# Patient Record
Sex: FEMALE | Race: ASIAN | NOT HISPANIC OR LATINO | URBAN - METROPOLITAN AREA
[De-identification: names, ages, dates, MRNs, and addresses within clinical notes are randomized per-mention and may not be internally consistent; named-entity substitution may affect disease eponyms.]

---

## 2018-06-25 ENCOUNTER — EMERGENCY (EMERGENCY)
Facility: HOSPITAL | Age: 28
LOS: 1 days | Discharge: HOME | End: 2018-06-25
Attending: EMERGENCY MEDICINE | Admitting: EMERGENCY MEDICINE
Payer: COMMERCIAL

## 2018-06-25 VITALS
SYSTOLIC BLOOD PRESSURE: 102 MMHG | HEART RATE: 61 BPM | OXYGEN SATURATION: 99 % | DIASTOLIC BLOOD PRESSURE: 55 MMHG | RESPIRATION RATE: 16 BRPM | TEMPERATURE: 99 F

## 2018-06-25 VITALS
SYSTOLIC BLOOD PRESSURE: 104 MMHG | OXYGEN SATURATION: 98 % | TEMPERATURE: 100 F | RESPIRATION RATE: 18 BRPM | DIASTOLIC BLOOD PRESSURE: 71 MMHG | HEART RATE: 77 BPM

## 2018-06-25 VITALS
HEART RATE: 76 BPM | OXYGEN SATURATION: 98 % | TEMPERATURE: 98 F | SYSTOLIC BLOOD PRESSURE: 84 MMHG | DIASTOLIC BLOOD PRESSURE: 54 MMHG | RESPIRATION RATE: 18 BRPM

## 2018-06-25 DIAGNOSIS — J18.9 PNEUMONIA, UNSPECIFIED ORGANISM: ICD-10-CM

## 2018-06-25 DIAGNOSIS — M32.9 SYSTEMIC LUPUS ERYTHEMATOSUS, UNSPECIFIED: ICD-10-CM

## 2018-06-25 DIAGNOSIS — R51 HEADACHE: ICD-10-CM

## 2018-06-25 DIAGNOSIS — Z88.2 ALLERGY STATUS TO SULFONAMIDES: ICD-10-CM

## 2018-06-25 DIAGNOSIS — R63.0 ANOREXIA: ICD-10-CM

## 2018-06-25 DIAGNOSIS — R53.1 WEAKNESS: ICD-10-CM

## 2018-06-25 DIAGNOSIS — R42 DIZZINESS AND GIDDINESS: ICD-10-CM

## 2018-06-25 LAB
ALBUMIN SERPL ELPH-MCNC: 3.5 G/DL — SIGNIFICANT CHANGE UP (ref 3.4–5)
ALP SERPL-CCNC: 43 U/L — SIGNIFICANT CHANGE UP (ref 40–120)
ALT FLD-CCNC: 29 U/L — SIGNIFICANT CHANGE UP (ref 12–42)
AMYLASE P1 CFR SERPL: 97 U/L — SIGNIFICANT CHANGE UP (ref 25–115)
ANION GAP SERPL CALC-SCNC: 5 MMOL/L — LOW (ref 9–16)
APPEARANCE UR: CLEAR — SIGNIFICANT CHANGE UP
APTT BLD: 30.5 SEC — SIGNIFICANT CHANGE UP (ref 27.5–36.5)
AST SERPL-CCNC: 27 U/L — SIGNIFICANT CHANGE UP (ref 15–37)
BILIRUB SERPL-MCNC: 0.4 MG/DL — SIGNIFICANT CHANGE UP (ref 0.2–1.2)
BILIRUB UR-MCNC: NEGATIVE — SIGNIFICANT CHANGE UP
BUN SERPL-MCNC: 10 MG/DL — SIGNIFICANT CHANGE UP (ref 7–23)
CALCIUM SERPL-MCNC: 9.1 MG/DL — SIGNIFICANT CHANGE UP (ref 8.5–10.5)
CHLORIDE SERPL-SCNC: 103 MMOL/L — SIGNIFICANT CHANGE UP (ref 96–108)
CO2 SERPL-SCNC: 29 MMOL/L — SIGNIFICANT CHANGE UP (ref 22–31)
COLOR SPEC: YELLOW — SIGNIFICANT CHANGE UP
CREAT SERPL-MCNC: 0.94 MG/DL — SIGNIFICANT CHANGE UP (ref 0.5–1.3)
D DIMER BLD IA.RAPID-MCNC: 257 NG/ML DDU — HIGH
DIFF PNL FLD: NEGATIVE — SIGNIFICANT CHANGE UP
GLUCOSE SERPL-MCNC: 103 MG/DL — HIGH (ref 70–99)
GLUCOSE UR QL: NEGATIVE — SIGNIFICANT CHANGE UP
HCG UR QL: NEGATIVE — SIGNIFICANT CHANGE UP
HCT VFR BLD CALC: 29.2 % — LOW (ref 34.5–45)
HCT VFR BLD CALC: 31.7 % — LOW (ref 34.5–45)
HGB BLD-MCNC: 10 G/DL — LOW (ref 11.5–15.5)
HGB BLD-MCNC: 10.7 G/DL — LOW (ref 11.5–15.5)
INR BLD: 1.05 — SIGNIFICANT CHANGE UP (ref 0.88–1.16)
KETONES UR-MCNC: NEGATIVE — SIGNIFICANT CHANGE UP
LACTATE SERPL-SCNC: 1.2 MMOL/L — SIGNIFICANT CHANGE UP (ref 0.4–2)
LEUKOCYTE ESTERASE UR-ACNC: NEGATIVE — SIGNIFICANT CHANGE UP
LIDOCAIN IGE QN: 201 U/L — SIGNIFICANT CHANGE UP (ref 73–393)
LIDOCAIN IGE QN: 256 U/L — SIGNIFICANT CHANGE UP (ref 73–393)
LYMPHOCYTES # BLD AUTO: 25 % — SIGNIFICANT CHANGE UP (ref 13–44)
MCHC RBC-ENTMCNC: 30.6 PG — SIGNIFICANT CHANGE UP (ref 27–34)
MCHC RBC-ENTMCNC: 31.1 PG — SIGNIFICANT CHANGE UP (ref 27–34)
MCHC RBC-ENTMCNC: 33.8 G/DL — SIGNIFICANT CHANGE UP (ref 32–36)
MCHC RBC-ENTMCNC: 34.2 G/DL — SIGNIFICANT CHANGE UP (ref 32–36)
MCV RBC AUTO: 90.6 FL — SIGNIFICANT CHANGE UP (ref 80–100)
MCV RBC AUTO: 90.7 FL — SIGNIFICANT CHANGE UP (ref 80–100)
MONOCYTES NFR BLD AUTO: 3 % — SIGNIFICANT CHANGE UP (ref 2–14)
NEUTROPHILS NFR BLD AUTO: 65 % — SIGNIFICANT CHANGE UP (ref 43–77)
NITRITE UR-MCNC: NEGATIVE — SIGNIFICANT CHANGE UP
PCP SPEC-MCNC: SIGNIFICANT CHANGE UP
PH UR: 6.5 — SIGNIFICANT CHANGE UP (ref 5–8)
PLATELET # BLD AUTO: 109 K/UL — LOW (ref 150–400)
PLATELET # BLD AUTO: 90 K/UL — LOW (ref 150–400)
POTASSIUM SERPL-MCNC: 3.9 MMOL/L — SIGNIFICANT CHANGE UP (ref 3.5–5.3)
POTASSIUM SERPL-SCNC: 3.9 MMOL/L — SIGNIFICANT CHANGE UP (ref 3.5–5.3)
PROT SERPL-MCNC: 8.7 G/DL — HIGH (ref 6.4–8.2)
PROT UR-MCNC: NEGATIVE MG/DL — SIGNIFICANT CHANGE UP
PROTHROM AB SERPL-ACNC: 11.6 SEC — SIGNIFICANT CHANGE UP (ref 9.8–12.7)
RBC # BLD: 3.22 M/UL — LOW (ref 3.8–5.2)
RBC # BLD: 3.5 M/UL — LOW (ref 3.8–5.2)
RBC # FLD: 11.2 % — SIGNIFICANT CHANGE UP (ref 10.3–16.9)
RBC # FLD: 11.2 % — SIGNIFICANT CHANGE UP (ref 10.3–16.9)
SODIUM SERPL-SCNC: 137 MMOL/L — SIGNIFICANT CHANGE UP (ref 132–145)
SP GR SPEC: 1.02 — SIGNIFICANT CHANGE UP (ref 1–1.03)
UROBILINOGEN FLD QL: 0.2 E.U./DL — SIGNIFICANT CHANGE UP
WBC # BLD: 3.1 K/UL — LOW (ref 3.8–10.5)
WBC # BLD: 3.7 K/UL — LOW (ref 3.8–10.5)
WBC # FLD AUTO: 3.1 K/UL — LOW (ref 3.8–10.5)
WBC # FLD AUTO: 3.7 K/UL — LOW (ref 3.8–10.5)

## 2018-06-25 PROCEDURE — 74177 CT ABD & PELVIS W/CONTRAST: CPT | Mod: 26

## 2018-06-25 PROCEDURE — 93010 ELECTROCARDIOGRAM REPORT: CPT | Mod: 59

## 2018-06-25 PROCEDURE — 62270 DX LMBR SPI PNXR: CPT

## 2018-06-25 PROCEDURE — 71260 CT THORAX DX C+: CPT | Mod: 26

## 2018-06-25 PROCEDURE — 70496 CT ANGIOGRAPHY HEAD: CPT | Mod: 26

## 2018-06-25 PROCEDURE — 99284 EMERGENCY DEPT VISIT MOD MDM: CPT

## 2018-06-25 PROCEDURE — 70498 CT ANGIOGRAPHY NECK: CPT | Mod: 26

## 2018-06-25 PROCEDURE — 70450 CT HEAD/BRAIN W/O DYE: CPT | Mod: 26,59

## 2018-06-25 PROCEDURE — 99291 CRITICAL CARE FIRST HOUR: CPT | Mod: 25

## 2018-06-25 PROCEDURE — 71046 X-RAY EXAM CHEST 2 VIEWS: CPT | Mod: 26

## 2018-06-25 RX ORDER — SODIUM CHLORIDE 9 MG/ML
2000 INJECTION INTRAMUSCULAR; INTRAVENOUS; SUBCUTANEOUS ONCE
Qty: 0 | Refills: 0 | Status: COMPLETED | OUTPATIENT
Start: 2018-06-25 | End: 2018-06-25

## 2018-06-25 RX ORDER — ACETAMINOPHEN 500 MG
650 TABLET ORAL ONCE
Qty: 0 | Refills: 0 | Status: COMPLETED | OUTPATIENT
Start: 2018-06-25 | End: 2018-06-25

## 2018-06-25 RX ORDER — CEFTRIAXONE 500 MG/1
2 INJECTION, POWDER, FOR SOLUTION INTRAMUSCULAR; INTRAVENOUS ONCE
Qty: 0 | Refills: 0 | Status: COMPLETED | OUTPATIENT
Start: 2018-06-25 | End: 2018-06-25

## 2018-06-25 RX ORDER — AZITHROMYCIN 500 MG/1
1 TABLET, FILM COATED ORAL
Qty: 4 | Refills: 0 | OUTPATIENT
Start: 2018-06-25 | End: 2018-06-28

## 2018-06-25 RX ORDER — KETOROLAC TROMETHAMINE 30 MG/ML
15 SYRINGE (ML) INJECTION ONCE
Qty: 0 | Refills: 0 | Status: DISCONTINUED | OUTPATIENT
Start: 2018-06-25 | End: 2018-06-25

## 2018-06-25 RX ORDER — AZITHROMYCIN 500 MG/1
500 TABLET, FILM COATED ORAL ONCE
Qty: 0 | Refills: 0 | Status: COMPLETED | OUTPATIENT
Start: 2018-06-25 | End: 2018-06-25

## 2018-06-25 RX ORDER — DIPHENHYDRAMINE HCL 50 MG
25 CAPSULE ORAL ONCE
Qty: 0 | Refills: 0 | Status: COMPLETED | OUTPATIENT
Start: 2018-06-25 | End: 2018-06-25

## 2018-06-25 RX ORDER — METOCLOPRAMIDE HCL 10 MG
5 TABLET ORAL ONCE
Qty: 0 | Refills: 0 | Status: COMPLETED | OUTPATIENT
Start: 2018-06-25 | End: 2018-06-25

## 2018-06-25 RX ADMIN — CEFTRIAXONE 100 GRAM(S): 500 INJECTION, POWDER, FOR SOLUTION INTRAMUSCULAR; INTRAVENOUS at 20:31

## 2018-06-25 RX ADMIN — SODIUM CHLORIDE 1000 MILLILITER(S): 9 INJECTION INTRAMUSCULAR; INTRAVENOUS; SUBCUTANEOUS at 15:24

## 2018-06-25 RX ADMIN — SODIUM CHLORIDE 2000 MILLILITER(S): 9 INJECTION INTRAMUSCULAR; INTRAVENOUS; SUBCUTANEOUS at 20:31

## 2018-06-25 RX ADMIN — Medication 15 MILLIGRAM(S): at 15:25

## 2018-06-25 RX ADMIN — AZITHROMYCIN 255 MILLIGRAM(S): 500 TABLET, FILM COATED ORAL at 18:20

## 2018-06-25 RX ADMIN — SODIUM CHLORIDE 2000 MILLILITER(S): 9 INJECTION INTRAMUSCULAR; INTRAVENOUS; SUBCUTANEOUS at 21:31

## 2018-06-25 RX ADMIN — Medication 650 MILLIGRAM(S): at 20:31

## 2018-06-25 RX ADMIN — Medication 5 MILLIGRAM(S): at 15:23

## 2018-06-25 RX ADMIN — CEFTRIAXONE 2 GRAM(S): 500 INJECTION, POWDER, FOR SOLUTION INTRAMUSCULAR; INTRAVENOUS at 21:00

## 2018-06-25 RX ADMIN — Medication 25 MILLIGRAM(S): at 15:23

## 2018-06-25 NOTE — ED ADULT NURSE NOTE - OBJECTIVE STATEMENT
c/o very bad headache with nausea, chills, and weakness x 1 week. denies CP, SOB, fever/ urinary/bowel s/s. pt in NAD, friends at bedside, and will continue to monitor.

## 2018-06-25 NOTE — ED PROVIDER NOTE - CRITICAL CARE PROVIDED
additional history taking/interpretation of diagnostic studies/documentation/consultation with other physicians/direct patient care (not related to procedure)

## 2018-06-25 NOTE — ED PROVIDER NOTE - PHYSICAL EXAMINATION
Comfortable, no acute distress  NCAT  PERRL, EOMI  RRR  CTAB  soft, NTND  skin normal, no rashes  AAOx3, motor/sensory grossly intact   no meningeal signs

## 2018-06-25 NOTE — ED PROVIDER NOTE - OBJECTIVE STATEMENT
29 y/o F w/ PMHx of Lupus presents to ED w/ c/o ha, di 29 y/o F w/ PMHx of Lupus on prednisone and hydroxychloroquine presents to ED w/ c/o ha and dizziness since this morning. Pt states that ha is really painful, specially when staying still. Pt has been having ha but today ha will not go away. She also reports stomach pain w/ loss of appetite. States she felt febrile but did not take temperature.  Pt also reports that she has not taken medications for a while. She recently had to change PCP due to change of insurance, has appointment next week w/ Dr Hanna Billy from [place].

## 2018-06-25 NOTE — ED PROVIDER NOTE - ATTESTATION, MLM
Pt got her test results (labs) and would like to further discuss   Pt is asking if the Nurse can call back and explain them TODAY   Please advise I have reviewed and confirmed nurses' notes for patient's medications, allergies, medical history, and surgical history.

## 2018-06-25 NOTE — ED PROVIDER NOTE - DIAGNOSTIC INTERPRETATION
Chest x-ray interpreted by ER Physician Dr. Pittman  Findings: heart size normal, no infiltrates, lungs fully expanded, soft tissues appear normal.

## 2018-06-25 NOTE — ED ADULT NURSE NOTE - CHPI ED SYMPTOMS NEG
no fever/no confusion/no loss of consciousness/no weakness/no vomiting/no change in level of consciousness/no numbness

## 2018-06-25 NOTE — ED PROVIDER NOTE - OBJECTIVE STATEMENT
pt with h/o SLE, seen today for headache, xray with possible infiltrate and treated with azithro, upon discharge pt had syncopal episode in front of hospital, SBP 80s upon triage, pt complains of 10/10 headache, frontal, thorbbing, some photophobia and neck stiffness, subjective fever, no CP/SOB, she does report a few weeks of abdominal pain, diffuse, crampy, no n/v/d, no rash, no sick contacts, no leg pain or swelling

## 2018-06-25 NOTE — ED PROVIDER NOTE - MEDICAL DECISION MAKING DETAILS
pt w/ ha and dizziness, will do blood work, CT head, and give IV fluids. pt w/ hx of lupus presenting w/ c/o ha and dizziness, will do blood work, CT head, and give IV fluids. pt w/ hx of lupus presenting w/ c/o ha and dizziness, will do blood work, CT head, and give IV fluids.  Pt with infiltrate on CXR as per radiologist, she will receive azithromycin.  She feels better after fluids and ketorolac and reglan.  IV azithro given 500mg in ED

## 2018-06-26 ENCOUNTER — INPATIENT (INPATIENT)
Facility: HOSPITAL | Age: 28
LOS: 7 days | Discharge: HOME | DRG: 546 | End: 2018-07-04
Attending: STUDENT IN AN ORGANIZED HEALTH CARE EDUCATION/TRAINING PROGRAM | Admitting: STUDENT IN AN ORGANIZED HEALTH CARE EDUCATION/TRAINING PROGRAM
Payer: COMMERCIAL

## 2018-06-26 DIAGNOSIS — D63.8 ANEMIA IN OTHER CHRONIC DISEASES CLASSIFIED ELSEWHERE: ICD-10-CM

## 2018-06-26 DIAGNOSIS — A41.9 SEPSIS, UNSPECIFIED ORGANISM: ICD-10-CM

## 2018-06-26 DIAGNOSIS — J18.9 PNEUMONIA, UNSPECIFIED ORGANISM: ICD-10-CM

## 2018-06-26 DIAGNOSIS — E03.8 OTHER SPECIFIED HYPOTHYROIDISM: ICD-10-CM

## 2018-06-26 DIAGNOSIS — R55 SYNCOPE AND COLLAPSE: ICD-10-CM

## 2018-06-26 DIAGNOSIS — Z29.9 ENCOUNTER FOR PROPHYLACTIC MEASURES, UNSPECIFIED: ICD-10-CM

## 2018-06-26 DIAGNOSIS — R63.8 OTHER SYMPTOMS AND SIGNS CONCERNING FOOD AND FLUID INTAKE: ICD-10-CM

## 2018-06-26 DIAGNOSIS — M32.9 SYSTEMIC LUPUS ERYTHEMATOSUS, UNSPECIFIED: ICD-10-CM

## 2018-06-26 DIAGNOSIS — D72.819 DECREASED WHITE BLOOD CELL COUNT, UNSPECIFIED: ICD-10-CM

## 2018-06-26 LAB
ALBUMIN SERPL ELPH-MCNC: 2.7 G/DL — LOW (ref 3.3–5)
ALP SERPL-CCNC: 28 U/L — LOW (ref 40–120)
ALT FLD-CCNC: 12 U/L — SIGNIFICANT CHANGE UP (ref 10–45)
ANION GAP SERPL CALC-SCNC: 9 MMOL/L — SIGNIFICANT CHANGE UP (ref 5–17)
APPEARANCE CSF: CLEAR — SIGNIFICANT CHANGE UP
APPEARANCE SPUN FLD: COLORLESS — SIGNIFICANT CHANGE UP
APPEARANCE UR: CLEAR — SIGNIFICANT CHANGE UP
AST SERPL-CCNC: 16 U/L — SIGNIFICANT CHANGE UP (ref 10–40)
BACTERIAL AG PNL SER: 0 % — SIGNIFICANT CHANGE UP
BILIRUB SERPL-MCNC: 0.2 MG/DL — SIGNIFICANT CHANGE UP (ref 0.2–1.2)
BILIRUB UR-MCNC: NEGATIVE — SIGNIFICANT CHANGE UP
BUN SERPL-MCNC: 12 MG/DL — SIGNIFICANT CHANGE UP (ref 7–23)
CALCIUM SERPL-MCNC: 7.7 MG/DL — LOW (ref 8.4–10.5)
CHLORIDE SERPL-SCNC: 111 MMOL/L — HIGH (ref 96–108)
CO2 SERPL-SCNC: 21 MMOL/L — LOW (ref 22–31)
COLOR CSF: CLEAR — SIGNIFICANT CHANGE UP
COLOR SPEC: YELLOW — SIGNIFICANT CHANGE UP
CREAT SERPL-MCNC: 0.77 MG/DL — SIGNIFICANT CHANGE UP (ref 0.5–1.3)
CULTURE RESULTS: SIGNIFICANT CHANGE UP
DIFF PNL FLD: NEGATIVE — SIGNIFICANT CHANGE UP
EOSINOPHIL # CSF: 0 % — SIGNIFICANT CHANGE UP
GLUCOSE CSF-MCNC: 45 MG/DL — SIGNIFICANT CHANGE UP (ref 40–70)
GLUCOSE SERPL-MCNC: 105 MG/DL — HIGH (ref 70–99)
GLUCOSE UR QL: NEGATIVE — SIGNIFICANT CHANGE UP
GRAM STN FLD: SIGNIFICANT CHANGE UP
HBA1C BLD-MCNC: 5.4 % — SIGNIFICANT CHANGE UP (ref 4–5.6)
HCT VFR BLD CALC: 29 % — LOW (ref 34.5–45)
HGB BLD-MCNC: 9.9 G/DL — LOW (ref 11.5–15.5)
HIV 1+2 AB+HIV1 P24 AG SERPL QL IA: SIGNIFICANT CHANGE UP
KETONES UR-MCNC: 15 MG/DL
LEUKOCYTE ESTERASE UR-ACNC: NEGATIVE — SIGNIFICANT CHANGE UP
LYMPHOCYTES # CSF: 7 % — LOW (ref 40–80)
MAGNESIUM SERPL-MCNC: 1.7 MG/DL — SIGNIFICANT CHANGE UP (ref 1.6–2.6)
MCHC RBC-ENTMCNC: 30.9 PG — SIGNIFICANT CHANGE UP (ref 27–34)
MCHC RBC-ENTMCNC: 34.1 G/DL — SIGNIFICANT CHANGE UP (ref 32–36)
MCV RBC AUTO: 90.6 FL — SIGNIFICANT CHANGE UP (ref 80–100)
MONOS+MACROS NFR CSF: 1 % — LOW (ref 15–45)
NEUTROPHILS # CSF: 92 % — HIGH (ref 0–6)
NITRITE UR-MCNC: NEGATIVE — SIGNIFICANT CHANGE UP
NRBC NFR CSF: 0 % — SIGNIFICANT CHANGE UP (ref 0–0)
NRBC NFR CSF: 57 /UL — HIGH
OTHER CELLS CSF MANUAL: 0 % — SIGNIFICANT CHANGE UP
PH UR: 5.5 — SIGNIFICANT CHANGE UP (ref 5–8)
PHOSPHATE SERPL-MCNC: 3.2 MG/DL — SIGNIFICANT CHANGE UP (ref 2.5–4.5)
PLATELET # BLD AUTO: 96 K/UL — LOW (ref 150–400)
POTASSIUM SERPL-MCNC: 4 MMOL/L — SIGNIFICANT CHANGE UP (ref 3.5–5.3)
POTASSIUM SERPL-SCNC: 4 MMOL/L — SIGNIFICANT CHANGE UP (ref 3.5–5.3)
PROT CSF-MCNC: 55 MG/DL — HIGH (ref 15–45)
PROT SERPL-MCNC: 5.9 G/DL — LOW (ref 6–8.3)
PROT UR-MCNC: NEGATIVE MG/DL — SIGNIFICANT CHANGE UP
RAPID RVP RESULT: SIGNIFICANT CHANGE UP
RBC # BLD: 3.2 M/UL — LOW (ref 3.8–5.2)
RBC # BLD: 3.2 M/UL — LOW (ref 3.8–5.2)
RBC # CSF: 12 /UL — HIGH (ref 0–0)
RBC # FLD: 11.3 % — SIGNIFICANT CHANGE UP (ref 10.3–16.9)
RETICS/RBC NFR: 0.8 % — SIGNIFICANT CHANGE UP (ref 0.5–2.5)
SODIUM SERPL-SCNC: 141 MMOL/L — SIGNIFICANT CHANGE UP (ref 135–145)
SP GR SPEC: <=1.005 — SIGNIFICANT CHANGE UP (ref 1–1.03)
SPECIMEN SOURCE: SIGNIFICANT CHANGE UP
SPECIMEN SOURCE: SIGNIFICANT CHANGE UP
T4 FREE SERPL-MCNC: 0.76 NG/DL — SIGNIFICANT CHANGE UP (ref 0.7–1.48)
TSH SERPL-MCNC: 0.26 UIU/ML — LOW (ref 0.35–4.94)
TUBE TYPE: SIGNIFICANT CHANGE UP
UROBILINOGEN FLD QL: 0.2 E.U./DL — SIGNIFICANT CHANGE UP
WBC # BLD: 2.1 K/UL — LOW (ref 3.8–10.5)
WBC # FLD AUTO: 2.1 K/UL — LOW (ref 3.8–10.5)

## 2018-06-26 PROCEDURE — 99223 1ST HOSP IP/OBS HIGH 75: CPT | Mod: GC

## 2018-06-26 PROCEDURE — 99233 SBSQ HOSP IP/OBS HIGH 50: CPT

## 2018-06-26 RX ORDER — HEPARIN SODIUM 5000 [USP'U]/ML
5000 INJECTION INTRAVENOUS; SUBCUTANEOUS EVERY 8 HOURS
Qty: 0 | Refills: 0 | Status: DISCONTINUED | OUTPATIENT
Start: 2018-06-26 | End: 2018-06-26

## 2018-06-26 RX ORDER — CEFTRIAXONE 500 MG/1
2 INJECTION, POWDER, FOR SOLUTION INTRAMUSCULAR; INTRAVENOUS EVERY 12 HOURS
Qty: 0 | Refills: 0 | Status: DISCONTINUED | OUTPATIENT
Start: 2018-06-26 | End: 2018-06-29

## 2018-06-26 RX ORDER — VANCOMYCIN HCL 1 G
1000 VIAL (EA) INTRAVENOUS ONCE
Qty: 0 | Refills: 0 | Status: COMPLETED | OUTPATIENT
Start: 2018-06-26 | End: 2018-06-26

## 2018-06-26 RX ORDER — VANCOMYCIN HCL 1 G
1000 VIAL (EA) INTRAVENOUS EVERY 12 HOURS
Qty: 0 | Refills: 0 | Status: DISCONTINUED | OUTPATIENT
Start: 2018-06-26 | End: 2018-06-27

## 2018-06-26 RX ORDER — ACYCLOVIR SODIUM 500 MG
500 VIAL (EA) INTRAVENOUS ONCE
Qty: 0 | Refills: 0 | Status: COMPLETED | OUTPATIENT
Start: 2018-06-26 | End: 2018-06-26

## 2018-06-26 RX ORDER — LIDOCAINE 4 G/100G
1 CREAM TOPICAL DAILY
Qty: 0 | Refills: 0 | Status: DISCONTINUED | OUTPATIENT
Start: 2018-06-26 | End: 2018-07-04

## 2018-06-26 RX ORDER — HYDROCORTISONE 20 MG
50 TABLET ORAL EVERY 8 HOURS
Qty: 0 | Refills: 0 | Status: COMPLETED | OUTPATIENT
Start: 2018-06-26 | End: 2018-06-27

## 2018-06-26 RX ORDER — KETOROLAC TROMETHAMINE 30 MG/ML
30 SYRINGE (ML) INJECTION ONCE
Qty: 0 | Refills: 0 | Status: DISCONTINUED | OUTPATIENT
Start: 2018-06-26 | End: 2018-06-26

## 2018-06-26 RX ORDER — HYDROCORTISONE 20 MG
50 TABLET ORAL ONCE
Qty: 0 | Refills: 0 | Status: COMPLETED | OUTPATIENT
Start: 2018-06-26 | End: 2018-06-26

## 2018-06-26 RX ORDER — SODIUM CHLORIDE 9 MG/ML
1000 INJECTION INTRAMUSCULAR; INTRAVENOUS; SUBCUTANEOUS
Qty: 0 | Refills: 0 | Status: DISCONTINUED | OUTPATIENT
Start: 2018-06-26 | End: 2018-06-27

## 2018-06-26 RX ORDER — POLYETHYLENE GLYCOL 3350 17 G/17G
17 POWDER, FOR SOLUTION ORAL DAILY
Qty: 0 | Refills: 0 | Status: DISCONTINUED | OUTPATIENT
Start: 2018-06-26 | End: 2018-06-30

## 2018-06-26 RX ORDER — ACETAMINOPHEN 500 MG
650 TABLET ORAL ONCE
Qty: 0 | Refills: 0 | Status: COMPLETED | OUTPATIENT
Start: 2018-06-26 | End: 2018-06-26

## 2018-06-26 RX ORDER — SIMETHICONE 80 MG/1
80 TABLET, CHEWABLE ORAL ONCE
Qty: 0 | Refills: 0 | Status: COMPLETED | OUTPATIENT
Start: 2018-06-26 | End: 2018-06-26

## 2018-06-26 RX ORDER — HYDROCORTISONE 20 MG
50 TABLET ORAL EVERY 8 HOURS
Qty: 0 | Refills: 0 | Status: DISCONTINUED | OUTPATIENT
Start: 2018-06-26 | End: 2018-06-26

## 2018-06-26 RX ORDER — CEFTRIAXONE 500 MG/1
2 INJECTION, POWDER, FOR SOLUTION INTRAMUSCULAR; INTRAVENOUS EVERY 24 HOURS
Qty: 0 | Refills: 0 | Status: DISCONTINUED | OUTPATIENT
Start: 2018-06-26 | End: 2018-06-26

## 2018-06-26 RX ORDER — SODIUM CHLORIDE 9 MG/ML
5 INJECTION INTRAMUSCULAR; INTRAVENOUS; SUBCUTANEOUS
Qty: 0 | Refills: 0 | Status: DISCONTINUED | OUTPATIENT
Start: 2018-06-26 | End: 2018-07-04

## 2018-06-26 RX ADMIN — Medication 50 MILLIGRAM(S): at 19:49

## 2018-06-26 RX ADMIN — HEPARIN SODIUM 5000 UNIT(S): 5000 INJECTION INTRAVENOUS; SUBCUTANEOUS at 14:39

## 2018-06-26 RX ADMIN — Medication 650 MILLIGRAM(S): at 21:59

## 2018-06-26 RX ADMIN — Medication 50 MILLIGRAM(S): at 04:16

## 2018-06-26 RX ADMIN — Medication 30 MILLIGRAM(S): at 04:30

## 2018-06-26 RX ADMIN — POLYETHYLENE GLYCOL 3350 17 GRAM(S): 17 POWDER, FOR SOLUTION ORAL at 21:59

## 2018-06-26 RX ADMIN — CEFTRIAXONE 100 GRAM(S): 500 INJECTION, POWDER, FOR SOLUTION INTRAMUSCULAR; INTRAVENOUS at 18:01

## 2018-06-26 RX ADMIN — Medication 650 MILLIGRAM(S): at 23:00

## 2018-06-26 RX ADMIN — CEFTRIAXONE 100 GRAM(S): 500 INJECTION, POWDER, FOR SOLUTION INTRAMUSCULAR; INTRAVENOUS at 05:51

## 2018-06-26 RX ADMIN — Medication 250 MILLIGRAM(S): at 02:42

## 2018-06-26 RX ADMIN — SODIUM CHLORIDE 100 MILLILITER(S): 9 INJECTION INTRAMUSCULAR; INTRAVENOUS; SUBCUTANEOUS at 06:57

## 2018-06-26 RX ADMIN — Medication 250 MILLIGRAM(S): at 14:39

## 2018-06-26 RX ADMIN — Medication 1000 MILLIGRAM(S): at 03:42

## 2018-06-26 RX ADMIN — Medication 110 MILLIGRAM(S): at 04:16

## 2018-06-26 RX ADMIN — SODIUM CHLORIDE 5 MILLILITER(S): 9 INJECTION INTRAMUSCULAR; INTRAVENOUS; SUBCUTANEOUS at 18:00

## 2018-06-26 RX ADMIN — Medication 50 MILLIGRAM(S): at 11:43

## 2018-06-26 RX ADMIN — Medication 500 MILLIGRAM(S): at 05:16

## 2018-06-26 RX ADMIN — SIMETHICONE 80 MILLIGRAM(S): 80 TABLET, CHEWABLE ORAL at 21:59

## 2018-06-26 RX ADMIN — LIDOCAINE 1 PATCH: 4 CREAM TOPICAL at 21:59

## 2018-06-26 NOTE — H&P ADULT - PROBLEM SELECTOR PLAN 3
- Patient with lupus, takes hydroxychloroquine and prednisone as home medications  - Consider immunosuppression in workup of infectious etiology  - Hold prednisone and hydroxychloroquine at present given sepsis  - SoluCortef 50mg IV q8h - Patient with episode of syncope, shortly afterward febrile to 103F  - Likely due to sepsis as patient hypotensive as well  - low threshold for cardiac workup   - Continue telemetry - Patient with episode of syncope, shortly afterward febrile to 103F  - Likely due to sepsis as patient hypotensive as well  - low threshold for cardiac workup   - Continue telemetry  - CT head negative and CTA negative

## 2018-06-26 NOTE — PROGRESS NOTE ADULT - PROBLEM SELECTOR PLAN 6
Noted leukopenia Noted leukopenia 3.1. VTE: HSQ    FULL CODE    Dispo: Admitted to [facility] for sepsis of unclear etiology though concern for opportunistic infection vs viral infection and up for step down to [facility].

## 2018-06-26 NOTE — PROGRESS NOTE ADULT - SUBJECTIVE AND OBJECTIVE BOX
Transfer Note: [facility] to [facility]    Hospital Course:   28F with PMH SLE who presented with headache and syncopal event. Reports that headache lasted 1 day 8.5/10, band-like distribution without radiation. Denied photophobia.  Patient reported that she has had cough- recent visit to ED day prior to admission for which patient given abx with azithromycin but never initiated as had syncopal event en route to picking up prescription Of note, patient reports over the last month she notes worsening generalized weakness and fatigue. Also reported 1 month of unintentional weight loss of 5lbs with early satiety, and night sweats. In the ED Noted febrile to 103F (rectal), HR P76, BP notable for 84/54, RR18 sat 98% on room air. S/p 2L NS, ceftriaxone 2g empiric coverage. Labs notable for WBC 3.7, Hb 10.7, platelets 109, UA and UTox negative, CT/CTA head negative. LP performed in ED, CSF with nucleated cells, with RBC and neutrophils, glucose WNL, mild elevation of protein- given no photophobia, no kernig/brudzinski with low suspicion for meningitis. CT chest with RUL with atypical nodule with tree in bud pattern. Patient admitted to [facility] for sepsis with concern for opportunistic infection vs TB given immunosuppression. Started on Vanc and ceftriaxone for broad abx coverage as well as solucortef. Sent off viral panel. BP improved with IVF. Patient with clinical improvement determined stable for step down to [facility].     Subjective/ROS: Patient reports improvement in her headache. With movement she experiences worsening of her headache and occasional dizziness. She endorses intermittent cough with occasional yellow phlegm, constipation, abdominal pain, and chills. Denies photophobia, chest pain, shortness of breath, palpitations, nausea, vomiting, diarrhea, fevers, and chills.     VITAL SIGNS:  Vital Signs Last 24 Hrs  T(C): 37.2 (26 Jun 2018 11:35), Max: 39.4 (25 Jun 2018 20:35)  T(F): 99 (26 Jun 2018 11:35), Max: 103 (25 Jun 2018 20:35)  HR: 52 (26 Jun 2018 14:56) (52 - 76)  BP: 112/71 (26 Jun 2018 14:56) (84/54 - 140/70)  BP(mean): 85 (26 Jun 2018 14:56) (71 - 85)  RR: 14 (26 Jun 2018 14:56) (14 - 19)  SpO2: 99% (26 Jun 2018 14:56) (98% - 100%)    PHYSICAL EXAM:    General: Well dressed well nourished young woman sitting comfortably in bed. Non-toxic appearing   HEENT: NC/AT; EOMI, anicteric sclera; MMM  Neck: supple, no JVD, no submandibular or cervical lymphadenopathy, no nuchal rigidity; kernig/brudzinski negative   Cardiovascular: +S1/S2; bradycardic, no r/m/g  Respiratory: CTA B/L; no W/R/R  Back: LP site with bandage; non-tender to palpation   Gastrointestinal: soft, NT/ND; +BS  Extremities: WWP; no edema, clubbing or cyanosis  Vascular: 2+ radial, DP pulses B/L  Neurological: AAOx3; no focal deficits    MEDICATIONS:  MEDICATIONS  (STANDING):  cefTRIAXone   IVPB 2 Gram(s) IV Intermittent every 12 hours  heparin  Injectable 5000 Unit(s) SubCutaneous every 8 hours  hydrocortisone sodium succinate Injectable 50 milliGRAM(s) IV Push every 8 hours  sodium chloride 0.9%. 1000 milliLiter(s) (100 mL/Hr) IV Continuous <Continuous>  sodium chloride 3%  Inhalation 5 milliLiter(s) Inhalation two times a day  vancomycin  IVPB 1000 milliGRAM(s) IV Intermittent every 12 hours    MEDICATIONS  (PRN):      ALLERGIES:  Allergies    sulfa drugs (Rash)    Intolerances        LABS:                        9.9    2.1   )-----------( 96       ( 26 Jun 2018 10:41 )             29.0     06-26    141  |  111<H>  |  12  ----------------------------<  105<H>  4.0   |  21<L>  |  0.77    Ca    7.7<L>      26 Jun 2018 10:41  Phos  3.2     06-26  Mg     1.7     06-26    TPro  5.9<L>  /  Alb  2.7<L>  /  TBili  0.2  /  DBili  x   /  AST  16  /  ALT  12  /  AlkPhos  28<L>  06-26    PT/INR - ( 25 Jun 2018 15:20 )   PT: 11.6 sec;   INR: 1.05          PTT - ( 25 Jun 2018 15:20 )  PTT:30.5 sec  Urinalysis Basic - ( 26 Jun 2018 06:43 )    Color: Yellow / Appearance: Clear / SG: <=1.005 / pH: x  Gluc: x / Ketone: 15 mg/dL  / Bili: Negative / Urobili: 0.2 E.U./dL   Blood: x / Protein: NEGATIVE mg/dL / Nitrite: NEGATIVE   Leuk Esterase: NEGATIVE / RBC: x / WBC x   Sq Epi: x / Non Sq Epi: x / Bacteria: x      CAPILLARY BLOOD GLUCOSE      POCT Blood Glucose.: 75 mg/dL (25 Jun 2018 20:46)      RADIOLOGY & ADDITIONAL TESTS: Reviewed. Transfer Note: [facility] to [facility]    Hospital Course:   28F with PMH SLE who presented with headache and syncopal event. Reports that headache lasted 1 day 8.5/10, band-like distribution without radiation. Denied photophobia.  Patient reported that she has had cough- recent visit to ED day prior to admission for which patient given abx with azithromycin but never initiated as had syncopal event en route to picking up prescription Of note, patient reports over the last month she notes worsening generalized weakness and fatigue. Also reported 1 month of unintentional weight loss of 5lbs with early satiety, and night sweats. In the ED Noted febrile to 103F (rectal), HR P76, BP notable for 84/54, RR18 sat 98% on room air. S/p 2L NS, ceftriaxone 2g empiric coverage. Labs notable for WBC 3.7, Hb 10.7, platelets 109, UA and UTox negative, CT/CTA head negative. LP performed in ED, CSF with nucleated cells, with RBC and neutrophils, glucose WNL, mild elevation of protein- given no photophobia, no kernig/brudzinski with low suspicion for meningitis. CT chest with RUL with atypical nodule with tree in bud pattern. Patient admitted to [facility] for sepsis with concern for opportunistic infection vs TB given immunosuppression. Started on Vanc and ceftriaxone for broad abx coverage as well as solucortef. Sent off viral panel. BP improved with IVF. Patient with clinical improvement determined stable for step down to [facility].     Subjective/ROS: Patient reports improvement in her headache. With movement she experiences worsening of her headache and occasional dizziness. She endorses intermittent cough with occasional yellow phlegm, constipation, abdominal pain, and chills. Denies photophobia, chest pain, shortness of breath, palpitations, nausea, vomiting, diarrhea, fevers.     VITAL SIGNS:  Vital Signs Last 24 Hrs  T(C): 37.2 (26 Jun 2018 11:35), Max: 39.4 (25 Jun 2018 20:35)  T(F): 99 (26 Jun 2018 11:35), Max: 103 (25 Jun 2018 20:35)  HR: 52 (26 Jun 2018 14:56) (52 - 76)  BP: 112/71 (26 Jun 2018 14:56) (84/54 - 140/70)  BP(mean): 85 (26 Jun 2018 14:56) (71 - 85)  RR: 14 (26 Jun 2018 14:56) (14 - 19)  SpO2: 99% (26 Jun 2018 14:56) (98% - 100%)    PHYSICAL EXAM:    General: Well dressed well nourished young woman sitting comfortably in bed. Non-toxic appearing   HEENT: NC/AT; EOMI, anicteric sclera; MMM  Neck: supple, no JVD, no submandibular or cervical lymphadenopathy, no nuchal rigidity; kernig/brudzinski negative   Cardiovascular: +S1/S2; bradycardic, no r/m/g  Respiratory: CTA B/L; no W/R/R  Back: LP site with bandage; non-tender to palpation   Gastrointestinal: soft, ND, tenderness to palpation in lower quadrants; +BS  Extremities: WWP; no edema, clubbing or cyanosis  Vascular: 2+ radial, DP pulses B/L  Neurological: AAOx3; no focal deficits    MEDICATIONS:  MEDICATIONS  (STANDING):  cefTRIAXone   IVPB 2 Gram(s) IV Intermittent every 12 hours  heparin  Injectable 5000 Unit(s) SubCutaneous every 8 hours  hydrocortisone sodium succinate Injectable 50 milliGRAM(s) IV Push every 8 hours  sodium chloride 0.9%. 1000 milliLiter(s) (100 mL/Hr) IV Continuous <Continuous>  sodium chloride 3%  Inhalation 5 milliLiter(s) Inhalation two times a day  vancomycin  IVPB 1000 milliGRAM(s) IV Intermittent every 12 hours    MEDICATIONS  (PRN):      ALLERGIES:  Allergies    sulfa drugs (Rash)    Intolerances        LABS:                        9.9    2.1   )-----------( 96       ( 26 Jun 2018 10:41 )             29.0     06-26    141  |  111<H>  |  12  ----------------------------<  105<H>  4.0   |  21<L>  |  0.77    Ca    7.7<L>      26 Jun 2018 10:41  Phos  3.2     06-26  Mg     1.7     06-26    TPro  5.9<L>  /  Alb  2.7<L>  /  TBili  0.2  /  DBili  x   /  AST  16  /  ALT  12  /  AlkPhos  28<L>  06-26    PT/INR - ( 25 Jun 2018 15:20 )   PT: 11.6 sec;   INR: 1.05          PTT - ( 25 Jun 2018 15:20 )  PTT:30.5 sec  Urinalysis Basic - ( 26 Jun 2018 06:43 )    Color: Yellow / Appearance: Clear / SG: <=1.005 / pH: x  Gluc: x / Ketone: 15 mg/dL  / Bili: Negative / Urobili: 0.2 E.U./dL   Blood: x / Protein: NEGATIVE mg/dL / Nitrite: NEGATIVE   Leuk Esterase: NEGATIVE / RBC: x / WBC x   Sq Epi: x / Non Sq Epi: x / Bacteria: x      CAPILLARY BLOOD GLUCOSE      POCT Blood Glucose.: 75 mg/dL (25 Jun 2018 20:46)      RADIOLOGY & ADDITIONAL TESTS: Reviewed.

## 2018-06-26 NOTE — H&P ADULT - PROBLEM SELECTOR PLAN 5
- Patient with likely chronic leukopenia due to SLE and hydroxychloroquine   - Trend CBC, continue to monitor - Hb 10 on arrival, likely due to chronic disease in setting of SLE  - Trend CBC  - Goal Hb >7

## 2018-06-26 NOTE — PROGRESS NOTE ADULT - PROBLEM SELECTOR PLAN 3
Hx of SLE on hydroxychloroquine, prednisone.  -Held hydroxychloroquine and prednisone on presentation. Continue with Solucortef

## 2018-06-26 NOTE — PROGRESS NOTE ADULT - PROBLEM SELECTOR PLAN 5
Noted leukopenia 3.1. Patient unsure of baseline- may be associated with SLE, hydroxychloroquine vs component of active infection. Patient reports having leukopenia in the past which her physician attributed to lupus.   -c/w monitoring CBC

## 2018-06-26 NOTE — H&P ADULT - PROBLEM SELECTOR PLAN 6
F: no IVF encourage PO intake  E Replete K<4 Mg<2  N regular diet    Ppx: SQH, SCD  Code full  Dispo [facility]  [facility] Dr. Hanna Billy (to establish care) [phone] - Patient with likely chronic leukopenia due to SLE and hydroxychloroquine   - Trend CBC, continue to monitor

## 2018-06-26 NOTE — PROGRESS NOTE ADULT - PROBLEM SELECTOR PLAN 2
Noted episode of syncope. Patient reports some dizziness with change in position- with no documented orthostatic- may be component of orthostatic hypotension. Denies chest pain, shortness of breath. EKG with no ischemic changes and negative trops

## 2018-06-26 NOTE — H&P ADULT - PROBLEM SELECTOR PLAN 4
- Hb 10 on arrival, likely due to chronic disease in setting of SLE  - Trend CBC  - Goal Hb >7 - Patient with lupus, takes hydroxychloroquine and prednisone as home medications  - Consider immunosuppression in workup of infectious etiology  - Hold prednisone and hydroxychloroquine at present given sepsis  - SoluCortef 50mg IV q8h

## 2018-06-26 NOTE — PROGRESS NOTE ADULT - PROBLEM SELECTOR PLAN 2
Noted episode of syncope. Patient reports some dizziness with change in position- with no documented orthostatic- may be component of orthostatic hypotension. Denies chest pain, shortness of breath. EKG with no ischemic changes and negative trops Noted episode of syncope. Patient reports some dizziness with change in position- with no documented orthostatic- may be component of orthostatic hypotension. Denies chest pain, shortness of breath. Trops negative.

## 2018-06-26 NOTE — PROGRESS NOTE ADULT - PROBLEM SELECTOR PLAN 8
F: S/p 100cc/h for 10h   E: Replete electrolytes to maintain K>4 and Mg>2  N:  Regular Diet as tolerated

## 2018-06-26 NOTE — H&P ADULT - HISTORY OF PRESENT ILLNESS
Patient is a 28 year old female with PMH SLE who presents following an earlier visit to ED yesterday during which time she was diagnosed with pneumonia and written a prescription for azithromycin - on her way to  the prescription she had a syncopal event and was brought back to the emergency department. She states that over the past month she has had generalized weakness and fatigue, as well as intermittent fever 2-3x per week for the last three weeks. She also states that she has been waking up drenched in sweat for the past month. She endorses unintentional weight loss of 5lbs over the past month with decreased appetite and early satiety associated with abdominal pain. She has dry cough, chills, and a headache which she describes as band-like, sharp, gradual onset, and associated with nausea. She describes this headache as her worst in the past seven years. The patient is [place] and lives with her two sisters, she denies international travel or any sick contacts at home. She denies diarrhea but has had some constipation.     In the ED T36.8 (rectal measurement 39.4), P76, 84/54, RR18 sat 98% on room air. Patient received 2L NS, ceftriaxone 2g empiric coverage. Labs notable for WBC 3.7, Hb 10.7, platelets 109, UA and UTox negative, CT/CTA head negative,

## 2018-06-26 NOTE — PROGRESS NOTE ADULT - PROBLEM SELECTOR PLAN 7
VTE: HSQ    FULL CODE    Dispo: Admitted to [facility] for sepsis of unclear etiology though concern for PNA and R/O TB, opportunistic infection. F: IVF= 100cc/hr NS for 10 hours.  E: Replete electrolytes to maintain K>4 and Mg>2  N:  Regular Diet as tolerated

## 2018-06-26 NOTE — PROGRESS NOTE ADULT - PROBLEM SELECTOR PLAN 1
Met sepsis criteria on presentation with fever to 103F, tachycardia and leukopenia <4. Etiology remains unclear. CT with an atypical nodule with tree- in bud- given immunosuppressed concern for opportunistic infection, may also be component of a viral infection. S/p acyclovir in ED.   -C/w Vanc and ceftriaxone (Day 1)  - On Solucortef 50mg q8h   -RVP negative, pending EBV, CMV, VDRL, West Nile Virus, Parvovirus, Monospot  -F/u BCx, CSF gram stain & culture, CSF crypto antigen,   - R/o TB- ordered for sputum induction and on contact; f/u quantiferon gold Met sepsis criteria on presentation with fever to 103F, tachycardia and leukopenia <4. Etiology remains unclear. CT with an atypical nodule with tree- in bud- given immunosuppressed concern for opportunistic infection, may also be component of a viral infection. S/p acyclovir in ED.   -C/w Vanc and ceftriaxone (Day 1)  - On Solucortef 50mg q8h for two more doses   -RVP negative, pending EBV, CMV, VDRL, West Nile Virus, Parvovirus, Monospot, Ehrlichia   -F/u BCx, CSF gram stain & culture, CSF crypto antigen  - R/o TB- ordered for sputum induction and on contact; f/u quantiferon gold

## 2018-06-26 NOTE — H&P ADULT - NSHPPHYSICALEXAM_GEN_ALL_CORE
.  VITAL SIGNS:  T(C): 37.1 (06-26-18 @ 05:08), Max: 39.4 (06-25-18 @ 20:35)  T(F): 98.7 (06-26-18 @ 05:08), Max: 103 (06-25-18 @ 20:35)  HR: 68 (06-26-18 @ 05:08) (54 - 77)  BP: 95/61 (06-26-18 @ 05:08) (84/54 - 140/70)  BP(mean): 72 (06-26-18 @ 05:08) (72 - 72)  RR: 18 (06-26-18 @ 05:08) (16 - 19)  SpO2: 100% (06-26-18 @ 05:08) (98% - 100%)  Wt(kg): --    PHYSICAL EXAM:    Constitutional: WDWN F resting comfortably in bed; NAD but appears weak  Head: NC/AT  Eyes: PERRL, EOMI, anicteric sclera. No photophobia present  ENT: no nasal discharge, moist mucus membranes  Neck: supple; no JVD  Respiratory: CTA B/L; no W/R/R, no increased work of breathing  Cardiac: +S1/S2; tachycardic, no murmurs, rubs, or gallops auscultated  Gastrointestinal: soft, NT/ND; no rebound or guarding; +BSx4  Extremities: WWP, no cyanosis; no peripheral edema  Musculoskeletal: NROM x4; no joint swelling, tenderness or erythema  Vascular: 2+ radial, DP pulses B/L  Dermatologic: skin warm, dry and intact  Neurologic: Alert and oriented, no focal deficits appreciated. PERRL, no photophobia. Kernig and Brudzinski signs negative.   Psychiatric: affect and characteristics of appearance, verbalizations, behaviors are appropriate

## 2018-06-26 NOTE — PROGRESS NOTE ADULT - PROBLEM SELECTOR PLAN 4
Hx of SLE on hydroxychloroquine, prednisone. Noted anemia 10.1 on presentation and now at 9.9. Unknown baseline. Will obtain collateral from primary doctor. No evidence of bloody output.   -c/w monitoring H/H  -keep active T&S, Hgb goal >7

## 2018-06-26 NOTE — PROGRESS NOTE ADULT - PROBLEM SELECTOR PLAN 8
F: IVF= 100cc/hr NS for 10 hours.  E: Replete electrolytes to maintain K>4 and Mg>2  N:  Regular Diet as tolerated

## 2018-06-26 NOTE — PROGRESS NOTE ADULT - PROBLEM SELECTOR PLAN 1
Met sepsis criteria on presentation with fever to 103F, tachycardia and leukopenia <4. Etiology remains unclear. CT with an atypical nodule with tree- in bud- given immunosuppressed concern for opportunistic infection, may also be component of a viral infection.   -C/w Vanc and ceftriaxone (Day 1)  - R/o TB- ordered for sputum induction and on contact. Met sepsis criteria on presentation with fever to 103F, tachycardia and leukopenia <4. Etiology remains unclear. CT with an atypical nodule with tree- in bud- given immunosuppressed concern for opportunistic infection, may also be component of a viral infection.   -C/w Vanc and ceftriaxone (Day 1)  -RVP negative, pending EBV, CMV  - R/o TB- ordered for sputum induction and on contact.

## 2018-06-26 NOTE — PROGRESS NOTE ADULT - PROBLEM SELECTOR PLAN 5
Noted anemia 10.1 Noted leukopenia 3.1. Patient unsure of baseline- may be associated with SLE, hydroxychloroquine vs component of active infection.   -c/w monitoring CBC

## 2018-06-26 NOTE — PROGRESS NOTE ADULT - ASSESSMENT
28F PMH SLE (on prednisone, hydroxychloroquine) who presents syncope, headaches, fevers, chills. Admitted to [facility] for sepsis concerning for pulmonary etiology given persistent productive cough but R/o TB given weight loss, immunosuppressed (hydroxychloroquine, prednisone). 28F PMH SLE (on prednisone, hydroxychloroquine) who presents syncope, headaches, fevers, chills. Admitted to [facility] for sepsis concerning for PNA/pulmonary etiolog with R/o TB given weight loss, immunosuppressed (hydroxychloroquine, prednisone) vs opportunistic infection. 28F PMH SLE (on prednisone, hydroxychloroquine) who presents syncope, headaches, fevers, chills. Admitted to [facility] for sepsis of unclear etiology, no evidence of PNA on CT scan but given cough and noted atypical nodule on CT may be underlying opportunistic infection and R/O TB as well as work up for viral etiology. Patient determined stable for further management on [facility].

## 2018-06-26 NOTE — PROGRESS NOTE ADULT - PROBLEM SELECTOR PLAN 7
F: S/p 100cc/h for 10h   E: Replete electrolytes to maintain K>4 and Mg>2  N:  Regular Diet as tolerated VTE: IMPROVE Score 0; no need for pharmacoprophylaxis     FULL CODE    Dispo: Admitted to [facility] for sepsis of unclear etiology though concern for opportunistic infection vs viral infection and up for step down to [facility].

## 2018-06-26 NOTE — H&P ADULT - NSHPLABSRESULTS_GEN_ALL_CORE
.  LABS:                         10.0   3.1   )-----------( 90       ( 2018 22:30 )             29.2         137  |  103  |  10  ----------------------------<  103<H>  3.9   |  29  |  0.94    Ca    9.1      2018 15:20    TPro  8.7<H>  /  Alb  3.5  /  TBili  0.4  /  DBili  x   /  AST  27  /  ALT  29  /  AlkPhos  43      PT/INR - ( 2018 15:20 )   PT: 11.6 sec;   INR: 1.05          PTT - ( 2018 15:20 )  PTT:30.5 sec  Urinalysis Basic - ( 2018 15:20 )    Color: Yellow / Appearance: Clear / S.020 / pH: x  Gluc: x / Ketone: NEGATIVE  / Bili: NEGATIVE / Urobili: 0.2 E.U./dL   Blood: x / Protein: NEGATIVE mg/dL / Nitrite: NEGATIVE   Leuk Esterase: NEGATIVE / RBC: x / WBC x   Sq Epi: x / Non Sq Epi: x / Bacteria: x                RADIOLOGY, EKG & ADDITIONAL TESTS: Reviewed.

## 2018-06-26 NOTE — H&P ADULT - NSHPOUTPATIENTPROVIDERS_GEN_ALL_CORE
Dr. Hanna Billy ([facility]) - was making appointment for end of June to establish care [phone]

## 2018-06-26 NOTE — PROGRESS NOTE ADULT - PROBLEM SELECTOR PLAN 3
Hx of SLE on hydroxychloroquine, prednisone.  -Held hydroxychloroquine and prednisone on presentation.   -Continue with Solucortef Hx of SLE on hydroxychloroquine, prednisone.  -Held hydroxychloroquine   -Will resume prednisone tomorrow    -Continue with Solucortef for two more doses

## 2018-06-26 NOTE — H&P ADULT - PROBLEM SELECTOR PLAN 2
- Patient with atypical appearance of RUL nodules  - Patient with immunosuppression, symptoms concerning for TB  - Needs r/o TB on airborne isolation  - AFB with sputum induction  - Continue with vancomycin, ceftriaxone, SoluCortef 50mg IV q8h - Patient with atypical appearance of RUL nodules on CXR, tree in bud on CT chest  - Patient with immunosuppression, symptoms concerning for TB  - Needs r/o TB on airborne isolation  - AFB with sputum induction  - Continue with vancomycin, ceftriaxone, SoluCortef 50mg IV q8h

## 2018-06-26 NOTE — H&P ADULT - PROBLEM SELECTOR PLAN 7
F: no IVF encourage PO intake  E Replete K<4 Mg<2  N regular diet    Ppx: SQH, SCD  Code full  Dispo [facility]  [facility]OCK[facility] Dr. Hanna Billy (to establish care) [phone]

## 2018-06-26 NOTE — PROGRESS NOTE ADULT - ASSESSMENT
28F PMH SLE (on prednisone, hydroxychloroquine) who presents syncope, headaches, fevers, chills. Admitted to [facility] for sepsis of unclear etiology, no evidence of PNA on CT scan but given cough and noted atypical nodule on CT may be underlying opportunistic infection and R/O TB as well as work up for viral etiology. Patient determined stable for further management on RMF.

## 2018-06-26 NOTE — H&P ADULT - NSHPREVIEWOFSYSTEMS_GEN_ALL_CORE
REVIEW OF SYSTEMS:    CONSTITUTIONAL: Positive for weakness, fever, chills, night sweats, weight loss 5lbs over past month  EYES/ENT: No visual changes;  No vertigo or throat pain   NECK: No pain or stiffness  RESPIRATORY: Dry cough, no wheezing or hemoptysis; Some shortness of breath with exertion  CARDIOVASCULAR: No chest pain or palpitations  GASTROINTESTINAL: Abdominal pain, nausea, early satiety with decreased appetite endorsed. No vomiting, or hematemesis; No diarrhea but does have constipation. No melena or hematochezia.  GENITOURINARY: No dysuria, frequency or hematuria  NEUROLOGICAL: No numbness   SKIN: No itching, burning, rashes, or lesions   All other review of systems is negative unless indicated above.

## 2018-06-26 NOTE — PROGRESS NOTE ADULT - ATTENDING COMMENTS
Patient seen and examined with house-staff during bedside rounds.  Resident note read, including vitals, physical findings, laboratory data, and radiological reports.   Revisions included below.  Direct personal management at bed side and extensive interpretation of the data.  Plan was outlined and discussed in details with the housestaff.  Decision making of high complexity  Action taken for acute disease activity to reflect the level of care provided:  - medication reconciliation  - review laboratory data  - I discussed the case in details with the resident.  All cultures are negative to date.  Await RVP.  The sepsis picture resolved.  The BP is art base line and she has adequate tissue perfusion.  She was started on antibiotics.  Follow on viral serology.  The CT scan of chest was reviewed and the tree in bud lesion in the RUL could be consistent with ISIAH infection or small airway disease.  Continue antibiotic.  Follow on CSF cutlures and serology.  Soutum inducrtion for AFB  Evalauated the patient for the RF

## 2018-06-26 NOTE — PROGRESS NOTE ADULT - PROBLEM SELECTOR PLAN 6
VTE: IMPROVE Score 0; no need for pharmacoprophylaxis     FULL CODE    Dispo: Admitted to [facility] for sepsis of unclear etiology though concern for opportunistic infection vs viral infection and up for step down to F. Patient found to have low TSH. No known thyroid disorders.   -F/u free T4 and T3

## 2018-06-26 NOTE — PROGRESS NOTE ADULT - PROBLEM SELECTOR PLAN 4
Noted anemia 10.1 on presentation and now at 9.9. Unknown baseline. Will obtain collateral from primary doctor. No evidence of bloody output.   -c/w monitoring H/H  -keep active T&S, Hgb goal >7 Noted anemia 10.1 on presentation and now at 9.9. Unknown baseline. Will obtain collateral from primary doctor. No evidence of bloody output.   -c/w monitoring H/H  -F/u haptoglobin and LDH  -keep active T&S, Hgb goal >7

## 2018-06-26 NOTE — H&P ADULT - PROBLEM SELECTOR PLAN 1
- Meets criteria with fever, tachycardia, WBC <4  - Continue with telemetry monitoring  - Low threshold for IVF supplementation  - Monitor bp and WBC  - Continue with vancomycin, ceftriaxone, SoluCortef 50mg IV q8h  - F/u blood cultures, AFB and sputum cultures  - Unlikely meningitis given negative Kernig and Brudzinski signs, negative photophobia, CSF negative and cultures sent

## 2018-06-26 NOTE — PROGRESS NOTE ADULT - SUBJECTIVE AND OBJECTIVE BOX
PROGRESS NOTE    CC: syncope, fevers, night sweats, wt loss  Overnight Events: Admitted o/n.  Interval History:   ROS:    OBJECTIVE  Vitals:  T(C): 37.1 (06-26-18 @ 05:08), Max: 39.4 (06-25-18 @ 20:35)  HR: 68 (06-26-18 @ 05:08) (54 - 77)  BP: 95/61 (06-26-18 @ 05:08) (84/54 - 140/70)  RR: 18 (06-26-18 @ 05:08) (16 - 19)  SpO2: 100% (06-26-18 @ 05:08) (98% - 100%)  Wt(kg): --    I/O:  I&O's Summary    25 Jun 2018 07:01  -  26 Jun 2018 07:00  --------------------------------------------------------  IN: 0 mL / OUT: 300 mL / NET: -300 mL        PHYSICAL EXAM:  Appearance: NAD. Speaking in full sentences.   HEENT: PERRL. No pallor noted.  Conjunctiva clear b/l. Moist oral mucosa.  Cardiovascular: RRR with no murmurs.  Respiratory: Lungs CTAB.   Gastrointestinal:  Soft, nontender. Non-distended. Non-rigid.	  Extremities: No edema b/l. No erythema b/l. LE WWP b/l.  Vascular: DP present.  Neurologic:  Alert and awake. Moving all extremities. Following commands. Making eye contact.  	  LABS:                        10.0   3.1   )-----------( 90       ( 25 Jun 2018 22:30 )             29.2     06-25    137  |  103  |  10  ----------------------------<  103<H>  3.9   |  29  |  0.94    Ca    9.1      25 Jun 2018 15:20    TPro  8.7<H>  /  Alb  3.5  /  TBili  0.4  /  DBili  x   /  AST  27  /  ALT  29  /  AlkPhos  43  06-25    PT/INR - ( 25 Jun 2018 15:20 )   PT: 11.6 sec;   INR: 1.05          PTT - ( 25 Jun 2018 15:20 )  PTT:30.5 sec  Urinalysis Basic - ( 26 Jun 2018 06:43 )    Color: Yellow / Appearance: Clear / SG: <=1.005 / pH: x  Gluc: x / Ketone: 15 mg/dL  / Bili: Negative / Urobili: 0.2 E.U./dL   Blood: x / Protein: NEGATIVE mg/dL / Nitrite: NEGATIVE   Leuk Esterase: NEGATIVE / RBC: x / WBC x   Sq Epi: x / Non Sq Epi: x / Bacteria: x        RADIOLOGY & ADDITIONAL TESTS:  Reviewed .    MEDICATIONS  (STANDING):  cefTRIAXone   IVPB 2 Gram(s) IV Intermittent every 12 hours  heparin  Injectable 5000 Unit(s) SubCutaneous every 8 hours  hydrocortisone sodium succinate Injectable 50 milliGRAM(s) IV Push every 8 hours  sodium chloride 0.9%. 1000 milliLiter(s) (100 mL/Hr) IV Continuous <Continuous>  sodium chloride 3%  Inhalation 5 milliLiter(s) Inhalation two times a day  vancomycin  IVPB 1000 milliGRAM(s) IV Intermittent every 12 hours    MEDICATIONS  (PRN):      ASSESSMENT:    PLAN: PROGRESS NOTE    CC: syncope, headache fevers, night sweats, wt loss  Overnight Events: Admitted o/n.  Interval History: Resolved headache- previous headache was persistent bandlike across frontal region 8.5/10 without radiation, denies neck stiffness. Denies photophobia. Reports dizziness associated with changing position. Reports fevers, night sweats and weight loss over last month. Productive cough.  Denies sick contacts. Denies known exposures to TB. Denies previous incarcerations. Reports travel to [place] years ago.   ROS: Denies shortness of breath, chest pain, nausea, vomiting, abdominal pain.     OBJECTIVE  Vitals:  T(C): 37.1 (06-26-18 @ 05:08), Max: 39.4 (06-25-18 @ 20:35)  HR: 68 (06-26-18 @ 05:08) (54 - 77)  BP: 95/61 (06-26-18 @ 05:08) (84/54 - 140/70)  RR: 18 (06-26-18 @ 05:08) (16 - 19)  SpO2: 100% (06-26-18 @ 05:08) (98% - 100%)  Wt(kg): --    I/O:  I&O's Summary    25 Jun 2018 07:01  -  26 Jun 2018 07:00  --------------------------------------------------------  IN: 0 mL / OUT: 300 mL / NET: -300 mL        PHYSICAL EXAM:  Appearance: NAD, no accessory muscle use. Speaking in full sentences.   HEENT: PERRL. No pallor noted.  Conjunctiva clear b/l. Moist oral mucosa.  Cardiovascular: RRR, S1, S2 with no murmurs.  Respiratory: Lungs CTAB.   Gastrointestinal:  Soft, nontender. Non-distended. Non-rigid.	  Extremities: No edema b/l. No erythema b/l. LE WWP b/l.  Vascular: DP present.  Neurologic:  Alert and awake. Moving all extremities. Following commands. Making eye contact.  	  LABS:                        10.0   3.1   )-----------( 90       ( 25 Jun 2018 22:30 )             29.2     06-25    137  |  103  |  10  ----------------------------<  103<H>  3.9   |  29  |  0.94    Ca    9.1      25 Jun 2018 15:20    TPro  8.7<H>  /  Alb  3.5  /  TBili  0.4  /  DBili  x   /  AST  27  /  ALT  29  /  AlkPhos  43  06-25    PT/INR - ( 25 Jun 2018 15:20 )   PT: 11.6 sec;   INR: 1.05          PTT - ( 25 Jun 2018 15:20 )  PTT:30.5 sec  Urinalysis Basic - ( 26 Jun 2018 06:43 )    Color: Yellow / Appearance: Clear / SG: <=1.005 / pH: x  Gluc: x / Ketone: 15 mg/dL  / Bili: Negative / Urobili: 0.2 E.U./dL   Blood: x / Protein: NEGATIVE mg/dL / Nitrite: NEGATIVE   Leuk Esterase: NEGATIVE / RBC: x / WBC x   Sq Epi: x / Non Sq Epi: x / Bacteria: x        RADIOLOGY & ADDITIONAL TESTS:  Reviewed .    MEDICATIONS  (STANDING):  cefTRIAXone   IVPB 2 Gram(s) IV Intermittent every 12 hours  heparin  Injectable 5000 Unit(s) SubCutaneous every 8 hours  hydrocortisone sodium succinate Injectable 50 milliGRAM(s) IV Push every 8 hours  sodium chloride 0.9%. 1000 milliLiter(s) (100 mL/Hr) IV Continuous <Continuous>  sodium chloride 3%  Inhalation 5 milliLiter(s) Inhalation two times a day  vancomycin  IVPB 1000 milliGRAM(s) IV Intermittent every 12 hours PROGRESS NOTE/TRANSFER [facility] to [facility]    Hospital Course  28F with PMH SLE who presented with headache and syncopal event. Reports that headache lasted 1 day 8.5/10, band-like Patient reports that she has had cough- recent visit to ED day prior to admission for which patient given abx with azithromycin but never initiated. Of note, patient reports over the last month she notes worsening generalized weakness and fatigue. Also reported 1 month of unintentional weight loss of 5lbs with early satiety, and night sweats.     In the ED T36.8 (rectal measurement 39.4), P76, 84/54, RR18 sat 98% on room air. Patient received 2L NS, ceftriaxone 2g empiric coverage. Labs notable for WBC 3.7, Hb 10.7, platelets 109, UA and UTox negative, CT/CTA head negative,           CC: syncope, headache fevers, night sweats, wt loss  Overnight Events: Admitted o/n.  Interval History: Resolved headache- previous headache was persistent bandlike across frontal region 8.5/10 without radiation, denies neck stiffness. Denies photophobia. Reports dizziness associated with changing position. Reports fevers, night sweats and weight loss over last month. Productive cough.  Denies sick contacts. Denies known exposures to TB. Denies previous incarcerations. Reports travel to [place] years ago.   ROS: Denies shortness of breath, chest pain, nausea, vomiting, abdominal pain.     OBJECTIVE  Vitals:  T(C): 37.1 (06-26-18 @ 05:08), Max: 39.4 (06-25-18 @ 20:35)  HR: 68 (06-26-18 @ 05:08) (54 - 77)  BP: 95/61 (06-26-18 @ 05:08) (84/54 - 140/70)  RR: 18 (06-26-18 @ 05:08) (16 - 19)  SpO2: 100% (06-26-18 @ 05:08) (98% - 100%)  Wt(kg): --    I/O:  I&O's Summary    25 Jun 2018 07:01  -  26 Jun 2018 07:00  --------------------------------------------------------  IN: 0 mL / OUT: 300 mL / NET: -300 mL        PHYSICAL EXAM:  Appearance: NAD, no accessory muscle use. Speaking in full sentences.   HEENT: PERRL. No pallor noted.  Conjunctiva clear b/l. Moist oral mucosa.  Cardiovascular: RRR, S1, S2 with no murmurs.  Respiratory: Lungs CTAB.   Gastrointestinal:  Soft, nontender. Non-distended. Non-rigid.	  Extremities: No edema b/l. No erythema b/l. LE WWP b/l.  Vascular: DP present.  Neurologic:  Alert and awake. Moving all extremities. Following commands. Making eye contact.  	  LABS:                        10.0   3.1   )-----------( 90       ( 25 Jun 2018 22:30 )             29.2     06-25    137  |  103  |  10  ----------------------------<  103<H>  3.9   |  29  |  0.94    Ca    9.1      25 Jun 2018 15:20    TPro  8.7<H>  /  Alb  3.5  /  TBili  0.4  /  DBili  x   /  AST  27  /  ALT  29  /  AlkPhos  43  06-25    PT/INR - ( 25 Jun 2018 15:20 )   PT: 11.6 sec;   INR: 1.05          PTT - ( 25 Jun 2018 15:20 )  PTT:30.5 sec  Urinalysis Basic - ( 26 Jun 2018 06:43 )    Color: Yellow / Appearance: Clear / SG: <=1.005 / pH: x  Gluc: x / Ketone: 15 mg/dL  / Bili: Negative / Urobili: 0.2 E.U./dL   Blood: x / Protein: NEGATIVE mg/dL / Nitrite: NEGATIVE   Leuk Esterase: NEGATIVE / RBC: x / WBC x   Sq Epi: x / Non Sq Epi: x / Bacteria: x        RADIOLOGY & ADDITIONAL TESTS:  Reviewed .    MEDICATIONS  (STANDING):  cefTRIAXone   IVPB 2 Gram(s) IV Intermittent every 12 hours  heparin  Injectable 5000 Unit(s) SubCutaneous every 8 hours  hydrocortisone sodium succinate Injectable 50 milliGRAM(s) IV Push every 8 hours  sodium chloride 0.9%. 1000 milliLiter(s) (100 mL/Hr) IV Continuous <Continuous>  sodium chloride 3%  Inhalation 5 milliLiter(s) Inhalation two times a day  vancomycin  IVPB 1000 milliGRAM(s) IV Intermittent every 12 hours PROGRESS NOTE/TRANSFER [facility] to [facility]    Hospital Course  28F with PMH SLE who presented with headache and syncopal event. Reports that headache lasted 1 day 8.5/10, band-like distribution without radiation. Denied photophobia.  Patient reported that she has had cough- recent visit to ED day prior to admission for which patient given abx with azithromycin but never initiated as had syncopal event en route to picking up precription. Of note, patient reports over the last month she notes worsening generalized weakness and fatigue. Also reported 1 month of unintentional weight loss of 5lbs with early satiety, and night sweats. In the ED Noted febrile to 103F (rectal), HR P76, BP notable for 84/54, RR18 sat 98% on room air. S/p 2L NS, ceftriaxone 2g empiric coverage. Labs notable for WBC 3.7, Hb 10.7, platelets 109, UA and UTox negative, CT/CTA head negative.           CC: syncope, headache fevers, night sweats, wt loss  Overnight Events: Admitted o/n.  Interval History: Resolved headache- previous headache was persistent bandlike across frontal region 8.5/10 without radiation, denies neck stiffness. Denies photophobia. Reports dizziness associated with changing position. Reports fevers, night sweats and weight loss over last month. Productive cough.  Denies sick contacts. Denies known exposures to TB. Denies previous incarcerations. Reports travel to [place] years ago.   ROS: Denies shortness of breath, chest pain, nausea, vomiting, abdominal pain.     OBJECTIVE  Vitals:  T(C): 37.1 (06-26-18 @ 05:08), Max: 39.4 (06-25-18 @ 20:35)  HR: 68 (06-26-18 @ 05:08) (54 - 77)  BP: 95/61 (06-26-18 @ 05:08) (84/54 - 140/70)  RR: 18 (06-26-18 @ 05:08) (16 - 19)  SpO2: 100% (06-26-18 @ 05:08) (98% - 100%)  Wt(kg): --    I/O:  I&O's Summary    25 Jun 2018 07:01  -  26 Jun 2018 07:00  --------------------------------------------------------  IN: 0 mL / OUT: 300 mL / NET: -300 mL        PHYSICAL EXAM:  Appearance: NAD, no accessory muscle use. Speaking in full sentences.   HEENT: PERRL. No pallor noted.  Conjunctiva clear b/l. Moist oral mucosa.  Cardiovascular: RRR, S1, S2 with no murmurs.  Respiratory: Lungs CTAB.   Gastrointestinal:  Soft, nontender. Non-distended. Non-rigid.	  Extremities: No edema b/l. No erythema b/l. LE WWP b/l.  Vascular: DP present.  Neurologic:  Alert and awake. Moving all extremities. Following commands. Making eye contact.  	  LABS:                        10.0   3.1   )-----------( 90       ( 25 Jun 2018 22:30 )             29.2     06-25    137  |  103  |  10  ----------------------------<  103<H>  3.9   |  29  |  0.94    Ca    9.1      25 Jun 2018 15:20    TPro  8.7<H>  /  Alb  3.5  /  TBili  0.4  /  DBili  x   /  AST  27  /  ALT  29  /  AlkPhos  43  06-25    PT/INR - ( 25 Jun 2018 15:20 )   PT: 11.6 sec;   INR: 1.05          PTT - ( 25 Jun 2018 15:20 )  PTT:30.5 sec  Urinalysis Basic - ( 26 Jun 2018 06:43 )    Color: Yellow / Appearance: Clear / SG: <=1.005 / pH: x  Gluc: x / Ketone: 15 mg/dL  / Bili: Negative / Urobili: 0.2 E.U./dL   Blood: x / Protein: NEGATIVE mg/dL / Nitrite: NEGATIVE   Leuk Esterase: NEGATIVE / RBC: x / WBC x   Sq Epi: x / Non Sq Epi: x / Bacteria: x        RADIOLOGY & ADDITIONAL TESTS:  Reviewed .    MEDICATIONS  (STANDING):  cefTRIAXone   IVPB 2 Gram(s) IV Intermittent every 12 hours  heparin  Injectable 5000 Unit(s) SubCutaneous every 8 hours  hydrocortisone sodium succinate Injectable 50 milliGRAM(s) IV Push every 8 hours  sodium chloride 0.9%. 1000 milliLiter(s) (100 mL/Hr) IV Continuous <Continuous>  sodium chloride 3%  Inhalation 5 milliLiter(s) Inhalation two times a day  vancomycin  IVPB 1000 milliGRAM(s) IV Intermittent every 12 hours PROGRESS NOTE/TRANSFER [facility] to [facility]    Hospital Course  28F with PMH SLE who presented with headache and syncopal event. Reports that headache lasted 1 day 8.5/10, band-like distribution without radiation. Denied photophobia.  Patient reported that she has had cough- recent visit to ED day prior to admission for which patient given abx with azithromycin but never initiated as had syncopal event en route to picking up precription. Of note, patient reports over the last month she notes worsening generalized weakness and fatigue. Also reported 1 month of unintentional weight loss of 5lbs with early satiety, and night sweats. In the ED Noted febrile to 103F (rectal), HR P76, BP notable for 84/54, RR18 sat 98% on room air. S/p 2L NS, ceftriaxone 2g empiric coverage. Labs notable for WBC 3.7, Hb 10.7, platelets 109, UA and UTox negative, CT/CTA head negative. CT           CC: syncope, headache fevers, night sweats, wt loss  Overnight Events: Admitted o/n.  Interval History: Resolved headache- previous headache was persistent bandlike across frontal region 8.5/10 without radiation, denies neck stiffness. Denies photophobia. Reports dizziness associated with changing position. Reports fevers, night sweats and weight loss over last month. Productive cough.  Denies sick contacts. Denies known exposures to TB. Denies previous incarcerations. Reports travel to [place] years ago.   ROS: Denies shortness of breath, chest pain, nausea, vomiting, abdominal pain.     OBJECTIVE  Vitals:  T(C): 37.1 (06-26-18 @ 05:08), Max: 39.4 (06-25-18 @ 20:35)  HR: 68 (06-26-18 @ 05:08) (54 - 77)  BP: 95/61 (06-26-18 @ 05:08) (84/54 - 140/70)  RR: 18 (06-26-18 @ 05:08) (16 - 19)  SpO2: 100% (06-26-18 @ 05:08) (98% - 100%)  Wt(kg): --    I/O:  I&O's Summary    25 Jun 2018 07:01  -  26 Jun 2018 07:00  --------------------------------------------------------  IN: 0 mL / OUT: 300 mL / NET: -300 mL        PHYSICAL EXAM:  Appearance: NAD, no accessory muscle use. Speaking in full sentences.   HEENT: PERRL. No pallor noted.  Conjunctiva clear b/l. Moist oral mucosa.  Cardiovascular: RRR, S1, S2 with no murmurs.  Respiratory: Lungs CTAB.   Gastrointestinal:  Soft, nontender. Non-distended. Non-rigid.	  Extremities: No edema b/l. No erythema b/l. LE WWP b/l.  Vascular: DP present.  Neurologic:  Alert and awake. Moving all extremities. Following commands. Making eye contact.  	  LABS:                        10.0   3.1   )-----------( 90       ( 25 Jun 2018 22:30 )             29.2     06-25    137  |  103  |  10  ----------------------------<  103<H>  3.9   |  29  |  0.94    Ca    9.1      25 Jun 2018 15:20    TPro  8.7<H>  /  Alb  3.5  /  TBili  0.4  /  DBili  x   /  AST  27  /  ALT  29  /  AlkPhos  43  06-25    PT/INR - ( 25 Jun 2018 15:20 )   PT: 11.6 sec;   INR: 1.05          PTT - ( 25 Jun 2018 15:20 )  PTT:30.5 sec  Urinalysis Basic - ( 26 Jun 2018 06:43 )    Color: Yellow / Appearance: Clear / SG: <=1.005 / pH: x  Gluc: x / Ketone: 15 mg/dL  / Bili: Negative / Urobili: 0.2 E.U./dL   Blood: x / Protein: NEGATIVE mg/dL / Nitrite: NEGATIVE   Leuk Esterase: NEGATIVE / RBC: x / WBC x   Sq Epi: x / Non Sq Epi: x / Bacteria: x        RADIOLOGY & ADDITIONAL TESTS:  Reviewed .    MEDICATIONS  (STANDING):  cefTRIAXone   IVPB 2 Gram(s) IV Intermittent every 12 hours  heparin  Injectable 5000 Unit(s) SubCutaneous every 8 hours  hydrocortisone sodium succinate Injectable 50 milliGRAM(s) IV Push every 8 hours  sodium chloride 0.9%. 1000 milliLiter(s) (100 mL/Hr) IV Continuous <Continuous>  sodium chloride 3%  Inhalation 5 milliLiter(s) Inhalation two times a day  vancomycin  IVPB 1000 milliGRAM(s) IV Intermittent every 12 hours PROGRESS NOTE/TRANSFER [facility] to [facility]    Hospital Course  28F with PMH SLE who presented with headache and syncopal event. Reports that headache lasted 1 day 8.5/10, band-like distribution without radiation. Denied photophobia.  Patient reported that she has had cough- recent visit to ED day prior to admission for which patient given abx with azithromycin but never initiated as had syncopal event en route to picking up precription. Of note, patient reports over the last month she notes worsening generalized weakness and fatigue. Also reported 1 month of unintentional weight loss of 5lbs with early satiety, and night sweats. In the ED Noted febrile to 103F (rectal), HR P76, BP notable for 84/54, RR18 sat 98% on room air. S/p 2L NS, ceftriaxone 2g empiric coverage. Labs notable for WBC 3.7, Hb 10.7, platelets 109, UA and UTox negative, CT/CTA head negative. LP performed in ED, CSF with nucleated cells, with RBC and neutrophils, glucose WNL, mild elevation of protein- given no photophobia, no kernig/brudzinski with low suspicion for meningitis. CT chest with RUL with atypical nodule with tree in bud pattern. Patient admitted to [facility] for sepsis with concern for opportunistic infection vs TB given immunosuppression. Started on Vanc and ceftriaxone for broad abx coverage as well as solucortef. Sent of viral panel. BP improved with IVF. Patient with clinical improvement determined stable for step down to [facility].       CC: syncope, headache fevers, night sweats, wt loss  Overnight Events: Admitted o/n.  Interval History: Resolved headache- previous headache was persistent bandlike across frontal region 8.5/10 without radiation, denies neck stiffness. Denies photophobia. Reports dizziness associated with changing position. Reports fevers, night sweats and weight loss over last month. Productive cough.  Denies sick contacts. Denies known exposures to TB. Denies previous incarcerations. Reports travel to [place] years ago.   ROS: Denies shortness of breath, chest pain, nausea, vomiting, abdominal pain.     OBJECTIVE  Vitals:  T(C): 37.1 (06-26-18 @ 05:08), Max: 39.4 (06-25-18 @ 20:35)  HR: 68 (06-26-18 @ 05:08) (54 - 77)  BP: 95/61 (06-26-18 @ 05:08) (84/54 - 140/70)  RR: 18 (06-26-18 @ 05:08) (16 - 19)  SpO2: 100% (06-26-18 @ 05:08) (98% - 100%)  Wt(kg): --    I/O:  I&O's Summary    25 Jun 2018 07:01  -  26 Jun 2018 07:00  --------------------------------------------------------  IN: 0 mL / OUT: 300 mL / NET: -300 mL        PHYSICAL EXAM:  Appearance: NAD, no accessory muscle use. Speaking in full sentences.   HEENT: PERRL. No pallor noted.  Conjunctiva clear b/l. Moist oral mucosa.  Cardiovascular: RRR, S1, S2 with no murmurs.  Respiratory: Lungs CTAB.   Gastrointestinal:  Soft, nontender. Non-distended. Non-rigid.	  Extremities: No edema b/l. No erythema b/l. LE WWP b/l.  Vascular: DP present.  Neurologic:  Alert and awake. Moving all extremities. Following commands. Making eye contact.  	  LABS:                        10.0   3.1   )-----------( 90       ( 25 Jun 2018 22:30 )             29.2     06-25    137  |  103  |  10  ----------------------------<  103<H>  3.9   |  29  |  0.94    Ca    9.1      25 Jun 2018 15:20    TPro  8.7<H>  /  Alb  3.5  /  TBili  0.4  /  DBili  x   /  AST  27  /  ALT  29  /  AlkPhos  43  06-25    PT/INR - ( 25 Jun 2018 15:20 )   PT: 11.6 sec;   INR: 1.05          PTT - ( 25 Jun 2018 15:20 )  PTT:30.5 sec  Urinalysis Basic - ( 26 Jun 2018 06:43 )    Color: Yellow / Appearance: Clear / SG: <=1.005 / pH: x  Gluc: x / Ketone: 15 mg/dL  / Bili: Negative / Urobili: 0.2 E.U./dL   Blood: x / Protein: NEGATIVE mg/dL / Nitrite: NEGATIVE   Leuk Esterase: NEGATIVE / RBC: x / WBC x   Sq Epi: x / Non Sq Epi: x / Bacteria: x        RADIOLOGY & ADDITIONAL TESTS:  Reviewed .    MEDICATIONS  (STANDING):  cefTRIAXone   IVPB 2 Gram(s) IV Intermittent every 12 hours  heparin  Injectable 5000 Unit(s) SubCutaneous every 8 hours  hydrocortisone sodium succinate Injectable 50 milliGRAM(s) IV Push every 8 hours  sodium chloride 0.9%. 1000 milliLiter(s) (100 mL/Hr) IV Continuous <Continuous>  sodium chloride 3%  Inhalation 5 milliLiter(s) Inhalation two times a day  vancomycin  IVPB 1000 milliGRAM(s) IV Intermittent every 12 hours

## 2018-06-27 DIAGNOSIS — D61.818 OTHER PANCYTOPENIA: ICD-10-CM

## 2018-06-27 LAB
ANION GAP SERPL CALC-SCNC: 11 MMOL/L — SIGNIFICANT CHANGE UP (ref 5–17)
BASOPHILS NFR BLD AUTO: 0 % — SIGNIFICANT CHANGE UP (ref 0–2)
BUN SERPL-MCNC: 10 MG/DL — SIGNIFICANT CHANGE UP (ref 7–23)
CALCIUM SERPL-MCNC: 8.4 MG/DL — SIGNIFICANT CHANGE UP (ref 8.4–10.5)
CHLORIDE SERPL-SCNC: 106 MMOL/L — SIGNIFICANT CHANGE UP (ref 96–108)
CMV IGG FLD QL: >10 U/ML — HIGH
CMV IGG SERPL-IMP: POSITIVE
CMV IGM FLD-ACNC: <8 AU/ML — SIGNIFICANT CHANGE UP
CMV IGM SERPL QL: NEGATIVE — SIGNIFICANT CHANGE UP
CO2 SERPL-SCNC: 21 MMOL/L — LOW (ref 22–31)
CREAT SERPL-MCNC: 0.65 MG/DL — SIGNIFICANT CHANGE UP (ref 0.5–1.3)
CULTURE RESULTS: NO GROWTH — SIGNIFICANT CHANGE UP
EBV EA AB SER IA-ACNC: 82.2 U/ML — HIGH
EBV EA AB TITR SER IF: POSITIVE
EBV EA IGG SER-ACNC: POSITIVE
EBV NA IGG SER IA-ACNC: 115 U/ML — HIGH
EBV PATRN SPEC IB-IMP: SIGNIFICANT CHANGE UP
EBV VCA IGG AVIDITY SER QL IA: POSITIVE
EBV VCA IGM SER IA-ACNC: 411 U/ML — HIGH
EBV VCA IGM SER IA-ACNC: <10 U/ML — SIGNIFICANT CHANGE UP
EBV VCA IGM TITR FLD: NEGATIVE — SIGNIFICANT CHANGE UP
EOSINOPHIL NFR BLD AUTO: 0 % — SIGNIFICANT CHANGE UP (ref 0–6)
EOSINOPHIL NFR BLD AUTO: 1 % — SIGNIFICANT CHANGE UP (ref 0–6)
GLUCOSE SERPL-MCNC: 135 MG/DL — HIGH (ref 70–99)
HAPTOGLOB SERPL-MCNC: 87 MG/DL — SIGNIFICANT CHANGE UP (ref 34–200)
HCT VFR BLD CALC: 30.3 % — LOW (ref 34.5–45)
HETEROPH AB TITR SER AGGL: NEGATIVE — SIGNIFICANT CHANGE UP
HGB BLD-MCNC: 10.4 G/DL — LOW (ref 11.5–15.5)
LDH SERPL L TO P-CCNC: 185 U/L — SIGNIFICANT CHANGE UP (ref 50–242)
LYMPHOCYTES # BLD AUTO: 38 % — SIGNIFICANT CHANGE UP (ref 13–44)
LYMPHOCYTES # BLD AUTO: 40.8 % — SIGNIFICANT CHANGE UP (ref 13–44)
MAGNESIUM SERPL-MCNC: 2 MG/DL — SIGNIFICANT CHANGE UP (ref 1.6–2.6)
MANUAL SMEAR VERIFICATION: SIGNIFICANT CHANGE UP
MCHC RBC-ENTMCNC: 29.9 PG — SIGNIFICANT CHANGE UP (ref 27–34)
MCHC RBC-ENTMCNC: 34.3 G/DL — SIGNIFICANT CHANGE UP (ref 32–36)
MCV RBC AUTO: 87.1 FL — SIGNIFICANT CHANGE UP (ref 80–100)
MONOCYTES NFR BLD AUTO: 8.9 % — SIGNIFICANT CHANGE UP (ref 2–14)
MONOCYTES NFR BLD AUTO: 9 % — SIGNIFICANT CHANGE UP (ref 2–14)
NEUTROPHILS NFR BLD AUTO: 50.3 % — SIGNIFICANT CHANGE UP (ref 43–77)
NEUTROPHILS NFR BLD AUTO: 51 % — SIGNIFICANT CHANGE UP (ref 43–77)
NEUTS BAND # BLD: 1 % — SIGNIFICANT CHANGE UP
OVALOCYTES BLD QL SMEAR: SLIGHT — SIGNIFICANT CHANGE UP
PLAT MORPH BLD: NORMAL — SIGNIFICANT CHANGE UP
PLATELET # BLD AUTO: 111 K/UL — LOW (ref 150–400)
POIKILOCYTOSIS BLD QL AUTO: SLIGHT — SIGNIFICANT CHANGE UP
POTASSIUM SERPL-MCNC: 3.9 MMOL/L — SIGNIFICANT CHANGE UP (ref 3.5–5.3)
POTASSIUM SERPL-SCNC: 3.9 MMOL/L — SIGNIFICANT CHANGE UP (ref 3.5–5.3)
RBC # BLD: 3.48 M/UL — LOW (ref 3.8–5.2)
RBC # FLD: 11.9 % — SIGNIFICANT CHANGE UP (ref 10.3–16.9)
RBC BLD AUTO: ABNORMAL
SODIUM SERPL-SCNC: 138 MMOL/L — SIGNIFICANT CHANGE UP (ref 135–145)
SPECIMEN SOURCE: SIGNIFICANT CHANGE UP
T3 SERPL-MCNC: 68 NG/DL — LOW (ref 80–200)
VANCOMYCIN TROUGH SERPL-MCNC: 6.6 UG/ML — LOW (ref 10–20)
WBC # BLD: 1.6 K/UL — LOW (ref 3.8–10.5)
WBC # FLD AUTO: 1.6 K/UL — LOW (ref 3.8–10.5)

## 2018-06-27 PROCEDURE — 99233 SBSQ HOSP IP/OBS HIGH 50: CPT | Mod: GC

## 2018-06-27 RX ORDER — VANCOMYCIN HCL 1 G
1250 VIAL (EA) INTRAVENOUS EVERY 12 HOURS
Qty: 0 | Refills: 0 | Status: DISCONTINUED | OUTPATIENT
Start: 2018-06-28 | End: 2018-06-28

## 2018-06-27 RX ORDER — VANCOMYCIN HCL 1 G
250 VIAL (EA) INTRAVENOUS ONCE
Qty: 0 | Refills: 0 | Status: COMPLETED | OUTPATIENT
Start: 2018-06-27 | End: 2018-06-27

## 2018-06-27 RX ORDER — ACETAMINOPHEN 500 MG
650 TABLET ORAL EVERY 6 HOURS
Qty: 0 | Refills: 0 | Status: DISCONTINUED | OUTPATIENT
Start: 2018-06-27 | End: 2018-07-04

## 2018-06-27 RX ADMIN — Medication 10 MILLIGRAM(S): at 06:09

## 2018-06-27 RX ADMIN — Medication 250 MILLIGRAM(S): at 01:48

## 2018-06-27 RX ADMIN — Medication 250 MILLIGRAM(S): at 15:14

## 2018-06-27 RX ADMIN — CEFTRIAXONE 100 GRAM(S): 500 INJECTION, POWDER, FOR SOLUTION INTRAMUSCULAR; INTRAVENOUS at 05:08

## 2018-06-27 RX ADMIN — Medication 100 MILLIGRAM(S): at 18:12

## 2018-06-27 RX ADMIN — Medication 50 MILLIGRAM(S): at 03:21

## 2018-06-27 RX ADMIN — LIDOCAINE 1 PATCH: 4 CREAM TOPICAL at 23:34

## 2018-06-27 RX ADMIN — SODIUM CHLORIDE 5 MILLILITER(S): 9 INJECTION INTRAMUSCULAR; INTRAVENOUS; SUBCUTANEOUS at 17:38

## 2018-06-27 RX ADMIN — SODIUM CHLORIDE 4 MILLILITER(S): 9 INJECTION INTRAMUSCULAR; INTRAVENOUS; SUBCUTANEOUS at 05:43

## 2018-06-27 RX ADMIN — POLYETHYLENE GLYCOL 3350 17 GRAM(S): 17 POWDER, FOR SOLUTION ORAL at 12:01

## 2018-06-27 RX ADMIN — LIDOCAINE 1 PATCH: 4 CREAM TOPICAL at 09:00

## 2018-06-27 RX ADMIN — LIDOCAINE 1 PATCH: 4 CREAM TOPICAL at 12:01

## 2018-06-27 RX ADMIN — CEFTRIAXONE 100 GRAM(S): 500 INJECTION, POWDER, FOR SOLUTION INTRAMUSCULAR; INTRAVENOUS at 17:37

## 2018-06-27 NOTE — PROGRESS NOTE ADULT - SUBJECTIVE AND OBJECTIVE BOX
Overnight Events: SE  Subjective/ROS: Patient reports back pain at the site of the LP, improvement in cough, now more dry, abdominal pain, and constipation. She denies headache, photophobia, neck pain, dizziness, lightheadedness, chest pain, shortness of breath, palpitations, nausea, vomiting, diarrhea, fevers, and chills.     VITAL SIGNS:  Vital Signs Last 24 Hrs  T(C): 36.7 (27 Jun 2018 05:16), Max: 37.2 (26 Jun 2018 11:35)  T(F): 98.1 (27 Jun 2018 05:16), Max: 99 (26 Jun 2018 11:35)  HR: 64 (27 Jun 2018 08:45) (52 - 64)  BP: 92/65 (27 Jun 2018 08:45) (92/65 - 121/73)  BP(mean): 77 (27 Jun 2018 08:45) (77 - 90)  RR: 14 (27 Jun 2018 08:45) (14 - 16)  SpO2: 98% (27 Jun 2018 08:45) (98% - 99%)    PHYSICAL EXAM:    General: Well dressed well nourished young woman sitting comfortably in bed. Non-toxic appearing   HEENT: NC/AT; EOMI, anicteric sclera; MMM  Neck: supple, no JVD, no submandibular or cervical lymphadenopathy, no nuchal rigidity  Cardiovascular: +S1/S2; bradycardic, no r/m/g  Respiratory: CTA B/L; no W/R/R  Back: LP site with bandage; non-tender to palpation   Gastrointestinal: soft, ND, tenderness to palpation in lower quadrants; +BS  Extremities: WWP; no edema, clubbing or cyanosis  Vascular: 2+ radial, DP pulses B/L  Neurological: AAOx3; no focal deficits    MEDICATIONS:  MEDICATIONS  (STANDING):  cefTRIAXone   IVPB 2 Gram(s) IV Intermittent every 12 hours  lidocaine   Patch 1 Patch Transdermal daily  polyethylene glycol 3350 17 Gram(s) Oral daily  predniSONE   Tablet 10 milliGRAM(s) Oral daily  sodium chloride 0.9%. 1000 milliLiter(s) (100 mL/Hr) IV Continuous <Continuous>  sodium chloride 3%  Inhalation 5 milliLiter(s) Inhalation two times a day  vancomycin  IVPB 1000 milliGRAM(s) IV Intermittent every 12 hours    MEDICATIONS  (PRN):      ALLERGIES:  Allergies    sulfa drugs (Rash)    Intolerances        LABS:                        10.4   1.6   )-----------( 111      ( 27 Jun 2018 06:32 )             30.3     06-27    138  |  106  |  10  ----------------------------<  135<H>  3.9   |  21<L>  |  0.65    Ca    8.4      27 Jun 2018 06:32  Phos  3.2     06-26  Mg     2.0     06-27    TPro  5.9<L>  /  Alb  2.7<L>  /  TBili  0.2  /  DBili  x   /  AST  16  /  ALT  12  /  AlkPhos  28<L>  06-26    PT/INR - ( 25 Jun 2018 15:20 )   PT: 11.6 sec;   INR: 1.05          PTT - ( 25 Jun 2018 15:20 )  PTT:30.5 sec  Urinalysis Basic - ( 26 Jun 2018 06:43 )    Color: Yellow / Appearance: Clear / SG: <=1.005 / pH: x  Gluc: x / Ketone: 15 mg/dL  / Bili: Negative / Urobili: 0.2 E.U./dL   Blood: x / Protein: NEGATIVE mg/dL / Nitrite: NEGATIVE   Leuk Esterase: NEGATIVE / RBC: x / WBC x   Sq Epi: x / Non Sq Epi: x / Bacteria: x      CAPILLARY BLOOD GLUCOSE      POCT Blood Glucose.: 75 mg/dL (25 Jun 2018 20:46)      RADIOLOGY & ADDITIONAL TESTS: Reviewed.

## 2018-06-27 NOTE — PROGRESS NOTE ADULT - PROBLEM SELECTOR PLAN 4
Noted anemia 10.1 on presentation and now at 9.9. Unknown baseline. Will obtain collateral from primary doctor. No evidence of bloody output.   -c/w monitoring H/H  -F/u haptoglobin and LDH  -keep active T&S, Hgb goal >7 Noted episode of syncope. Patient reports some dizziness with change in position- with no documented orthostatic- may be component of orthostatic hypotension. Denies chest pain, shortness of breath. EKG with no ischemic changes and negative trops

## 2018-06-27 NOTE — PROGRESS NOTE ADULT - ASSESSMENT
28F PMH SLE (on prednisone, hydroxychloroquine) who presents syncope, headaches, fevers, chills. Admitted to [facility] for sepsis of unclear etiology, no evidence of PNA on CT scan but given cough and noted atypical nodule on CT may be underlying opportunistic infection and R/O TB as well as work up for viral etiology. Patient determined stable for further management on RMF. 28F PMH SLE (on prednisone, hydroxychloroquine) who presents syncope, headaches, fevers, chills. Admitted to [facility] for sepsis of unclear etiology, no evidence of PNA on CT scan but given cough and noted atypical nodule on CT may be underlying opportunistic infection and R/O TB as well as work up for viral etiology.

## 2018-06-27 NOTE — PROGRESS NOTE ADULT - PROBLEM SELECTOR PLAN 1
Met sepsis criteria on presentation with fever to 103F, tachycardia and leukopenia <4. Etiology remains unclear. CT with an atypical nodule with tree- in bud- given immunosuppressed concern for opportunistic infection, may also be component of a viral infection. S/p acyclovir in ED.   -C/w Vanc and ceftriaxone (Day 1)  - On Solucortef 50mg q8h for two more doses   -RVP negative, pending EBV, CMV, VDRL, West Nile Virus, Parvovirus, Monospot, Ehrlichia   -F/u BCx, CSF gram stain & culture, CSF crypto antigen  - R/o TB- ordered for sputum induction and on contact; f/u quantiferon gold Improving. Met sepsis criteria on presentation with fever to 103F, tachycardia and leukopenia <4. Etiology remains unclear. CT with an atypical nodule with tree- in bud- given immunosuppressed concern for opportunistic infection, may also be component of a viral infection.  -C/w Vanc and ceftriaxone (Day 1)  -RVP negative, prior EBV infection  -F/u CMV, VDRL, West Nile Virus, Parvovirus, Monospot, Ehrlichia, Babesia, Borrelia  -F/u BCx, CSF studies  -CSF gram stain negative for organisms     #R/o TB  Pt with 1m of night sweats, unintentional weight loss, weakness, fatigue. No sick contacts. No recent travel. Does not live in a shelter. However, she is immunocompromised in the setting of lupus on prednisone/hydroxychloroquine. No cavitary lesions on CT chest.   - AFB ordered   - F/u quantiferon gold

## 2018-06-27 NOTE — PROGRESS NOTE ADULT - PROBLEM SELECTOR PLAN 2
Noted episode of syncope. Patient reports some dizziness with change in position- with no documented orthostatic- may be component of orthostatic hypotension. Denies chest pain, shortness of breath. EKG with no ischemic changes and negative trops #Anemia  Likely multifactorial (sepsis, lupus). Unknown baseline. No evidence of active bleeding.   -c/w monitoring H/H  -F/u haptoglobin and LDH  -keep active T&S, Hgb goal >7    #Leukopenia  WBC of 3.7 on admission, now 1.6. Per patient, she has a history of leukopenia and was told by her rheumatologist that it's due to lupus. Absolute neutrophil count of ~800.   -F/u manual differential     #Thrombocytopenia  Stable. Plts have been between 90 and 111 during current admission. Likely secondary to sepsis.   -Monitor CBC   -Transfuse platelets if <50K with bleeding or less than 10K #Anemia  Likely multifactorial (sepsis, lupus). Unknown baseline. No evidence of active bleeding.   -c/w monitoring H/H  -F/u haptoglobin and LDH  -keep active T&S, Hgb goal >7    #Leukopenia  WBC of 3.7 on admission, now 1.6. Per patient, she has a history of leukopenia and was told by her rheumatologist that it's due to lupus. Per Dr. Nava's office, her WBC was 3.7 in January. Absolute neutrophil count of ~800.   -F/u manual differential     #Thrombocytopenia  Stable. Plts have been between 90 and 111 during current admission. Likely secondary to sepsis.   -Monitor CBC   -Transfuse platelets if <50K with bleeding or less than 10K

## 2018-06-27 NOTE — PROGRESS NOTE ADULT - PROBLEM SELECTOR PLAN 7
VTE: IMPROVE Score 0; no need for pharmacoprophylaxis     FULL CODE    Dispo: Admitted to [facility] for sepsis of unclear etiology though concern for opportunistic infection vs viral infection and up for step down to F. F: None  E: Replete electrolytes to maintain K>4 and Mg>2  N:  Regular Diet as tolerated

## 2018-06-27 NOTE — PROGRESS NOTE ADULT - PROBLEM SELECTOR PLAN 5
Noted leukopenia 3.1. Patient unsure of baseline- may be associated with SLE, hydroxychloroquine vs component of active infection. Patient reports having leukopenia in the past which her physician attributed to lupus.   -c/w monitoring CBC Patient found to have low TSH. No known thyroid disorders.   -Free thyroxine WNL (0.76)  -F/u T3 level Patient found to have low TSH. No known thyroid disorders.   -Free thyroxine WNL (0.76)  -Low T3 (68)  -Mixed picture in setting of infection (central hypothyroidism vs. subclinical hyperthyroidism)

## 2018-06-27 NOTE — PROGRESS NOTE ADULT - PROBLEM SELECTOR PLAN 6
Patient found to have low TSH. No known thyroid disorders.   -F/u free T4 and T3 VTE: IMPROVE Score 0; no need for pharmacoprophylaxis     FULL CODE    Dispo: [facility]

## 2018-06-27 NOTE — PROGRESS NOTE ADULT - PROBLEM SELECTOR PLAN 3
Hx of SLE on hydroxychloroquine, prednisone.  -Held hydroxychloroquine   -Will resume prednisone tomorrow    -Continue with Solucortef for two more doses Hx of SLE on hydroxychloroquine, prednisone. Patient had followed with Dr. Beltran Nava in the past but had to change to a doctor at [facility] (appointment scheduled in a couple weeks) due to insurance issues.   -Holding hydroxychlorquine   -C/w home dose of prednisone 10mg q24h

## 2018-06-27 NOTE — CHART NOTE - NSCHARTNOTEFT_GEN_A_CORE
28F with PMH SLE who presented with headache and syncopal event. Reports that headache lasted 1 day 8.5/10, band-like distribution without radiation. Denied photophobia.  Patient reported that she has had cough- recent visit to ED day prior to admission for which patient given abx with azithromycin but never initiated as had syncopal event en route to picking up prescription Of note, patient reports over the last month she notes worsening generalized weakness and fatigue. Also reported 1 month of unintentional weight loss of 5lbs with early satiety, and night sweats. In the ED Noted febrile to 103F (rectal), HR P76, BP notable for 84/54, RR18 sat 98% on room air. S/p 2L NS, ceftriaxone 2g empiric coverage. Labs notable for WBC 3.7, Hb 10.7, platelets 109, UA and UTox negative, CT/CTA head negative. LP performed in ED, CSF with nucleated cells, with RBC and neutrophils, glucose WNL, mild elevation of protein- given no photophobia, no kernig/brudzinski with low suspicion for meningitis. CT chest with RUL with atypical nodule with tree in bud pattern. Patient admitted to [facility] for sepsis with concern for opportunistic infection vs TB given immunosuppression. Started on Vanc and ceftriaxone for broad abx coverage. Sent off viral panel. BP improved with IVF. Patient was stepped down to the regional medical floor after clinical improvement.

## 2018-06-28 DIAGNOSIS — G03.9 MENINGITIS, UNSPECIFIED: ICD-10-CM

## 2018-06-28 DIAGNOSIS — R10.30 LOWER ABDOMINAL PAIN, UNSPECIFIED: ICD-10-CM

## 2018-06-28 LAB
A PHAGOCYTOPH DNA BLD QL NAA+PROBE: NEGATIVE — SIGNIFICANT CHANGE UP
ANION GAP SERPL CALC-SCNC: 3 MMOL/L — LOW (ref 5–17)
B BURGDOR DNA SPEC QL NAA+PROBE: NEGATIVE — SIGNIFICANT CHANGE UP
B19V IGG SER-ACNC: 0.2 INDEX — SIGNIFICANT CHANGE UP (ref 0–0.8)
B19V IGG+IGM SER-IMP: NEGATIVE — SIGNIFICANT CHANGE UP
B19V IGG+IGM SER-IMP: SIGNIFICANT CHANGE UP
B19V IGM FLD-ACNC: 0.1 INDEX — SIGNIFICANT CHANGE UP (ref 0–0.8)
B19V IGM SER-ACNC: NEGATIVE — SIGNIFICANT CHANGE UP
BASOPHILS NFR BLD AUTO: 0.6 % — SIGNIFICANT CHANGE UP (ref 0–2)
BUN SERPL-MCNC: 10 MG/DL — SIGNIFICANT CHANGE UP (ref 7–23)
CALCIUM SERPL-MCNC: 8 MG/DL — LOW (ref 8.4–10.5)
CHLORIDE SERPL-SCNC: 110 MMOL/L — HIGH (ref 96–108)
CMV IGM FLD-ACNC: <8 AU/ML — SIGNIFICANT CHANGE UP
CMV IGM SERPL QL: NEGATIVE — SIGNIFICANT CHANGE UP
CO2 SERPL-SCNC: 29 MMOL/L — SIGNIFICANT CHANGE UP (ref 22–31)
CREAT SERPL-MCNC: 0.75 MG/DL — SIGNIFICANT CHANGE UP (ref 0.5–1.3)
CULTURE RESULTS: SIGNIFICANT CHANGE UP
E CHAFFEENSIS DNA BLD QL NAA+PROBE: NEGATIVE — SIGNIFICANT CHANGE UP
E EWINGII DNA SPEC QL NAA+PROBE: NEGATIVE — SIGNIFICANT CHANGE UP
EHRLICHIA DNA SPEC QL NAA+PROBE: NEGATIVE — SIGNIFICANT CHANGE UP
EOSINOPHIL NFR BLD AUTO: 0 % — SIGNIFICANT CHANGE UP (ref 0–6)
GLUCOSE SERPL-MCNC: 97 MG/DL — SIGNIFICANT CHANGE UP (ref 70–99)
GRAM STN FLD: SIGNIFICANT CHANGE UP
HCT VFR BLD CALC: 25.9 % — LOW (ref 34.5–45)
HGB BLD-MCNC: 9 G/DL — LOW (ref 11.5–15.5)
LYMPHOCYTES # BLD AUTO: 52.4 % — HIGH (ref 13–44)
M TB TUBERC IFN-G BLD QL: -0.08 IU/ML — SIGNIFICANT CHANGE UP
M TB TUBERC IFN-G BLD QL: 0.13 IU/ML — SIGNIFICANT CHANGE UP
M TB TUBERC IFN-G BLD QL: NEGATIVE — SIGNIFICANT CHANGE UP
MCHC RBC-ENTMCNC: 30.6 PG — SIGNIFICANT CHANGE UP (ref 27–34)
MCHC RBC-ENTMCNC: 34.7 G/DL — SIGNIFICANT CHANGE UP (ref 32–36)
MCV RBC AUTO: 88.1 FL — SIGNIFICANT CHANGE UP (ref 80–100)
MITOGEN IGNF BCKGRD COR BLD-ACNC: 2.63 IU/ML — SIGNIFICANT CHANGE UP
MONOCYTES NFR BLD AUTO: 13.3 % — SIGNIFICANT CHANGE UP (ref 2–14)
NEUTROPHILS NFR BLD AUTO: 33.7 % — LOW (ref 43–77)
NIGHT BLUE STAIN TISS: SIGNIFICANT CHANGE UP
PLATELET # BLD AUTO: 91 K/UL — LOW (ref 150–400)
POTASSIUM SERPL-MCNC: 3.8 MMOL/L — SIGNIFICANT CHANGE UP (ref 3.5–5.3)
POTASSIUM SERPL-SCNC: 3.8 MMOL/L — SIGNIFICANT CHANGE UP (ref 3.5–5.3)
RBC # BLD: 2.94 M/UL — LOW (ref 3.8–5.2)
RBC # FLD: 11.9 % — SIGNIFICANT CHANGE UP (ref 10.3–16.9)
SODIUM SERPL-SCNC: 142 MMOL/L — SIGNIFICANT CHANGE UP (ref 135–145)
SPECIMEN SOURCE: SIGNIFICANT CHANGE UP
SPECIMEN SOURCE: SIGNIFICANT CHANGE UP
VANCOMYCIN TROUGH SERPL-MCNC: 12 UG/ML — SIGNIFICANT CHANGE UP (ref 10–20)
VDRL CSF-TITR: NEGATIVE — SIGNIFICANT CHANGE UP
WBC # BLD: 1.7 K/UL — LOW (ref 3.8–10.5)
WBC # FLD AUTO: 1.7 K/UL — LOW (ref 3.8–10.5)

## 2018-06-28 PROCEDURE — 99233 SBSQ HOSP IP/OBS HIGH 50: CPT | Mod: GC

## 2018-06-28 RX ORDER — VANCOMYCIN HCL 1 G
1250 VIAL (EA) INTRAVENOUS EVERY 12 HOURS
Qty: 0 | Refills: 0 | Status: DISCONTINUED | OUTPATIENT
Start: 2018-06-29 | End: 2018-06-29

## 2018-06-28 RX ORDER — VANCOMYCIN HCL 1 G
1250 VIAL (EA) INTRAVENOUS ONCE
Qty: 0 | Refills: 0 | Status: COMPLETED | OUTPATIENT
Start: 2018-06-28 | End: 2018-06-28

## 2018-06-28 RX ORDER — ACYCLOVIR SODIUM 500 MG
550 VIAL (EA) INTRAVENOUS EVERY 8 HOURS
Qty: 0 | Refills: 0 | Status: DISCONTINUED | OUTPATIENT
Start: 2018-06-28 | End: 2018-06-29

## 2018-06-28 RX ORDER — AMPICILLIN TRIHYDRATE 250 MG
2 CAPSULE ORAL EVERY 6 HOURS
Qty: 0 | Refills: 0 | Status: DISCONTINUED | OUTPATIENT
Start: 2018-06-28 | End: 2018-06-29

## 2018-06-28 RX ORDER — POTASSIUM CHLORIDE 20 MEQ
20 PACKET (EA) ORAL ONCE
Qty: 0 | Refills: 0 | Status: COMPLETED | OUTPATIENT
Start: 2018-06-28 | End: 2018-06-28

## 2018-06-28 RX ORDER — ONDANSETRON 8 MG/1
4 TABLET, FILM COATED ORAL EVERY 6 HOURS
Qty: 0 | Refills: 0 | Status: DISCONTINUED | OUTPATIENT
Start: 2018-06-28 | End: 2018-06-28

## 2018-06-28 RX ORDER — SODIUM CHLORIDE 9 MG/ML
1000 INJECTION INTRAMUSCULAR; INTRAVENOUS; SUBCUTANEOUS
Qty: 0 | Refills: 0 | Status: DISCONTINUED | OUTPATIENT
Start: 2018-06-28 | End: 2018-06-29

## 2018-06-28 RX ORDER — ONDANSETRON 8 MG/1
4 TABLET, FILM COATED ORAL EVERY 6 HOURS
Qty: 0 | Refills: 0 | Status: DISCONTINUED | OUTPATIENT
Start: 2018-06-28 | End: 2018-07-04

## 2018-06-28 RX ADMIN — SODIUM CHLORIDE 75 MILLILITER(S): 9 INJECTION INTRAMUSCULAR; INTRAVENOUS; SUBCUTANEOUS at 17:25

## 2018-06-28 RX ADMIN — Medication 20 MILLIEQUIVALENT(S): at 09:12

## 2018-06-28 RX ADMIN — POLYETHYLENE GLYCOL 3350 17 GRAM(S): 17 POWDER, FOR SOLUTION ORAL at 12:32

## 2018-06-28 RX ADMIN — SODIUM CHLORIDE 4 MILLILITER(S): 9 INJECTION INTRAMUSCULAR; INTRAVENOUS; SUBCUTANEOUS at 05:34

## 2018-06-28 RX ADMIN — Medication 166.67 MILLIGRAM(S): at 03:44

## 2018-06-28 RX ADMIN — Medication 10 MILLIGRAM(S): at 05:27

## 2018-06-28 RX ADMIN — Medication 650 MILLIGRAM(S): at 09:12

## 2018-06-28 RX ADMIN — Medication 166.67 MILLIGRAM(S): at 21:20

## 2018-06-28 RX ADMIN — LIDOCAINE 1 PATCH: 4 CREAM TOPICAL at 12:32

## 2018-06-28 RX ADMIN — CEFTRIAXONE 100 GRAM(S): 500 INJECTION, POWDER, FOR SOLUTION INTRAMUSCULAR; INTRAVENOUS at 05:27

## 2018-06-28 RX ADMIN — LIDOCAINE 1 PATCH: 4 CREAM TOPICAL at 23:39

## 2018-06-28 RX ADMIN — Medication 216 GRAM(S): at 19:04

## 2018-06-28 RX ADMIN — Medication 650 MILLIGRAM(S): at 10:29

## 2018-06-28 RX ADMIN — SODIUM CHLORIDE 4 MILLILITER(S): 9 INJECTION INTRAMUSCULAR; INTRAVENOUS; SUBCUTANEOUS at 16:57

## 2018-06-28 RX ADMIN — CEFTRIAXONE 100 GRAM(S): 500 INJECTION, POWDER, FOR SOLUTION INTRAMUSCULAR; INTRAVENOUS at 16:58

## 2018-06-28 RX ADMIN — Medication 111 MILLIGRAM(S): at 17:45

## 2018-06-28 RX ADMIN — ONDANSETRON 4 MILLIGRAM(S): 8 TABLET, FILM COATED ORAL at 16:55

## 2018-06-28 RX ADMIN — Medication 216 GRAM(S): at 23:23

## 2018-06-28 NOTE — PROGRESS NOTE ADULT - PROBLEM SELECTOR PLAN 6
Patient found to have low TSH. No known thyroid disorders.   -Free thyroxine WNL (0.76)  -Low T3 (68)  - nl fT4  -Mixed picture in setting of infection (central hypothyroidism vs. subclinical hyperthyroidism)  - recommend f/u testing in 6-8 weeks after resolution of acute illness

## 2018-06-28 NOTE — PROGRESS NOTE ADULT - PROBLEM SELECTOR PLAN 5
Noted episode of syncope. Patient reports some dizziness with change in position- with no documented orthostatic- may be component of orthostatic hypotension. Denies chest pain, shortness of breath. EKG with no ischemic changes and negative trops.

## 2018-06-28 NOTE — CONSULT NOTE ADULT - SUBJECTIVE AND OBJECTIVE BOX
INFECTIOUS DISEASE SERVICE INITIAL CONSULT NOTE    HPI:  Patient is a 28 year old female with PMH SLE who presents following an earlier visit to ED yesterday during which time she was diagnosed with pneumonia and written a prescription for azithromycin - on her way to  the prescription she had a syncopal event and was brought back to the emergency department. She states that over the past month she has had generalized weakness and fatigue, as well as intermittent fever 2-3x per week for the last three weeks. She also states that she has been waking up drenched in sweat for the past month. She endorses unintentional weight loss of 5lbs over the past month with decreased appetite and early satiety associated with abdominal pain. She has dry cough, chills, and a headache which she describes as band-like, sharp, gradual onset, and associated with nausea. She describes this headache as her worst in the past seven years. The patient is [place] and lives with her two sisters, she denies international travel or any sick contacts at home. She denies diarrhea but has had some constipation. In the ED T36.8 (rectal measurement 39.4), P76, 84/54, RR18 sat 98% on room air. Patient received 2L NS, ceftriaxone 2g empiric coverage. Labs notable for WBC 3.7, Hb 10.7, platelets 109, UA and UTox negative, CT/CTA head negative, (26 Jun 2018 05:48)    Subjective: Patient complaining of abdominal pain and nausea.     ADDITIONAL ID HPI:  PAST MEDICAL & SURGICAL HISTORY:  Lupus  No significant past surgical history      REVIEW OF SYSTEMS:  Otherwise negative other than what is stated in the HPI.    ACTIVE ANTIMICROBIAL/ANTIBIOTIC MEDICATIONS:  acyclovir IVPB 550 milliGRAM(s) IV Intermittent every 8 hours  ampicillin  IVPB 2 Gram(s) IV Intermittent every 6 hours  cefTRIAXone   IVPB 2 Gram(s) IV Intermittent every 12 hours      OTHER MEDICATIONS:  acetaminophen   Tablet. 650 milliGRAM(s) Oral every 6 hours PRN  lidocaine   Patch 1 Patch Transdermal daily  ondansetron Injectable 4 milliGRAM(s) IV Push every 6 hours PRN  polyethylene glycol 3350 17 Gram(s) Oral daily  predniSONE   Tablet 10 milliGRAM(s) Oral daily  sodium chloride 0.9%. 1000 milliLiter(s) IV Continuous <Continuous>  sodium chloride 3%  Inhalation 5 milliLiter(s) Inhalation two times a day      ALLERGIES:  Allergies    sulfa drugs (Rash)    Intolerances        SOCIAL HISTORY:    FAMILY HISTORY:  No pertinent family history in first degree relatives      VITAL SIGNS:  ICU Vital Signs Last 24 Hrs  T(C): 36.3 (28 Jun 2018 22:36), Max: 37.5 (28 Jun 2018 05:19)  T(F): 97.4 (28 Jun 2018 22:36), Max: 99.5 (28 Jun 2018 05:19)  HR: 54 (28 Jun 2018 21:00) (49 - 55)  BP: 98/62 (28 Jun 2018 21:00) (82/53 - 98/62)  BP(mean): 69 (28 Jun 2018 21:00) (60 - 71)  ABP: --  ABP(mean): --  RR: 18 (28 Jun 2018 21:00) (14 - 18)  SpO2: 98% (28 Jun 2018 21:00) (98% - 99%)      PHYSICAL EXAM:  General: Pale and fatigue appearing, sitting comfortably in bed. Non-toxic. Speaking in full sentences   HEENT: NC/AT; EOMI, anicteric sclera; MMM  Neck: supple, no JVD, no submandibular or cervical lymphadenopathy, no nuchal rigidity  Cardiovascular: +S1/S2; bradycardic, no r/m/g  Respiratory: CTA B/L; no W/R/R  Gastrointestinal: soft, nondistended, diffusely tender, no guarding   Extremities: WWP; no edema, clubbing or cyanosis  Vascular: 2+ radial, DP pulses B/L  Neurological: AAOx3; no focal deficits  LABS:                        9.0    1.7   )-----------( 91       ( 28 Jun 2018 06:22 )             25.9     06-28    142  |  110<H>  |  10  ----------------------------<  97  3.8   |  29  |  0.75    Ca    8.0<L>      28 Jun 2018 06:24  Mg     2.0     06-27            MICROBIOLOGY:    Culture - Acid Fast - Sputum w/Smear (collected 06-27-18 @ 17:31)  Source: .Sputum Sputum    Culture - Sputum (collected 06-27-18 @ 08:50)  Source: .Sputum Sputum  Gram Stain (06-28-18 @ 12:53):    Sputum specimen rejected.  Microscopic examination indicates    oropharyngeal contamination.  Please repeat.    Numerous epithelial cells    Numerous White blood cells    Moderate Gram positive cocci in pairs    Moderate Gram Negative Rods    Rare Gram Positive Rods  Final Report (06-28-18 @ 12:53):    Sputum specimen rejected.  Microscopic examination indicates    oropharyngeal contamination.  Please repeat.    Culture - Fungal, CSF (collected 06-26-18 @ 16:28)  Source: .CSF CSF  Preliminary Report (06-27-18 @ 05:45):    Testing in progress    Culture - CSF with Gram Stain (collected 06-26-18 @ 16:28)  Source: .CSF CSF  Gram Stain (06-26-18 @ 17:02):    No polymorphonuclear cells seen    No organisms seen    by cytocentrifuge  Preliminary Report (06-27-18 @ 17:18):    No growth    Culture - Urine (collected 06-26-18 @ 08:14)  Source: .Urine None  Final Report (06-27-18 @ 08:52):    No growth    Culture - Urine (collected 06-26-18 @ 00:01)  Source: .Urine Clean Catch (Midstream)  Final Report (06-26-18 @ 21:14):    10,000 - 49,000 CFU/mL Streptococcus agalactiae (Group B)    Group B streptococci are susceptible to ampicillin,    penicillin and cefazolin, but may be resistant to    erythromycin and clindamycin.    Recommendations for intrapartum prophylaxis for Group B    streptococci are penicillin or ampicillin.    Culture - Blood (collected 06-25-18 @ 23:53)  Source: .Blood Blood  Preliminary Report (06-27-18 @ 01:02):    No growth to date.    Culture - Blood (collected 06-25-18 @ 23:52)  Source: .Blood Blood  Preliminary Report (06-27-18 @ 01:02):    No growth to date.    Culture - Blood (collected 06-25-18 @ 23:52)  Source: .Blood Blood  Preliminary Report (06-27-18 @ 01:02):    No growth to date.    Culture - Blood (collected 06-25-18 @ 23:52)  Source: .Blood Blood  Preliminary Report (06-27-18 @ 01:02):    No growth to date.        RADIOLOGY & ADDITIONAL STUDIES:

## 2018-06-28 NOTE — PROGRESS NOTE ADULT - SUBJECTIVE AND OBJECTIVE BOX
OVERNIGHT EVENTS: No acute events overnight.     SUBJECTIVE / INTERVAL HPI: Patient seen and examined at bedside. Still experiencing fatigue and complains early this AM of increased abdominal pain. She has had pain like this for the past 6 months but it has been worse over the past month. The pain is cramping in nature and can occur throughout the abdomen, without radiation; today it is worst in RLQ. The pain has been associated with a poor appetite and a sense of early satiety after "a few bites" of food or sips of a beverage; the pain is sometimes improved by defecation. Her last bowel movement was yesterday and was normal, though she endorses subjective constipation; she denies any history of hematochezia or melena, or episodes of diarrhea.     VITAL SIGNS:  Vital Signs Last 24 Hrs  T(C): 36.6 (28 Jun 2018 10:09), Max: 37.5 (28 Jun 2018 05:19)  T(F): 97.8 (28 Jun 2018 10:09), Max: 99.5 (28 Jun 2018 05:19)  HR: 50 (28 Jun 2018 09:17) (50 - 66)  BP: 90/57 (28 Jun 2018 09:17) (90/57 - 110/66)  BP(mean): 64 (28 Jun 2018 09:17) (62 - 76)  RR: 16 (28 Jun 2018 09:17) (14 - 16)  SpO2: 98% (28 Jun 2018 09:17) (98% - 99%)    PHYSICAL EXAM:    General: Well-developed, well-nourished adult  female in no acute distress; appears fatigue.  HEENT: NC/AT; PERRL, anicteric sclera; MMM.  Cardiovascular: +S1/S2, RRR  Respiratory: CTA B/L; no W/R/R  Gastrointestinal: soft, moderately tender to palpation throughout worst in RLQ; no rebound or guarding; negative McBurney and Rosving's signs; +BSx4  Extremities: WWP; no edema, clubbing or cyanosis  Vascular: 2+ radial, DP/PT pulses B/L  Neurological: AAOx3; no focal deficits    MEDICATIONS:  MEDICATIONS  (STANDING):  cefTRIAXone   IVPB 2 Gram(s) IV Intermittent every 12 hours  lidocaine   Patch 1 Patch Transdermal daily  polyethylene glycol 3350 17 Gram(s) Oral daily  predniSONE   Tablet 10 milliGRAM(s) Oral daily  sodium chloride 3%  Inhalation 5 milliLiter(s) Inhalation two times a day    MEDICATIONS  (PRN):  acetaminophen   Tablet. 650 milliGRAM(s) Oral every 6 hours PRN Mild Pain (1 - 3)      ALLERGIES:  Allergies    sulfa drugs (Rash)    Intolerances        LABS:                        9.0    1.7   )-----------( 91       ( 28 Jun 2018 06:22 )             25.9     06-28    142  |  110<H>  |  10  ----------------------------<  97  3.8   |  29  |  0.75    Ca    8.0<L>      28 Jun 2018 06:24  Mg     2.0     06-27          CAPILLARY BLOOD GLUCOSE          RADIOLOGY & ADDITIONAL TESTS: Reviewed. OVERNIGHT EVENTS: No acute events overnight.     SUBJECTIVE / INTERVAL HPI: Patient seen and examined at bedside. Still experiencing fatigue and complains early this AM of increased abdominal pain. She has had pain like this for the past 6 months but it has been worse over the past month. The pain is cramping in nature and can occur throughout the abdomen, without radiation; today it is worst in RLQ. The pain has been associated with a poor appetite and a sense of early satiety after "a few bites" of food or sips of a beverage; the pain is sometimes improved by defecation. Her last bowel movement was yesterday and was normal, though she endorses subjective constipation; she denies any history of hematochezia or melena, or episodes of diarrhea. She feels the Miralax is helping.    VITAL SIGNS:  Vital Signs Last 24 Hrs  T(C): 36.6 (28 Jun 2018 10:09), Max: 37.5 (28 Jun 2018 05:19)  T(F): 97.8 (28 Jun 2018 10:09), Max: 99.5 (28 Jun 2018 05:19)  HR: 50 (28 Jun 2018 09:17) (50 - 66)  BP: 90/57 (28 Jun 2018 09:17) (90/57 - 110/66)  BP(mean): 64 (28 Jun 2018 09:17) (62 - 76)  RR: 16 (28 Jun 2018 09:17) (14 - 16)  SpO2: 98% (28 Jun 2018 09:17) (98% - 99%)    PHYSICAL EXAM:    General: Well-developed, well-nourished adult  female in no acute distress; appears fatigue.  HEENT: NC/AT; PERRL, anicteric sclera; MMM.  Cardiovascular: +S1/S2, RRR  Respiratory: CTA B/L; no W/R/R  Gastrointestinal: soft, moderately tender to palpation throughout worst in RLQ; no rebound or guarding; negative McBurney and Rosving's signs; +BSx4  Extremities: WWP; no edema, clubbing or cyanosis  Vascular: 2+ radial, DP/PT pulses B/L  Neurological: AAOx3; no focal deficits    MEDICATIONS:  MEDICATIONS  (STANDING):  cefTRIAXone   IVPB 2 Gram(s) IV Intermittent every 12 hours  lidocaine   Patch 1 Patch Transdermal daily  polyethylene glycol 3350 17 Gram(s) Oral daily  predniSONE   Tablet 10 milliGRAM(s) Oral daily  sodium chloride 3%  Inhalation 5 milliLiter(s) Inhalation two times a day    MEDICATIONS  (PRN):  acetaminophen   Tablet. 650 milliGRAM(s) Oral every 6 hours PRN Mild Pain (1 - 3)      ALLERGIES:  Allergies    sulfa drugs (Rash)    Intolerances        LABS:                        9.0    1.7   )-----------( 91       ( 28 Jun 2018 06:22 )             25.9     06-28    142  |  110<H>  |  10  ----------------------------<  97  3.8   |  29  |  0.75    Ca    8.0<L>      28 Jun 2018 06:24  Mg     2.0     06-27          CAPILLARY BLOOD GLUCOSE          RADIOLOGY & ADDITIONAL TESTS: Reviewed.

## 2018-06-28 NOTE — CONSULT NOTE ADULT - PROBLEM SELECTOR RECOMMENDATION 9
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

## 2018-06-28 NOTE — PROGRESS NOTE ADULT - PROBLEM SELECTOR PLAN 2
#Anemia  Likely multifactorial (sepsis, lupus). Unknown baseline. No evidence of active bleeding.   -c/w monitoring H/H  -F/u haptoglobin and LDH  -keep active T&S, Hgb goal >7    #Leukopenia  WBC of 3.7 on admission, now 1.6. Per patient, she has a history of leukopenia and was told by her rheumatologist that it's due to lupus. Per Dr. Nava's office, her WBC was 3.7 in January. Absolute neutrophil count of ~800.   -F/u manual differential     #Thrombocytopenia  Stable. Plts have been between 90 and 111 during current admission. Likely secondary to sepsis.   -Monitor CBC   -Transfuse platelets if <50K with bleeding or less than 10K

## 2018-06-28 NOTE — PROGRESS NOTE ADULT - PROBLEM SELECTOR PLAN 4
Hx of SLE on hydroxychloroquine, prednisone. Patient had followed with Dr. Beltran Nava in the past but had to change to a doctor at [facility] (appointment scheduled in a couple weeks) due to insurance issues.   -Holding hydroxychlorquine   - continuing home dose of prednisone 10mg q24h

## 2018-06-28 NOTE — PROGRESS NOTE ADULT - ASSESSMENT
28F PMH SLE (on prednisone, hydroxychloroquine) who presents syncope, headaches, fevers, chills. Admitted to [facility] for sepsis of unclear etiology, no evidence of PNA on CT scan but given cough and noted atypical nodule on CT may be underlying opportunistic infection and R/O TB as well as work up for viral etiology.

## 2018-06-28 NOTE — CONSULT NOTE ADULT - ASSESSMENT
28 year old female with PMH SLE presented with presented with severe headache 8/10 in band-like distribution, with past month worsening generalized weakness, fatigue and syncopal episode. Patient T max 103F (rectal), BP 84/54, labs significant for leukopenia/ LP performed in ED, CSF with nucleated cells with neutrophils predominance ID consulted for r/o bacterial meningitis. 28 year old female with PMH SLE presented with presented with severe headache 8/10 in band-like distribution, with past month worsening generalized weakness, fatigue and syncopal episode. Patient T max 103F (rectal), BP 84/54, labs significant for leukopenia/ LP performed in ED, CSF with nucleated cells with neutrophils predominance, glucose WNL 45, protein mildly elevated 55, ID consulted for r/o bacterial meningitis.

## 2018-06-28 NOTE — PROGRESS NOTE ADULT - ATTENDING COMMENTS
Pt seen and examined at bedside. Agree with housestaff's exam/a/p as noted above with additions,   c/o nausea. Had one BM yesterday after miralax  VSS  comfortable  +S1/S2 RRR   CTA b/l  +bs/+mild tenderness on lower quadrant on deep palpation  no LEs edema   labs reviewed     a/p:  1. Meningitis: ID consulted,   2. hx of lupus, check C3/C4 complement, dsDNA (with hx of leukopenia 2/2 lupus)  3. Pancytopenia likely 2/2 above/multifactorial  4. R/o TB: f/u 2 more AFB.     Agree with rest of a/p as above.

## 2018-06-28 NOTE — PROGRESS NOTE ADULT - PROBLEM SELECTOR PLAN 1
Improving. Met sepsis criteria on presentation with fever to 103F, tachycardia and leukopenia <4. Etiology remains unclear. CT with an atypical nodule with tree-in-bud; given immunosuppressed concern for opportunistic infection, may also be component of a viral infection.  -continue Vanc and ceftriaxone (Day 2)  - vanc trough at 2 pm  -RVP negative  - negative monospot, prior EBV infection, prior CMV infection  -F/u VDRL, West Nile Virus, Parvovirus, Monospot, Ehrlichia, Babesia, Borrelia  -F/u BCx, CSF studies  -CSF gram stain negative for organisms     #R/o TB  Pt with 1m of night sweats, unintentional weight loss, weakness, fatigue. No sick contacts. No recent travel. Does not live in a shelter. However, she is immunocompromised in the setting of lupus on prednisone/hydroxychloroquine. No cavitary lesions on CT chest.   - AFB ordered x3 days; first day negative  - Quantiferon gold negative, but of limited utility in immunosuppression   - continue airborne precautions

## 2018-06-29 DIAGNOSIS — Z91.89 OTHER SPECIFIED PERSONAL RISK FACTORS, NOT ELSEWHERE CLASSIFIED: ICD-10-CM

## 2018-06-29 LAB
ANION GAP SERPL CALC-SCNC: 7 MMOL/L — SIGNIFICANT CHANGE UP (ref 5–17)
BUN SERPL-MCNC: 9 MG/DL — SIGNIFICANT CHANGE UP (ref 7–23)
C3 SERPL-MCNC: 27 MG/DL — LOW (ref 81–157)
C4 SERPL-MCNC: 2 MG/DL — LOW (ref 13–39)
CALCIUM SERPL-MCNC: 7.9 MG/DL — LOW (ref 8.4–10.5)
CHLORIDE SERPL-SCNC: 110 MMOL/L — HIGH (ref 96–108)
CO2 SERPL-SCNC: 27 MMOL/L — SIGNIFICANT CHANGE UP (ref 22–31)
CREAT SERPL-MCNC: 0.76 MG/DL — SIGNIFICANT CHANGE UP (ref 0.5–1.3)
CRYPTOC AG CSF-ACNC: NEGATIVE — SIGNIFICANT CHANGE UP
CSF PCR RESULT: SIGNIFICANT CHANGE UP
CULTURE RESULTS: NO GROWTH — SIGNIFICANT CHANGE UP
EOSINOPHIL NFR BLD AUTO: 0.6 % — SIGNIFICANT CHANGE UP (ref 0–6)
ERYTHROCYTE [SEDIMENTATION RATE] IN BLOOD: 16 MM/HR — HIGH
GLUCOSE SERPL-MCNC: 99 MG/DL — SIGNIFICANT CHANGE UP (ref 70–99)
HCT VFR BLD CALC: 25.1 % — LOW (ref 34.5–45)
HGB BLD-MCNC: 8.8 G/DL — LOW (ref 11.5–15.5)
LYMPHOCYTES # BLD AUTO: 44.5 % — HIGH (ref 13–44)
MAGNESIUM SERPL-MCNC: 1.9 MG/DL — SIGNIFICANT CHANGE UP (ref 1.6–2.6)
MCHC RBC-ENTMCNC: 30.4 PG — SIGNIFICANT CHANGE UP (ref 27–34)
MCHC RBC-ENTMCNC: 35.1 G/DL — SIGNIFICANT CHANGE UP (ref 32–36)
MCV RBC AUTO: 86.9 FL — SIGNIFICANT CHANGE UP (ref 80–100)
MONOCYTES NFR BLD AUTO: 8.7 % — SIGNIFICANT CHANGE UP (ref 2–14)
NEUTROPHILS NFR BLD AUTO: 46.2 % — SIGNIFICANT CHANGE UP (ref 43–77)
PHOSPHATE SERPL-MCNC: 3.3 MG/DL — SIGNIFICANT CHANGE UP (ref 2.5–4.5)
PLATELET # BLD AUTO: 97 K/UL — LOW (ref 150–400)
POTASSIUM SERPL-MCNC: 3.9 MMOL/L — SIGNIFICANT CHANGE UP (ref 3.5–5.3)
POTASSIUM SERPL-SCNC: 3.9 MMOL/L — SIGNIFICANT CHANGE UP (ref 3.5–5.3)
RBC # BLD: 2.89 M/UL — LOW (ref 3.8–5.2)
RBC # FLD: 11.9 % — SIGNIFICANT CHANGE UP (ref 10.3–16.9)
SODIUM SERPL-SCNC: 144 MMOL/L — SIGNIFICANT CHANGE UP (ref 135–145)
SPECIMEN SOURCE: SIGNIFICANT CHANGE UP
THYROPEROXIDASE AB SERPL-ACNC: <10 IU/ML — SIGNIFICANT CHANGE UP (ref 0–34.9)
WBC # BLD: 1.6 K/UL — LOW (ref 3.8–10.5)
WBC # FLD AUTO: 1.6 K/UL — LOW (ref 3.8–10.5)

## 2018-06-29 PROCEDURE — 99255 IP/OBS CONSLTJ NEW/EST HI 80: CPT | Mod: GC

## 2018-06-29 PROCEDURE — 99233 SBSQ HOSP IP/OBS HIGH 50: CPT | Mod: GC

## 2018-06-29 PROCEDURE — 70553 MRI BRAIN STEM W/O & W/DYE: CPT | Mod: 26

## 2018-06-29 RX ORDER — HYDROXYCHLOROQUINE SULFATE 200 MG
1 TABLET ORAL
Qty: 0 | Refills: 0 | COMMUNITY

## 2018-06-29 RX ORDER — KETOROLAC TROMETHAMINE 30 MG/ML
30 SYRINGE (ML) INJECTION ONCE
Qty: 0 | Refills: 0 | Status: DISCONTINUED | OUTPATIENT
Start: 2018-06-29 | End: 2018-06-29

## 2018-06-29 RX ORDER — HYDROXYCHLOROQUINE SULFATE 200 MG
200 TABLET ORAL AT BEDTIME
Qty: 0 | Refills: 0 | Status: DISCONTINUED | OUTPATIENT
Start: 2018-06-29 | End: 2018-06-29

## 2018-06-29 RX ORDER — HYDROXYCHLOROQUINE SULFATE 200 MG
400 TABLET ORAL DAILY
Qty: 0 | Refills: 0 | Status: DISCONTINUED | OUTPATIENT
Start: 2018-06-29 | End: 2018-06-29

## 2018-06-29 RX ORDER — HYDROXYCHLOROQUINE SULFATE 200 MG
200 TABLET ORAL EVERY 12 HOURS
Qty: 0 | Refills: 0 | Status: DISCONTINUED | OUTPATIENT
Start: 2018-06-29 | End: 2018-07-04

## 2018-06-29 RX ADMIN — Medication 30 MILLIGRAM(S): at 12:11

## 2018-06-29 RX ADMIN — Medication 30 MILLIGRAM(S): at 11:40

## 2018-06-29 RX ADMIN — Medication 650 MILLIGRAM(S): at 09:10

## 2018-06-29 RX ADMIN — CEFTRIAXONE 100 GRAM(S): 500 INJECTION, POWDER, FOR SOLUTION INTRAMUSCULAR; INTRAVENOUS at 06:54

## 2018-06-29 RX ADMIN — Medication 216 GRAM(S): at 06:11

## 2018-06-29 RX ADMIN — POLYETHYLENE GLYCOL 3350 17 GRAM(S): 17 POWDER, FOR SOLUTION ORAL at 14:39

## 2018-06-29 RX ADMIN — SODIUM CHLORIDE 5 MILLILITER(S): 9 INJECTION INTRAMUSCULAR; INTRAVENOUS; SUBCUTANEOUS at 06:12

## 2018-06-29 RX ADMIN — Medication 111 MILLIGRAM(S): at 00:33

## 2018-06-29 RX ADMIN — Medication 10 MILLIGRAM(S): at 06:11

## 2018-06-29 RX ADMIN — Medication 166.67 MILLIGRAM(S): at 09:11

## 2018-06-29 RX ADMIN — Medication 200 MILLIGRAM(S): at 19:48

## 2018-06-29 RX ADMIN — Medication 111 MILLIGRAM(S): at 14:39

## 2018-06-29 RX ADMIN — Medication 111 MILLIGRAM(S): at 07:29

## 2018-06-29 RX ADMIN — ONDANSETRON 4 MILLIGRAM(S): 8 TABLET, FILM COATED ORAL at 10:46

## 2018-06-29 RX ADMIN — SODIUM CHLORIDE 5 MILLILITER(S): 9 INJECTION INTRAMUSCULAR; INTRAVENOUS; SUBCUTANEOUS at 22:17

## 2018-06-29 RX ADMIN — Medication 650 MILLIGRAM(S): at 10:43

## 2018-06-29 NOTE — DIETITIAN INITIAL EVALUATION ADULT. - PROBLEM SELECTOR PLAN 4
- Patient with lupus, takes hydroxychloroquine and prednisone as home medications  - Consider immunosuppression in workup of infectious etiology  - Hold prednisone and hydroxychloroquine at present given sepsis  - SoluCortef 50mg IV q8h

## 2018-06-29 NOTE — PROGRESS NOTE ADULT - SUBJECTIVE AND OBJECTIVE BOX
INFECTIOUS DISEASE CONSULT PROGRESS NOTE    INTERVAL HPI/OVERNIGHT EVENTS: No acute events overnight.     Subjective: Patient continues to complain of nausea, generalized weakness, fatigue. She complains of 8/10 pain.     ACTIVE ANTIMICROBIALS/ANTIBIOTICS:  hydroxychloroquine 200 milliGRAM(s) Oral every 12 hours      VITAL SIGNS:  ICU Vital Signs Last 24 Hrs  T(C): 36.5 (29 Jun 2018 11:35), Max: 36.9 (29 Jun 2018 05:25)  T(F): 97.7 (29 Jun 2018 11:35), Max: 98.4 (29 Jun 2018 05:25)  HR: 49 (29 Jun 2018 11:35) (49 - 54)  BP: 102/65 (29 Jun 2018 11:35) (96/66 - 102/65)  BP(mean): 69 (28 Jun 2018 21:00) (69 - 69)  ABP: --  ABP(mean): --  RR: 16 (29 Jun 2018 11:35) (15 - 18)  SpO2: 98% (29 Jun 2018 11:35) (97% - 100%)      PHYSICAL EXAM:  General: Pale and fatigue appearing, sitting comfortably in bed. Non-toxic. Speaking in full sentences   HEENT: NC/AT; EOMI, anicteric sclera; MMM  Neck: supple, no JVD, no submandibular or cervical lymphadenopathy, no nuchal rigidity  Cardiovascular: +S1/S2; bradycardic, no r/m/g  Respiratory: CTA B/L; no W/R/R  Gastrointestinal: soft, nondistended, diffusely tender, no guarding   Extremities: WWP; no edema, clubbing or cyanosis  Vascular: 2+ radial, DP pulses B/L  Neurological: AAOx3; no focal deficits      LABS:                        8.8    1.6   )-----------( 97       ( 29 Jun 2018 06:36 )             25.1       06-29    144  |  110<H>  |  9   ----------------------------<  99  3.9   |  27  |  0.76    Ca    7.9<L>      29 Jun 2018 06:36  Phos  3.3     06-29  Mg     1.9     06-29        Sedimentation Rate, Erythrocyte: 16 mm/Hr (06-29-18 @ 06:36)      MICROBIOLOGY:    Culture - Acid Fast - Sputum w/Smear (collected 06-27-18 @ 17:31)  Source: .Sputum Sputum    Culture - Sputum (collected 06-27-18 @ 08:50)  Source: .Sputum Sputum  Gram Stain (06-28-18 @ 12:53):    Sputum specimen rejected.  Microscopic examination indicates    oropharyngeal contamination.  Please repeat.    Numerous epithelial cells    Numerous White blood cells    Moderate Gram positive cocci in pairs    Moderate Gram Negative Rods    Rare Gram Positive Rods  Final Report (06-28-18 @ 12:53):    Sputum specimen rejected.  Microscopic examination indicates    oropharyngeal contamination.  Please repeat.        RADIOLOGY & ADDITIONAL STUDIES:

## 2018-06-29 NOTE — DIETITIAN INITIAL EVALUATION ADULT. - ENERGY NEEDS
Height: 63" Weight: 122lbs, IBW 115lbs+/-10%, %%, BMI - 21.7  ABW used to calculate energy needs due to pt's current body weight within % IBW   Nutrient needs based on [facility] standards of care for repleton in adults 2/2 wt loss

## 2018-06-29 NOTE — DIETITIAN INITIAL EVALUATION ADULT. - PROBLEM SELECTOR PLAN 2
- Patient with atypical appearance of RUL nodules on CXR, tree in bud on CT chest  - Patient with immunosuppression, symptoms concerning for TB  - Needs r/o TB on airborne isolation  - AFB with sputum induction  - Continue with vancomycin, ceftriaxone, SoluCortef 50mg IV q8h

## 2018-06-29 NOTE — CONSULT NOTE ADULT - ASSESSMENT
29 yo F with long standing hx of Lupus presents to ED w/ generalized weakness and fatigue, w/ pancytopenia, etiology likely related to lupus vs medication induced 27 yo F with long standing hx of Lupus presents to ED w/ generalized weakness and fatigue, w/ pancytopenia, etiology likely related to lupus vs medication induced    #Pancytopenia  - etiology likely 2/2 to lupus vs medication induced (plaquanil)  - will check folate, Vit B12 to r/o nutritional causes  - check abd ultrasound to assess for splenomegaly  - CT chest/abd and pelvis does show b/l axillary LAD, etiology unclear, will send peripheral blood flow cytometry to r/o lymphoproliferative disorder  - obtain collateral on cbc from 6 months ago at Dr Martinez office  - if initial w/u for pancytopenia unrevealing, will consider bone marrow biopsy.    # -normocytic anemia, peripheral smear reviewed  - etiology likely 2/2 to chronic bleeding from heavy periods, check retic count and iron studies  - transfuse for Hgb<7, cont to monitor.    # Lupus    - followed by rheumatology on steroids and plaquanil, f/u rheum recs.     Case discussed with Dr Mejia

## 2018-06-29 NOTE — CONSULT NOTE ADULT - CONSULT REASON
To assess if lupus flare is causing/ contributing to pt's current illness.
pancytopenia
r/o Meningitis

## 2018-06-29 NOTE — DIETITIAN INITIAL EVALUATION ADULT. - PROBLEM SELECTOR PLAN 6
- Patient with likely chronic leukopenia due to SLE and hydroxychloroquine   - Trend CBC, continue to monitor

## 2018-06-29 NOTE — PROGRESS NOTE ADULT - PROBLEM SELECTOR PLAN 1
LP showed CSF with nucleated cells with neutrophils predominance, glucose WNL 45, protein mildly elevated 55.   - CSF PCR Result: Not Detec: The meningitis/encephalitis (ME) panel (CSF PCR) is a PCR based assay that screens for: Escherichia coli; Haemophilus influenzae; Listeria monocytogenes; Neisseria meningitidis; Streptococcus agalactiae;  Streptococcus pneumoniae; CMV; Enterovirus; HSV-1; HSV-2; Human herpes virus 6; Parechovirus; VZV and Cryptococcus.   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   - CSF PCR Result: Not Detec: The meningitis/encephalitis (ME) panel (CSF PCR) is a PCR based assay that screens for: Escherichia coli; Haemophilus influenzae; Listeria monocytogenes; Neisseria meningitidis; Streptococcus agalactiae;  Streptococcus pneumoniae; CMV; Enterovirus; HSV-1; HSV-2; Human herpes virus 6; Parechovirus; VZV and Cryptococcus.   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

## 2018-06-29 NOTE — CONSULT NOTE ADULT - SUBJECTIVE AND OBJECTIVE BOX
Hematology Oncology Consult Note (Dr Mejia)    The patient was seen and examined at bedside.    SREEKANTH THIBODEAUX is a 28y Female with      ROS is otherwise negative.    PAST MEDICAL & SURGICAL HISTORY:  Lupus    No major surgeries      Allergies: NKDA      Medications:  MEDICATIONS  (STANDING):  lidocaine   Patch 1 Patch Transdermal daily  polyethylene glycol 3350 17 Gram(s) Oral daily  predniSONE   Tablet 10 milliGRAM(s) Oral daily  sodium chloride 3%  Inhalation 5 milliLiter(s) Inhalation two times a day      Social History: non smoker, no alcohol use,     FAMILY HISTORY:  No pertinent family history in first degree relatives      PHYSICAL EXAM:    T(F): 97.7 (18 @ 11:35), Max: 98.4 (18 @ 05:25)  HR: 49 (18 @ 11:35) (49 - 54)  BP: 102/65 (18 @ 11:35) (96/66 - 102/65)  RR: 16 (18 @ 11:35) (15 - 18)  SpO2: 98% (18 @ 11:35) (97% - 100%)  Wt(kg): --    Daily     Daily Weight in k.3 (2018 14:57)    Gen: well developed, well nourished, comfortable  HEENT: normocephalic/atraumatic, no conjunctival pallor, no scleral icterus, no oral thrush/mucosal bleeding/mucositis  Neck: supple, no masses, no JVD  Back: nontender  Cardiovascular: RR, nl S1S2, no murmurs/rubs/gallops  Respiratory: clear air entry   Gastrointestinal: BS+, soft, NT/ND, no masses, no splenomegaly, no hepatomegaly, no evidence for ascites  Extremities: no clubbing/cyanosis, no edema, no calf tenderness, DP/PT 2+ b/l  Neurological: no focal deficits  Skin: no rash on visible skin  Lymph Nodes:  no cervical/supraclavicular LAD, no axillary/groin LAD                              8.8    1.6   )-----------( 97       ( 2018 06:36 )             25.1         CBC Full  -  ( 2018 06:36 )  WBC Count : 1.6 K/uL  Hemoglobin : 8.8 g/dL  Hematocrit : 25.1 %  Platelet Count - Automated : 97 K/uL  Mean Cell Volume : 86.9 fL  Mean Cell Hemoglobin : 30.4 pg  Mean Cell Hemoglobin Concentration : 35.1 g/dL  Auto Neutrophil # : x  Auto Lymphocyte # : x  Auto Monocyte # : x  Auto Eosinophil # : x  Auto Basophil # : x  Auto Neutrophil % : 46.2 %  Auto Lymphocyte % : 44.5 %  Auto Monocyte % : 8.7 %  Auto Eosinophil % : 0.6 %  Auto Basophil % : x            144  |  110<H>  |  9   ----------------------------<  99  3.9   |  27  |  0.76    Ca    7.9<L>      2018 06:36  Phos  3.3       Mg     1.9      Hematology Oncology Consult Note (Dr Mejia)    The patient was seen and examined at bedside.    28 year old female with PMH SLE (diagnosed at age 12) presented to [facility] with headaches, syncopal event, reports excessive fatigue and generalized weakness over past several months. Initially evaluated by Dr Win in 2018. Pt followed up with Dr Myers (rheum) started on plaquanil and prednisone for lupus. Pt also saw GI, underwent egd and c-scope which were both negative    Pt reports intermittent night sweats, no fevers or chills, some unintentional weight loss but <10 lbs over past few months, no appetite and has early satiety. No bleeding or bruising episodes. No prior hx of chemo or RT. Pt had bone marrow biopsy at age 12 when she was diagnosed with lupus. She has dry cough, chills, and a headache which she describes as band-like, sharp, gradual onset, and associated with nausea. She describes this headache as her worst in the past seven years. The patient is [place] and lives with her two sisters, she denies international travel or any sick contacts at home. She denies diarrhea but has had some constipation. She reports having heavy periods, denies any PICA symptoms. no priro hx of iron supplementation. Pt has a well-balanced diet.     Her labs show pancytopenia. Rheumatology on board for concern of lupus flare- on plaquanil, prednisone.     ROS is otherwise negative.    PAST MEDICAL & SURGICAL HISTORY:  Lupus    No major surgeries    Allergies: NKDA    Medications:  MEDICATIONS  (STANDING):  lidocaine   Patch 1 Patch Transdermal daily  polyethylene glycol 3350 17 Gram(s) Oral daily  predniSONE   Tablet 10 milliGRAM(s) Oral daily  sodium chloride 3%  Inhalation 5 milliLiter(s) Inhalation two times a day    Social History: non smoker, no alcohol use, works in [employer] on [employer] store, not , no kids, not sexually active, lives in [place]    FAMILY HISTORY:  No pertinent family history in first degree relatives    PHYSICAL EXAM:    T(F): 97.7 (18 @ 11:35), Max: 98.4 (18 @ 05:25)  HR: 49 (18 @ 11:35) (49 - 54)  BP: 102/65 (18 @ 11:35) (96/66 - 102/65)  RR: 16 (18 @ 11:35) (15 - 18)  SpO2: 98% (18 @ 11:35) (97% - 100%)   Daily Weight in k.3 (2018 14:57)    Gen: well developed, well nourished, comfortable  HEENT: normocephalic/atraumatic, no conjunctival pallor, no scleral icterus, no oral thrush/mucosal bleeding/mucositis  Neck: supple, no masses, no JVD  Back: nontender  Cardiovascular: RR, nl S1S2, no murmurs/rubs/gallops  Respiratory: clear air entry   Gastrointestinal: BS+, soft, NT/ND, no masses, no splenomegaly, no hepatomegaly, no evidence for ascites  Extremities: no clubbing/cyanosis, no edema, no calf tenderness, DP/PT 2+ b/l  Neurological: no focal deficits  Skin: no rash on visible skin  Lymph Nodes:  no cervical/supraclavicular LAD, no axillary/groin LAD    Labs                8.8    1.6   )-----------( 97       ( 2018 06:36 )             25.1         CBC Full  -  ( 2018 06:36 )  WBC Count : 1.6 K/uL  Hemoglobin : 8.8 g/dL  Hematocrit : 25.1 %  Platelet Count - Automated : 97 K/uL  Mean Cell Volume : 86.9 fL  Mean Cell Hemoglobin : 30.4 pg  Mean Cell Hemoglobin Concentration : 35.1 g/dL  Auto Neutrophil % : 46.2 %  Auto Lymphocyte % : 44.5 %  Auto Monocyte % : 8.7 %  Auto Eosinophil % : 0.6 %          144  |  110<H>  |  9   ----------------------------<  99  3.9   |  27  |  0.76    Ca    7.9<L>      2018 06:36  Phos  3.3       Mg     1.9           ---CHEST---    Lower Neck: Visualized thyroid unremarkable. No lower cervical   lymphadenopathy.    Lungs/Pleura/Airways: Patchy tree-in-bud micronodules in the right upper   lobe. No pleural effusion or pneumothorax. Patent central airways.    Mediastinum: Heart is normal in size. No pericardial effusion. No aortic   aneurysm or dissection. No central pulmonary embolism.    Lymph nodes: Bilateral axillary lymphadenopathy. No mediastinal or hilar   lymphadenopathy.    Bones/Soft tissues: No significant abnormality.    ---ABDOMEN/PELVIS---    Liver: Smooth in contour. No focal mass. Portal and hepatic veins are   patent.    Biliary system: Gallbladder is normal in size. No calcified gallstones.   No biliary ductal dilatation.    Pancreas: Unremarkable.    Spleen: Unremarkable.    Adrenal glands: Unremarkable.    Kidneys: Symmetric parenchymal enhancement. No renal mass. No   hydronephrosis. No definite renal or ureteral stone, although evaluation   is somewhat limited due to presence of excreted contrast.   Urinary Bladder: Excreted contrast within the bladder. No definite   intraluminal stone.    Reproductive organs: Unremarkable.     Bowel/Peritoneum: Normal caliber without evidence of obstruction. No   appreciable wall thickening, although evaluation is suboptimal without   oral contrast. Abundant feces in the ascending and proximal transverse   colon. Normal appendix. No extraluminal gas. Trace pelvic ascites which   is likely physiologic.    Lymph nodes: No lymphadenopathy.    Aorta/IVC: Normal caliber.    Bones/Soft tissues: No significant abnormality.      IMPRESSION:     1.  Patchy tree-in-bud micronodules in the right upper lobe are   suggestive of small airways disease of infectious or inflammatory   etiology.  2.  Bilateral axillary lymphadenopathy.  3.  No acute abdominopelvic pathology.

## 2018-06-29 NOTE — CONSULT NOTE ADULT - ASSESSMENT
29 yo F w/ SLE p/w 6 months of worsening fatigue, headaches, neutropenia 29 yo F w/ SLE p/w 6 months of worsening fatigue, headaches, neutropenia. Given gradual onset, sx of hair loss and fatigue and low WBC that are similar to her initial presentation of SLE, there may be a component of lupus flare, with possible additional infectious etiology. Will do further work-up to assess ( MRI brain, bone marrow biopsy).    #SLE  -MRI brain  -bone marrow biopsy  -c/w Plaquenil and PDN 27 yo F w/ SLE p/w 6 months of worsening fevers, fatigue, headaches, neutropenia. DDX is lupus flare vs infection. Given gradual and subacute onset, sx of hair loss and fatigue and low WBC that are similar to her initial presentation of SLE, and that pt was off any lupus medication at the time of sx initiation, lupus flare is high on differential. So far, infectious w/u has been negative. However, the neutropenia present is more associated with infectious etiology, whereas lupus would typically show lymphopenia.    #?SLE flare  -c/w PDN 10mg qd for now  -restart home dose Plaquenil 200mg PO, 2 tabs in morning and 1 at night  -MRI brain  -bone marrow biopsy  -might increase dose of PDN if decide it is a lupus flare (1g PDN IV for 3 days, with IV PPI), after 3 days would add another immunosuppressive agent (e.g. cellcept), but want to be sure not infectious because immunosuppressive 27 yo F w/ SLE p/w 6 months of worsening fevers, fatigue, headaches, pancytopenia. DDX is lupus flare vs infection. Given gradual and subacute onset, sx of hair loss and fatigue and low WBC that are similar to her initial presentation of SLE, and that pt was off any lupus medication at the time of sx initiation, lupus flare is high on differential. So far, infectious w/u has been negative. However, the neutropenia present is more associated with infectious etiology, whereas lupus would typically show lymphopenia. Additionally, the LP showed the presence of neutrophils rather than lymphocytes also more common in infectious etiology.    -c/w PDN 10mg qd for now  -restart home dose Plaquenil   -MRI brain to assess presence leptomeningeal enhancement associated with lupus cerebritis  -bone marrow biopsy to assess underlying cause of pancytopenia  -will reassess likelihood of lupus flare vs infectious after the above w/u and might increase dose of Prednisone if decide it is a lupus flare (1g prednisone IV for 3 days, with IV PPI), after 3 days would add another immunosuppressive agent (e.g. cellcept), but want to be sure not infectious because immunosuppressive

## 2018-06-29 NOTE — DIETITIAN INITIAL EVALUATION ADULT. - OTHER INFO
28F PMH SLE (on prednisone, hydroxychloroquine) who presents syncope, headaches, fevers, chills. Admitted to [facility] for sepsis of unclear etiology, no evidence of PNA on CT scan but given cough and noted atypical nodule on CT may be underlying opportunistic infection. Pt tolerating diet yet with poor PO intake/appetite at this time. Pt reports constipation which has since resolved. No n/v/d noted. Pt reports a 5 lb wt loss over the past month 2/2 persistent lack of appetite. NKFA.  Obtained an updated pt's food preferences.

## 2018-06-29 NOTE — PROGRESS NOTE ADULT - ASSESSMENT
28 year old female with PMH SLE presented with presented with severe headache 8/10 in band-like distribution, with past month worsening generalized weakness, fatigue and syncopal episode. Patient T max 103F (rectal), BP 84/54, labs significant for leukopenia/ LP performed in ED, CSF with nucleated cells with neutrophils predominance, glucose WNL 45, protein mildly elevated 55, ID consulted for r/o bacterial meningitis.

## 2018-06-29 NOTE — DIETITIAN INITIAL EVALUATION ADULT. - PROBLEM SELECTOR PLAN 3
- Patient with episode of syncope, shortly afterward febrile to 103F  - Likely due to sepsis as patient hypotensive as well  - low threshold for cardiac workup   - Continue telemetry  - CT head negative and CTA negative

## 2018-06-29 NOTE — PROGRESS NOTE ADULT - PROBLEM SELECTOR PLAN 2
Hx significant for one month of unintentional weight loss of 5lbs with early satiety, and night sweats.   - AFB negative, f/u two more AFB  - QuantiFeron gold Negative, but with steroid therapy and immunosuppressive agents lead to a high risk of indeterminate QuantiFeron test   - continue airborne precautions

## 2018-06-29 NOTE — DIETITIAN INITIAL EVALUATION ADULT. - PROBLEM SELECTOR PLAN 7
F: no IVF encourage PO intake  E Replete K<4 Mg<2  N regular diet    Ppx: SQH, SCD  Code full  Dispo [facility] Dr. Hanna Billy (to establish care) [phone]

## 2018-06-29 NOTE — PROGRESS NOTE ADULT - PROBLEM SELECTOR PLAN 7
VTE: IMPROVE Score 0; no need for pharmacoprophylaxis     FULL CODE    Dispo: [facility]

## 2018-06-29 NOTE — PROGRESS NOTE ADULT - SUBJECTIVE AND OBJECTIVE BOX
SUBJECTIVE / INTERVAL HPI: Patient seen and examined at bedside. Today she is still experiencing intermittent headaches. She states that one was an 8/10 pain and associated with nausea but no vomiting. She does also complain that she is still experiencing a generalized weakness and fatigue. No visual changes, focal deficits, fevers, night sweats, dizziness, lightheadedness, or any other complaints.     VITAL SIGNS:  Vital Signs Last 24 Hrs  T(C): 36.5 (29 Jun 2018 11:35), Max: 36.9 (29 Jun 2018 05:25)  T(F): 97.7 (29 Jun 2018 11:35), Max: 98.4 (29 Jun 2018 05:25)  HR: 49 (29 Jun 2018 11:35) (49 - 54)  BP: 102/65 (29 Jun 2018 11:35) (82/53 - 102/65)  BP(mean): 69 (28 Jun 2018 21:00) (60 - 69)  RR: 16 (29 Jun 2018 11:35) (15 - 18)  SpO2: 98% (29 Jun 2018 11:35) (97% - 100%)    REVIEW OF SYSTEMS:    CONSTITUTIONAL: Weakness. No fevers or chills  EYES/ENT: No visual changes;  No vertigo or throat pain   NECK: No pain or stiffness  RESPIRATORY: No cough, wheezing, hemoptysis; No shortness of breath  CARDIOVASCULAR: No chest pain or palpitations  GASTROINTESTINAL: No abdominal or epigastric pain. No nausea, vomiting, or hematemesis; No diarrhea or constipation. No melena or hematochezia.  GENITOURINARY: No dysuria, frequency or hematuria  NEUROLOGICAL: Headache. No numbness or weakness  SKIN: No itching, burning, rashes, or lesions   All other review of systems is negative unless indicated above.    PHYSICAL EXAM:  General: WDWN  HEENT: NC/AT; PERRL, clear conjunctiva  Neck: supple  Cardiovascular: +S1/S2; RRR  Respiratory: CTA b/l; no W/R/R  Gastrointestinal: soft, NT/ND; +BSx4  Extremities: WWP; 2+ peripheral pulses; no edema   Neurological: AAOx3; no focal deficits    MEDICATIONS:  MEDICATIONS  (STANDING):  acyclovir IVPB 550 milliGRAM(s) IV Intermittent every 8 hours  lidocaine   Patch 1 Patch Transdermal daily  polyethylene glycol 3350 17 Gram(s) Oral daily  predniSONE   Tablet 10 milliGRAM(s) Oral daily  sodium chloride 0.9%. 1000 milliLiter(s) (75 mL/Hr) IV Continuous <Continuous>  sodium chloride 3%  Inhalation 5 milliLiter(s) Inhalation two times a day    MEDICATIONS  (PRN):  acetaminophen   Tablet. 650 milliGRAM(s) Oral every 6 hours PRN Mild Pain (1 - 3)  ondansetron Injectable 4 milliGRAM(s) IV Push every 6 hours PRN Nausea and/or Vomiting      ALLERGIES:  Allergies    sulfa drugs (Rash)    Intolerances        LABS:                        8.8    1.6   )-----------( 97       ( 29 Jun 2018 06:36 )             25.1     06-29    144  |  110<H>  |  9   ----------------------------<  99  3.9   |  27  |  0.76    Ca    7.9<L>      29 Jun 2018 06:36  Phos  3.3     06-29  Mg     1.9     06-29          CAPILLARY BLOOD GLUCOSE          RADIOLOGY & ADDITIONAL TESTS: Reviewed.

## 2018-06-29 NOTE — PROGRESS NOTE ADULT - ATTENDING COMMENTS
Pt seen and examined by me at bedside earlier in AM. Agree with housestaff's exam/a/p as noted above with additions,   still feel fatigue with headache.   VSS, exam as above  labs reviewed   CSF PCR neg    a/p:  1.Fever/pancytopenia likely 2/2 Lupus flare: CSF PCR negative, d/c abx and acyclovir, Appreciate ID/rheum recs, f/u MRI with contrast to r/o cerebritis  2. r/o TB, pending 2 more AFB, may need to induce sputum with hypertonic saline  3. FEN: d/c IVF    Rest of treatment as above.

## 2018-06-29 NOTE — PROGRESS NOTE ADULT - PROBLEM SELECTOR PLAN 1
Improving. Met sepsis criteria on presentation with fever to 103F, tachycardia and leukopenia <4. Etiology remains unclear. CT with an atypical nodule with tree-in-bud; given immunosuppressed concern for opportunistic infection, may also be component of a viral infection.  - Per ID's recommendations, unlikely to be bacterial meningitis based on CSF results. D/c vanc, ceftriaxone, and ampicillin.   -RVP negative  - negative monospot, prior EBV infection, prior CMV infection  -F/u VDRL, West Nile Virus, Parvovirus, Monospot, Ehrlichia, Babesia, Borrelia  -F/u BCx, CSF studies  -CSF gram stain negative for organisms     #R/o TB  Pt with 1m of night sweats, unintentional weight loss, weakness, fatigue. No sick contacts. No recent travel. Does not live in a shelter. However, she is immunocompromised in the setting of lupus on prednisone/hydroxychloroquine. No cavitary lesions on CT chest.   - AFB ordered x3 days; first day negative. Will repeat.   - Quantiferon gold negative, but of limited utility in immunosuppression   - continue airborne precautions

## 2018-06-29 NOTE — CONSULT NOTE ADULT - SUBJECTIVE AND OBJECTIVE BOX
HISTORY OF PRESENT ILLNESS:        PAST MEDICAL & SURGICAL HISTORY:  Lupus  No significant past surgical history      REVIEW OF SYSTEMS      General:	    Skin/Breast:  	  Ophthalmologic:  	  ENMT:	    Respiratory and Thorax:  	  Cardiovascular:	    Gastrointestinal:	    Genitourinary:	    Musculoskeletal:	    Neurological:	    Psychiatric:	    Hematology/Lymphatics:	    Endocrine:	    Allergic/Immunologic:	    MEDICATIONS  (STANDING):  acyclovir IVPB 550 milliGRAM(s) IV Intermittent every 8 hours  ampicillin  IVPB 2 Gram(s) IV Intermittent every 6 hours  cefTRIAXone   IVPB 2 Gram(s) IV Intermittent every 12 hours  lidocaine   Patch 1 Patch Transdermal daily  polyethylene glycol 3350 17 Gram(s) Oral daily  predniSONE   Tablet 10 milliGRAM(s) Oral daily  sodium chloride 0.9%. 1000 milliLiter(s) (75 mL/Hr) IV Continuous <Continuous>  sodium chloride 3%  Inhalation 5 milliLiter(s) Inhalation two times a day  vancomycin  IVPB 1250 milliGRAM(s) IV Intermittent every 12 hours    MEDICATIONS  (PRN):  acetaminophen   Tablet. 650 milliGRAM(s) Oral every 6 hours PRN Mild Pain (1 - 3)  ondansetron Injectable 4 milliGRAM(s) IV Push every 6 hours PRN Nausea and/or Vomiting      Allergies    sulfa drugs (Rash)    Intolerances        PERTINENT MEDICATION HISTORY:    SOCIAL HISTORY:    FAMILY HISTORY:  No pertinent family history in first degree relatives      Vital Signs Last 24 Hrs  T(C): 36.9 (29 Jun 2018 05:25), Max: 36.9 (29 Jun 2018 05:25)  T(F): 98.4 (29 Jun 2018 05:25), Max: 98.4 (29 Jun 2018 05:25)  HR: 52 (29 Jun 2018 05:25) (49 - 54)  BP: 99/64 (29 Jun 2018 05:25) (82/53 - 99/64)  BP(mean): 69 (28 Jun 2018 21:00) (60 - 69)  RR: 15 (29 Jun 2018 05:25) (15 - 18)  SpO2: 97% (29 Jun 2018 05:25) (97% - 100%)    Daily     Daily     PHYSICAL EXAM:      Constitutional:    Eyes:    ENMT:    Neck:    Breasts:    Back:    Respiratory:    Cardiovascular:    Gastrointestinal:    Genitourinary:    Rectal:    Extremities:    Vascular:    Neurological:    Skin:    Lymph Nodes:    Musculoskeletal:    Psychiatric:        LABS:                        8.8    1.6   )-----------( 97       ( 29 Jun 2018 06:36 )             25.1     06-29    144  |  110<H>  |  9   ----------------------------<  99  3.9   |  27  |  0.76    Ca    7.9<L>      29 Jun 2018 06:36  Phos  3.3     06-29  Mg     1.9     06-29            RADIOLOGY & ADDITIONAL STUDIES: HISTORY OF PRESENT ILLNESS:  29 yo F w/ lupus p/w 6 months of gradual onset., worsening fevers, headaches, weakness, and hair loss. Headaches are intermittent, bilateral, and at the temples.     Pt was diagnosed with SLE in middle school with sx of hair loss, fevers, and a low WBC. She was started on Plaquenil, among other medications, and was well-controlled for 7 years. At that time, she was told by her doctor in [place] to stop the Plaqeunil because she was stable. Pt was doing well off any medication until 6 months ago, when she developed cough, night sweats, mild fevers that progressively worsened, stomach pains, joint pain in her fingers, and arm pains. Found to have low WBC. Since January, pt also endorses weight loss, dry mouth, pleuritic chest pain occasionally, leg swelling when sitting, numbness in finger tips, and mildly blurry vision. Denies sick contacts.    Started on PDN and Plaquenil this admission, for possible lupus flare.       PAST MEDICAL & SURGICAL HISTORY:  Lupus  No significant past surgical history      REVIEW OF SYSTEMS    General:	  Endorses fevers, nightsweats, weight loss.    Skin/Breast:  Denies rashes.    Ophthalmologic:  Endorses blurry vision.  	  ENMT:	  Endorses dry mouth.    Respiratory and Thorax:  Endorses pleuritic chest pain occasionally.    Genitourinary:  Denies prior hx of kidney problems.	    Musculoskeletal:	  Denies joint swelling. Endorses leg swelling with sitting.  Denies prior pregnancies. Denies family hx of lupus.  	  Hematology/Lymphatics:	  Denies hx of clots.     MEDICATIONS  (STANDING):  acyclovir IVPB 550 milliGRAM(s) IV Intermittent every 8 hours  ampicillin  IVPB 2 Gram(s) IV Intermittent every 6 hours  cefTRIAXone   IVPB 2 Gram(s) IV Intermittent every 12 hours  lidocaine   Patch 1 Patch Transdermal daily  polyethylene glycol 3350 17 Gram(s) Oral daily  predniSONE   Tablet 10 milliGRAM(s) Oral daily  sodium chloride 0.9%. 1000 milliLiter(s) (75 mL/Hr) IV Continuous <Continuous>  sodium chloride 3%  Inhalation 5 milliLiter(s) Inhalation two times a day  vancomycin  IVPB 1250 milliGRAM(s) IV Intermittent every 12 hours    MEDICATIONS  (PRN):  acetaminophen   Tablet. 650 milliGRAM(s) Oral every 6 hours PRN Mild Pain (1 - 3)  ondansetron Injectable 4 milliGRAM(s) IV Push every 6 hours PRN Nausea and/or Vomiting      Allergies    sulfa drugs (Rash)    PERTINENT MEDICATION HISTORY:    FAMILY HISTORY:  No pertinent family history in first degree relatives      Vital Signs Last 24 Hrs  T(C): 36.9 (29 Jun 2018 05:25), Max: 36.9 (29 Jun 2018 05:25)  T(F): 98.4 (29 Jun 2018 05:25), Max: 98.4 (29 Jun 2018 05:25)  HR: 52 (29 Jun 2018 05:25) (49 - 54)  BP: 99/64 (29 Jun 2018 05:25) (82/53 - 99/64)  BP(mean): 69 (28 Jun 2018 21:00) (60 - 69)  RR: 15 (29 Jun 2018 05:25) (15 - 18)  SpO2: 97% (29 Jun 2018 05:25) (97% - 100%)    Daily     Daily     PHYSICAL EXAM:    Constitutional: laying in bed, alert, but moderately ill-appearing    Respiratory: CTAB    Cardiovascular: RRR    Extremities: No edema.    Skin: no rashes    Musculoskeletal: No joint swelling. Strength 5/5.      LABS:                        8.8    1.6   )-----------( 97       ( 29 Jun 2018 06:36 )             25.1     06-29    144  |  110<H>  |  9   ----------------------------<  99  3.9   |  27  |  0.76    Ca    7.9<L>      29 Jun 2018 06:36  Phos  3.3     06-29  Mg     1.9     06-29    TPro  5.9<L>  /  Alb  2.7<L>  /  TBili  0.2  /  DBili  x   /  AST  16  /  ALT  12  /  AlkPhos  28<L>  06-26      RADIOLOGY & ADDITIONAL STUDIES: HISTORY OF PRESENT ILLNESS:  29 yo F w/ lupus p/w 6 months of gradual onset., worsening fevers, headaches, weakness, and hair loss. Headaches are intermittent, bilateral, and at the temples.     Pt was diagnosed with SLE in middle school with sx of hair loss, fevers, and a low WBC. She was started on Plaquenil, among other medications, and was well-controlled for 7 years. At that time, she was told by her doctor in [place] to stop the Plaqeunil because she was stable. Pt was doing well off any medication until 6 months ago, when she developed cough, night sweats, mild fevers that progressively worsened, stomach pains, joint pain in her fingers, and arm pains. Found to have low WBC. Since January, pt also endorses weight loss, dry mouth, pleuritic chest pain occasionally, leg swelling when sitting, numbness in finger tips, and mildly blurry vision. Denies sick contacts.    Started on PDN and Plaquenil this admission, for possible lupus flare.       PAST MEDICAL & SURGICAL HISTORY:  Lupus  No significant past surgical history      REVIEW OF SYSTEMS    General:	  Endorses fevers, nightsweats, weight loss.    Skin/Breast:  Denies rashes.    Ophthalmologic:  Endorses blurry vision.  	  ENMT:	  Endorses dry mouth.    Respiratory and Thorax:  Endorses pleuritic chest pain occasionally.    Genitourinary:  Denies prior hx of kidney problems.	    Musculoskeletal:	  Denies joint swelling. Endorses leg swelling with sitting.  Denies prior pregnancies. Denies family hx of lupus.  	  Hematology/Lymphatics:	  Denies hx of clots.     MEDICATIONS  (STANDING):  acyclovir IVPB 550 milliGRAM(s) IV Intermittent every 8 hours  ampicillin  IVPB 2 Gram(s) IV Intermittent every 6 hours  cefTRIAXone   IVPB 2 Gram(s) IV Intermittent every 12 hours  lidocaine   Patch 1 Patch Transdermal daily  polyethylene glycol 3350 17 Gram(s) Oral daily  predniSONE   Tablet 10 milliGRAM(s) Oral daily  sodium chloride 0.9%. 1000 milliLiter(s) (75 mL/Hr) IV Continuous <Continuous>  sodium chloride 3%  Inhalation 5 milliLiter(s) Inhalation two times a day  vancomycin  IVPB 1250 milliGRAM(s) IV Intermittent every 12 hours    MEDICATIONS  (PRN):  acetaminophen   Tablet. 650 milliGRAM(s) Oral every 6 hours PRN Mild Pain (1 - 3)  ondansetron Injectable 4 milliGRAM(s) IV Push every 6 hours PRN Nausea and/or Vomiting      Allergies    sulfa drugs (Rash)    PERTINENT MEDICATION HISTORY:    FAMILY HISTORY:  No pertinent family history in first degree relatives      Vital Signs Last 24 Hrs  T(C): 36.9 (29 Jun 2018 05:25), Max: 36.9 (29 Jun 2018 05:25)  T(F): 98.4 (29 Jun 2018 05:25), Max: 98.4 (29 Jun 2018 05:25)  HR: 52 (29 Jun 2018 05:25) (49 - 54)  BP: 99/64 (29 Jun 2018 05:25) (82/53 - 99/64)  BP(mean): 69 (28 Jun 2018 21:00) (60 - 69)  RR: 15 (29 Jun 2018 05:25) (15 - 18)  SpO2: 97% (29 Jun 2018 05:25) (97% - 100%)    Daily     Daily     PHYSICAL EXAM:    Constitutional: laying in bed, alert, but moderately ill-appearing    Respiratory: CTAB    Cardiovascular: RRR    Extremities: No edema.    Skin: no rashes    Musculoskeletal: No joint swelling. Strength 5/5.      LABS:                        8.8    1.6   )-----------( 97       ( 29 Jun 2018 06:36 )             25.1     06-29    144  |  110<H>  |  9   ----------------------------<  99  3.9   |  27  |  0.76    Ca    7.9<L>      29 Jun 2018 06:36  Phos  3.3     06-29  Mg     1.9     06-29    TPro  5.9<L>  /  Alb  2.7<L>  /  TBili  0.2  /  DBili  x   /  AST  16  /  ALT  12  /  AlkPhos  28<L>  06-26      RADIOLOGY & ADDITIONAL STUDIES: Reviewed. HISTORY OF PRESENT ILLNESS:  27 yo F w/ lupus p/w 6 months of gradual onset., worsening fevers, headaches, weakness, and hair loss. Headaches are intermittent, bilateral, and at the temples.     Pt was diagnosed with SLE in middle school with sx of hair loss, fevers, and a low WBC. She was started on Plaquenil, among other medications, and was well-controlled for 7 years. At that time, she was told by her doctor in [place] to stop the Plaqeunil because she was stable. Pt was doing well off any medication until 6 months ago, when she developed cough, night sweats, mild fevers that progressively worsened, stomach pains, joint pain in her fingers, and arm pains. Found to have low WBC. Since January, pt also endorses weight loss, dry mouth, pleuritic chest pain occasionally, leg swelling when sitting, numbness in finger tips, and mildly blurry vision. Denies sick contacts.    Was started on PDN and Plaquenil prior to admission.    PAST MEDICAL & SURGICAL HISTORY:  Lupus  No significant past surgical history      REVIEW OF SYSTEMS    General:	  Endorses fevers, nightsweats, weight loss.    Skin/Breast:  Denies rashes.    Ophthalmologic:  Endorses blurry vision.  	  ENMT:	  Endorses dry mouth.    Respiratory and Thorax:  Endorses pleuritic chest pain occasionally.    Genitourinary:  Denies prior hx of kidney problems.	    Musculoskeletal:	  Denies joint swelling. Endorses leg swelling with sitting.  Denies prior pregnancies. Denies family hx of lupus.  	  Hematology/Lymphatics:	  Denies hx of clots.     MEDICATIONS  (STANDING):  acyclovir IVPB 550 milliGRAM(s) IV Intermittent every 8 hours  ampicillin  IVPB 2 Gram(s) IV Intermittent every 6 hours  cefTRIAXone   IVPB 2 Gram(s) IV Intermittent every 12 hours  lidocaine   Patch 1 Patch Transdermal daily  polyethylene glycol 3350 17 Gram(s) Oral daily  predniSONE   Tablet 10 milliGRAM(s) Oral daily  sodium chloride 0.9%. 1000 milliLiter(s) (75 mL/Hr) IV Continuous <Continuous>  sodium chloride 3%  Inhalation 5 milliLiter(s) Inhalation two times a day  vancomycin  IVPB 1250 milliGRAM(s) IV Intermittent every 12 hours    MEDICATIONS  (PRN):  acetaminophen   Tablet. 650 milliGRAM(s) Oral every 6 hours PRN Mild Pain (1 - 3)  ondansetron Injectable 4 milliGRAM(s) IV Push every 6 hours PRN Nausea and/or Vomiting      Allergies    sulfa drugs (Rash)    PERTINENT MEDICATION HISTORY:    FAMILY HISTORY:  No pertinent family history in first degree relatives      Vital Signs Last 24 Hrs  T(C): 36.9 (29 Jun 2018 05:25), Max: 36.9 (29 Jun 2018 05:25)  T(F): 98.4 (29 Jun 2018 05:25), Max: 98.4 (29 Jun 2018 05:25)  HR: 52 (29 Jun 2018 05:25) (49 - 54)  BP: 99/64 (29 Jun 2018 05:25) (82/53 - 99/64)  BP(mean): 69 (28 Jun 2018 21:00) (60 - 69)  RR: 15 (29 Jun 2018 05:25) (15 - 18)  SpO2: 97% (29 Jun 2018 05:25) (97% - 100%)    Daily     Daily     PHYSICAL EXAM:    Constitutional: laying in bed, alert, but moderately ill-appearing    Respiratory: CTAB    Cardiovascular: RRR    Extremities: No edema.    Skin: no rashes    Musculoskeletal: No joint swelling. Strength 5/5.      LABS:                        8.8    1.6   )-----------( 97       ( 29 Jun 2018 06:36 )             25.1     06-29    144  |  110<H>  |  9   ----------------------------<  99  3.9   |  27  |  0.76    Ca    7.9<L>      29 Jun 2018 06:36  Phos  3.3     06-29  Mg     1.9     06-29    TPro  5.9<L>  /  Alb  2.7<L>  /  TBili  0.2  /  DBili  x   /  AST  16  /  ALT  12  /  AlkPhos  28<L>  06-26      RADIOLOGY & ADDITIONAL STUDIES: Reviewed. HISTORY OF PRESENT ILLNESS:  29 yo F w/ lupus p/w 6 months of gradual onset., worsening fevers, headaches, weakness, and hair loss. Headaches are intermittent, bilateral, and at the temples.     Pt was diagnosed with SLE in middle school with sx of hair loss, fevers, and a low WBC. She was started on Plaquenil, among other medications, and was well-controlled for 7 years. At that time, she was told by her doctor in [place] to stop the Plaqeunil because she was stable. Pt was doing well off any medication until 6 months ago, when she developed cough, night sweats, mild fevers that progressively worsened, stomach pains, joint pain in her fingers, and arm pains. Pt also had weight loss, and found to have low WBC. Since January, pt also endorses weight loss, dry mouth, pleuritic chest pain occasionally, leg swelling when sitting, numbness in finger tips, and mildly blurry vision. Denies sick contacts. Pt denies rashes, joint swelling.    Was started on Prednisone and Plaquenil prior to admission.    PAST MEDICAL & SURGICAL HISTORY:  Lupus  No significant past surgical history      REVIEW OF SYSTEMS    General:	  Endorses fevers, nightsweats.    Ophthalmologic:  Endorses blurry vision.  	  ENMT:	  Endorses dry mouth.    Respiratory and Thorax:  Endorses pleuritic chest pain occasionally.    Genitourinary:  Denies prior hx of kidney problems.	    Musculoskeletal:	  Denies joint swelling. Endorses leg swelling with sitting.  Denies prior pregnancies. Denies family hx of lupus.  	  Hematology/Lymphatics:	  Denies hx of clots.     All other pertinent ROS in HPI.    MEDICATIONS  (STANDING):  acyclovir IVPB 550 milliGRAM(s) IV Intermittent every 8 hours  ampicillin  IVPB 2 Gram(s) IV Intermittent every 6 hours  cefTRIAXone   IVPB 2 Gram(s) IV Intermittent every 12 hours  lidocaine   Patch 1 Patch Transdermal daily  polyethylene glycol 3350 17 Gram(s) Oral daily  predniSONE   Tablet 10 milliGRAM(s) Oral daily  sodium chloride 0.9%. 1000 milliLiter(s) (75 mL/Hr) IV Continuous <Continuous>  sodium chloride 3%  Inhalation 5 milliLiter(s) Inhalation two times a day  vancomycin  IVPB 1250 milliGRAM(s) IV Intermittent every 12 hours    MEDICATIONS  (PRN):  acetaminophen   Tablet. 650 milliGRAM(s) Oral every 6 hours PRN Mild Pain (1 - 3)  ondansetron Injectable 4 milliGRAM(s) IV Push every 6 hours PRN Nausea and/or Vomiting      Allergies    sulfa drugs (Rash)    PERTINENT MEDICATION HISTORY:    FAMILY HISTORY:  No pertinent family history in first degree relatives      Vital Signs Last 24 Hrs  T(C): 36.9 (29 Jun 2018 05:25), Max: 36.9 (29 Jun 2018 05:25)  T(F): 98.4 (29 Jun 2018 05:25), Max: 98.4 (29 Jun 2018 05:25)  HR: 52 (29 Jun 2018 05:25) (49 - 54)  BP: 99/64 (29 Jun 2018 05:25) (82/53 - 99/64)  BP(mean): 69 (28 Jun 2018 21:00) (60 - 69)  RR: 15 (29 Jun 2018 05:25) (15 - 18)  SpO2: 97% (29 Jun 2018 05:25) (97% - 100%)    Daily     Daily     PHYSICAL EXAM:    Constitutional: laying in bed, alert, but moderately ill-appearing    Respiratory: CTAB    Cardiovascular: RRR    Extremities: No edema.    Skin: no rashes    Musculoskeletal: No joint swelling. Strength 5/5.      LABS:                        8.8    1.6   )-----------( 97       ( 29 Jun 2018 06:36 )             25.1     06-29    144  |  110<H>  |  9   ----------------------------<  99  3.9   |  27  |  0.76    Ca    7.9<L>      29 Jun 2018 06:36  Phos  3.3     06-29  Mg     1.9     06-29    TPro  5.9<L>  /  Alb  2.7<L>  /  TBili  0.2  /  DBili  x   /  AST  16  /  ALT  12  /  AlkPhos  28<L>  06-26      RADIOLOGY & ADDITIONAL STUDIES: Reviewed. HISTORY OF PRESENT ILLNESS:  27 yo F w/ lupus p/w 6 months of gradual onset., worsening fevers, headaches, weakness, and hair loss. Headaches are intermittent, bilateral, and at the temples.     Pt was diagnosed with SLE in middle school with sx of hair loss, fevers, and a low WBC. She was started on Plaquenil, among other medications, and was well-controlled for 7 years. At that time, she was told by her doctor in [place] to stop the Plaqeunil because she was stable. Pt was doing well off any medication until 6 months ago, when she developed cough, night sweats, mild fevers that progressively worsened, stomach pains, joint pain in her fingers, and arm pains. Pt also had weight loss, and found to have low WBC. Since January, pt also endorses weight loss, dry mouth, pleuritic chest pain occasionally, leg swelling when sitting, numbness in finger tips, and mildly blurry vision. Denies sick contacts. Pt denies rashes, joint swelling.    Was started on Prednisone and Plaquenil prior to admission.    PAST MEDICAL & SURGICAL HISTORY:  Lupus  No significant past surgical history      REVIEW OF SYSTEMS    General:	  Endorses fevers, nightsweats.    Ophthalmologic:  Endorses blurry vision.  	  ENMT:	  Endorses dry mouth.    Respiratory and Thorax:  Endorses pleuritic chest pain occasionally.    Genitourinary:  Denies prior hx of kidney problems.	    Musculoskeletal:	  Denies joint swelling. Endorses leg swelling with sitting.  Denies prior pregnancies. Denies family hx of lupus.  	  Hematology/Lymphatics:	  Denies hx of clots.     All other pertinent ROS in HPI.    MEDICATIONS  (STANDING):  acyclovir IVPB 550 milliGRAM(s) IV Intermittent every 8 hours  ampicillin  IVPB 2 Gram(s) IV Intermittent every 6 hours  cefTRIAXone   IVPB 2 Gram(s) IV Intermittent every 12 hours  lidocaine   Patch 1 Patch Transdermal daily  polyethylene glycol 3350 17 Gram(s) Oral daily  predniSONE   Tablet 10 milliGRAM(s) Oral daily  sodium chloride 0.9%. 1000 milliLiter(s) (75 mL/Hr) IV Continuous <Continuous>  sodium chloride 3%  Inhalation 5 milliLiter(s) Inhalation two times a day  vancomycin  IVPB 1250 milliGRAM(s) IV Intermittent every 12 hours    MEDICATIONS  (PRN):  acetaminophen   Tablet. 650 milliGRAM(s) Oral every 6 hours PRN Mild Pain (1 - 3)  ondansetron Injectable 4 milliGRAM(s) IV Push every 6 hours PRN Nausea and/or Vomiting      Allergies    sulfa drugs (Rash)    PERTINENT MEDICATION HISTORY:    FAMILY HISTORY:  No pertinent family history in first degree relatives      Vital Signs Last 24 Hrs  T(C): 36.9 (29 Jun 2018 05:25), Max: 36.9 (29 Jun 2018 05:25)  T(F): 98.4 (29 Jun 2018 05:25), Max: 98.4 (29 Jun 2018 05:25)  HR: 52 (29 Jun 2018 05:25) (49 - 54)  BP: 99/64 (29 Jun 2018 05:25) (82/53 - 99/64)  BP(mean): 69 (28 Jun 2018 21:00) (60 - 69)  RR: 15 (29 Jun 2018 05:25) (15 - 18)  SpO2: 97% (29 Jun 2018 05:25) (97% - 100%)    Daily     Daily     PHYSICAL EXAM:    Constitutional: laying in bed, alert, NAD but fatigued     Respiratory: CTAB, no wheezes or crackles.    Cardiovascular: RRR, no friction rub.    Extremities: No edema.    Skin: no rashes, no malar rash, discoid rash or signs of photosensitivity    Musculoskeletal: No joint swelling, heat, tenderness, or redness/ discoloration.     Neuro: Strength 5/5 in upper extremities.       LABS:                        8.8    1.6   )-----------( 97       ( 29 Jun 2018 06:36 )             25.1     06-29    144  |  110<H>  |  9   ----------------------------<  99  3.9   |  27  |  0.76    Ca    7.9<L>      29 Jun 2018 06:36  Phos  3.3     06-29  Mg     1.9     06-29    TPro  5.9<L>  /  Alb  2.7<L>  /  TBili  0.2  /  DBili  x   /  AST  16  /  ALT  12  /  AlkPhos  28<L>  06-26      RADIOLOGY & ADDITIONAL STUDIES: Reviewed. HISTORY OF PRESENT ILLNESS:  27 yo F w/ lupus p/w 6 months of gradual onset., worsening fevers, headaches, weakness, and hair loss. Headaches are intermittent, bilateral, and at the temples.     Pt was diagnosed with SLE in middle school with sx of hair loss, fevers, and a low WBC. She was started on Plaquenil, among other medications, and was well-controlled for 7 years. At that time, she was told by her doctor in [place] to stop the Plaqeunil because she was stable. Pt was doing well off any medication until 6 months ago, when she developed cough, night sweats, mild fevers that progressively worsened, stomach pains, joint pain in her fingers, and arm pains. Pt also had weight loss, and found to have low WBC. Since January, pt also endorses weight loss, dry mouth, pleuritic chest pain occasionally, leg swelling when sitting, numbness in finger tips, and mildly blurry vision. Denies sick contacts. Pt denies rashes, joint swelling.    Was started on Prednisone and Plaquenil prior to admission.    PAST MEDICAL & SURGICAL HISTORY:  Lupus  No significant past surgical history      REVIEW OF SYSTEMS    General:	  Endorses fevers, nightsweats.    Ophthalmologic:  Endorses blurry vision.  	  ENMT:	  Endorses dry mouth.    Respiratory and Thorax:  Endorses pleuritic chest pain occasionally.    Genitourinary:  Denies prior hx of kidney problems.	    Musculoskeletal:	  Denies joint swelling. Endorses leg swelling with sitting.  Denies prior pregnancies. Denies family hx of lupus.  	  Hematology/Lymphatics:	  Denies hx of clots.     All other pertinent ROS in HPI.    MEDICATIONS  (STANDING):  acyclovir IVPB 550 milliGRAM(s) IV Intermittent every 8 hours  ampicillin  IVPB 2 Gram(s) IV Intermittent every 6 hours  cefTRIAXone   IVPB 2 Gram(s) IV Intermittent every 12 hours  lidocaine   Patch 1 Patch Transdermal daily  polyethylene glycol 3350 17 Gram(s) Oral daily  predniSONE   Tablet 10 milliGRAM(s) Oral daily  sodium chloride 0.9%. 1000 milliLiter(s) (75 mL/Hr) IV Continuous <Continuous>  sodium chloride 3%  Inhalation 5 milliLiter(s) Inhalation two times a day  vancomycin  IVPB 1250 milliGRAM(s) IV Intermittent every 12 hours    MEDICATIONS  (PRN):  acetaminophen   Tablet. 650 milliGRAM(s) Oral every 6 hours PRN Mild Pain (1 - 3)  ondansetron Injectable 4 milliGRAM(s) IV Push every 6 hours PRN Nausea and/or Vomiting      Allergies    sulfa drugs (Rash)    PERTINENT MEDICATION HISTORY:    FAMILY HISTORY:  No pertinent family history in first degree relatives      Vital Signs Last 24 Hrs  T(C): 36.9 (29 Jun 2018 05:25), Max: 36.9 (29 Jun 2018 05:25)  T(F): 98.4 (29 Jun 2018 05:25), Max: 98.4 (29 Jun 2018 05:25)  HR: 52 (29 Jun 2018 05:25) (49 - 54)  BP: 99/64 (29 Jun 2018 05:25) (82/53 - 99/64)  BP(mean): 69 (28 Jun 2018 21:00) (60 - 69)  RR: 15 (29 Jun 2018 05:25) (15 - 18)  SpO2: 97% (29 Jun 2018 05:25) (97% - 100%)    Daily     Daily     PHYSICAL EXAM:    Constitutional: laying in bed, alert, NAD but fatigued    Respiratory: CTAB, no wheezes or crackles.    Cardiovascular: RRR, no friction rub.    Extremities: No edema.    Skin: no rashes, no malar rash, discoid rash or signs of photosensitivity    Musculoskeletal: No joint swelling, heat, tenderness, or redness/ discoloration. No gross alopecia seen.    Neuro: Strength 5/5 in upper extremities.       LABS:                        8.8    1.6   )-----------( 97       ( 29 Jun 2018 06:36 )             25.1     06-29    144  |  110<H>  |  9   ----------------------------<  99  3.9   |  27  |  0.76    Ca    7.9<L>      29 Jun 2018 06:36  Phos  3.3     06-29  Mg     1.9     06-29    TPro  5.9<L>  /  Alb  2.7<L>  /  TBili  0.2  /  DBili  x   /  AST  16  /  ALT  12  /  AlkPhos  28<L>  06-26      RADIOLOGY & ADDITIONAL STUDIES: Reviewed. HISTORY OF PRESENT ILLNESS:  27 yo F w/ lupus p/w 6 months of gradual onset., worsening fevers, headaches, weakness, and hair loss. Headaches are intermittent, bilateral, and at the temples.     Pt was diagnosed with SLE in middle school with sx of hair loss, fevers, and a low WBC. She was started on Plaquenil, among other medications, and was well-controlled for 7 years. At that time, she was told by her doctor in [place] to stop the Plaqeunil because she was stable. Pt was doing well off any medication until 6 months ago, when she developed cough, night sweats, mild fevers that progressively worsened, stomach pains, joint pain in her fingers, and arm pains. Pt also had weight loss, and found to have low WBC. Since January, pt also endorses weight loss, dry mouth, pleuritic chest pain occasionally, leg swelling when sitting, numbness in finger tips, and mildly blurry vision. Denies sick contacts. Pt denies rashes, joint swelling.    Was started on Prednisone and Plaquenil prior to admission.    PAST MEDICAL & SURGICAL HISTORY:  Lupus  No significant past surgical history      REVIEW OF SYSTEMS    General:	  Endorses fevers, nightsweats.    Ophthalmologic:  Endorses blurry vision.  	  ENMT:	  Endorses dry mouth.    Respiratory and Thorax:  Endorses pleuritic chest pain occasionally.    Genitourinary:  Denies prior hx of kidney problems.	    Musculoskeletal:	  Denies joint swelling. Endorses leg swelling with sitting.  Denies prior pregnancies. Denies family hx of lupus.  	  Hematology/Lymphatics:	  Denies hx of clots.     All other pertinent ROS in HPI.    MEDICATIONS  (STANDING):  acyclovir IVPB 550 milliGRAM(s) IV Intermittent every 8 hours  ampicillin  IVPB 2 Gram(s) IV Intermittent every 6 hours  cefTRIAXone   IVPB 2 Gram(s) IV Intermittent every 12 hours  lidocaine   Patch 1 Patch Transdermal daily  polyethylene glycol 3350 17 Gram(s) Oral daily  predniSONE   Tablet 10 milliGRAM(s) Oral daily  sodium chloride 0.9%. 1000 milliLiter(s) (75 mL/Hr) IV Continuous <Continuous>  sodium chloride 3%  Inhalation 5 milliLiter(s) Inhalation two times a day  vancomycin  IVPB 1250 milliGRAM(s) IV Intermittent every 12 hours    MEDICATIONS  (PRN):  acetaminophen   Tablet. 650 milliGRAM(s) Oral every 6 hours PRN Mild Pain (1 - 3)  ondansetron Injectable 4 milliGRAM(s) IV Push every 6 hours PRN Nausea and/or Vomiting      Allergies    sulfa drugs (Rash)    PERTINENT MEDICATION HISTORY:    FAMILY HISTORY:  No pertinent family history in first degree relatives      Vital Signs Last 24 Hrs  T(C): 36.9 (29 Jun 2018 05:25), Max: 36.9 (29 Jun 2018 05:25)  T(F): 98.4 (29 Jun 2018 05:25), Max: 98.4 (29 Jun 2018 05:25)  HR: 52 (29 Jun 2018 05:25) (49 - 54)  BP: 99/64 (29 Jun 2018 05:25) (82/53 - 99/64)  BP(mean): 69 (28 Jun 2018 21:00) (60 - 69)  RR: 15 (29 Jun 2018 05:25) (15 - 18)  SpO2: 97% (29 Jun 2018 05:25) (97% - 100%)    Daily     Daily     PHYSICAL EXAM:    Constitutional: laying in bed, alert, NAD but fatigued    Respiratory: CTAB, no wheezes or crackles.    Cardiovascular: RRR, no friction rub.    Extremities: No edema.    Skin: no rashes, no malar rash, discoid rash or signs of photosensitivity    Musculoskeletal: No joint swelling, heat, tenderness, or redness/ discoloration. No gross alopecia seen.    Neuro: Strength 5/5 in upper extremities.       LABS:                        8.8    1.6   )-----------( 97       ( 29 Jun 2018 06:36 )             25.1     Diff:  neutrophils %- 33.7 (L)  lymphocyte %- 52 (H)  eos%-  0    ESR: 16 (H)    06-29    144  |  110<H>  |  9   ----------------------------<  99  3.9   |  27  |  0.76    Ca    7.9<L>      29 Jun 2018 06:36  Phos  3.3     06-29  Mg     1.9     06-29    TPro  5.9<L>  /  Alb  2.7<L>  /  TBili  0.2  /  DBili  x   /  AST  16  /  ALT  12  /  AlkPhos  28<L>  06-26 6-26  CSF-  clear, colorless  total cell count 57 (H)  RBC 12 (H)  neutrophils 92 (H)  lympocytes 7 (L)    RADIOLOGY & ADDITIONAL STUDIES: Reviewed.

## 2018-06-29 NOTE — CONSULT NOTE ADULT - ATTENDING COMMENTS
28 year old female with a history of SLE primarily manifesting with fevers and cytopenias presents for evaluation of acute on chronic exacerbation of fevers, malaise, headaches, blurred vision and abdominal pains. On laboratory evaluation, she is noted to have pancytopenia which has progressed since admission. She underwent an LP which revealed a hypercellular CSF primarily neutrophilic, and high protein. Of note, patient is neutropenic. She has been on Prednisone 10mg and Plaquenil 200mg BID since presentation to her rheumatologist in Jan 2018, with malaise, low grade fevers and abdominal pain. She has remained with cytopenias (though less pronounced then now), hypocomplementemia and slightly elevated dsDNA since - also with +sm and +RNP per her outpatient rheumatologist. The headaches only developed three weeks ago. Blurred vision last week.    Of note, when she was diagnosed with SLE in her mid teens she had cytopenias and fevers. Was off medications for 7 years.   Patient notes that   She initially had complaints of cough back in Jan 2018, productive of mild amounts of sputum. Coughs have since resolved and she denies SOB or chest pain. Does endorse night sweats. She is originally form [place].     Based on history and lab evaluation, findings are somewhat consistent with a SLE flare. Patient presented with SLE initially with similar symptoms, however without the headaches at that time - additionally, these symptoms are longstanding for the past 6 months, which goes against an infection. She developed her current symptoms 6 mo ago, off therapy. All this said, the neutropenic predominance in the CSF and neutropenia on her WBC diff are inconsistent with a flare - as is the "tree in bud" pattern of the RUL nodule on her CT scan, and thus infection should be ruled out given that she has resided outside the [place] for many years. There is more than likely on going SLE activity, however it is unclear if that is the only insult.     For now, would continue Prednisone 10mg daily and restart Plaquenil 200mg BID. Would be comfortable starting higher doses of Prednisone if  atypical infection is ruled out by ID.  Please obtain MRI brain to evaluate for leptomeningeal enhancement seen in SLE cerebritis.  Heme consult for BM biopsy.  Will consider pulse dose steroids / high dose steroids pending discussion with ID and above data.   Will follow up remaining serologies with you - C3/C4 and dsDNA.    Please feel free to call me with any questions.   [phone]

## 2018-06-29 NOTE — PROGRESS NOTE ADULT - PROBLEM SELECTOR PLAN 4
Hx of SLE on hydroxychloroquine, prednisone. Patient had followed with Dr. Beltran Nava in the past but had to change to a doctor at [facility] (appointment scheduled in a couple weeks) due to insurance issues.   - Holding hydroxychlorquine due to depressed cell lines  - continuing home dose of prednisone 10mg q24h  - Rheumatology recommends MRI brain with suspicion of lupus cerebritis. If lupus cerebritis is found, increase steroids.  - ESR of 16

## 2018-06-29 NOTE — PROGRESS NOTE ADULT - PROBLEM SELECTOR PLAN 9
- Patient must follow with outpatient rheumatology. Pending results of infectious workup, may recommend further followup.

## 2018-06-29 NOTE — PROGRESS NOTE ADULT - PROBLEM SELECTOR PLAN 2
#Anemia  Likely multifactorial (sepsis, lupus). Unknown baseline. No evidence of active bleeding.   -c/w monitoring H/H  -F/u haptoglobin and LDH  -keep active T&S, Hgb goal >7    #Leukopenia  WBC of 3.7 on admission, now 1.6. Per patient, she has a history of leukopenia and was told by her rheumatologist that it's due to lupus. Per Dr. Nava's office, her WBC was 3.7 in January. Absolute neutrophil count of 752 today.   -F/u manual differential     #Thrombocytopenia  Stable. Plts have been between 90 and 111 during current admission. Likely secondary to sepsis.   -Monitor CBC   -Transfuse platelets if <50K with bleeding or less than 10K

## 2018-06-30 DIAGNOSIS — R51 HEADACHE: ICD-10-CM

## 2018-06-30 DIAGNOSIS — R50.9 FEVER, UNSPECIFIED: ICD-10-CM

## 2018-06-30 LAB
ALBUMIN SERPL ELPH-MCNC: 2.7 G/DL — LOW (ref 3.3–5)
ALP SERPL-CCNC: 28 U/L — LOW (ref 40–120)
ALT FLD-CCNC: 37 U/L — SIGNIFICANT CHANGE UP (ref 10–45)
ANION GAP SERPL CALC-SCNC: 7 MMOL/L — SIGNIFICANT CHANGE UP (ref 5–17)
APTT BLD: 28 SEC — SIGNIFICANT CHANGE UP (ref 27.5–37.4)
AST SERPL-CCNC: 30 U/L — SIGNIFICANT CHANGE UP (ref 10–40)
BASOPHILS NFR BLD AUTO: 0.8 % — SIGNIFICANT CHANGE UP (ref 0–2)
BILIRUB SERPL-MCNC: 0.3 MG/DL — SIGNIFICANT CHANGE UP (ref 0.2–1.2)
BLD GP AB SCN SERPL QL: NEGATIVE — SIGNIFICANT CHANGE UP
BUN SERPL-MCNC: 6 MG/DL — LOW (ref 7–23)
CALCIUM SERPL-MCNC: 8.5 MG/DL — SIGNIFICANT CHANGE UP (ref 8.4–10.5)
CHLORIDE SERPL-SCNC: 105 MMOL/L — SIGNIFICANT CHANGE UP (ref 96–108)
CO2 SERPL-SCNC: 28 MMOL/L — SIGNIFICANT CHANGE UP (ref 22–31)
CREAT SERPL-MCNC: 0.75 MG/DL — SIGNIFICANT CHANGE UP (ref 0.5–1.3)
DSDNA AB SER-ACNC: 335 IU/ML — HIGH
EOSINOPHIL NFR BLD AUTO: 0.8 % — SIGNIFICANT CHANGE UP (ref 0–6)
FIBRINOGEN PPP-MCNC: 198 MG/DL — LOW (ref 258–438)
GLUCOSE SERPL-MCNC: 93 MG/DL — SIGNIFICANT CHANGE UP (ref 70–99)
HAPTOGLOB SERPL-MCNC: 64 MG/DL — SIGNIFICANT CHANGE UP (ref 34–200)
HCT VFR BLD CALC: 25.6 % — LOW (ref 34.5–45)
HGB BLD-MCNC: 9 G/DL — LOW (ref 11.5–15.5)
INR BLD: 1.01 — SIGNIFICANT CHANGE UP (ref 0.88–1.16)
IRON SATN MFR SERPL: 48 UG/DL — SIGNIFICANT CHANGE UP (ref 30–160)
LDH SERPL L TO P-CCNC: 134 U/L — SIGNIFICANT CHANGE UP (ref 50–242)
LYMPHOCYTES # BLD AUTO: 42.2 % — SIGNIFICANT CHANGE UP (ref 13–44)
MCHC RBC-ENTMCNC: 31.5 PG — SIGNIFICANT CHANGE UP (ref 27–34)
MCHC RBC-ENTMCNC: 35.2 G/DL — SIGNIFICANT CHANGE UP (ref 32–36)
MCV RBC AUTO: 89.5 FL — SIGNIFICANT CHANGE UP (ref 80–100)
MONOCYTES NFR BLD AUTO: 11.7 % — SIGNIFICANT CHANGE UP (ref 2–14)
NEUTROPHILS NFR BLD AUTO: 44.5 % — SIGNIFICANT CHANGE UP (ref 43–77)
NIGHT BLUE STAIN TISS: SIGNIFICANT CHANGE UP
PLATELET # BLD AUTO: 112 K/UL — LOW (ref 150–400)
POTASSIUM SERPL-MCNC: 3.5 MMOL/L — SIGNIFICANT CHANGE UP (ref 3.5–5.3)
POTASSIUM SERPL-SCNC: 3.5 MMOL/L — SIGNIFICANT CHANGE UP (ref 3.5–5.3)
PROT SERPL-MCNC: 5.5 G/DL — LOW (ref 6–8.3)
PROTHROM AB SERPL-ACNC: 11.2 SEC — SIGNIFICANT CHANGE UP (ref 9.8–12.7)
RBC # BLD: 2.86 M/UL — LOW (ref 3.8–5.2)
RBC # FLD: 11.2 % — SIGNIFICANT CHANGE UP (ref 10.3–16.9)
RH IG SCN BLD-IMP: POSITIVE — SIGNIFICANT CHANGE UP
SODIUM SERPL-SCNC: 140 MMOL/L — SIGNIFICANT CHANGE UP (ref 135–145)
SPECIMEN SOURCE: SIGNIFICANT CHANGE UP
WBC # BLD: 1.3 K/UL — LOW (ref 3.8–10.5)
WBC # FLD AUTO: 1.3 K/UL — LOW (ref 3.8–10.5)

## 2018-06-30 RX ORDER — POLYETHYLENE GLYCOL 3350 17 G/17G
17 POWDER, FOR SOLUTION ORAL
Qty: 0 | Refills: 0 | Status: DISCONTINUED | OUTPATIENT
Start: 2018-06-30 | End: 2018-07-03

## 2018-06-30 RX ORDER — POTASSIUM CHLORIDE 20 MEQ
40 PACKET (EA) ORAL ONCE
Qty: 0 | Refills: 0 | Status: COMPLETED | OUTPATIENT
Start: 2018-06-30 | End: 2018-06-30

## 2018-06-30 RX ADMIN — Medication 30 MILLIGRAM(S): at 17:34

## 2018-06-30 RX ADMIN — Medication 200 MILLIGRAM(S): at 17:34

## 2018-06-30 RX ADMIN — Medication 40 MILLIEQUIVALENT(S): at 09:32

## 2018-06-30 RX ADMIN — SODIUM CHLORIDE 5 MILLILITER(S): 9 INJECTION INTRAMUSCULAR; INTRAVENOUS; SUBCUTANEOUS at 06:58

## 2018-06-30 RX ADMIN — Medication 10 MILLIGRAM(S): at 06:58

## 2018-06-30 RX ADMIN — Medication 200 MILLIGRAM(S): at 07:02

## 2018-06-30 RX ADMIN — SODIUM CHLORIDE 5 MILLILITER(S): 9 INJECTION INTRAMUSCULAR; INTRAVENOUS; SUBCUTANEOUS at 18:44

## 2018-06-30 NOTE — PROGRESS NOTE ADULT - PROBLEM SELECTOR PLAN 8
VTE: IMPROVE Score 0; no need for pharmacoprophylaxis     FULL CODE    Dispo: [facility] Patient found to have low TSH. No known thyroid disorders.   -Free thyroxine WNL (0.76)  -Low T3 (68)  - nl fT4  -Mixed picture in setting of infection (central hypothyroidism vs. subclinical hyperthyroidism)  - recommend f/u testing in 6-8 weeks after resolution of acute illness

## 2018-06-30 NOTE — PROGRESS NOTE ADULT - PROBLEM SELECTOR PLAN 1
infectious work up has been unrevealing.   concern for SLE flare rather than TB   d/w rheum --- will start solumedril 30 iv bid

## 2018-06-30 NOTE — PROGRESS NOTE ADULT - PROBLEM SELECTOR PLAN 6
ekg urermarkable  check orthostatics  check TTE (has murmur) c3/4 low w/ ds DNA elevated  case d/w rheum ---start solumedrol iv

## 2018-06-30 NOTE — PROGRESS NOTE ADULT - SUBJECTIVE AND OBJECTIVE BOX
patient says her headaches are much better    INTERVAL HPI/OVERNIGHT EVENTS:    ANTIBIOTICS    MEDICATIONS  (STANDING):  hydroxychloroquine 200 milliGRAM(s) Oral every 12 hours  lidocaine   Patch 1 Patch Transdermal daily  polyethylene glycol 3350 17 Gram(s) Oral daily  predniSONE   Tablet 10 milliGRAM(s) Oral daily  sodium chloride 3%  Inhalation 5 milliLiter(s) Inhalation two times a day    MEDICATIONS  (PRN):  acetaminophen   Tablet. 650 milliGRAM(s) Oral every 6 hours PRN Mild Pain (1 - 3)  ondansetron Injectable 4 milliGRAM(s) IV Push every 6 hours PRN Nausea and/or Vomiting      Allergies    sulfa drugs (Rash)    Intolerances        REVIEW OF SYSTEMS:    Constitutional: No fever, weight loss or fatigue  Eyes: No eye pain, visual disturbances, or discharge  ENMT:  No difficulty hearing, tinnitus, vertigo; No sinus or throat pain  Neck: No pain or stiffness  Respiratory: No cough, wheezing, chills or hemoptysis  Cardiovascular: No chest pain, palpitations, shortness of breath, dizziness or leg swelling  Gastrointestinal: No abdominal or epigastric pain. No nausea, vomiting or hematemesis; No diarrhea or constipation. No melena or hematochezia.  Genitourinary: No dysuria, frequency, hematuria or incontinence  Rectal: No pain, hemorrhoids or incontinence  Neurological: headaches much better  Skin: No itching, burning, rashes or lesions   Lymph Nodes: No enlarged glands  Endocrine: No heat or cold intolerance; No hair loss  Musculoskeletal: No joint pain or swelling; No muscle, back or extremity pain  Heme/Lymph: No easy bruising or bleeding gums  Allergy and Immunologic: No hives or eczema    Vital Signs Last 24 Hrs  T(C): 37.2 (30 Jun 2018 05:22), Max: 37.2 (30 Jun 2018 05:22)  T(F): 99 (30 Jun 2018 05:22), Max: 99 (30 Jun 2018 05:22)  HR: 56 (30 Jun 2018 05:22) (49 - 98)  BP: 115/73 (30 Jun 2018 05:22) (100/64 - 115/73)  BP(mean): --  RR: 14 (30 Jun 2018 05:22) (14 - 16)  SpO2: 97% (30 Jun 2018 05:22) (96% - 98%)    PHYSICAL EXAM:    General: Well developed; well nourished; in no acute distress  Eyes: PERRL, EOM intact; conjunctiva and sclera clear  Head: Normocephalic; atraumatic  ENMT: No nasal discharge; airway clear  Neck: Supple; non tender; no masses  Respiratory: No wheezes, rales or rhonchi  Cardiovascular: Regular rate and rhythm. S1 and S2 Normal; No murmurs, gallops or rubs  Gastrointestinal: Soft non-tender non-distended; Normal bowel sounds; No hepatosplenomegaly  Genitourinary: No costovertebral angle tenderness  Extremities: Normal range of motion, No clubbing, cyanosis or edema  Vascular: Peripheral pulses palpable 2+ bilaterally  Neurological: Alert and oriented x3  Skin: Warm and dry. No acute rash  Lymph Nodes: No acute cervical adenopathy  Musculoskeletal: Normal gait, tone, without deformities    LABS:                        9.0    1.3   )-----------( 112      ( 30 Jun 2018 07:21 )             25.6     06-30    140  |  105  |  6<L>  ----------------------------<  93  3.5   |  28  |  0.75    Ca    8.5      30 Jun 2018 07:21  Phos  3.3     06-29  Mg     1.9     06-29    TPro  5.5<L>  /  Alb  2.7<L>  /  TBili  0.3  /  DBili  x   /  AST  30  /  ALT  37  /  AlkPhos  28<L>  06-30    PT/INR - ( 30 Jun 2018 07:21 )   PT: 11.2 sec;   INR: 1.01          PTT - ( 30 Jun 2018 07:21 )  PTT:28.0 sec        MICROBIOLOGY:  Culture Results:   Sputum specimen rejected.  Microscopic examination indicates  oropharyngeal contamination.  Please repeat. (06-27 @ 08:50)  Culture Results:   No growth (06-26 @ 16:28)  Culture Results:   Testing in progress (06-26 @ 16:28)  Culture Results:   No growth (06-26 @ 08:14)  Culture Results:   10,000 - 49,000 CFU/mL Streptococcus agalactiae (Group B)  Group B streptococci are susceptible to ampicillin,  penicillin and cefazolin, but may be resistant to  erythromycin and clindamycin.  Recommendations for intrapartum prophylaxis for Group B  streptococci are penicillin or ampicillin. (06-26 @ 00:01)  Culture Results:   No growth to date. (06-25 @ 23:53)  Culture Results:   No growth to date. (06-25 @ 23:52)  Culture Results:   No growth to date. (06-25 @ 23:52)  Culture Results:   No growth to date. (06-25 @ 23:52)      RADIOLOGY & ADDITIONAL STUDIES:

## 2018-06-30 NOTE — PROGRESS NOTE ADULT - PROBLEM SELECTOR PLAN 7
Patient found to have low TSH. No known thyroid disorders.   -Free thyroxine WNL (0.76)  -Low T3 (68)  - nl fT4  -Mixed picture in setting of infection (central hypothyroidism vs. subclinical hyperthyroidism)  - recommend f/u testing in 6-8 weeks after resolution of acute illness ekg urermarkable  check orthostatics  check TTE (has murmur)

## 2018-06-30 NOTE — PROGRESS NOTE ADULT - PROBLEM SELECTOR PLAN 5
c3/4 low w/ ds DNA elevated  case d/w rheum ---start solumedrol iv ct a/p - unremarkable , except for constipation

## 2018-06-30 NOTE — PROGRESS NOTE ADULT - PROBLEM SELECTOR PLAN 4
ct a/p - unremarkable , except for constipation etiology unclear  MRI brain unremarkable  CSF not c/w meningitis  possibly due to SLE?

## 2018-06-30 NOTE — PROGRESS NOTE ADULT - PROBLEM SELECTOR PLAN 9
- Patient must follow with outpatient rheumatology. Pending results of infectious workup, may recommend further followup. VTE: IMPROVE Score 0; no need for pharmacoprophylaxis     FULL CODE    Dispo: [facility]

## 2018-06-30 NOTE — PROGRESS NOTE ADULT - SUBJECTIVE AND OBJECTIVE BOX
Patient is a 28y old  Female who presents with a chief complaint of     INTERVAL HPI/OVERNIGHT EVENTS:    mild right sided headache  otherwise feels better  no cough or dyspnea  afebrile        ROS     ( -  )fevers/chills        (  - ) chest pain  (  - ) palpatations  ( - ) dizziness/lightheadedness  (  - ) nausea/vomiting  (  - ) abd pain  (  - ) diarrhea  (  - ) melena  (  - ) hematochezia  (  - ) dysuria   ( - ) hematuria  (  - ) leg swelling    ( - ) calf tenderness  (  - ) motor weakness  ( - ) extremity numbness  ( - ) back pain  ( + ) tolerating POs  ( + ) BM  ROS: 12 point review of systems otherwise negative              MEDICATIONS  (STANDING):  hydroxychloroquine 200 milliGRAM(s) Oral every 12 hours  lidocaine   Patch 1 Patch Transdermal daily  methylPREDNISolone sodium succinate Injectable 30 milliGRAM(s) IV Push two times a day  polyethylene glycol 3350 17 Gram(s) Oral two times a day  sodium chloride 3%  Inhalation 5 milliLiter(s) Inhalation two times a day    MEDICATIONS  (PRN):  acetaminophen   Tablet. 650 milliGRAM(s) Oral every 6 hours PRN Mild Pain (1 - 3)  bisacodyl 5 milliGRAM(s) Oral at bedtime PRN Constipation  ondansetron Injectable 4 milliGRAM(s) IV Push every 6 hours PRN Nausea and/or Vomiting      Allergies    sulfa drugs (Rash)    Intolerances          Vital Signs Last 24 Hrs  T(C): 37.7 (30 Jun 2018 17:32), Max: 37.7 (30 Jun 2018 17:32)  T(F): 99.8 (30 Jun 2018 17:32), Max: 99.8 (30 Jun 2018 17:32)  HR: 55 (30 Jun 2018 17:32) (55 - 98)  BP: 117/70 (30 Jun 2018 17:32) (100/64 - 117/70)  BP(mean): --  RR: 14 (30 Jun 2018 17:32) (14 - 16)  SpO2: 97% (30 Jun 2018 17:32) (96% - 97%)  CAPILLARY BLOOD GLUCOSE            Physical Exam:    Daily     Daily   General:  Well appearing   HEENT:  Nonicteric, PERRLA, oral pharynx w/o erythema/exudate,  neck supple  CV:  H9T9rpd , RRR,  no JVD, i/vi GORDON at LUSB  Lungs:  CTA B/L, no wheezes, rales, rhonchi  Abdomen:  positive BS, Soft, non-tender, non distended, no hepatosplenomegaly  Extremities:  2+ DP/radial pulses b/l, no cyanosis, no edema  Back: no cva or midline tenderness  Skin:  Warm and dry   :  No monique  Neuro:  AAOx3,  CN II-XII grossly intact, 5/5 str all 4 ext, sensation intact, (-) dysmetria b/l    No Restraints    LABS:                        9.0    1.3   )-----------( 112      ( 30 Jun 2018 07:21 )             25.6     06-30    140  |  105  |  6<L>  ----------------------------<  93  3.5   |  28  |  0.75    Ca    8.5      30 Jun 2018 07:21  Phos  3.3     06-29  Mg     1.9     06-29    TPro  5.5<L>  /  Alb  2.7<L>  /  TBili  0.3  /  DBili  x   /  AST  30  /  ALT  37  /  AlkPhos  28<L>  06-30    PT/INR - ( 30 Jun 2018 07:21 )   PT: 11.2 sec;   INR: 1.01          PTT - ( 30 Jun 2018 07:21 )  PTT:28.0 sec        RADIOLOGY & ADDITIONAL TESTS:

## 2018-07-01 LAB
ALBUMIN SERPL ELPH-MCNC: 2.9 G/DL — LOW (ref 3.3–5)
ALP SERPL-CCNC: 42 U/L — SIGNIFICANT CHANGE UP (ref 40–120)
ALT FLD-CCNC: 46 U/L — HIGH (ref 10–45)
ANA PAT FLD IF-IMP: ABNORMAL
ANA TITR SER: ABNORMAL
ANION GAP SERPL CALC-SCNC: 6 MMOL/L — SIGNIFICANT CHANGE UP (ref 5–17)
AST SERPL-CCNC: 29 U/L — SIGNIFICANT CHANGE UP (ref 10–40)
B BURGDOR C6 AB SER-ACNC: NEGATIVE — SIGNIFICANT CHANGE UP
B BURGDOR IGG+IGM SER-ACNC: 0.09 INDEX — SIGNIFICANT CHANGE UP (ref 0.01–0.89)
BILIRUB SERPL-MCNC: 0.2 MG/DL — SIGNIFICANT CHANGE UP (ref 0.2–1.2)
BUN SERPL-MCNC: 8 MG/DL — SIGNIFICANT CHANGE UP (ref 7–23)
CALCIUM SERPL-MCNC: 8.4 MG/DL — SIGNIFICANT CHANGE UP (ref 8.4–10.5)
CHLORIDE SERPL-SCNC: 104 MMOL/L — SIGNIFICANT CHANGE UP (ref 96–108)
CO2 SERPL-SCNC: 28 MMOL/L — SIGNIFICANT CHANGE UP (ref 22–31)
CREAT SERPL-MCNC: 0.64 MG/DL — SIGNIFICANT CHANGE UP (ref 0.5–1.3)
CULTURE RESULTS: SIGNIFICANT CHANGE UP
EXTRA LAVENDER TOP TUBE: SIGNIFICANT CHANGE UP
FOLATE SERPL-MCNC: 13.3 NG/ML — SIGNIFICANT CHANGE UP
GLUCOSE SERPL-MCNC: 130 MG/DL — HIGH (ref 70–99)
GRAM STN FLD: SIGNIFICANT CHANGE UP
HCT VFR BLD CALC: 29.5 % — LOW (ref 34.5–45)
HGB BLD-MCNC: 10.1 G/DL — LOW (ref 11.5–15.5)
LYMPHOCYTES # BLD AUTO: 29.2 % — SIGNIFICANT CHANGE UP (ref 13–44)
MCHC RBC-ENTMCNC: 30.6 PG — SIGNIFICANT CHANGE UP (ref 27–34)
MCHC RBC-ENTMCNC: 34.2 G/DL — SIGNIFICANT CHANGE UP (ref 32–36)
MCV RBC AUTO: 89.4 FL — SIGNIFICANT CHANGE UP (ref 80–100)
MONOCYTES NFR BLD AUTO: 7.4 % — SIGNIFICANT CHANGE UP (ref 2–14)
NEUTROPHILS NFR BLD AUTO: 63.4 % — SIGNIFICANT CHANGE UP (ref 43–77)
NIGHT BLUE STAIN TISS: SIGNIFICANT CHANGE UP
PLATELET # BLD AUTO: 147 K/UL — LOW (ref 150–400)
POTASSIUM SERPL-MCNC: 4.1 MMOL/L — SIGNIFICANT CHANGE UP (ref 3.5–5.3)
POTASSIUM SERPL-SCNC: 4.1 MMOL/L — SIGNIFICANT CHANGE UP (ref 3.5–5.3)
PROT SERPL-MCNC: 6.1 G/DL — SIGNIFICANT CHANGE UP (ref 6–8.3)
RBC # BLD: 3.3 M/UL — LOW (ref 3.8–5.2)
RBC # FLD: 11.3 % — SIGNIFICANT CHANGE UP (ref 10.3–16.9)
SODIUM SERPL-SCNC: 138 MMOL/L — SIGNIFICANT CHANGE UP (ref 135–145)
SPECIMEN SOURCE: SIGNIFICANT CHANGE UP
T PALLIDUM AB TITR SER: NEGATIVE — SIGNIFICANT CHANGE UP
VIT B12 SERPL-MCNC: 742 PG/ML — SIGNIFICANT CHANGE UP (ref 232–1245)
WBC # BLD: 2.2 K/UL — LOW (ref 3.8–10.5)
WBC # FLD AUTO: 2.2 K/UL — LOW (ref 3.8–10.5)

## 2018-07-01 PROCEDURE — 76700 US EXAM ABDOM COMPLETE: CPT | Mod: 26

## 2018-07-01 RX ORDER — ACETAMINOPHEN 500 MG
1000 TABLET ORAL ONCE
Qty: 0 | Refills: 0 | Status: COMPLETED | OUTPATIENT
Start: 2018-07-01 | End: 2018-07-01

## 2018-07-01 RX ADMIN — POLYETHYLENE GLYCOL 3350 17 GRAM(S): 17 POWDER, FOR SOLUTION ORAL at 07:33

## 2018-07-01 RX ADMIN — Medication 30 MILLIGRAM(S): at 19:12

## 2018-07-01 RX ADMIN — Medication 650 MILLIGRAM(S): at 19:11

## 2018-07-01 RX ADMIN — SODIUM CHLORIDE 5 MILLILITER(S): 9 INJECTION INTRAMUSCULAR; INTRAVENOUS; SUBCUTANEOUS at 18:41

## 2018-07-01 RX ADMIN — Medication 400 MILLIGRAM(S): at 21:56

## 2018-07-01 RX ADMIN — Medication 1000 MILLIGRAM(S): at 22:15

## 2018-07-01 RX ADMIN — Medication 200 MILLIGRAM(S): at 19:11

## 2018-07-01 RX ADMIN — SODIUM CHLORIDE 5 MILLILITER(S): 9 INJECTION INTRAMUSCULAR; INTRAVENOUS; SUBCUTANEOUS at 06:31

## 2018-07-01 RX ADMIN — Medication 200 MILLIGRAM(S): at 07:33

## 2018-07-01 RX ADMIN — Medication 30 MILLIGRAM(S): at 08:00

## 2018-07-01 RX ADMIN — Medication 650 MILLIGRAM(S): at 19:40

## 2018-07-01 NOTE — PROGRESS NOTE ADULT - SUBJECTIVE AND OBJECTIVE BOX
Patient is a 28y old  Female who presents with a chief complaint of     INTERVAL HPI/OVERNIGHT EVENTS:    c/o mild , intermittent rt sided headache  no visual changes, dysarthria      ROS     ( -  )fevers/chills  ( - ) dyspnea  (  - ) cough  (  - ) chest pain  (  - ) palpatations  ( - ) dizziness/lightheadedness  (  - ) nausea/vomiting  (  - ) abd pain  (  - ) diarrhea  (  - ) melena  (  - ) hematochezia  (  - ) dysuria   ( - ) hematuria  (  - ) leg swelling    ( - ) calf tenderness  (  - ) motor weakness  ( - ) extremity numbness  ( - ) back pain  ( + ) tolerating POs  ( + ) BM  ROS: 12 point review of systems otherwise negative              MEDICATIONS  (STANDING):  hydroxychloroquine 200 milliGRAM(s) Oral every 12 hours  lidocaine   Patch 1 Patch Transdermal daily  methylPREDNISolone sodium succinate Injectable 30 milliGRAM(s) IV Push two times a day  polyethylene glycol 3350 17 Gram(s) Oral two times a day  sodium chloride 3%  Inhalation 5 milliLiter(s) Inhalation two times a day    MEDICATIONS  (PRN):  acetaminophen   Tablet. 650 milliGRAM(s) Oral every 6 hours PRN Mild Pain (1 - 3)  bisacodyl 5 milliGRAM(s) Oral at bedtime PRN Constipation  ondansetron Injectable 4 milliGRAM(s) IV Push every 6 hours PRN Nausea and/or Vomiting      Allergies    sulfa drugs (Rash)    Intolerances          Vital Signs Last 24 Hrs  T(C): 36.6 (01 Jul 2018 05:40), Max: 37.7 (30 Jun 2018 17:32)  T(F): 97.8 (01 Jul 2018 05:40), Max: 99.8 (30 Jun 2018 17:32)  HR: 65 (01 Jul 2018 05:40) (55 - 65)  BP: 93/57 (01 Jul 2018 05:40) (90/60 - 117/70)  BP(mean): --  RR: 15 (01 Jul 2018 05:40) (14 - 15)  SpO2: 95% (01 Jul 2018 05:40) (95% - 97%)  CAPILLARY BLOOD GLUCOSE            Physical Exam:    Daily     Daily   General:  Well appearing   HEENT:  Nonicteric, PERRLA, oral pharynx w/o erythema/exudate,  neck supple  CV:  [id number] , RRR,  no JVD, i/vi GORDON at LUSB  Lungs:  CTA B/L, no wheezes, rales, rhonchi  Abdomen:  positive BS, Soft, non-tender, non distended, no hepatosplenomegaly  Extremities:  2+ DP/radial pulses b/l, no cyanosis, no edema  Back: no cva or midline tenderness  Skin:  Warm and dry   :  No monique  Neuro:  AAOx3,  CN II-XII grossly intact, 5/5 str all 4 ext, sensation intact, (-) dysmetria b/l      LABS:                        9.0    1.3   )-----------( 112      ( 30 Jun 2018 07:21 )             25.6     07-01    138  |  104  |  8   ----------------------------<  130<H>  4.1   |  28  |  0.64    Ca    8.4      01 Jul 2018 02:21    TPro  6.1  /  Alb  2.9<L>  /  TBili  0.2  /  DBili  x   /  AST  29  /  ALT  46<H>  /  AlkPhos  42  07-01    PT/INR - ( 30 Jun 2018 07:21 )   PT: 11.2 sec;   INR: 1.01          PTT - ( 30 Jun 2018 07:21 )  PTT:28.0 sec        RADIOLOGY & ADDITIONAL TESTS:

## 2018-07-01 NOTE — PROGRESS NOTE ADULT - SUBJECTIVE AND OBJECTIVE BOX
INTERVAL HPI/OVERNIGHT EVENTS:    ANTIBIOTICS    MEDICATIONS  (STANDING):  hydroxychloroquine 200 milliGRAM(s) Oral every 12 hours  lidocaine   Patch 1 Patch Transdermal daily  methylPREDNISolone sodium succinate Injectable 30 milliGRAM(s) IV Push two times a day  polyethylene glycol 3350 17 Gram(s) Oral two times a day  sodium chloride 3%  Inhalation 5 milliLiter(s) Inhalation two times a day    MEDICATIONS  (PRN):  acetaminophen   Tablet. 650 milliGRAM(s) Oral every 6 hours PRN Mild Pain (1 - 3)  bisacodyl 5 milliGRAM(s) Oral at bedtime PRN Constipation  ondansetron Injectable 4 milliGRAM(s) IV Push every 6 hours PRN Nausea and/or Vomiting      Allergies    sulfa drugs (Rash)    Intolerances        REVIEW OF SYSTEMS:    Constitutional: No fever, weight loss or fatigue  Eyes: No eye pain, visual disturbances, or discharge  ENMT:  No difficulty hearing, tinnitus, vertigo; No sinus or throat pain  Neck: No pain or stiffness  Respiratory: No cough, wheezing, chills or hemoptysis  Cardiovascular: No chest pain, palpitations, shortness of breath, dizziness or leg swelling  Gastrointestinal: No abdominal or epigastric pain. No nausea, vomiting or hematemesis; No diarrhea or constipation. No melena or hematochezia.  Genitourinary: No dysuria, frequency, hematuria or incontinence  Rectal: No pain, hemorrhoids or incontinence  Neurological: No headaches, memory loss, loss of strength, numbness or tremors  Skin: No itching, burning, rashes or lesions   Lymph Nodes: No enlarged glands  Endocrine: No heat or cold intolerance; No hair loss  Musculoskeletal: No joint pain or swelling; No muscle, back or extremity pain  Heme/Lymph: No easy bruising or bleeding gums  Allergy and Immunologic: No hives or eczema    Vital Signs Last 24 Hrs  T(C): 36.6 (01 Jul 2018 05:40), Max: 37.7 (30 Jun 2018 17:32)  T(F): 97.8 (01 Jul 2018 05:40), Max: 99.8 (30 Jun 2018 17:32)  HR: 65 (01 Jul 2018 05:40) (55 - 65)  BP: 93/57 (01 Jul 2018 05:40) (90/60 - 117/70)  BP(mean): --  RR: 15 (01 Jul 2018 05:40) (14 - 15)  SpO2: 95% (01 Jul 2018 05:40) (95% - 97%)    PHYSICAL EXAM:    General: Well developed; well nourished; in no acute distress  Eyes: PERRL, EOM intact; conjunctiva and sclera clear  Head: Normocephalic; atraumatic  ENMT: No nasal discharge; airway clear  Neck: Supple; non tender; no masses  Respiratory: No wheezes, rales or rhonchi  Cardiovascular: Regular rate and rhythm. S1 and S2 Normal; No murmurs, gallops or rubs  Gastrointestinal: Soft non-tender non-distended; Normal bowel sounds; No hepatosplenomegaly  Genitourinary: No costovertebral angle tenderness  Extremities: Normal range of motion, No clubbing, cyanosis or edema  Vascular: Peripheral pulses palpable 2+ bilaterally  Neurological: Alert and oriented x3  Skin: Warm and dry. No acute rash  Lymph Nodes: No acute cervical adenopathy  no meningeal signs    LABS:                        9.0    1.3   )-----------( 112      ( 30 Jun 2018 07:21 )             25.6     07-01    138  |  104  |  8   ----------------------------<  130<H>  4.1   |  28  |  0.64    Ca    8.4      01 Jul 2018 02:21    TPro  6.1  /  Alb  2.9<L>  /  TBili  0.2  /  DBili  x   /  AST  29  /  ALT  46<H>  /  AlkPhos  42  07-01    PT/INR - ( 30 Jun 2018 07:21 )   PT: 11.2 sec;   INR: 1.01          PTT - ( 30 Jun 2018 07:21 )  PTT:28.0 sec        MICROBIOLOGY:  Culture Results:   Sputum specimen rejected.  Microscopic examination indicates  oropharyngeal contamination.  Please repeat. (06-27 @ 08:50)  Culture Results:   No growth (06-26 @ 16:28)  Culture Results:   Testing in progress (06-26 @ 16:28)  Culture Results:   No growth (06-26 @ 08:14)  Culture Results:   10,000 - 49,000 CFU/mL Streptococcus agalactiae (Group B)  Group B streptococci are susceptible to ampicillin,  penicillin and cefazolin, but may be resistant to  erythromycin and clindamycin.  Recommendations for intrapartum prophylaxis for Group B  streptococci are penicillin or ampicillin. (06-26 @ 00:01)  Culture Results:   No growth at 5 days. (06-25 @ 23:53)  Culture Results:   No growth at 5 days. (06-25 @ 23:52)  Culture Results:   No growth at 5 days. (06-25 @ 23:52)  Culture Results:   No growth at 5 days. (06-25 @ 23:52)      RADIOLOGY & ADDITIONAL STUDIES:

## 2018-07-02 ENCOUNTER — TRANSCRIPTION ENCOUNTER (OUTPATIENT)
Age: 28
End: 2018-07-02

## 2018-07-02 DIAGNOSIS — R51 HEADACHE: ICD-10-CM

## 2018-07-02 LAB
ALBUMIN SERPL ELPH-MCNC: 3.1 G/DL — LOW (ref 3.3–5)
ALP SERPL-CCNC: 39 U/L — LOW (ref 40–120)
ALT FLD-CCNC: 42 U/L — SIGNIFICANT CHANGE UP (ref 10–45)
ANION GAP SERPL CALC-SCNC: 9 MMOL/L — SIGNIFICANT CHANGE UP (ref 5–17)
ANION GAP SERPL CALC-SCNC: 9 MMOL/L — SIGNIFICANT CHANGE UP (ref 5–17)
AST SERPL-CCNC: 22 U/L — SIGNIFICANT CHANGE UP (ref 10–40)
B MICROTI IGG TITR SER: SIGNIFICANT CHANGE UP
B MICROTI IGM TITR SER: SIGNIFICANT CHANGE UP
BABESIA AB SER-ACNC: SIGNIFICANT CHANGE UP
BILIRUB SERPL-MCNC: 0.2 MG/DL — SIGNIFICANT CHANGE UP (ref 0.2–1.2)
BUN SERPL-MCNC: 9 MG/DL — SIGNIFICANT CHANGE UP (ref 7–23)
BUN SERPL-MCNC: 9 MG/DL — SIGNIFICANT CHANGE UP (ref 7–23)
CALCIUM SERPL-MCNC: 8.5 MG/DL — SIGNIFICANT CHANGE UP (ref 8.4–10.5)
CALCIUM SERPL-MCNC: 9.2 MG/DL — SIGNIFICANT CHANGE UP (ref 8.4–10.5)
CHLORIDE SERPL-SCNC: 102 MMOL/L — SIGNIFICANT CHANGE UP (ref 96–108)
CHLORIDE SERPL-SCNC: 103 MMOL/L — SIGNIFICANT CHANGE UP (ref 96–108)
CO2 SERPL-SCNC: 25 MMOL/L — SIGNIFICANT CHANGE UP (ref 22–31)
CO2 SERPL-SCNC: 27 MMOL/L — SIGNIFICANT CHANGE UP (ref 22–31)
CREAT SERPL-MCNC: 0.57 MG/DL — SIGNIFICANT CHANGE UP (ref 0.5–1.3)
CREAT SERPL-MCNC: 0.59 MG/DL — SIGNIFICANT CHANGE UP (ref 0.5–1.3)
CULTURE RESULTS: SIGNIFICANT CHANGE UP
CULTURE RESULTS: SIGNIFICANT CHANGE UP
GLUCOSE SERPL-MCNC: 108 MG/DL — HIGH (ref 70–99)
GLUCOSE SERPL-MCNC: 136 MG/DL — HIGH (ref 70–99)
GRAM STN FLD: SIGNIFICANT CHANGE UP
HCT VFR BLD CALC: 28.9 % — LOW (ref 34.5–45)
HGB BLD-MCNC: 9.9 G/DL — LOW (ref 11.5–15.5)
LYMPHOCYTES # BLD AUTO: 24 % — SIGNIFICANT CHANGE UP (ref 13–44)
MCHC RBC-ENTMCNC: 30.3 PG — SIGNIFICANT CHANGE UP (ref 27–34)
MCHC RBC-ENTMCNC: 34.3 G/DL — SIGNIFICANT CHANGE UP (ref 32–36)
MCV RBC AUTO: 88.4 FL — SIGNIFICANT CHANGE UP (ref 80–100)
MONOCYTES NFR BLD AUTO: 10.2 % — SIGNIFICANT CHANGE UP (ref 2–14)
NEUTROPHILS NFR BLD AUTO: 65.8 % — SIGNIFICANT CHANGE UP (ref 43–77)
NIGHT BLUE STAIN TISS: SIGNIFICANT CHANGE UP
NIGHT BLUE STAIN TISS: SIGNIFICANT CHANGE UP
PLATELET # BLD AUTO: 141 K/UL — LOW (ref 150–400)
POTASSIUM SERPL-MCNC: 3.9 MMOL/L — SIGNIFICANT CHANGE UP (ref 3.5–5.3)
POTASSIUM SERPL-MCNC: 4.1 MMOL/L — SIGNIFICANT CHANGE UP (ref 3.5–5.3)
POTASSIUM SERPL-SCNC: 3.9 MMOL/L — SIGNIFICANT CHANGE UP (ref 3.5–5.3)
POTASSIUM SERPL-SCNC: 4.1 MMOL/L — SIGNIFICANT CHANGE UP (ref 3.5–5.3)
PROT SERPL-MCNC: 6.5 G/DL — SIGNIFICANT CHANGE UP (ref 6–8.3)
RBC # BLD: 3.27 M/UL — LOW (ref 3.8–5.2)
RBC # FLD: 11.9 % — SIGNIFICANT CHANGE UP (ref 10.3–16.9)
SODIUM SERPL-SCNC: 137 MMOL/L — SIGNIFICANT CHANGE UP (ref 135–145)
SODIUM SERPL-SCNC: 138 MMOL/L — SIGNIFICANT CHANGE UP (ref 135–145)
SPECIMEN SOURCE: SIGNIFICANT CHANGE UP
WBC # BLD: 3.5 K/UL — LOW (ref 3.8–10.5)
WBC # FLD AUTO: 3.5 K/UL — LOW (ref 3.8–10.5)

## 2018-07-02 PROCEDURE — 99232 SBSQ HOSP IP/OBS MODERATE 35: CPT | Mod: GC

## 2018-07-02 RX ORDER — CHOLECALCIFEROL (VITAMIN D3) 125 MCG
1000 CAPSULE ORAL DAILY
Qty: 0 | Refills: 0 | Status: DISCONTINUED | OUTPATIENT
Start: 2018-07-02 | End: 2018-07-04

## 2018-07-02 RX ORDER — SODIUM CHLORIDE 9 MG/ML
2000 INJECTION INTRAMUSCULAR; INTRAVENOUS; SUBCUTANEOUS ONCE
Qty: 0 | Refills: 0 | Status: COMPLETED | OUTPATIENT
Start: 2018-07-02 | End: 2018-07-02

## 2018-07-02 RX ORDER — IBUPROFEN 200 MG
600 TABLET ORAL EVERY 6 HOURS
Qty: 0 | Refills: 0 | Status: DISCONTINUED | OUTPATIENT
Start: 2018-07-02 | End: 2018-07-04

## 2018-07-02 RX ORDER — PANTOPRAZOLE SODIUM 20 MG/1
40 TABLET, DELAYED RELEASE ORAL DAILY
Qty: 0 | Refills: 0 | Status: DISCONTINUED | OUTPATIENT
Start: 2018-07-02 | End: 2018-07-04

## 2018-07-02 RX ADMIN — Medication 200 MILLIGRAM(S): at 19:03

## 2018-07-02 RX ADMIN — SODIUM CHLORIDE 5 MILLILITER(S): 9 INJECTION INTRAMUSCULAR; INTRAVENOUS; SUBCUTANEOUS at 06:51

## 2018-07-02 RX ADMIN — PANTOPRAZOLE SODIUM 40 MILLIGRAM(S): 20 TABLET, DELAYED RELEASE ORAL at 19:02

## 2018-07-02 RX ADMIN — Medication 200 MILLIGRAM(S): at 06:49

## 2018-07-02 RX ADMIN — Medication 58 MILLIGRAM(S): at 13:45

## 2018-07-02 RX ADMIN — Medication 1000 UNIT(S): at 22:55

## 2018-07-02 RX ADMIN — SODIUM CHLORIDE 2000 MILLILITER(S): 9 INJECTION INTRAMUSCULAR; INTRAVENOUS; SUBCUTANEOUS at 12:22

## 2018-07-02 RX ADMIN — Medication 600 MILLIGRAM(S): at 09:42

## 2018-07-02 RX ADMIN — POLYETHYLENE GLYCOL 3350 17 GRAM(S): 17 POWDER, FOR SOLUTION ORAL at 19:03

## 2018-07-02 RX ADMIN — Medication 30 MILLIGRAM(S): at 06:50

## 2018-07-02 NOTE — DISCHARGE NOTE ADULT - CARE PLAN
Principal Discharge DX:	Acute intractable headache, unspecified headache type  Goal:	Decrease headache symptoms  Assessment and plan of treatment:	You were admitted for a headache with unknown cause. The headache was associated with a fever when you arrived and you received antibiotics for possible infection. An extensive infectious workup was negative and the fever did not recur, so antibiotics were discontinued. A lumbar puncture ruled out meningitis and MRI of the brain was normal. Blood cultures were also normal. During your stay, tylenol and ibuprofen were used for pain relief. Lupus flare is a possible cause of the headache for which you are receiving an increased dose of steroids.  Secondary Diagnosis:	Pancytopenia  Goal:	Improve cell counts  Assessment and plan of treatment:	Pancytopenia is a decrease in all cell counts including white cells, red cells, and platelets. You were noted to have pancytopenia on admission. Initially hydroxychloroquine was held but a rheumatology consultant determined it was unlikely to be the cause. Lupus can independently cause decreased cell counts and this was also addressed with the high-dose steroids. You will need to follow-up with rheumatology outpatient to manage your lupus. Principal Discharge DX:	Acute intractable headache, unspecified headache type  Goal:	Decrease headache symptoms  Assessment and plan of treatment:	You were admitted for a headache with unknown cause. The headache was associated with a fever when you arrived and you received antibiotics for possible infection. An extensive infectious workup was negative and the fever did not recur, so antibiotics were discontinued. A lumbar puncture ruled out meningitis and MRI of the brain was normal. Blood cultures were also normal. During your stay, tylenol and ibuprofen were used for pain relief. Lupus flare is a possible cause of the headache for which you are receiving an increased dose of steroids. You should follow up with Dr. Hanna Billy for this on July 30th.  Secondary Diagnosis:	Pancytopenia  Goal:	Improve cell counts  Assessment and plan of treatment:	Pancytopenia is a decrease in all cell counts including white cells, red cells, and platelets. You were noted to have pancytopenia on admission. Initially hydroxychloroquine was held but a rheumatology consultant determined it was unlikely to be the cause. Lupus can independently cause decreased cell counts and this was also addressed with the high-dose steroids. You will need to follow-up with rheumatology outpatient to manage your lupus.

## 2018-07-02 NOTE — DISCHARGE NOTE ADULT - PROVIDER TOKENS
FREE:[LAST:[Mariajose],FIRST:[Hanna],PHONE:[phone]],FAX:[phone]   [phone]    -],ADDRESS:[[address] [place] [address]]]

## 2018-07-02 NOTE — PROGRESS NOTE ADULT - ATTENDING COMMENTS
DX  FEVERS /RIGHT SIDED HEADACHE - SUSPECTED SLE FLARE - case d/w rheum,  brain imaging unremarkable. LP not consistent w/ meningitis.  will start pulse solumedrol.  DIZZINESS - check orthostatics  ABD PAIN - suspect due to constipation? aggressive bowel regimen.   TREE AND BUD LUNG MICRONODULES- TB ruled out. pt w/o resp sxs. can monitor with outpt imaging.

## 2018-07-02 NOTE — PROGRESS NOTE ADULT - ATTENDING COMMENTS
28 year old female with a history of SLE primarily manifesting with fevers and cytopenias presents for evaluation of acute on chronic exacerbation of fevers, malaise, headaches, blurred vision and abdominal pains. On laboratory evaluation, she is noted to have pancytopenia which has progressed since admission. She underwent an LP which revealed a hypercellular CSF primarily neutrophilic, and high protein. Of note, patient is neutropenic. She has been on Prednisone 10mg and Plaquenil 200mg BID since presentation to her rheumatologist in Jan 2018, with malaise, low grade fevers and abdominal pain. She has remained with cytopenias (though less pronounced then now), hypocomplementemia and slightly elevated dsDNA since - also with +sm and +RNP per her outpatient rheumatologist. The headaches only developed three weeks ago. Blurred vision last week.    Of note, when she was diagnosed with SLE in her mid teens she had cytopenias and fevers. Was off medications for 7 years.     Based on history and lab evaluation, findings have declared themselves to be more consistent with an SLE flare. Patient presented with SLE initially with similar symptoms, however without the headaches at that time - additionally, these symptoms are longstanding for the past 6 months, which goes against an infection. She developed her current symptoms 6 mo ago, off therapy. All this said, the neutropenic predominance in the CSF and neutropenia on her WBC diff are inconsistent with a flare - as is the "tree in bud" pattern of the RUL nodule on her CT scan, and thus infection should be ruled out given that she has resided outside the [place] for many years. There is more than likely on going SLE activity, and with a negative infectious work up this may be the predominant etiology driving her current presentation.     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   Solumedrol 1000mg X 3 days followed by higher doses of Prednisone.   Continue Plaquenil 200mg BID.   Will continue to follow with you.     Please feel free to call me with any questions.   [phone]

## 2018-07-02 NOTE — DISCHARGE NOTE ADULT - MEDICATION SUMMARY - MEDICATIONS TO TAKE
I will START or STAY ON the medications listed below when I get home from the hospital:    predniSONE 10 mg oral tablet  -- 3 tab(s) by mouth 2 times a day   -- Indication: For Lupus (systemic lupus erythematosus)    hydroxychloroquine 200 mg oral tablet  -- 1 tab(s) by mouth 2 times a day  -- Indication: For Lupus (systemic lupus erythematosus)    polyethylene glycol 3350 oral powder for reconstitution  -- 17 gram(s) by mouth every 12 hours  -- Indication: For constipation    cholecalciferol oral tablet  -- 1000 unit(s) by mouth once a day  -- Indication: For Nutrition, metabolism, and development symptoms

## 2018-07-02 NOTE — PROGRESS NOTE ADULT - ASSESSMENT
29 yo F with long standing hx of Lupus presents to ED w/ generalized weakness and fatigue, w/ pancytopenia, etiology likely related to lupus vs medication induced    #Pancytopenia  - etiology likely 2/2 to lupus vs medication induced (plaquanil), Vit B12 and folate normal. CT Abd/Pelvis neg for splenomegaly Jun 2018  - CT chest/abd and pelvis does show b/l axillary LAD, etiology unclear, will send peripheral blood flow cytometry to r/o lymphoproliferative disorder as outpt.   - if initial w/u for pancytopenia unrevealing, will consider bone marrow biopsy as outpt.    # -normocytic anemia, peripheral smear reviewed  - etiology likely 2/2 to chronic bleeding from heavy periods, onyl serum iron checked- normal. please send TIBC and ferritin levels  - transfuse for Hgb<7, cont to monitor.    # Lupus    - followed by rheumatology on steroids and plaquanil, f/u rheum recs.     Pt can follow up as outpt with Dr Jung.

## 2018-07-02 NOTE — PROGRESS NOTE ADULT - PROBLEM SELECTOR PLAN 7
- Orthostatics positive, patient dropped to 80/40 on standing.   - Giving 2L NS bolus.   - ekg urermarkable  check TTE (has murmur)

## 2018-07-02 NOTE — PROGRESS NOTE ADULT - SUBJECTIVE AND OBJECTIVE BOX
Hematology Oncology Progress Note    The patient was seen and examined at bedside.    28 year old female with PMH SLE (diagnosed at age 12) presented to [facility] with headaches, syncopal event, reports excessive fatigue and generalized weakness over past several months. Initially evaluated by Dr Win in Jan 2018. Pt followed up with Dr Myers (rheum) started on plaquanil and prednisone for lupus. Pt also saw GI, underwent egd and c-scope which were both negative. Admitted with lupus flare on steroids and plaquanil, heme eval for pancytopenia    Interval hx:    no acute events over weekend, feels somewhat better. pancytopenia improving. reports continued headaches, some nausea no vomiting. denies any B symptoms    PHYSICAL EXAM:    ICU Vital Signs Last 24 Hrs  T(C): 36.7 (02 Jul 2018 10:50), Max: 36.7 (02 Jul 2018 10:50)  T(F): 98 (02 Jul 2018 10:50), Max: 98 (02 Jul 2018 10:50)  HR: 49 (02 Jul 2018 10:50) (49 - 58)  BP: 113/75 (02 Jul 2018 10:50) (91/56 - 124/79)  RR: 16 (02 Jul 2018 10:50) (14 - 16)  SpO2: 97% (02 Jul 2018 10:50) (97% - 99%)      Gen: well developed, well nourished, comfortable  HEENT: normocephalic/atraumatic, no conjunctival pallor, no scleral icterus, no oral thrush/mucosal bleeding/mucositis  Neck: supple, no masses, no JVD  Back: nontender  Cardiovascular: RR, nl S1S2, no murmurs/rubs/gallops  Respiratory: clear air entry   Gastrointestinal: BS+, soft, NT/ND, no masses, no splenomegaly, no hepatomegaly, no evidence for ascites  Extremities: no clubbing/cyanosis, no edema, no calf tenderness, DP/PT 2+ b/l  Neurological: no focal deficits  Skin: no rash on visible skin  Lymph Nodes:  no cervical/supraclavicular LAD, no axillary/groin LAD    Labs                             9.9    3.5   )-----------( 141      ( 02 Jul 2018 06:32 )             28.9         CBC Full  -  ( 02 Jul 2018 06:32 )  WBC Count : 3.5 K/uL  Hemoglobin : 9.9 g/dL  Hematocrit : 28.9 %  Platelet Count - Automated : 141 K/uL  Mean Cell Volume : 88.4 fL  Mean Cell Hemoglobin : 30.3 pg  Mean Cell Hemoglobin Concentration : 34.3 g/dL  Auto Neutrophil % : 65.8 %  Auto Lymphocyte % : 24.0 %  Auto Monocyte % : 10.2 %    07-02    138  |  102  |  9   ----------------------------<  108<H>  3.9   |  27  |  0.59    Ca    9.2      02 Jul 2018 06:32    TPro  6.5  /  Alb  3.1<L>  /  TBili  0.2  /  DBili  x   /  AST  22  /  ALT  42  /  AlkPhos  39<L>  07-02      ---CHEST---    Lower Neck: Visualized thyroid unremarkable. No lower cervical   lymphadenopathy.    Lungs/Pleura/Airways: Patchy tree-in-bud micronodules in the right upper   lobe. No pleural effusion or pneumothorax. Patent central airways.    Mediastinum: Heart is normal in size. No pericardial effusion. No aortic   aneurysm or dissection. No central pulmonary embolism.    Lymph nodes: Bilateral axillary lymphadenopathy. No mediastinal or hilar   lymphadenopathy.    Bones/Soft tissues: No significant abnormality.    ---ABDOMEN/PELVIS---    Liver: Smooth in contour. No focal mass. Portal and hepatic veins are   patent.    Biliary system: Gallbladder is normal in size. No calcified gallstones.   No biliary ductal dilatation.    Pancreas: Unremarkable.    Spleen: Unremarkable.    Adrenal glands: Unremarkable.    Kidneys: Symmetric parenchymal enhancement. No renal mass. No   hydronephrosis. No definite renal or ureteral stone, although evaluation   is somewhat limited due to presence of excreted contrast.   Urinary Bladder: Excreted contrast within the bladder. No definite   intraluminal stone.    Reproductive organs: Unremarkable.     Bowel/Peritoneum: Normal caliber without evidence of obstruction. No   appreciable wall thickening, although evaluation is suboptimal without   oral contrast. Abundant feces in the ascending and proximal transverse   colon. Normal appendix. No extraluminal gas. Trace pelvic ascites which   is likely physiologic.    Lymph nodes: No lymphadenopathy.    Aorta/IVC: Normal caliber.    Bones/Soft tissues: No significant abnormality.      IMPRESSION:     1.  Patchy tree-in-bud micronodules in the right upper lobe are   suggestive of small airways disease of infectious or inflammatory   etiology.  2.  Bilateral axillary lymphadenopathy.  3.  No acute abdominopelvic pathology.

## 2018-07-02 NOTE — DISCHARGE NOTE ADULT - HOSPITAL COURSE
Patient is a 29 y/o female w hx of SLE (on prednisone, hydroxychloroquine) who presents w syncope, headaches, fevers, chills. Patient was initially admitted to [facility] for sepsis of unclear etiology and was found to have tree-in-bud nodularity on CT chest, Patient underwent extensive workup for infectious causes including CSF PCR for meningitis, blood cultures, and acid fast smear for tuberculosis. She was started on vanc, ceftriaxone, ampicillin, and acyclovir. She was transferred to [address] [place] [address]. Patient was noted to be pancytopenic but states this is a chronic problem attributed to lupus by her outpatient rheumatologist. Rheumatology consult recommended MRI brain which was normal. She was started on IV steroids with mild improvement of symptoms. At present patient is stable, headache is being managed on tylenol+ibuprofen+steroids, and all antibiotics have been dc'd. Patient is a 29 y/o female w hx of SLE (on prednisone, hydroxychloroquine) who presents w syncope, headaches, fevers, chills. Patient was initially admitted to [facility] for sepsis of unclear etiology and was found to have tree-in-bud nodularity on CT chest, Patient underwent extensive workup for infectious causes including CSF PCR for meningitis, blood cultures, and acid fast smear for tuberculosis. She was started on vanc, ceftriaxone, ampicillin, and acyclovir. She was transferred to [address] [place] [address]. Patient was noted to be pancytopenic but states this is a chronic problem attributed to lupus by her outpatient rheumatologist. Rheumatology consult recommended MRI brain which was normal. She was started on IV steroids with mild improvement of symptoms. At present patient is stable, headache has been managed on tylenol+ibuprofen+steroids, and all antibiotics have been dc'd. A 3-day course of pulse-dose steroids per rheumatology rec's was completed.

## 2018-07-02 NOTE — PROGRESS NOTE ADULT - ASSESSMENT
27 yo F w/ hx of SLE p/w fevers, headaches, syncopal event, pancytopenia, hair loss, abnormal CSF. Infectious w/u negative. Started on 60mg Prednisone this weekend. While pt's labs have improved with the higher dose of steroids, her headache sx have not. Pt's MRI brain was normal, but given abnormal CSF analysis and recurring headaches, there is  concern for lupus cerebritis. Given negative AFBsx3, and negative PCR, and pt has been afebrile on steroids infectious etiology is unlikely. Recommend pulse of steroids to reduce pt's inflammation more completely and will reassess.

## 2018-07-02 NOTE — PROGRESS NOTE ADULT - PROBLEM SELECTOR PLAN 1
- Infectious work up has been unrevealing.   - Concern for SLE flare rather than TB   - d/w rheum --- Patient has been on 48 hours of IV solumedrol 30 iv bid with little improvement. Rheum would like to do pulse dose steroids at 1000mg IV daily for 3 days.

## 2018-07-02 NOTE — PROGRESS NOTE ADULT - SUBJECTIVE AND OBJECTIVE BOX
Interval Hx: PCR and AFBsx3 negative. MRI brain negative for lupus cerebritis. Pt started on 60mg Prednisone over the weekend. No acute events over the weekend/ overnight. On the higher dose steroids, pt has remained afebrile. Her cytopenias and vitals have improved.      SUBJECTIVE: Patient seen and examined at bedside. Pt denies significant improvement in sx. Pt reports her headaches have recurred since yesterday, and are only mildly alleviated by Motrin.     OVITAL SIGNS:  Vital Signs Last 24 Hrs  T(C): 36.7 (02 Jul 2018 10:50), Max: 36.7 (02 Jul 2018 10:50)  T(F): 98 (02 Jul 2018 10:50), Max: 98 (02 Jul 2018 10:50)  HR: 49 (02 Jul 2018 10:50) (49 - 58)  BP: 113/75 (02 Jul 2018 10:50) (91/56 - 124/79)  RR: 16 (02 Jul 2018 10:50) (14 - 16)  SpO2: 97% (02 Jul 2018 10:50) (97% - 99%)    PHYSICAL EXAM:  General: pt laying in bed, speaks slowly and softly, alert but tired appearing, NAD  Eyes: EOMI,  mild R sided horizontal nystagmus  Cardiovascular: +S1/S2; RRR  Respiratory: CTA B/L; no W/R/R  MSK: no joint swelling, heat, redness, tenderness  Neurological: AAOx3; no focal deficits    MEDICATIONS:  MEDICATIONS  (STANDING):  hydroxychloroquine 200 milliGRAM(s) Oral every 12 hours  lidocaine   Patch 1 Patch Transdermal daily  methylPREDNISolone sodium succinate IVPB 1000 milliGRAM(s) IV Intermittent daily  polyethylene glycol 3350 17 Gram(s) Oral two times a day  sodium chloride 3%  Inhalation 5 milliLiter(s) Inhalation two times a day    MEDICATIONS  (PRN):  acetaminophen   Tablet. 650 milliGRAM(s) Oral every 6 hours PRN Mild Pain (1 - 3)  bisacodyl 5 milliGRAM(s) Oral at bedtime PRN Constipation  ibuprofen  Tablet 600 milliGRAM(s) Oral every 6 hours PRN headache  ondansetron Injectable 4 milliGRAM(s) IV Push every 6 hours PRN Nausea and/or Vomiting      ALLERGIES:  Allergies    sulfa drugs (Rash)        LABS:                        9.9    3.5   )-----------( 141      ( 02 Jul 2018 06:32 )             28.9     CBC Full  -  ( 02 Jul 2018 06:32 )  WBC Count : 3.5 K/uL  Hemoglobin : 9.9 g/dL  Hematocrit : 28.9 %  Platelet Count - Automated : 141 K/uL  Mean Cell Volume : 88.4 fL  Mean Cell Hemoglobin : 30.3 pg  Mean Cell Hemoglobin Concentration : 34.3 g/dL  Auto Neutrophil % : 65.8 %  Auto Lymphocyte % : 24.0 %  Auto Monocyte % : 10.2 %      07-02    138  |  102  |  9   ----------------------------<  108<H>  3.9   |  27  |  0.59    Ca    9.2      02 Jul 2018 06:32    TPro  6.5  /  Alb  3.1<L>  /  TBili  0.2  /  DBili  x   /  AST  22  /  ALT  42  /  AlkPhos  39<L>  07-02      RADIOLOGY & ADDITIONAL TESTS: Reviewed.    MRI brain- negative Interval Hx: PCR and AFBsx3 negative. MRI brain negative for lupus cerebritis. Pt started on 60mg Prednisone over the weekend. No acute events over the weekend/ overnight. On the higher dose steroids, pt has remained afebrile. Her cytopenias and vitals have improved.      SUBJECTIVE: Patient seen and examined at bedside. Pt denies significant improvement in sx. Pt reports her headaches have recurred since yesterday, and are only mildly alleviated by Motrin.     OVITAL SIGNS:  Vital Signs Last 24 Hrs  T(C): 36.7 (02 Jul 2018 10:50), Max: 36.7 (02 Jul 2018 10:50)  T(F): 98 (02 Jul 2018 10:50), Max: 98 (02 Jul 2018 10:50)  HR: 49 (02 Jul 2018 10:50) (49 - 58)  BP: 113/75 (02 Jul 2018 10:50) (91/56 - 124/79)  RR: 16 (02 Jul 2018 10:50) (14 - 16)  SpO2: 97% (02 Jul 2018 10:50) (97% - 99%)    PHYSICAL EXAM:  General: pt laying in bed, speaks slowly and softly, alert but tired appearing, NAD  Eyes: EOMI,  mild R sided horizontal nystagmus  Cardiovascular: +S1/S2; RRR  Respiratory: CTA B/L; no W/R/R  MSK: no joint swelling, heat, redness, tenderness  Neurological: AAOx3; no focal deficits    MEDICATIONS:  MEDICATIONS  (STANDING):  hydroxychloroquine 200 milliGRAM(s) Oral every 12 hours  lidocaine   Patch 1 Patch Transdermal daily  methylPREDNISolone sodium succinate IVPB 1000 milliGRAM(s) IV Intermittent daily  polyethylene glycol 3350 17 Gram(s) Oral two times a day  sodium chloride 3%  Inhalation 5 milliLiter(s) Inhalation two times a day    MEDICATIONS  (PRN):  acetaminophen   Tablet. 650 milliGRAM(s) Oral every 6 hours PRN Mild Pain (1 - 3)  bisacodyl 5 milliGRAM(s) Oral at bedtime PRN Constipation  ibuprofen  Tablet 600 milliGRAM(s) Oral every 6 hours PRN headache  ondansetron Injectable 4 milliGRAM(s) IV Push every 6 hours PRN Nausea and/or Vomiting      ALLERGIES:  Allergies    sulfa drugs (Rash)        LABS:                        9.9    3.5   )-----------( 141      ( 02 Jul 2018 06:32 )             28.9     CBC Full  -  ( 02 Jul 2018 06:32 )  WBC Count : 3.5 K/uL  Hemoglobin : 9.9 g/dL  Hematocrit : 28.9 %  Platelet Count - Automated : 141 K/uL  Mean Cell Volume : 88.4 fL  Mean Cell Hemoglobin : 30.3 pg  Mean Cell Hemoglobin Concentration : 34.3 g/dL  Auto Neutrophil % : 65.8 %  Auto Lymphocyte % : 24.0 %  Auto Monocyte % : 10.2 %      07-02    138  |  102  |  9   ----------------------------<  108<H>  3.9   |  27  |  0.59    Ca    9.2      02 Jul 2018 06:32    TPro  6.5  /  Alb  3.1<L>  /  TBili  0.2  /  DBili  x   /  AST  22  /  ALT  42  /  AlkPhos  39<L>  07-02      RADIOLOGY & ADDITIONAL TESTS: Reviewed.    MRI brain- 6/29/2018  Impression: Normal MRI of the brain. No evidence of abnormal enhancement..   No evidence of acute infarction. Interval Hx: 29 yo F w/ lupus admitted with h/as, syncopal episode, fever, pancytopenias. PCR, AFBsx3, and infectious serology negative. MRI brain negative for lupus cerebritis. Pt started on 60mg Prednisone over the weekend. No acute events over the weekend/ overnight. On the higher dose steroids, pt has remained afebrile. Her cytopenias and vitals have improved.      SUBJECTIVE: Patient seen and examined at bedside. Pt denies significant improvement in sx. Pt reports her headaches have recurred since yesterday, and are only mildly alleviated by Motrin.     OVITAL SIGNS:  Vital Signs Last 24 Hrs  T(C): 36.7 (02 Jul 2018 10:50), Max: 36.7 (02 Jul 2018 10:50)  T(F): 98 (02 Jul 2018 10:50), Max: 98 (02 Jul 2018 10:50)  HR: 49 (02 Jul 2018 10:50) (49 - 58)  BP: 113/75 (02 Jul 2018 10:50) (91/56 - 124/79)  RR: 16 (02 Jul 2018 10:50) (14 - 16)  SpO2: 97% (02 Jul 2018 10:50) (97% - 99%)    PHYSICAL EXAM:  General: pt laying in bed, speaks slowly and softly, alert but tired appearing, NAD  Eyes: EOMI,  mild R sided horizontal nystagmus  Cardiovascular: +S1/S2; RRR  Respiratory: CTA B/L; no W/R/R  MSK: no joint swelling, heat, redness, tenderness  Neurological: AAOx3; no focal deficits    MEDICATIONS:  MEDICATIONS  (STANDING):  hydroxychloroquine 200 milliGRAM(s) Oral every 12 hours  lidocaine   Patch 1 Patch Transdermal daily  methylPREDNISolone sodium succinate IVPB 1000 milliGRAM(s) IV Intermittent daily  polyethylene glycol 3350 17 Gram(s) Oral two times a day  sodium chloride 3%  Inhalation 5 milliLiter(s) Inhalation two times a day    MEDICATIONS  (PRN):  acetaminophen   Tablet. 650 milliGRAM(s) Oral every 6 hours PRN Mild Pain (1 - 3)  bisacodyl 5 milliGRAM(s) Oral at bedtime PRN Constipation  ibuprofen  Tablet 600 milliGRAM(s) Oral every 6 hours PRN headache  ondansetron Injectable 4 milliGRAM(s) IV Push every 6 hours PRN Nausea and/or Vomiting      ALLERGIES:  Allergies    sulfa drugs (Rash)        LABS:                        9.9    3.5   )-----------( 141      ( 02 Jul 2018 06:32 )             28.9     CBC Full  -  ( 02 Jul 2018 06:32 )  WBC Count : 3.5 K/uL  Hemoglobin : 9.9 g/dL  Hematocrit : 28.9 %  Platelet Count - Automated : 141 K/uL  Mean Cell Volume : 88.4 fL  Mean Cell Hemoglobin : 30.3 pg  Mean Cell Hemoglobin Concentration : 34.3 g/dL  Auto Neutrophil % : 65.8 %  Auto Lymphocyte % : 24.0 %  Auto Monocyte % : 10.2 %      07-02    138  |  102  |  9   ----------------------------<  108<H>  3.9   |  27  |  0.59    Ca    9.2      02 Jul 2018 06:32    TPro  6.5  /  Alb  3.1<L>  /  TBili  0.2  /  DBili  x   /  AST  22  /  ALT  42  /  AlkPhos  39<L>  07-02      RADIOLOGY & ADDITIONAL TESTS: Reviewed.    MRI brain- 6/29/2018  Impression: Normal MRI of the brain. No evidence of abnormal enhancement..   No evidence of acute infarction.

## 2018-07-02 NOTE — PROGRESS NOTE ADULT - PROBLEM SELECTOR PLAN 6
c3/4 low w/ ds DNA elevated  - d/w rheum --- Patient has been on 48 hours of IV solumedrol 30 iv bid with little improvement. Rheum would like to do pulse dose steroids at 1000mg IV daily for 3 days.

## 2018-07-02 NOTE — PROGRESS NOTE ADULT - PROBLEM SELECTOR PLAN 2
-pulse dose steroids as written above for tx of possible lupus cerebritis  -check with family if pt is at her baseline mental status (pt speaks and moves slowly) --------  -pulse dose steroids as written above for tx of possible lupus cerebritis  -check with family if pt is at her baseline mental status (pt speaks and moves slowly)

## 2018-07-02 NOTE — PROGRESS NOTE ADULT - PROBLEM SELECTOR PLAN 1
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  -give w/ PO vit D 1000mg qd (continue this outpt) for osteoporosis ppx  -give w/ IV PPI for ppx of gastritis -   -c/w Plaquenil 200mg q12 PO for chronic lupus tx  - start pulse dose steroids: 1g IV methylprednisolone qd x 3 days bc sx have not improved and 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

## 2018-07-02 NOTE — DISCHARGE NOTE ADULT - PATIENT PORTAL LINK FT
You can access the [facility] Patient Portal, offered by [facility], by registering with the following website: [email or web address]

## 2018-07-02 NOTE — PROGRESS NOTE ADULT - SUBJECTIVE AND OBJECTIVE BOX
SUBJECTIVE / INTERVAL HPI: Patient seen and examined at bedside. Today she is still experiencing headache but states that it is improved with tylenol and motrin. She has been feeling fatigue as well as lightheadedness upon standing up. She denies any fevers, night sweats, cough, tremor, chest pain, shortness of breath, or any other complaints.     VITAL SIGNS:  Vital Signs Last 24 Hrs  T(C): 36.7 (02 Jul 2018 10:50), Max: 36.7 (02 Jul 2018 10:50)  T(F): 98 (02 Jul 2018 10:50), Max: 98 (02 Jul 2018 10:50)  HR: 49 (02 Jul 2018 10:50) (49 - 58)  BP: 113/75 (02 Jul 2018 10:50) (106/58 - 124/79)  BP(mean): --  RR: 16 (02 Jul 2018 10:50) (14 - 16)  SpO2: 97% (02 Jul 2018 10:50) (97% - 99%)    REVIEW OF SYSTEMS:    CONSTITUTIONAL: Weakness. No fevers or chills  EYES/ENT: No visual changes;  No vertigo or throat pain   NECK: No pain or stiffness  RESPIRATORY: No cough, wheezing, hemoptysis; No shortness of breath  CARDIOVASCULAR: No chest pain or palpitations  GASTROINTESTINAL: No abdominal or epigastric pain. No nausea, vomiting, or hematemesis; No diarrhea or constipation. No melena or hematochezia.  GENITOURINARY: No dysuria, frequency or hematuria  NEUROLOGICAL: Headache. No numbness or weakness  SKIN: No itching, burning, rashes, or lesions   All other review of systems is negative unless indicated above.    PHYSICAL EXAM:  General: WDWN  HEENT: NC/AT; PERRL, clear conjunctiva  Neck: supple  Cardiovascular: +S1/S2; RRR  Respiratory: CTA b/l; no W/R/R  Gastrointestinal: soft, NT/ND; +BSx4  Extremities: WWP; 2+ peripheral pulses; no edema   Neurological: AAOx3; no focal deficits    MEDICATIONS:  MEDICATIONS  (STANDING):  hydroxychloroquine 200 milliGRAM(s) Oral every 12 hours  lidocaine   Patch 1 Patch Transdermal daily  methylPREDNISolone sodium succinate IVPB 1000 milliGRAM(s) IV Intermittent daily  polyethylene glycol 3350 17 Gram(s) Oral two times a day  sodium chloride 3%  Inhalation 5 milliLiter(s) Inhalation two times a day    MEDICATIONS  (PRN):  acetaminophen   Tablet. 650 milliGRAM(s) Oral every 6 hours PRN Mild Pain (1 - 3)  bisacodyl 5 milliGRAM(s) Oral at bedtime PRN Constipation  ibuprofen  Tablet 600 milliGRAM(s) Oral every 6 hours PRN headache  ondansetron Injectable 4 milliGRAM(s) IV Push every 6 hours PRN Nausea and/or Vomiting      ALLERGIES:  Allergies    sulfa drugs (Rash)    Intolerances        LABS:                        9.9    3.5   )-----------( 141      ( 02 Jul 2018 06:32 )             28.9     07-02    138  |  102  |  9   ----------------------------<  108<H>  3.9   |  27  |  0.59    Ca    9.2      02 Jul 2018 06:32    TPro  6.5  /  Alb  3.1<L>  /  TBili  0.2  /  DBili  x   /  AST  22  /  ALT  42  /  AlkPhos  39<L>  07-02        CAPILLARY BLOOD GLUCOSE          RADIOLOGY & ADDITIONAL TESTS: Reviewed.

## 2018-07-02 NOTE — CHART NOTE - NSCHARTNOTEFT_GEN_A_CORE
Admitting Diagnosis:   28 year old female with PMH SLE (diagnosed at age 12) presented to [facility] with headaches, syncopal event, reports excessive fatigue and generalized weakness over past several months.     PAST MEDICAL & SURGICAL HISTORY:  Lupus  No significant past surgical history      Current Nutrition Order: Regular     PO Intake: Good (%) [   ]  Fair (50-75%) [ X  ] Poor (<25%) [   ]    GI Issues: No n/v/d/c noted     Pain: No pain     Skin Integrity: Intact     Labs:   07-02    138  |  102  |  9   ----------------------------<  108<H>  3.9   |  27  |  0.59    Ca    9.2      02 Jul 2018 06:32    TPro  6.5  /  Alb  3.1<L>  /  TBili  0.2  /  DBili  x   /  AST  22  /  ALT  42  /  AlkPhos  39<L>  07-02    CAPILLARY BLOOD GLUCOSE          Medications:  MEDICATIONS  (STANDING):  hydroxychloroquine 200 milliGRAM(s) Oral every 12 hours  lidocaine   Patch 1 Patch Transdermal daily  methylPREDNISolone sodium succinate IVPB 1000 milliGRAM(s) IV Intermittent daily  polyethylene glycol 3350 17 Gram(s) Oral two times a day  sodium chloride 3%  Inhalation 5 milliLiter(s) Inhalation two times a day    MEDICATIONS  (PRN):  acetaminophen   Tablet. 650 milliGRAM(s) Oral every 6 hours PRN Mild Pain (1 - 3)  bisacodyl 5 milliGRAM(s) Oral at bedtime PRN Constipation  ibuprofen  Tablet 600 milliGRAM(s) Oral every 6 hours PRN headache  ondansetron Injectable 4 milliGRAM(s) IV Push every 6 hours PRN Nausea and/or Vomiting      Weight: 55.6 kg     Weight Change: No new wt to assess       Subjective: Pt's appetite/intake has improved. No significant nutrition concerns of complaints.     Previous Nutrition Diagnosis: Inadequate oral intake r/t lack of appetite AEB current intake <50%    Active [ X  ]  Resolved [   ]    If resolved, new PES:     Goal: 1.) Pt to meet 75% of needs PO     Recommendations: Continue current diet     Education: Educated on meeting estefania/pro needs.     Risk Level: High [   ] Moderate [ X  ] Low [   ]

## 2018-07-02 NOTE — DISCHARGE NOTE ADULT - ADDITIONAL INSTRUCTIONS
Patient will follow up with rheumatology, has appointment on July 30th. Patient will follow up with rheumatology (Dr. Hanna Billy), has appointment on July 30th.

## 2018-07-02 NOTE — DISCHARGE NOTE ADULT - PLAN OF CARE
Decrease headache symptoms You were admitted for a headache with unknown cause. The headache was associated with a fever when you arrived and you received antibiotics for possible infection. An extensive infectious workup was negative and the fever did not recur, so antibiotics were discontinued. A lumbar puncture ruled out meningitis and MRI of the brain was normal. Blood cultures were also normal. During your stay, tylenol and ibuprofen were used for pain relief. Lupus flare is a possible cause of the headache for which you are receiving an increased dose of steroids. Improve cell counts Pancytopenia is a decrease in all cell counts including white cells, red cells, and platelets. You were noted to have pancytopenia on admission. Initially hydroxychloroquine was held but a rheumatology consultant determined it was unlikely to be the cause. Lupus can independently cause decreased cell counts and this was also addressed with the high-dose steroids. You will need to follow-up with rheumatology outpatient to manage your lupus. You were admitted for a headache with unknown cause. The headache was associated with a fever when you arrived and you received antibiotics for possible infection. An extensive infectious workup was negative and the fever did not recur, so antibiotics were discontinued. A lumbar puncture ruled out meningitis and MRI of the brain was normal. Blood cultures were also normal. During your stay, tylenol and ibuprofen were used for pain relief. Lupus flare is a possible cause of the headache for which you are receiving an increased dose of steroids. You should follow up with Dr. Hanna Billy for this on July 30th.

## 2018-07-02 NOTE — DISCHARGE NOTE ADULT - CARE PROVIDER_API CALL
Hanna Billy  [address] [place] [address], [place] [address]  Phone: [phone]  Fax: (       -

## 2018-07-03 LAB
ANION GAP SERPL CALC-SCNC: 13 MMOL/L — SIGNIFICANT CHANGE UP (ref 5–17)
BUN SERPL-MCNC: 13 MG/DL — SIGNIFICANT CHANGE UP (ref 7–23)
CALCIUM SERPL-MCNC: 8.8 MG/DL — SIGNIFICANT CHANGE UP (ref 8.4–10.5)
CHLORIDE SERPL-SCNC: 103 MMOL/L — SIGNIFICANT CHANGE UP (ref 96–108)
CO2 SERPL-SCNC: 23 MMOL/L — SIGNIFICANT CHANGE UP (ref 22–31)
CREAT SERPL-MCNC: 0.59 MG/DL — SIGNIFICANT CHANGE UP (ref 0.5–1.3)
GLUCOSE SERPL-MCNC: 131 MG/DL — HIGH (ref 70–99)
HCT VFR BLD CALC: 28.6 % — LOW (ref 34.5–45)
HGB BLD-MCNC: 9.9 G/DL — LOW (ref 11.5–15.5)
MCHC RBC-ENTMCNC: 31 PG — SIGNIFICANT CHANGE UP (ref 27–34)
MCHC RBC-ENTMCNC: 34.6 G/DL — SIGNIFICANT CHANGE UP (ref 32–36)
MCV RBC AUTO: 89.7 FL — SIGNIFICANT CHANGE UP (ref 80–100)
MISCELLANEOUS TEST NAME: SIGNIFICANT CHANGE UP
MISCELLANEOUS TEST NAME: SIGNIFICANT CHANGE UP
PLATELET # BLD AUTO: 156 K/UL — SIGNIFICANT CHANGE UP (ref 150–400)
POTASSIUM SERPL-MCNC: 4.1 MMOL/L — SIGNIFICANT CHANGE UP (ref 3.5–5.3)
POTASSIUM SERPL-SCNC: 4.1 MMOL/L — SIGNIFICANT CHANGE UP (ref 3.5–5.3)
RBC # BLD: 3.19 M/UL — LOW (ref 3.8–5.2)
RBC # FLD: 11.6 % — SIGNIFICANT CHANGE UP (ref 10.3–16.9)
SODIUM SERPL-SCNC: 139 MMOL/L — SIGNIFICANT CHANGE UP (ref 135–145)
TSH RECEP AB FLD-ACNC: <0.5 IU/L — SIGNIFICANT CHANGE UP (ref 0–1.75)
WBC # BLD: 5.7 K/UL — SIGNIFICANT CHANGE UP (ref 3.8–10.5)
WBC # FLD AUTO: 5.7 K/UL — SIGNIFICANT CHANGE UP (ref 3.8–10.5)

## 2018-07-03 PROCEDURE — 99232 SBSQ HOSP IP/OBS MODERATE 35: CPT | Mod: GC

## 2018-07-03 RX ORDER — POLYETHYLENE GLYCOL 3350 17 G/17G
17 POWDER, FOR SOLUTION ORAL EVERY 12 HOURS
Qty: 0 | Refills: 0 | Status: DISCONTINUED | OUTPATIENT
Start: 2018-07-03 | End: 2018-07-04

## 2018-07-03 RX ORDER — SIMETHICONE 80 MG/1
80 TABLET, CHEWABLE ORAL EVERY 6 HOURS
Qty: 0 | Refills: 0 | Status: DISCONTINUED | OUTPATIENT
Start: 2018-07-03 | End: 2018-07-04

## 2018-07-03 RX ORDER — SODIUM CHLORIDE 9 MG/ML
1000 INJECTION INTRAMUSCULAR; INTRAVENOUS; SUBCUTANEOUS
Qty: 0 | Refills: 0 | Status: DISCONTINUED | OUTPATIENT
Start: 2018-07-03 | End: 2018-07-04

## 2018-07-03 RX ADMIN — PANTOPRAZOLE SODIUM 40 MILLIGRAM(S): 20 TABLET, DELAYED RELEASE ORAL at 11:00

## 2018-07-03 RX ADMIN — Medication 600 MILLIGRAM(S): at 08:34

## 2018-07-03 RX ADMIN — POLYETHYLENE GLYCOL 3350 17 GRAM(S): 17 POWDER, FOR SOLUTION ORAL at 17:10

## 2018-07-03 RX ADMIN — Medication 200 MILLIGRAM(S): at 06:49

## 2018-07-03 RX ADMIN — Medication 58 MILLIGRAM(S): at 06:49

## 2018-07-03 RX ADMIN — Medication 200 MILLIGRAM(S): at 17:10

## 2018-07-03 RX ADMIN — Medication 1000 UNIT(S): at 11:00

## 2018-07-03 NOTE — PROGRESS NOTE ADULT - SUBJECTIVE AND OBJECTIVE BOX
SUBJECTIVE / INTERVAL HPI: Patient seen and examined at bedside. States her headache has improved today, it has decreased in both intensity and duration of episodes. It has not changed in character. She does complain of some abdominal bloating when receiving her IV steroids. No fever, chills, dizziness, lightheadedness, shortness of breath, or any other complaints.     VITAL SIGNS:  Vital Signs Last 24 Hrs  T(C): 36.4 (03 Jul 2018 05:25), Max: 36.7 (02 Jul 2018 10:50)  T(F): 97.5 (03 Jul 2018 05:25), Max: 98 (02 Jul 2018 10:50)  HR: 48 (03 Jul 2018 05:25) (48 - 51)  BP: 144/85 (03 Jul 2018 05:25) (112/74 - 144/85)  BP(mean): --  RR: 14 (03 Jul 2018 05:25) (14 - 16)  SpO2: 98% (03 Jul 2018 05:25) (97% - 99%)    REVIEW OF SYSTEMS:    CONSTITUTIONAL: No weakness, fevers or chills  EYES/ENT: No visual changes;  No vertigo or throat pain   NECK: No pain or stiffness  RESPIRATORY: No cough, wheezing, hemoptysis; No shortness of breath  CARDIOVASCULAR: No chest pain or palpitations  GASTROINTESTINAL: Abdominal bloating. No abdominal or epigastric pain. No nausea, vomiting, or hematemesis; No diarrhea or constipation. No melena or hematochezia.  GENITOURINARY: No dysuria, frequency or hematuria  NEUROLOGICAL: Headache. No numbness or weakness  SKIN: No itching, burning, rashes, or lesions   All other review of systems is negative unless indicated above.    PHYSICAL EXAM:  General: WDWN  HEENT: NC/AT; PERRL, clear conjunctiva  Neck: supple  Cardiovascular: +S1/S2; RRR  Respiratory: CTA b/l; no W/R/R  Gastrointestinal: soft, NT/ND; +BSx4  Extremities: WWP; 2+ peripheral pulses; no edema   Neurological: AAOx3; no focal deficits    MEDICATIONS:  MEDICATIONS  (STANDING):  cholecalciferol 1000 Unit(s) Oral daily  hydroxychloroquine 200 milliGRAM(s) Oral every 12 hours  lidocaine   Patch 1 Patch Transdermal daily  methylPREDNISolone sodium succinate IVPB 1000 milliGRAM(s) IV Intermittent daily  pantoprazole  Injectable 40 milliGRAM(s) IV Push daily  polyethylene glycol 3350 17 Gram(s) Oral two times a day  sodium chloride 3%  Inhalation 5 milliLiter(s) Inhalation two times a day    MEDICATIONS  (PRN):  acetaminophen   Tablet. 650 milliGRAM(s) Oral every 6 hours PRN Mild Pain (1 - 3)  bisacodyl 5 milliGRAM(s) Oral at bedtime PRN Constipation  ibuprofen  Tablet 600 milliGRAM(s) Oral every 6 hours PRN headache  ondansetron Injectable 4 milliGRAM(s) IV Push every 6 hours PRN Nausea and/or Vomiting  simethicone 80 milliGRAM(s) Chew every 6 hours PRN Upset Stomach      ALLERGIES:  Allergies    sulfa drugs (Rash)    Intolerances        LABS:                        9.9    5.7   )-----------( 156      ( 03 Jul 2018 05:36 )             28.6     07-03    139  |  103  |  13  ----------------------------<  131<H>  4.1   |  23  |  0.59    Ca    8.8      03 Jul 2018 05:36    TPro  6.5  /  Alb  3.1<L>  /  TBili  0.2  /  DBili  x   /  AST  22  /  ALT  42  /  AlkPhos  39<L>  07-02        CAPILLARY BLOOD GLUCOSE          RADIOLOGY & ADDITIONAL TESTS: Reviewed.

## 2018-07-03 NOTE — PROGRESS NOTE ADULT - PROBLEM SELECTOR PLAN 2
3 negative AFB smears. Isolation precautions removed.   quant (-) - suspect due to SLE   - no e/o bleeding

## 2018-07-03 NOTE — PROGRESS NOTE ADULT - ASSESSMENT
28F PMH SLE (on prednisone, hydroxychloroquine) who presents syncope, headaches, fevers, chills. Admitted to [facility] for sepsis of unclear etiology, no evidence of PNA on CT scan but given cough and noted atypical nodule on CT may be underlying opportunistic infection and R/O TB as well as work up for viral etiology. Negative infectious workup, per rheum is likely lupus flare, pulse dose steroids.

## 2018-07-03 NOTE — PROGRESS NOTE ADULT - PROBLEM SELECTOR PLAN 9
Pt has risk of DVT from SLE, however she is ambulatory.   No need for pharmacoprophylaxis - Patient must follow with outpatient rheumatology.

## 2018-07-03 NOTE — PROGRESS NOTE ADULT - PROBLEM SELECTOR PLAN 8
Patient found to have low TSH. No known thyroid disorders.   -Free thyroxine WNL (0.76)  -Low T3 (68)  - nl fT4  -Mixed picture in setting of infection (central hypothyroidism vs. subclinical hyperthyroidism)  - recommend f/u testing in 6-8 weeks after resolution of acute illness Pt has risk of DVT from SLE, however she is ambulatory.   No need for pharmacoprophylaxis

## 2018-07-03 NOTE — PROGRESS NOTE ADULT - ASSESSMENT
Recent headaches now improving on steroids   Most likely not from infectious etiology    - ID will sign off, re-consult if needed.

## 2018-07-03 NOTE — PROGRESS NOTE ADULT - ASSESSMENT
27 yo F w/ hx of SLE admitted headaches, syncope, and fevers, being treated for lupus cerebritis. Infectious w/u negative. Headaches persisted on 60mg Prednisone, but have improved on pulse dose steroids. On pulse dose steroids, pt has been asx, afebrile, and has improved white cell counts.

## 2018-07-03 NOTE — PROGRESS NOTE ADULT - SUBJECTIVE AND OBJECTIVE BOX
Interval Hx: 27 yo F w/ lupus admitted with h/as, syncopal episode, fever, pancytopenias. PCR, AFBsx3, and infectious serology negative. MRI brain negative for lupus cerebritis. Pt started on pulse dose steroids. No acute events over the overnight.     SUBJECTIVE: Patient seen and examined at bedside. Pt reports headaches have resolved, and pt feels improved.     Vital Signs Last 24 Hrs  T(C): 36.1 (03 Jul 2018 08:41), Max: 36.6 (02 Jul 2018 20:57)  T(F): 96.9 (03 Jul 2018 08:41), Max: 97.8 (02 Jul 2018 20:57)  HR: 52 (03 Jul 2018 08:41) (48 - 52)  BP: 103/65 (03 Jul 2018 08:41) (103/65 - 144/85)  BP(mean): --  RR: 14 (03 Jul 2018 08:41) (14 - 15)  SpO2: 99% (03 Jul 2018 08:41) (98% - 99%)    PHYSICAL EXAM:  General: pt laying in bed, NAD  Cardiovascular: +S1/S2; RRR  Respiratory: CTA B/L; no W/R/R  MSK: no joint swelling, heat, redness, tenderness    MEDICATIONS  (STANDING):  cholecalciferol 1000 Unit(s) Oral daily  hydroxychloroquine 200 milliGRAM(s) Oral every 12 hours  lidocaine   Patch 1 Patch Transdermal daily  methylPREDNISolone sodium succinate IVPB 1000 milliGRAM(s) IV Intermittent daily  pantoprazole  Injectable 40 milliGRAM(s) IV Push daily  polyethylene glycol 3350 17 Gram(s) Oral every 12 hours  sodium chloride 3%  Inhalation 5 milliLiter(s) Inhalation two times a day    MEDICATIONS  (PRN):  acetaminophen   Tablet. 650 milliGRAM(s) Oral every 6 hours PRN Mild Pain (1 - 3)  bisacodyl 5 milliGRAM(s) Oral at bedtime PRN Constipation  ibuprofen  Tablet 600 milliGRAM(s) Oral every 6 hours PRN headache  ondansetron Injectable 4 milliGRAM(s) IV Push every 6 hours PRN Nausea and/or Vomiting  simethicone 80 milliGRAM(s) Chew every 6 hours PRN Upset Stomach        ALLERGIES:  Allergies    sulfa drugs (Rash)    Labs:  CBC Full  -  ( 03 Jul 2018 05:36 )  WBC Count : 5.7 K/uL  Hemoglobin : 9.9 g/dL  Hematocrit : 28.6 %  Platelet Count - Automated : 156 K/uL  Mean Cell Volume : 89.7 fL  Mean Cell Hemoglobin : 31.0 pg  Mean Cell Hemoglobin Concentration : 34.6 g/dL  Auto Neutrophil # : x  Auto Lymphocyte # : x  Auto Monocyte # : x  Auto Eosinophil # : x  Auto Basophil # : x  Auto Neutrophil % : x  Auto Lymphocyte % : x  Auto Monocyte % : x  Auto Eosinophil % : x  Auto Basophil % : x      RADIOLOGY & ADDITIONAL TESTS: Reviewed.  MRI brain- 6/29/2018  Impression: Normal MRI of the brain. No evidence of abnormal enhancement..   No evidence of acute infarction.

## 2018-07-03 NOTE — PROGRESS NOTE ADULT - ATTENDING COMMENTS
28 year old female with a history of SLE primarily manifesting with fevers and cytopenias presents for evaluation of acute on chronic exacerbation of fevers, malaise, headaches, blurred vision and abdominal pains. On laboratory evaluation, she is noted to have pancytopenia which has progressed since admission. She underwent an LP which revealed a hypercellular CSF primarily neutrophilic, and high protein. Of note, patient is neutropenic. She has been on Prednisone 10mg and Plaquenil 200mg BID since presentation to her rheumatologist in Jan 2018, with malaise, low grade fevers and abdominal pain. She has remained with cytopenias (though less pronounced then now), hypocomplementemia and slightly elevated dsDNA since - also with +sm and +RNP per her outpatient rheumatologist. The headaches only developed three weeks ago. Blurred vision last week.    Of note, when she was diagnosed with SLE in her mid teens she had cytopenias and fevers. Was off medications for 7 years.   Patient notes that   She initially had complaints of cough back in Jan 2018, productive of mild amounts of sputum. Coughs have since resolved and she denies SOB or chest pain. Does endorse night sweats. She is originally form [place].     Based on history and lab evaluation, findings are somewhat consistent with a SLE flare. Patient presented with SLE initially with similar symptoms, however without the headaches at that time - additionally, these symptoms are longstanding for the past 6 months, which goes against an infection. She developed her current symptoms 6 mo ago, off therapy. All this said, the neutropenic predominance in the CSF and neutropenia on her WBC diff are inconsistent with a flare - as is the "tree in bud" pattern of the RUL nodule on her CT scan, and thus infection should be ruled out given that she has resided outside the [place] for many years. There is more than likely on going SLE activity, however it is unclear if that is the only insult.     For now, would continue Prednisone 10mg daily and restart Plaquenil 200mg BID. Would be comfortable starting higher doses of Prednisone if  atypical infection is ruled out by ID.  Please obtain MRI brain to evaluate for leptomeningeal enhancement seen in SLE cerebritis.  Heme consult for BM biopsy.  Will consider pulse dose steroids / high dose steroids pending discussion with ID and above data.   Will follow up remaining serologies with you - C3/C4 and dsDNA.    Please feel free to call me with any questions.   [phone] 28 year old female with a history of SLE primarily manifesting with fevers and cytopenias presents for evaluation of acute on chronic exacerbation of fevers, malaise, headaches, blurred vision and abdominal pains. On laboratory evaluation, she is noted to have pancytopenia which has progressed since admission. She underwent an LP which revealed a hypercellular CSF primarily neutrophilic, and high protein. Of note, patient is neutropenic. She has been on Prednisone 10mg and Plaquenil 200mg BID since presentation to her rheumatologist in Jan 2018, with malaise, low grade fevers and abdominal pain. She has remained with cytopenias (though less pronounced then now), hypocomplementemia and slightly elevated dsDNA since - also with +sm and +RNP per her outpatient rheumatologist. The headaches only developed three weeks ago. Blurred vision last week.    Of note, when she was diagnosed with SLE in her mid teens she had cytopenias and fevers. Was off medications for 7 years.     Based on history and lab evaluation, findings have declared themselves to be more consistent with an SLE flare. Patient presented with SLE initially with similar symptoms, however without the headaches at that time - additionally, these symptoms are longstanding for the past 6 months, which goes against an infection. She developed her current symptoms 6 mo ago, off therapy. All this said, the neutropenic predominance in the CSF and neutropenia on her WBC diff are inconsistent with a flare - as is the "tree in bud" pattern of the RUL nodule on her CT scan, and thus infection should be ruled out given that she has resided outside the [place] for many years. There is more than likely on going SLE activity, and with a negative infectious work up this may be the predominant etiology driving her current presentation.     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   Now on day 2 of pulse - Solumedrol 1000mg X 3 days followed by higher doses of Prednisone.   Continue Plaquenil 200mg BID.   Will continue to follow with you.     Please feel free to call me with any questions.   [phone]

## 2018-07-03 NOTE — PROGRESS NOTE ADULT - PROBLEM SELECTOR PLAN 3
- suspect due to SLE   - no e/o bleeding etiology unclear  - MRI brain unremarkable  - CSF not c/w meningitis  - Improvement in symptoms on pulse dose steroids.

## 2018-07-03 NOTE — PROGRESS NOTE ADULT - PROBLEM SELECTOR PLAN 7
- Orthostatics positive, patient dropped to 80/40 on standing.   - Giving 2L NS bolus.   - ekg urermarkable  check TTE (has murmur) Patient found to have low TSH. No known thyroid disorders.   -Free thyroxine WNL (0.76)  -Low T3 (68)  - nl fT4  -Mixed picture in setting of infection (central hypothyroidism vs. subclinical hyperthyroidism)  - recommend f/u testing in 6-8 weeks after resolution of acute illness

## 2018-07-03 NOTE — PROGRESS NOTE ADULT - NSHPATTENDINGPLANDISCUSS_GEN_ALL_CORE
patient and medical staff
Dr. Nava, Dr. Cheng and Dr. Rose
house staff
as above
house staff
HOUSE STAFF
Housestaff
house staff

## 2018-07-03 NOTE — PROGRESS NOTE ADULT - SUBJECTIVE AND OBJECTIVE BOX
INFECTIOUS DISEASE CONSULT PROGRESS NOTE    INTERVAL HPI/OVERNIGHT EVENTS: No acute events overnight.     ACTIVE ANTIMICROBIALS/ANTIBIOTICS:  hydroxychloroquine 200 milliGRAM(s) Oral every 12 hours    VITAL SIGNS:  T(C): 36.1 (03 Jul 2018 08:41), Max: 36.6 (02 Jul 2018 20:57)  T(F): 96.9 (03 Jul 2018 08:41), Max: 97.8 (02 Jul 2018 20:57)  HR: 52 (03 Jul 2018 08:41) (48 - 52)  BP: 103/65 (03 Jul 2018 08:41) (103/65 - 144/85)  BP(mean): --  ABP: --  ABP(mean): --  RR: 14 (03 Jul 2018 08:41) (14 - 15)  SpO2: 99% (03 Jul 2018 08:41) (98% - 99%)      PHYSICAL EXAM:  General: Seen standing besides bed, comfortable. Non-toxic. Speaking in full sentences   HEENT: NC/AT; EOMI, anicteric sclera; MMM  Neck: supple, no JVD, no submandibular or cervical lymphadenopathy, no nuchal rigidity  Cardiovascular: +S1/S2, bradycardic no r/m/g  Respiratory: CTA B/L; no W/R/R  Gastrointestinal: soft, nondistended, diffusely tender in lower abdomen on deep palpation,  no guarding   Extremities: WWP; no edema, clubbing or cyanosis  Vascular: 2+ radial, DP pulses B/L  Neurological: AAOx3; no focal deficits    LABS:             9.9    5.7   )-----------( 156      ( 03 Jul 2018 05:36 )             28.6     07-03    139  |  103  |  13  ----------------------------<  131<H>  4.1   |  23  |  0.59    Ca    8.8      03 Jul 2018 05:36    TPro  6.5  /  Alb  3.1<L>  /  TBili  0.2  /  DBili  x   /  AST  22  /  ALT  42  /  AlkPhos  39<L>  07-02      MICROBIOLOGY:    Culture - Acid Fast - Sputum w/Smear (collected 07-01-18 @ 22:58)  Source: .Sputum    Culture - Acid Fast - Sputum w/Smear (collected 07-01-18 @ 22:58)  Source: .Sputum Sputum    Culture - Sputum (collected 07-01-18 @ 09:31)  Source: .Sputum  Gram Stain (07-02-18 @ 11:03):    Sputum specimen rejected.  Microscopic examination indicates    oropharyngeal contamination.  Please repeat.    Numerous epithelial cells    Rare White blood cells    Moderate Gram Negative Rods    Few Gram positive cocci in pairs    Few Gram Positive Rods  Final Report (07-02-18 @ 11:03):    Sputum specimen rejected.  Microscopic examination indicates    oropharyngeal contamination.  Please repeat.

## 2018-07-03 NOTE — PROGRESS NOTE ADULT - PROBLEM SELECTOR PLAN 5
ct a/p - unremarkable , except for constipation c3/4 low w/ ds DNA elevated  - d/w rheum --- Patient has been on 48 hours of IV solumedrol 30 iv bid with little improvement. Rheum would like to do pulse dose steroids at 1000mg IV daily for 3 days. Today is day 2 of 3.   - On Vit D and PPI for complication prevention.

## 2018-07-03 NOTE — PROGRESS NOTE ADULT - PROBLEM SELECTOR PROBLEM 1
Fever, unspecified fever cause
Lupus (systemic lupus erythematosus)
Lupus (systemic lupus erythematosus)
Meningitis
Sepsis
Meningitis
Sepsis
Sepsis
Fever, unspecified fever cause

## 2018-07-03 NOTE — PROGRESS NOTE ADULT - SUBJECTIVE AND OBJECTIVE BOX
Hematology Oncology Progress Note    The patient was seen and examined at bedside.    28 year old female with PMH SLE (diagnosed at age 12) presented to [facility] with headaches, syncopal event, reports excessive fatigue and generalized weakness over past several months. Initially evaluated by Dr Win in Jan 2018. Pt followed up with Dr Myers (rheum) started on plaquanil and prednisone for lupus. Pt also saw GI, underwent egd and c-scope which were both negative. Admitted with lupus flare on steroids and plaquanil, heme eval for pancytopenia    Interval hx:    continues on steroids, gets bloated with IV fluid and steroids. symptoms improved, headaches better    PHYSICAL EXAM:    ICU Vital Signs Last 24 Hrs  T(C): 36.1 (03 Jul 2018 08:41), Max: 36.6 (02 Jul 2018 20:57)  T(F): 96.9 (03 Jul 2018 08:41), Max: 97.8 (02 Jul 2018 20:57)  HR: 52 (03 Jul 2018 08:41) (48 - 52)  BP: 103/65 (03 Jul 2018 08:41) (103/65 - 144/85)  RR: 14 (03 Jul 2018 08:41) (14 - 15)  SpO2: 99% (03 Jul 2018 08:41) (98% - 99%)      Gen: well developed, well nourished, comfortable  HEENT: normocephalic/atraumatic, no conjunctival pallor, no scleral icterus, no oral thrush/mucosal bleeding/mucositis  Neck: supple, no masses, no JVD  Back: nontender  Cardiovascular: RR, nl S1S2, no murmurs/rubs/gallops  Respiratory: clear air entry   Gastrointestinal: BS+, soft, NT/ND, no masses, no splenomegaly, no hepatomegaly, no evidence for ascites  Extremities: no clubbing/cyanosis, no edema, no calf tenderness, DP/PT 2+ b/l  Neurological: no focal deficits  Skin: no rash on visible skin  Lymph Nodes:  no cervical/supraclavicular LAD, no axillary/groin LAD    Labs                               9.9    5.7   )-----------( 156      ( 03 Jul 2018 05:36 )             28.6         CBC Full  -  ( 03 Jul 2018 05:36 )  WBC Count : 5.7 K/uL  Hemoglobin : 9.9 g/dL  Hematocrit : 28.6 %  Platelet Count - Automated : 156 K/uL  Mean Cell Volume : 89.7 fL  Mean Cell Hemoglobin : 31.0 pg  Mean Cell Hemoglobin Concentration : 34.6 g/dL        07-03    139  |  103  |  13  ----------------------------<  131<H>  4.1   |  23  |  0.59    Ca    8.8      03 Jul 2018 05:36    TPro  6.5  /  Alb  3.1<L>  /  TBili  0.2  /  DBili  x   /  AST  22  /  ALT  42  /  AlkPhos  39<L>  07-02                ---CHEST---    Lower Neck: Visualized thyroid unremarkable. No lower cervical   lymphadenopathy.    Lungs/Pleura/Airways: Patchy tree-in-bud micronodules in the right upper   lobe. No pleural effusion or pneumothorax. Patent central airways.    Mediastinum: Heart is normal in size. No pericardial effusion. No aortic   aneurysm or dissection. No central pulmonary embolism.    Lymph nodes: Bilateral axillary lymphadenopathy. No mediastinal or hilar   lymphadenopathy.    Bones/Soft tissues: No significant abnormality.    ---ABDOMEN/PELVIS---    Liver: Smooth in contour. No focal mass. Portal and hepatic veins are   patent.    Biliary system: Gallbladder is normal in size. No calcified gallstones.   No biliary ductal dilatation.    Pancreas: Unremarkable.    Spleen: Unremarkable.    Adrenal glands: Unremarkable.    Kidneys: Symmetric parenchymal enhancement. No renal mass. No   hydronephrosis. No definite renal or ureteral stone, although evaluation   is somewhat limited due to presence of excreted contrast.   Urinary Bladder: Excreted contrast within the bladder. No definite   intraluminal stone.    Reproductive organs: Unremarkable.     Bowel/Peritoneum: Normal caliber without evidence of obstruction. No   appreciable wall thickening, although evaluation is suboptimal without   oral contrast. Abundant feces in the ascending and proximal transverse   colon. Normal appendix. No extraluminal gas. Trace pelvic ascites which   is likely physiologic.    Lymph nodes: No lymphadenopathy.    Aorta/IVC: Normal caliber.    Bones/Soft tissues: No significant abnormality.      IMPRESSION:     1.  Patchy tree-in-bud micronodules in the right upper lobe are   suggestive of small airways disease of infectious or inflammatory   etiology.  2.  Bilateral axillary lymphadenopathy.  3.  No acute abdominopelvic pathology.

## 2018-07-03 NOTE — PROGRESS NOTE ADULT - PROBLEM SELECTOR PLAN 4
etiology unclear  - MRI brain unremarkable  - CSF not c/w meningitis  - Improvement in symptoms on pulse dose steroids. ct a/p - unremarkable , except for constipation

## 2018-07-03 NOTE — PROGRESS NOTE ADULT - PROBLEM SELECTOR PLAN 10
- Patient must follow with outpatient rheumatology. Pending results of infectious workup, may recommend further followup.
- Patient must follow with outpatient rheumatology.
- Patient must follow with outpatient rheumatology. Pending results of infectious workup, may recommend further followup.
- Patient must follow with outpatient rheumatology. Pending results of infectious workup, may recommend further followup.

## 2018-07-03 NOTE — PROGRESS NOTE ADULT - ASSESSMENT
29 yo F with long standing hx of Lupus presents to ED w/ generalized weakness and fatigue, w/ pancytopenia, etiology likely related to lupus vs medication induced    #Pancytopenia  - etiology likely 2/2 to lupus vs medication induced (plaquanil), Vit B12 and folate normal. CT Abd/Pelvis neg for splenomegaly Jun 2018  - CT chest/abd and pelvis does show b/l axillary LAD, etiology unclear  - blood counts improved, no role for bone marrow biopsy at this time given improvement in CBC Will sign off at this time    # -normocytic anemia, peripheral smear reviewed  - etiology likely 2/2 to chronic bleeding from heavy periods, onyl serum iron checked- normal. please send TIBC and ferritin levels  - transfuse for Hgb<7, cont to monitor.    # Lupus    - followed by rheumatology on steroids and plaquanil, f/u rheum recs.

## 2018-07-03 NOTE — PROGRESS NOTE ADULT - PROBLEM SELECTOR PLAN 1
-   -c/w Plaquenil 200mg q12 PO for chronic lupus tx  -c/w pulse dose steroids: 1g IV methylprednisolone qd x 3 days (today is day 2/3)  -return pt to prednisone 30mg BID after the 3 days of IV steroids  -c/w PO vit D 1000mg qd (continue this outpt) for osteoporosis ppx  -c/w IV PPI for ppx of gastritis

## 2018-07-03 NOTE — PROGRESS NOTE ADULT - PROBLEM SELECTOR PLAN 6
c3/4 low w/ ds DNA elevated  - d/w rheum --- Patient has been on 48 hours of IV solumedrol 30 iv bid with little improvement. Rheum would like to do pulse dose steroids at 1000mg IV daily for 3 days. Today is day 2 of 3.   - On Vit D and PPI for complication prevention. - Orthostatics positive, patient dropped to 80/40 on standing.   - Giving 2L NS bolus.   - ekg urermarkable  check TTE (has murmur)

## 2018-07-03 NOTE — PROGRESS NOTE ADULT - ATTENDING COMMENTS
DX  FEVERS /RIGHT SIDED HEADACHE - SUSPECTED SLE FLARE - improved on pulse solumedrol. rheum recs.   brain imaging unremarkable. LP not consistent w/ meningitis.  ORTHOSTATIC HYPOTENSION - rec'd ivfs , will repeat orthostatics. if still (+) and symptomatic, would start fludrocortisone.   ABD PAIN - suspect due to constipation? aggressive bowel regimen.   TREE AND BUD LUNG MICRONODULES- TB ruled out. pt w/o resp sxs. can monitor with outpt imaging.

## 2018-07-03 NOTE — PROGRESS NOTE ADULT - PROBLEM SELECTOR PROBLEM 10
Transition of care performed with sharing of clinical summary

## 2018-07-04 VITALS — HEART RATE: 59 BPM | SYSTOLIC BLOOD PRESSURE: 121 MMHG | DIASTOLIC BLOOD PRESSURE: 82 MMHG

## 2018-07-04 PROCEDURE — 84480 ASSAY TRIIODOTHYRONINE (T3): CPT

## 2018-07-04 PROCEDURE — 85384 FIBRINOGEN ACTIVITY: CPT

## 2018-07-04 PROCEDURE — 87015 SPECIMEN INFECT AGNT CONCNTJ: CPT

## 2018-07-04 PROCEDURE — 87206 SMEAR FLUORESCENT/ACID STAI: CPT

## 2018-07-04 PROCEDURE — 96367 TX/PROPH/DG ADDL SEQ IV INF: CPT

## 2018-07-04 PROCEDURE — 96375 TX/PRO/DX INJ NEW DRUG ADDON: CPT | Mod: XU

## 2018-07-04 PROCEDURE — 86753 PROTOZOA ANTIBODY NOS: CPT

## 2018-07-04 PROCEDURE — 85045 AUTOMATED RETICULOCYTE COUNT: CPT

## 2018-07-04 PROCEDURE — 87102 FUNGUS ISOLATION CULTURE: CPT

## 2018-07-04 PROCEDURE — 87476 LYME DIS DNA AMP PROBE: CPT

## 2018-07-04 PROCEDURE — 76700 US EXAM ABDOM COMPLETE: CPT

## 2018-07-04 PROCEDURE — 86160 COMPLEMENT ANTIGEN: CPT

## 2018-07-04 PROCEDURE — 87389 HIV-1 AG W/HIV-1&-2 AB AG IA: CPT

## 2018-07-04 PROCEDURE — 82746 ASSAY OF FOLIC ACID SERUM: CPT

## 2018-07-04 PROCEDURE — 86747 PARVOVIRUS ANTIBODY: CPT

## 2018-07-04 PROCEDURE — 85025 COMPLETE CBC W/AUTO DIFF WBC: CPT

## 2018-07-04 PROCEDURE — 83690 ASSAY OF LIPASE: CPT

## 2018-07-04 PROCEDURE — 80202 ASSAY OF VANCOMYCIN: CPT

## 2018-07-04 PROCEDURE — 86900 BLOOD TYPING SEROLOGIC ABO: CPT

## 2018-07-04 PROCEDURE — 86901 BLOOD TYPING SEROLOGIC RH(D): CPT

## 2018-07-04 PROCEDURE — 86644 CMV ANTIBODY: CPT

## 2018-07-04 PROCEDURE — A9585: CPT

## 2018-07-04 PROCEDURE — 87581 M.PNEUMON DNA AMP PROBE: CPT

## 2018-07-04 PROCEDURE — 93005 ELECTROCARDIOGRAM TRACING: CPT | Mod: XU

## 2018-07-04 PROCEDURE — 86663 EPSTEIN-BARR ANTIBODY: CPT

## 2018-07-04 PROCEDURE — 87205 SMEAR GRAM STAIN: CPT

## 2018-07-04 PROCEDURE — 99285 EMERGENCY DEPT VISIT HI MDM: CPT | Mod: 25

## 2018-07-04 PROCEDURE — 85027 COMPLETE CBC AUTOMATED: CPT

## 2018-07-04 PROCEDURE — 86665 EPSTEIN-BARR CAPSID VCA: CPT

## 2018-07-04 PROCEDURE — 83036 HEMOGLOBIN GLYCOSYLATED A1C: CPT

## 2018-07-04 PROCEDURE — 83520 IMMUNOASSAY QUANT NOS NONAB: CPT

## 2018-07-04 PROCEDURE — 96365 THER/PROPH/DIAG IV INF INIT: CPT | Mod: XU

## 2018-07-04 PROCEDURE — 86664 EPSTEIN-BARR NUCLEAR ANTIGEN: CPT

## 2018-07-04 PROCEDURE — 87116 MYCOBACTERIA CULTURE: CPT

## 2018-07-04 PROCEDURE — 86618 LYME DISEASE ANTIBODY: CPT

## 2018-07-04 PROCEDURE — 82607 VITAMIN B-12: CPT

## 2018-07-04 PROCEDURE — 82962 GLUCOSE BLOOD TEST: CPT

## 2018-07-04 PROCEDURE — 80048 BASIC METABOLIC PNL TOTAL CA: CPT

## 2018-07-04 PROCEDURE — 86403 PARTICLE AGGLUT ANTBDY SCRN: CPT

## 2018-07-04 PROCEDURE — 83615 LACTATE (LD) (LDH) ENZYME: CPT

## 2018-07-04 PROCEDURE — 85379 FIBRIN DEGRADATION QUANT: CPT

## 2018-07-04 PROCEDURE — 87798 DETECT AGENT NOS DNA AMP: CPT

## 2018-07-04 PROCEDURE — 70498 CT ANGIOGRAPHY NECK: CPT

## 2018-07-04 PROCEDURE — 83735 ASSAY OF MAGNESIUM: CPT

## 2018-07-04 PROCEDURE — 83010 ASSAY OF HAPTOGLOBIN QUANT: CPT

## 2018-07-04 PROCEDURE — 87483 CNS DNA AMP PROBE TYPE 12-25: CPT

## 2018-07-04 PROCEDURE — 85652 RBC SED RATE AUTOMATED: CPT

## 2018-07-04 PROCEDURE — 86592 SYPHILIS TEST NON-TREP QUAL: CPT

## 2018-07-04 PROCEDURE — 99239 HOSP IP/OBS DSCHRG MGMT >30: CPT

## 2018-07-04 PROCEDURE — 86038 ANTINUCLEAR ANTIBODIES: CPT

## 2018-07-04 PROCEDURE — 86645 CMV ANTIBODY IGM: CPT

## 2018-07-04 PROCEDURE — 87040 BLOOD CULTURE FOR BACTERIA: CPT

## 2018-07-04 PROCEDURE — 83540 ASSAY OF IRON: CPT

## 2018-07-04 PROCEDURE — 62270 DX LMBR SPI PNXR: CPT

## 2018-07-04 PROCEDURE — 71260 CT THORAX DX C+: CPT

## 2018-07-04 PROCEDURE — 94640 AIRWAY INHALATION TREATMENT: CPT

## 2018-07-04 PROCEDURE — 87486 CHLMYD PNEUM DNA AMP PROBE: CPT

## 2018-07-04 PROCEDURE — 84157 ASSAY OF PROTEIN OTHER: CPT

## 2018-07-04 PROCEDURE — 70496 CT ANGIOGRAPHY HEAD: CPT

## 2018-07-04 PROCEDURE — 84439 ASSAY OF FREE THYROXINE: CPT

## 2018-07-04 PROCEDURE — 85610 PROTHROMBIN TIME: CPT

## 2018-07-04 PROCEDURE — 86225 DNA ANTIBODY NATIVE: CPT

## 2018-07-04 PROCEDURE — 89051 BODY FLUID CELL COUNT: CPT

## 2018-07-04 PROCEDURE — 81003 URINALYSIS AUTO W/O SCOPE: CPT

## 2018-07-04 PROCEDURE — 87086 URINE CULTURE/COLONY COUNT: CPT

## 2018-07-04 PROCEDURE — 82945 GLUCOSE OTHER FLUID: CPT

## 2018-07-04 PROCEDURE — 80053 COMPREHEN METABOLIC PANEL: CPT

## 2018-07-04 PROCEDURE — 36415 COLL VENOUS BLD VENIPUNCTURE: CPT

## 2018-07-04 PROCEDURE — 86376 MICROSOMAL ANTIBODY EACH: CPT

## 2018-07-04 PROCEDURE — 86850 RBC ANTIBODY SCREEN: CPT

## 2018-07-04 PROCEDURE — 86780 TREPONEMA PALLIDUM: CPT

## 2018-07-04 PROCEDURE — 84443 ASSAY THYROID STIM HORMONE: CPT

## 2018-07-04 PROCEDURE — 86308 HETEROPHILE ANTIBODY SCREEN: CPT

## 2018-07-04 PROCEDURE — 74177 CT ABD & PELVIS W/CONTRAST: CPT

## 2018-07-04 PROCEDURE — 87633 RESP VIRUS 12-25 TARGETS: CPT

## 2018-07-04 PROCEDURE — 87070 CULTURE OTHR SPECIMN AEROBIC: CPT

## 2018-07-04 PROCEDURE — 84100 ASSAY OF PHOSPHORUS: CPT

## 2018-07-04 PROCEDURE — 70553 MRI BRAIN STEM W/O & W/DYE: CPT

## 2018-07-04 PROCEDURE — 85730 THROMBOPLASTIN TIME PARTIAL: CPT

## 2018-07-04 RX ORDER — HYDROXYCHLOROQUINE SULFATE 200 MG
1 TABLET ORAL
Qty: 0 | Refills: 0 | COMMUNITY

## 2018-07-04 RX ORDER — CHOLECALCIFEROL (VITAMIN D3) 125 MCG
1000 CAPSULE ORAL
Qty: 30 | Refills: 3 | OUTPATIENT
Start: 2018-07-04 | End: 2018-10-31

## 2018-07-04 RX ORDER — CHOLECALCIFEROL (VITAMIN D3) 125 MCG
1000 CAPSULE ORAL
Qty: 30 | Refills: 3
Start: 2018-07-04 | End: 2018-10-31

## 2018-07-04 RX ORDER — POLYETHYLENE GLYCOL 3350 17 G/17G
17 POWDER, FOR SOLUTION ORAL
Qty: 527 | Refills: 3 | OUTPATIENT
Start: 2018-07-04 | End: 2018-09-01

## 2018-07-04 RX ORDER — HYDROXYCHLOROQUINE SULFATE 200 MG
1 TABLET ORAL
Qty: 60 | Refills: 1
Start: 2018-07-04 | End: 2018-09-01

## 2018-07-04 RX ORDER — POLYETHYLENE GLYCOL 3350 17 G/17G
17 POWDER, FOR SOLUTION ORAL
Qty: 527 | Refills: 3
Start: 2018-07-04 | End: 2018-09-01

## 2018-07-04 RX ADMIN — POLYETHYLENE GLYCOL 3350 17 GRAM(S): 17 POWDER, FOR SOLUTION ORAL at 06:00

## 2018-07-04 RX ADMIN — Medication 200 MILLIGRAM(S): at 06:00

## 2018-07-04 RX ADMIN — SODIUM CHLORIDE 70 MILLILITER(S): 9 INJECTION INTRAMUSCULAR; INTRAVENOUS; SUBCUTANEOUS at 06:05

## 2018-07-04 RX ADMIN — Medication 58 MILLIGRAM(S): at 06:22

## 2018-07-04 NOTE — PROGRESS NOTE ADULT - PROVIDER SPECIALTY LIST ADULT
Heme/Onc
Heme/Onc
Infectious Disease
Internal Medicine
Rheumatology
Infectious Disease
Hospitalist
Hospitalist
Internal Medicine

## 2018-07-04 NOTE — PROGRESS NOTE ADULT - SUBJECTIVE AND OBJECTIVE BOX
Rheumatology Brief Note    28 year old female with a history of SLE primarily manifesting with fevers and cytopenias presents for evaluation of acute on chronic exacerbation of fevers, malaise, headaches, blurred vision and abdominal pains. 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   At this time, will transition to high dose steroids - Prednisone 60mg daily.   Patient should follow up with Dr. Beltran Nava on discharge.     If any questions or concerns until 7/9 about patients care, please call Dr. Vikki Garcia with questions. [phone]

## 2018-07-05 LAB — HEMATOPATHOLOGY REPORT: SIGNIFICANT CHANGE UP

## 2018-07-10 DIAGNOSIS — Z79.52 LONG TERM (CURRENT) USE OF SYSTEMIC STEROIDS: ICD-10-CM

## 2018-07-10 DIAGNOSIS — R10.9 UNSPECIFIED ABDOMINAL PAIN: ICD-10-CM

## 2018-07-10 DIAGNOSIS — D61.818 OTHER PANCYTOPENIA: ICD-10-CM

## 2018-07-10 DIAGNOSIS — Z88.2 ALLERGY STATUS TO SULFONAMIDES: ICD-10-CM

## 2018-07-10 DIAGNOSIS — E07.89 OTHER SPECIFIED DISORDERS OF THYROID: ICD-10-CM

## 2018-07-10 DIAGNOSIS — R51 HEADACHE: ICD-10-CM

## 2018-07-10 DIAGNOSIS — A41.9 SEPSIS, UNSPECIFIED ORGANISM: ICD-10-CM

## 2018-07-10 DIAGNOSIS — D64.9 ANEMIA, UNSPECIFIED: ICD-10-CM

## 2018-07-10 DIAGNOSIS — D69.6 THROMBOCYTOPENIA, UNSPECIFIED: ICD-10-CM

## 2018-07-10 DIAGNOSIS — R55 SYNCOPE AND COLLAPSE: ICD-10-CM

## 2018-07-10 DIAGNOSIS — M32.19 OTHER ORGAN OR SYSTEM INVOLVEMENT IN SYSTEMIC LUPUS ERYTHEMATOSUS: ICD-10-CM

## 2018-07-25 LAB
CULTURE RESULTS: SIGNIFICANT CHANGE UP
SPECIMEN SOURCE: SIGNIFICANT CHANGE UP

## 2018-08-18 LAB
CULTURE RESULTS: SIGNIFICANT CHANGE UP
CULTURE RESULTS: SIGNIFICANT CHANGE UP
SPECIMEN SOURCE: SIGNIFICANT CHANGE UP
SPECIMEN SOURCE: SIGNIFICANT CHANGE UP

## 2018-08-20 LAB
CULTURE RESULTS: SIGNIFICANT CHANGE UP
SPECIMEN SOURCE: SIGNIFICANT CHANGE UP

## 2019-11-04 NOTE — ED ADULT NURSE NOTE - RESPIRATORY WDL
Breathing spontaneous and unlabored. Breath sounds clear and equal bilaterally with regular rhythm. (4) walks frequently

## 2020-01-06 NOTE — ED PROVIDER NOTE - PROGRESS NOTE DETAILS
ED Tech at bedside to ambulate patient with pulse ox.   pt rectally febrile to 103, given 2g cetriaxone empirically for meningitis coverage prior to CT, tylenol, placed on iso, LP perfomed after CT without signs of SAH or aneurysm, no PE on CT, again noted possible upper lobe infection, no abdominal infection, pt with many nucleated WBCs on LP, additional coverage given with vanco and acyclovir, had transient second dip in BP but bp now stable on 3rd liter fluid, discussed case with Dr byrd and Dr Melendez, agreement made to admit to step down given pt with 2 episodes hypotension and syncopal episode, adding hydrocortisone stress dose as well given pt on chronic prednisone, staff and friends treated with cipro for ppx

## 2021-07-20 NOTE — PROGRESS NOTE ADULT - PROBLEM SELECTOR PROBLEM 4
The patient is 76-year-old female who Bennett from prior evaluation comes in for follow-up.   She has underlying COPD and follows with the low-dose CT screening program.  She had a COPD exacerbation few weeks ago and got steroid and antibiotic by her prim Lupus (systemic lupus erythematosus) Anemia due to chronic illness

## 2022-09-16 NOTE — CONSULT NOTE ADULT - PROBLEM SELECTOR RECOMMENDATION 2
We discussed the risks and benefits of colonoscopy.  I explained the risk of bleeding, infection, perforation requiring emergent surgery as well as anesthesia related complications including heart attack, stroke, or pneumonia.  Patient voiced understanding and agrees to proceed. Hx significant for one month of unintentional weight loss of 5lbs with early satiety, and night sweats.   - AFB negative, f/u two more AFB  - QuantiFeron gold Negative, but with steroid therapy and immunosuppressive agents lead to a high risk of indeterminate QuantiFeron test   - continue airborne precautions

## 2022-09-27 NOTE — ED ADULT NURSE NOTE - PMH
Lupus Winlevi Pregnancy And Lactation Text: This medication is considered safe during pregnancy and breastfeeding.

## 2023-07-06 PROBLEM — L93.0 DISCOID LUPUS ERYTHEMATOSUS: Chronic | Status: ACTIVE | Noted: 2018-06-25

## 2023-09-14 ENCOUNTER — TRANSCRIPTION ENCOUNTER (OUTPATIENT)
Age: 33
End: 2023-09-14

## 2023-09-14 ENCOUNTER — INPATIENT (INPATIENT)
Facility: HOSPITAL | Age: 33
LOS: 6 days | Discharge: HOME | DRG: 546 | End: 2023-09-21
Attending: STUDENT IN AN ORGANIZED HEALTH CARE EDUCATION/TRAINING PROGRAM | Admitting: INTERNAL MEDICINE
Payer: MEDICAID

## 2023-09-14 VITALS
HEART RATE: 61 BPM | RESPIRATION RATE: 18 BRPM | SYSTOLIC BLOOD PRESSURE: 172 MMHG | HEIGHT: 63 IN | TEMPERATURE: 98 F | OXYGEN SATURATION: 97 % | WEIGHT: 153 LBS | DIASTOLIC BLOOD PRESSURE: 106 MMHG

## 2023-09-14 DIAGNOSIS — Z29.9 ENCOUNTER FOR PROPHYLACTIC MEASURES, UNSPECIFIED: ICD-10-CM

## 2023-09-14 DIAGNOSIS — M32.9 SYSTEMIC LUPUS ERYTHEMATOSUS, UNSPECIFIED: ICD-10-CM

## 2023-09-14 DIAGNOSIS — I10 ESSENTIAL (PRIMARY) HYPERTENSION: ICD-10-CM

## 2023-09-14 DIAGNOSIS — D64.9 ANEMIA, UNSPECIFIED: ICD-10-CM

## 2023-09-14 DIAGNOSIS — D61.818 OTHER PANCYTOPENIA: ICD-10-CM

## 2023-09-14 DIAGNOSIS — N17.9 ACUTE KIDNEY FAILURE, UNSPECIFIED: ICD-10-CM

## 2023-09-14 DIAGNOSIS — E87.5 HYPERKALEMIA: ICD-10-CM

## 2023-09-14 LAB
APPEARANCE UR: CLEAR — SIGNIFICANT CHANGE UP
APTT BLD: 30 SEC — SIGNIFICANT CHANGE UP (ref 24.5–35.6)
BACTERIA # UR AUTO: PRESENT /HPF
BASE EXCESS BLDV CALC-SCNC: -7.7 MMOL/L — LOW (ref -2–3)
BILIRUB UR-MCNC: NEGATIVE — SIGNIFICANT CHANGE UP
CA-I SERPL-SCNC: 1.26 MMOL/L — SIGNIFICANT CHANGE UP (ref 1.15–1.33)
CO2 BLDV-SCNC: 19.5 MMOL/L — LOW (ref 22–26)
COLOR SPEC: YELLOW — SIGNIFICANT CHANGE UP
CREAT ?TM UR-MCNC: 107 MG/DL — SIGNIFICANT CHANGE UP
DIFF PNL FLD: ABNORMAL
EPI CELLS # UR: SIGNIFICANT CHANGE UP /HPF (ref 0–5)
FERRITIN SERPL-MCNC: 574 NG/ML — HIGH (ref 15–150)
GAS PNL BLDV: 141 MMOL/L — SIGNIFICANT CHANGE UP (ref 136–145)
GAS PNL BLDV: SIGNIFICANT CHANGE UP
GLUCOSE UR QL: NEGATIVE — SIGNIFICANT CHANGE UP
GRAN CASTS # UR COMP ASSIST: ABNORMAL /LPF
HCO3 BLDV-SCNC: 18 MMOL/L — LOW (ref 22–29)
HYALINE CASTS # UR AUTO: SIGNIFICANT CHANGE UP /LPF (ref 0–2)
INR BLD: 0.78 — LOW (ref 0.85–1.18)
IRON SATN MFR SERPL: 23 % — SIGNIFICANT CHANGE UP (ref 14–50)
IRON SATN MFR SERPL: 32 UG/DL — SIGNIFICANT CHANGE UP (ref 30–160)
KETONES UR-MCNC: NEGATIVE — SIGNIFICANT CHANGE UP
LEUKOCYTE ESTERASE UR-ACNC: NEGATIVE — SIGNIFICANT CHANGE UP
NITRITE UR-MCNC: NEGATIVE — SIGNIFICANT CHANGE UP
OSMOLALITY UR: 472 MOSM/KG — SIGNIFICANT CHANGE UP (ref 300–900)
PCO2 BLDV: 38 MMHG — LOW (ref 39–42)
PH BLDV: 7.29 — LOW (ref 7.32–7.43)
PH UR: 5.5 — SIGNIFICANT CHANGE UP (ref 5–8)
PO2 BLDV: <33 MMHG — LOW (ref 25–45)
POTASSIUM BLDV-SCNC: 5.6 MMOL/L — HIGH (ref 3.5–5.1)
POTASSIUM UR-SCNC: 43 MMOL/L — SIGNIFICANT CHANGE UP
PROT UR-MCNC: >=300 MG/DL
PROTHROM AB SERPL-ACNC: 9 SEC — LOW (ref 9.5–13)
RBC # BLD: 2.16 M/UL — LOW (ref 3.8–5.2)
RBC CASTS # UR COMP ASSIST: ABNORMAL /HPF
RETICS #: 55.5 K/UL — SIGNIFICANT CHANGE UP (ref 25–125)
RETICS/RBC NFR: 2.6 % — HIGH (ref 0.5–2.5)
SAO2 % BLDV: 30.2 % — LOW (ref 67–88)
SODIUM UR-SCNC: 46 MMOL/L — SIGNIFICANT CHANGE UP
SP GR SPEC: 1.02 — SIGNIFICANT CHANGE UP (ref 1–1.03)
TIBC SERPL-MCNC: 139 UG/DL — LOW (ref 220–430)
UIBC SERPL-MCNC: 107 UG/DL — LOW (ref 110–370)
UROBILINOGEN FLD QL: 0.2 E.U./DL — SIGNIFICANT CHANGE UP
UUN UR-MCNC: 769 MG/DL — SIGNIFICANT CHANGE UP
WBC UR QL: < 5 /HPF — SIGNIFICANT CHANGE UP

## 2023-09-14 PROCEDURE — 71045 X-RAY EXAM CHEST 1 VIEW: CPT | Mod: 26

## 2023-09-14 PROCEDURE — 99285 EMERGENCY DEPT VISIT HI MDM: CPT

## 2023-09-14 PROCEDURE — 93010 ELECTROCARDIOGRAM REPORT: CPT

## 2023-09-14 PROCEDURE — 99223 1ST HOSP IP/OBS HIGH 75: CPT | Mod: GC

## 2023-09-14 RX ORDER — LISINOPRIL 2.5 MG/1
20 TABLET ORAL EVERY 24 HOURS
Refills: 0 | Status: DISCONTINUED | OUTPATIENT
Start: 2023-09-15 | End: 2023-09-15

## 2023-09-14 RX ORDER — LABETALOL HCL 100 MG
100 TABLET ORAL EVERY 12 HOURS
Refills: 0 | Status: DISCONTINUED | OUTPATIENT
Start: 2023-09-14 | End: 2023-09-14

## 2023-09-14 RX ADMIN — Medication 100 MILLIGRAM(S): at 23:12

## 2023-09-14 NOTE — H&P ADULT - PROBLEM SELECTOR PLAN 1
Patient presenting with progressively worsening dyspnea found to have Cr 1.4, severe proteinuria, and concern for SLE nephritis. Renal consulted who rec renal u/s, urine studies, however patient will need to have biopsy done before tx plan. Noteable that patient also pancoptypenic including low plt   -f/u renal recs including sono, lytes  -Biopsy once plt counts improve Patient presenting with progressively worsening dyspnea found to have Cr 1.4, severe proteinuria, and concern for SLE nephritis. Renal consulted who rec renal u/s, urine studies, however patient will need to have biopsy done before tx plan. Noteable that patient also pancoptypenic including low plt so will likely need plt to improve before biopsy performed   -f/u renal recs including sono, lytes  -Biopsy once plt counts improve Patient presenting with progressively worsening dyspnea found to have Cr 1.4, severe proteinuria, and concern for SLE nephritis. Renal consulted who rec renal u/s, urine studies, however patient will need to have biopsy done before tx plan. Noteable that patient also pancoptypenic including low plt so will likely need plt to improve before biopsy performed   -f/u renal recs including sono, lytes  -Biopsy once plt counts improve (will make NPO after Mn just in case)

## 2023-09-14 NOTE — ED ADULT NURSE NOTE - NSFALLRISKFACTORS_ED_ALL_ED
The PA catheter is repositioned to the main pulmonary artery. Hemodynamics performed.  No indicators present

## 2023-09-14 NOTE — ED ADULT NURSE NOTE - OBJECTIVE STATEMENT
patient presents to ED with low Hgb - checked 6.3 w/o bleeding events, having sob, b/l legs swelling for weeks. denies cp, dizziness, n/v/abd pain. A&OX4. stable at bed.

## 2023-09-14 NOTE — ED ADULT NURSE NOTE - NSFALLUNIVINTERV_ED_ALL_ED
Bed/Stretcher in lowest position, wheels locked, appropriate side rails in place/Call bell, personal items and telephone in reach/Instruct patient to call for assistance before getting out of bed/chair/stretcher/Non-slip footwear applied when patient is off stretcher/[place] to call system/Physically safe environment - no spills, clutter or unnecessary equipment/Purposeful proactive rounding/Room/bathroom lighting operational, light cord in reach

## 2023-09-14 NOTE — H&P ADULT - HISTORY OF PRESENT ILLNESS
HPI:  33 F co dyspnea, leg swelling- pmh lupus on steroids, hydroxychloroquine, prednisone- started on Ramipril for bp 3 months ago- now w 3 months worsening swelling of legs, fatigue and dyspnea. Patient was at [facility] cardiologist yesterday who did TTE (wnl per pt) and sent labs and got the results and told patient to come to ED--concern for lupus nephritis.     In ED  VS noteable for HTN to 172/106  Labs remarkable for Hgb 6.8 RB 2.14 PLT 76 RC 2.6 K 5.4 HCO3 19 Cr 1.53  Renal, heme consulted. Concern for Lupus Nephritis. Heme ascribed pancytopenia as possibly related to mycophenalate use    HPI:  33 F PMHx SLE since middle school c/o dyspnea, leg swelling progressively worsening over the last three months. Patient was at her cardiologist at [facility] yesterday who did a TTE (wnl per patient) and blood work. Sent patient on steroids, hydroxychloroquine, prednisone- started on Ramipril for bp 3 months ago- now w 3 months worsening swelling of legs, fatigue and dyspnea. Patient was at [facility] cardiologist yesterday who did TTE (wnl per pt) and sent labs and got the results and told patient to come to ED--concern for lupus nephritis.     In ED  VS noteable for HTN to 172/106  Labs remarkable for Hgb 6.8 RB 2.14 PLT 76 RC 2.6 K 5.4 HCO3 19 Cr 1.53  Renal, heme consulted. Concern for Lupus Nephritis. Heme ascribed pancytopenia as possibly related to mycophenalate use    HPI:  33 F PMHx SLE since middle school c/o dyspnea, leg swelling progressively worsening over the last three months. Patient was at her cardiologist at [facility] yesterday who did a TTE (wnl per patient) and blood work. Sent patient on steroids, hydroxychloroquine, prednisone- started on Ramipril for bp 3 months ago- now w 3 months worsening swelling of legs, fatigue and dyspnea. Patient was at [facility] cardiologist yesterday, who she saw for progressive leg swelling and dyspnea, who did TTE (wnl per pt) and sent labs and got the results and sent labs to patient's rheumatologist who told patient to come to ED--concern for lupus nephritis. Patient's bustos has been worsening and has trouble catching her brreath if she walks many blocks at normal pace.     In ED  VS noteable for HTN to 172/106  Labs remarkable for Hgb 6.8 RB 2.14 PLT 76 RC 2.6 K 5.4 HCO3 19 Cr 1.53  Renal, heme consulted. Concern for Lupus Nephritis. Heme ascribed pancytopenia as possibly related to mycophenalate use

## 2023-09-14 NOTE — H&P ADULT - PROBLEM SELECTOR PLAN 5
F: None   E: Replete as necessary K>4 Mg>2  N: Reg diet   DVT Prophylaxis: improv 0   GI prophylaxis: None   CODE STATUS: FULL Heme consulted for #pancotyopenia and #anemia  - likely a sequelae of mecyphenolate use. Agree with transfusion of 1unit prbc   - recommend anemia workup prior to transfusion of prbc: reticulocytes, iron, ferritin, B12, folate  -will hold MMF    Hematology will follow

## 2023-09-14 NOTE — H&P ADULT - ATTENDING COMMENTS
#KARENA: hx of SLE, p/w worsening LE edema, KARENA, proteinuria, hypo albumin, c/f lupus nephritis/ nephrotic syndrome. K 5.4 s/p lokelma. EKG w/o changes. Renal consulted, recommend renal biopsy. F/up urine lytes, bladder scan, ESR/CRP, renal US, monitor urine output, Trend cr. NPO for bx. Rheum consult      #r/o lupus nephritis.       #Pancytopenia: hx of pancytopenia, in setting of SLE and currently on mycophenolate. No s/s of bleeding, HDS. Coags wnl. F/up ldh/hapto, retic count, iron studies, b12/folate.. s/p 1U prbcx, hgb goal >7. Active TS, 2 large bore IVs. F/up heme onc recs

## 2023-09-14 NOTE — H&P ADULT - PROBLEM SELECTOR PLAN 4
Heme consulted,   - likely a sequelae of mecyphenolate use. Agree with transfusion of 1unit prbc   - recommend anemia workup prior to transfusion of prbc: reticulocytes, iron, ferritin, B12, folate  -will hold MMF    Hematology will follow K 5.4 i/s/o renal injury. No EKG changes   1x dose of lokelma  continue to monitor

## 2023-09-14 NOTE — H&P ADULT - ASSESSMENT
EKG: PACs noted. There are no previous tracings available for comparison. 33 F PMHx SLE since middle school c/o dyspnea, leg swelling progressively worsening over the last three months admitted for concern for SLE nephritis

## 2023-09-14 NOTE — ED PROVIDER NOTE - OBJECTIVE STATEMENT
33 F co dyspnea, leg swelling- pmh lupus on steroids, hydroxychloroquine, prednisone- started on Ramipril for bp 3 months ago- now w 3 months prog sswelling of legs, fatigue dyspnea- denies black stools/bloody stools  mod severity  no sgi exac/allev factors 33 F co dyspnea, leg swelling- pmh lupus on steroids, hydroxychloroquine, prednisone- started on Ramipril for bp 3 months ago- now w 3 months prog sswelling of legs, fatigue dyspnea- denies black stools/bloody stools  mod severity  no sig exac/allev factors

## 2023-09-14 NOTE — ED ADULT NURSE REASSESSMENT NOTE - NS ED NURSE REASSESS COMMENT FT1
Patient endorsed from prior RN, patient received resting on the stretcher, awake and alert, spont breathing. Patient denies sob/dizziness, aware that she is pending transfusion.

## 2023-09-14 NOTE — H&P ADULT - NSHPLABSRESULTS_GEN_ALL_CORE
LABS:                         6.8    3.47  )-----------( 76       ( 14 Sep 2023 18:22 )             21.4     -    141  |  114<H>  |  51<H>  ----------------------------<  105<H>  5.4<H>   |  18<L>  |  1.53<H>    Ca    8.1<L>      14 Sep 2023 18:22    TPro  5.4<L>  /  Alb  2.1<L>  /  TBili  <0.2  /  DBili  x   /  AST  27  /  ALT  14  /  AlkPhos  41  -14    PT/INR - ( 14 Sep 2023 19:30 )   PT: 9.0 sec;   INR: 0.78          PTT - ( 14 Sep 2023 19:30 )  PTT:30.0 sec  Urinalysis Basic - ( 14 Sep 2023 19:49 )    Color: Yellow / Appearance: Clear / S.025 / pH: x  Gluc: x / Ketone: NEGATIVE  / Bili: Negative / Urobili: 0.2 E.U./dL   Blood: x / Protein: >=300 mg/dL / Nitrite: NEGATIVE   Leuk Esterase: NEGATIVE / RBC: Many /HPF / WBC < 5 /HPF   Sq Epi: x / Non Sq Epi: x / Bacteria: Present /HPF      CARDIAC MARKERS ( 14 Sep 2023 18:22 )  x     / x     / 56 U/L / x     / 2.1 ng/mL            RADIOLOGY, EKG & ADDITIONAL TESTS:

## 2023-09-14 NOTE — H&P ADULT - PROBLEM SELECTOR PLAN 2
Hx of SLE since middle school per [facility] chart review and currenltly on Prednisone, Mycophenolate, Hydroxychloroquine, Ramipril Hx of SLE since middle school per [facility] chart review and currently on Prednisone, Mycophenolate, Hydroxychloroquine,   -AM rheum consult to follow along--suspect will need higher dose of prednisone right now  -holding MMF

## 2023-09-14 NOTE — CHART NOTE - NSCHARTNOTEFT_GEN_A_CORE
Pt is a 34 yo F w/ known lupus (x ~20 years), previously normal kidney function now coming to ED with ~3 mos of LE edema, dyspnea, fatigue. Of note, also stared on ramipril for BP 3 months ago. Had TTE at cardiologist office yesterday which was wnl. Now with Cr 1.5, pancytopenia, active UA.    #Creatinine elevation-KARENA vs CKD  BCr not known. Last creatinine in [facility] 0.6 from 2018. Now Cr 1.5  UA w/ proteinuria, hematuria  Leatha 46  Also pancytopenic  Given active UA, etiology is likely to be lupus nephritis, but will need a tissue diagnosis and see what class    Recommend:  Given pancytopenia may be due to lupus, suggest giving a dose of 50mg IV methylprednisone tonight. Pancytopenia can also be due to MMF use, so will keep holding MMF  Check UPCR  Renal US  Lupus labs-C3. C4. PATRICE. dsDNA. Antiphospholipid antibodies  Optimize BP  Appreciate heme input  Will plan for renal bx Monday. Will need to optimize platelets, PT/INR for the bx      Discussed with attending Dr. Jerez  Full consult note to follow in AM    Andrae Morales  PGY-5 Nephrology  [phone]

## 2023-09-14 NOTE — H&P ADULT - PROBLEM SELECTOR PLAN 3
160 systolic BP, hx of HTN on ramipril 5   -labetalol 100mg BID--holding ramipril i/s/o KARENA and opting not for norvasc i/s/o leg swelling 160 systolic BP, hx of HTN on ramipril 5   c/w therapeutic exchange   OK i/s/o nephrotic syndrome

## 2023-09-14 NOTE — ED PROVIDER NOTE - CLINICAL SUMMARY MEDICAL DECISION MAKING FREE TEXT BOX
33 F w lupus- prob lupus nephritis- d/w Nephro- they will follow- labs/us ordered  d/w Heme/onc they will follow- agree w 1 u prbc

## 2023-09-14 NOTE — H&P ADULT - PROBLEM SELECTOR PLAN 6
F: None   E: Replete as necessary K>4 Mg>2  N: Reg diet   DVT Prophylaxis: improv 0   GI prophylaxis: None   CODE STATUS: FULL

## 2023-09-15 ENCOUNTER — APPOINTMENT (OUTPATIENT)
Dept: NEPHROLOGY | Facility: CLINIC | Age: 33
End: 2023-09-15

## 2023-09-15 DIAGNOSIS — D64.9 ANEMIA, UNSPECIFIED: ICD-10-CM

## 2023-09-15 DIAGNOSIS — M32.14 GLOMERULAR DISEASE IN SYSTEMIC LUPUS ERYTHEMATOSUS: ICD-10-CM

## 2023-09-15 DIAGNOSIS — D63.8 ANEMIA IN OTHER CHRONIC DISEASES CLASSIFIED ELSEWHERE: ICD-10-CM

## 2023-09-15 LAB
ALBUMIN SERPL ELPH-MCNC: 2 G/DL — LOW (ref 3.3–5)
ALP SERPL-CCNC: 34 U/L — LOW (ref 40–120)
ALT FLD-CCNC: 10 U/L — SIGNIFICANT CHANGE UP (ref 10–45)
ANION GAP SERPL CALC-SCNC: 6 MMOL/L — SIGNIFICANT CHANGE UP (ref 5–17)
ANISOCYTOSIS BLD QL: SLIGHT — SIGNIFICANT CHANGE UP
AST SERPL-CCNC: 23 U/L — SIGNIFICANT CHANGE UP (ref 10–40)
BASOPHILS # BLD AUTO: 0 K/UL — SIGNIFICANT CHANGE UP (ref 0–0.2)
BASOPHILS NFR BLD AUTO: 0 % — SIGNIFICANT CHANGE UP (ref 0–2)
BILIRUB SERPL-MCNC: 0.2 MG/DL — SIGNIFICANT CHANGE UP (ref 0.2–1.2)
BUN SERPL-MCNC: 51 MG/DL — HIGH (ref 7–23)
BURR CELLS BLD QL SMEAR: PRESENT — SIGNIFICANT CHANGE UP
C3 SERPL-MCNC: 32 MG/DL — LOW (ref 81–157)
C4 SERPL-MCNC: 3 MG/DL — LOW (ref 13–39)
CALCIUM SERPL-MCNC: 8.2 MG/DL — LOW (ref 8.4–10.5)
CHLORIDE SERPL-SCNC: 116 MMOL/L — HIGH (ref 96–108)
CO2 SERPL-SCNC: 19 MMOL/L — LOW (ref 22–31)
CREAT ?TM UR-MCNC: 60 MG/DL — SIGNIFICANT CHANGE UP
CREAT SERPL-MCNC: 1.71 MG/DL — HIGH (ref 0.5–1.3)
CRP SERPL-MCNC: <3 MG/L — SIGNIFICANT CHANGE UP (ref 0–4)
DACRYOCYTES BLD QL SMEAR: SLIGHT — SIGNIFICANT CHANGE UP
DAT POLY-SP REAG RBC QL: NEGATIVE — SIGNIFICANT CHANGE UP
EGFR: 40 ML/MIN/1.73M2 — LOW
EOSINOPHIL # BLD AUTO: 0 K/UL — SIGNIFICANT CHANGE UP (ref 0–0.5)
EOSINOPHIL NFR BLD AUTO: 0 % — SIGNIFICANT CHANGE UP (ref 0–6)
ERYTHROCYTE [SEDIMENTATION RATE] IN BLOOD: 43 MM/HR — HIGH
FOLATE SERPL-MCNC: 11.9 NG/ML — SIGNIFICANT CHANGE UP
GIANT PLATELETS BLD QL SMEAR: PRESENT — SIGNIFICANT CHANGE UP
GLUCOSE BLDC GLUCOMTR-MCNC: 140 MG/DL — HIGH (ref 70–99)
GLUCOSE BLDC GLUCOMTR-MCNC: 155 MG/DL — HIGH (ref 70–99)
GLUCOSE SERPL-MCNC: 106 MG/DL — HIGH (ref 70–99)
HAPTOGLOB SERPL-MCNC: <10 MG/DL — LOW (ref 34–200)
HCT VFR BLD CALC: 24.7 % — LOW (ref 34.5–45)
HGB BLD-MCNC: 7.7 G/DL — LOW (ref 11.5–15.5)
LDH SERPL L TO P-CCNC: 307 U/L — HIGH (ref 50–242)
LYMPHOCYTES # BLD AUTO: 0.11 K/UL — LOW (ref 1–3.3)
LYMPHOCYTES # BLD AUTO: 4 % — LOW (ref 13–44)
MACROCYTES BLD QL: SLIGHT — SIGNIFICANT CHANGE UP
MAGNESIUM SERPL-MCNC: 2.4 MG/DL — SIGNIFICANT CHANGE UP (ref 1.6–2.6)
MANUAL SMEAR VERIFICATION: SIGNIFICANT CHANGE UP
MCHC RBC-ENTMCNC: 28.8 PG — SIGNIFICANT CHANGE UP (ref 27–34)
MCHC RBC-ENTMCNC: 31.2 GM/DL — LOW (ref 32–36)
MCV RBC AUTO: 92.5 FL — SIGNIFICANT CHANGE UP (ref 80–100)
MONOCYTES # BLD AUTO: 0.14 K/UL — SIGNIFICANT CHANGE UP (ref 0–0.9)
MONOCYTES NFR BLD AUTO: 4.9 % — SIGNIFICANT CHANGE UP (ref 2–14)
MYELOCYTES NFR BLD: 1 % — HIGH (ref 0–0)
NEUTROPHILS # BLD AUTO: 2.53 K/UL — SIGNIFICANT CHANGE UP (ref 1.8–7.4)
NEUTROPHILS NFR BLD AUTO: 90.1 % — HIGH (ref 43–77)
OVALOCYTES BLD QL SMEAR: SLIGHT — SIGNIFICANT CHANGE UP
PHOSPHATE SERPL-MCNC: 5.7 MG/DL — HIGH (ref 2.5–4.5)
PLAT MORPH BLD: ABNORMAL
PLATELET # BLD AUTO: 78 K/UL — LOW (ref 150–400)
POIKILOCYTOSIS BLD QL AUTO: SIGNIFICANT CHANGE UP
POLYCHROMASIA BLD QL SMEAR: SLIGHT — SIGNIFICANT CHANGE UP
POTASSIUM SERPL-MCNC: 5.4 MMOL/L — HIGH (ref 3.5–5.3)
POTASSIUM SERPL-SCNC: 5.4 MMOL/L — HIGH (ref 3.5–5.3)
PROT ?TM UR-MCNC: 210 MG/DL — SIGNIFICANT CHANGE UP (ref 0–12)
PROT ?TM UR-MCNC: 804 MG/DL — HIGH (ref 0–12)
PROT SERPL-MCNC: 4.8 G/DL — LOW (ref 6–8.3)
PROT/CREAT UR-RTO: 3.5 RATIO — HIGH (ref 0–0.2)
PROT/CREAT UR-RTO: 7.6 RATIO — HIGH (ref 0–0.2)
RBC # BLD: 2.67 M/UL — LOW (ref 3.8–5.2)
RBC # BLD: 2.67 M/UL — LOW (ref 3.8–5.2)
RBC # FLD: 19.8 % — HIGH (ref 10.3–14.5)
RBC BLD AUTO: ABNORMAL
RETICS #: 54.7 K/UL — SIGNIFICANT CHANGE UP (ref 25–125)
RETICS/RBC NFR: 2.1 % — SIGNIFICANT CHANGE UP (ref 0.5–2.5)
SCHISTOCYTES BLD QL AUTO: SLIGHT — SIGNIFICANT CHANGE UP
SMUDGE CELLS # BLD: PRESENT — SIGNIFICANT CHANGE UP
SODIUM SERPL-SCNC: 141 MMOL/L — SIGNIFICANT CHANGE UP (ref 135–145)
VIT B12 SERPL-MCNC: 659 PG/ML — SIGNIFICANT CHANGE UP (ref 232–1245)
WBC # BLD: 2.81 K/UL — LOW (ref 3.8–10.5)
WBC # FLD AUTO: 2.81 K/UL — LOW (ref 3.8–10.5)

## 2023-09-15 PROCEDURE — 99223 1ST HOSP IP/OBS HIGH 75: CPT

## 2023-09-15 PROCEDURE — 76770 US EXAM ABDO BACK WALL COMP: CPT | Mod: 26

## 2023-09-15 PROCEDURE — 99233 SBSQ HOSP IP/OBS HIGH 50: CPT | Mod: GC

## 2023-09-15 RX ORDER — INSULIN LISPRO 100/ML
VIAL (ML) SUBCUTANEOUS
Refills: 0 | Status: DISCONTINUED | OUTPATIENT
Start: 2023-09-15 | End: 2023-09-21

## 2023-09-15 RX ORDER — INSULIN LISPRO 100/ML
VIAL (ML) SUBCUTANEOUS AT BEDTIME
Refills: 0 | Status: DISCONTINUED | OUTPATIENT
Start: 2023-09-15 | End: 2023-09-21

## 2023-09-15 RX ORDER — DEXTROSE 50 % IN WATER 50 %
12.5 SYRINGE (ML) INTRAVENOUS ONCE
Refills: 0 | Status: DISCONTINUED | OUTPATIENT
Start: 2023-09-15 | End: 2023-09-21

## 2023-09-15 RX ORDER — SODIUM CHLORIDE 9 MG/ML
1000 INJECTION, SOLUTION INTRAVENOUS
Refills: 0 | Status: DISCONTINUED | OUTPATIENT
Start: 2023-09-15 | End: 2023-09-21

## 2023-09-15 RX ORDER — FUROSEMIDE 40 MG
40 TABLET ORAL ONCE
Refills: 0 | Status: COMPLETED | OUTPATIENT
Start: 2023-09-15 | End: 2023-09-15

## 2023-09-15 RX ORDER — DEXTROSE 50 % IN WATER 50 %
25 SYRINGE (ML) INTRAVENOUS ONCE
Refills: 0 | Status: DISCONTINUED | OUTPATIENT
Start: 2023-09-15 | End: 2023-09-21

## 2023-09-15 RX ORDER — GLUCAGON INJECTION, SOLUTION 0.5 MG/.1ML
1 INJECTION, SOLUTION SUBCUTANEOUS ONCE
Refills: 0 | Status: DISCONTINUED | OUTPATIENT
Start: 2023-09-15 | End: 2023-09-21

## 2023-09-15 RX ORDER — NIFEDIPINE 30 MG
30 TABLET, EXTENDED RELEASE 24 HR ORAL DAILY
Refills: 0 | Status: DISCONTINUED | OUTPATIENT
Start: 2023-09-15 | End: 2023-09-17

## 2023-09-15 RX ORDER — SODIUM ZIRCONIUM CYCLOSILICATE 10 G/10G
5 POWDER, FOR SUSPENSION ORAL ONCE
Refills: 0 | Status: COMPLETED | OUTPATIENT
Start: 2023-09-15 | End: 2023-09-15

## 2023-09-15 RX ORDER — DEXTROSE 50 % IN WATER 50 %
15 SYRINGE (ML) INTRAVENOUS ONCE
Refills: 0 | Status: DISCONTINUED | OUTPATIENT
Start: 2023-09-15 | End: 2023-09-21

## 2023-09-15 RX ORDER — ETHACRYNIC ACID 25 MG/1
50 TABLET ORAL ONCE
Refills: 0 | Status: DISCONTINUED | OUTPATIENT
Start: 2023-09-15 | End: 2023-09-17

## 2023-09-15 RX ADMIN — LISINOPRIL 20 MILLIGRAM(S): 2.5 TABLET ORAL at 09:11

## 2023-09-15 RX ADMIN — Medication 40 MILLIGRAM(S): at 12:09

## 2023-09-15 RX ADMIN — Medication 50 MILLIGRAM(S): at 10:31

## 2023-09-15 RX ADMIN — Medication 100 MILLIGRAM(S): at 19:12

## 2023-09-15 RX ADMIN — SODIUM ZIRCONIUM CYCLOSILICATE 5 GRAM(S): 10 POWDER, FOR SUSPENSION ORAL at 10:28

## 2023-09-15 RX ADMIN — Medication 30 MILLIGRAM(S): at 10:28

## 2023-09-15 RX ADMIN — Medication 10 MILLIGRAM(S): at 06:06

## 2023-09-15 RX ADMIN — Medication 0: at 22:43

## 2023-09-15 NOTE — CONSULT NOTE ADULT - SUBJECTIVE AND OBJECTIVE BOX
Hematology Consult Note    HPI:  33 F PMHx SLE since middle school c/o dyspnea, leg swelling progressively worsening over the last three months. Patient was at her cardiologist at [facility] yesterday who did a TTE (wnl per patient) and blood work. Sent patient on steroids, hydroxychloroquine, prednisone- started on Ramipril for bp 3 months ago- now w 3 months worsening swelling of legs, fatigue and dyspnea. Patient was at [facility] cardiologist yesterday, who she saw for progressive leg swelling and dyspnea, who did TTE (wnl per pt) and sent labs and got the results and sent labs to patient's rheumatologist who told patient to come to ED--concern for lupus nephritis. Patient's bustos has been worsening and has trouble catching her brreath if she walks many blocks at normal pace.     In ED  VS noteable for HTN to 172/106  Labs remarkable for Hgb 6.8 RB 2.14 PLT 76 RC 2.6 K 5.4 HCO3 19 Cr 1.53  Renal, heme consulted. Concern for Lupus Nephritis. Heme ascribed pancytopenia as possibly related to mycophenalate use    (14 Sep 2023 20:58)      Allergies    sulfa drugs (Rash)    Intolerances        MEDICATIONS  (STANDING):  NIFEdipine XL 30 milliGRAM(s) Oral daily  predniSONE   Tablet 10 milliGRAM(s) Oral daily    MEDICATIONS  (PRN):      PAST MEDICAL & SURGICAL HISTORY:  Lupus      No significant past surgical history          FAMILY HISTORY:  No pertinent family history in first degree relatives        SOCIAL HISTORY: No EtOH, no tobacco    REVIEW OF SYSTEMS:    CONSTITUTIONAL: No weakness, fevers or chills  EYES/ENT: No visual changes;  No vertigo or throat pain   NECK: No pain or stiffness  RESPIRATORY: No cough, wheezing, hemoptysis; No shortness of breath  CARDIOVASCULAR: No chest pain or palpitations  GASTROINTESTINAL: No abdominal or epigastric pain. No nausea, vomiting, or hematemesis; No diarrhea or constipation. No melena or hematochezia.  GENITOURINARY: No dysuria, frequency or hematuria  NEUROLOGICAL: No numbness or weakness  SKIN: No itching, burning, rashes, or lesions   All other review of systems is negative unless indicated above.    Height (cm): 160 (09-15 @ 00:15)  Weight (kg): 68.9 (09-15 @ 00:15)  BMI (kg/m2): 26.9 (09-15 @ 00:15)  BSA (m2): 1.72 (09-15 @ 00:15)    T(F): 98 (09-15-23 @ 11:49), Max: 98.8 (09-15-23 @ 05:59)  HR: 75 (09-15-23 @ 11:49)  BP: 159/98 (09-15-23 @ 11:49)  RR: 18 (09-15-23 @ 11:49)  SpO2: 98% (09-15-23 @ 11:49)  Wt(kg): --    GENERAL: NAD, well-developed  HEAD:  Atraumatic, Normocephalic  EYES: EOMI, PERRLA, conjunctiva and sclera clear  NECK: Supple, No JVD  CHEST/LUNG: Clear to auscultation bilaterally; No wheeze  HEART: Regular rate and rhythm; No murmurs, rubs, or gallops  ABDOMEN: Soft, Nontender, Nondistended; Bowel sounds present  EXTREMITIES:  2+ Peripheral Pulses, No clubbing, cyanosis, or edema  NEUROLOGY: non-focal  SKIN: No rashes or lesions                          7.7    2.81  )-----------( 78       ( 15 Sep 2023 08:03 )             24.7       09-15    141  |  116<H>  |  51<H>  ----------------------------<  106<H>  5.4<H>   |  19<L>  |  1.71<H>    Ca    8.2<L>      15 Sep 2023 08:03  Phos  5.7     09-15  Mg     2.4     09-15    TPro  4.8<L>  /  Alb  2.0<L>  /  TBili  0.2  /  DBili  x   /  AST  23  /  ALT  10  /  AlkPhos  34<L>  09-15      Magnesium: 2.4 mg/dL (09-15 @ 08:03)  Phosphorus: 5.7 mg/dL (09-15 @ 08:03)  Haptoglobin, Serum: <10 mg/dL (09-15 @ 08:03)  Lactate Dehydrogenase, Serum: 307 U/L (09-15 @ 08:03)

## 2023-09-15 NOTE — CHART NOTE - NSCHARTNOTEFT_GEN_A_CORE
Was able to obtain collateral from pt's outpt Rheumatologist (Dr. Beltran Nava) as follow:     Pt was last seen on June 20.                        7.8    2.2)-----------( 102, MCV 92             23.7    Cr 1.22 eGFR 59, Protein 5.7, Alb 2.8, uric acid 8.1, Ca 8.3 Was able to obtain collateral from pt's outpt Rheumatologist (Dr. Beltran Nava) as follow:     Pt was last seen on June 20 and went overseas to [place] without any further follow up before presenting to [facility]    Labs in June as follow:                       7.8    2.2)-----------( 102, MCV 92             23.7    Cr 1.22 eGFR 59, Protein 5.7, Alb 2.8, uric acid 8.1, Ca 8.3  , ALP 40    Grade 3+ Lupus nephritis   TSH 4.74 (suspected sick thyroid)    Home med per Rheumatologist: Prednisone 40, Mycophenolate 1g BID, Hydroxychloroquine 200, ramipril 5 Was able to obtain collateral from pt's outpt Rheumatologist (Dr. Beltran Nava) as follow:     Pt was last seen on June 20 and went overseas to [place] without any further follow up before presenting to [facility]    Labs in June as follow:                       7.8    2.2)-----------( 102, MCV 92             23.7    Cr 1.22 eGFR 59, Protein 5.7, Alb 2.8, uric acid 8.1, Ca 8.3  , ALP 40    Grade 3+ Lupus nephritis   TSH 4.74 (suspected sick thyroid)    Home med per Rheumatologist: Prednisone 40, Mycophenolate 1g BID, Hydroxychloroquine 200, ramipril 5    Rheumatologist:  Dr. Beltran Nava  Cardiologist: Dr. Tylor Whitten  nephrologist: Dr. Poncho Simon

## 2023-09-15 NOTE — CONSULT NOTE ADULT - ATTENDING COMMENTS
KARENA c/w RPGN due to lupus nephritis  pancytopenia likely due to lupus flare-reports typical of her flares  suggest pulse steroids as above  restart MMF at higher dose once cbc improves --was only taking 500 mg /d which was likely inadequate   plan renal bx on monday if platelet improve- and hgb> 8
I evaluated the patient with the Hematology/Oncology fellow, Dr Jimenez.  Briefly, the patient is a 33 year old female with lupus admitted w/ a presumptive lupus flare and lupus nephritis.  Hematology is consulted for pancytopenia;  her current WBC count is 2.8 (predominantly lymphocytopenia);  hgb 7.7 s/p transfusion (was 6.8 on admission);  MCV 92;  and platelet count 78.  Additional relevant labs include haptoglobin < 10, , normal ANTONY, normal total bili.  Creatinine 1.7.    the peripheral blood smear was reviewed.  RBC morphologic changes include echinocytes, teardrop cells, and rare schistocytes.  There was a paucity of reticulocytes.  There were dyspoietic changes of the PMN's.  No platelet clumping.    We agree w/ the assessment that the patient's pancytopenia is most likely due to the combination of her lupus flare and myelosuppression from [employer].  There may also be a component of non-immune mediated hemolysis (considering the low haptoglobin level - unless this was artificially reduced by the pRBC transfusion).      Agree w/ steroids  Trend CBC + diff daily.    Mycophenolate may not be the ideal drug for her lupus if we suspect it caused this degree of cytopenias;  but will defer to the Nephro and Rheumatology teams

## 2023-09-15 NOTE — PROGRESS NOTE ADULT - PROBLEM SELECTOR PLAN 1
Patient presenting with progressively worsening dyspnea found to have Cr 1.4, severe proteinuria, and concern for SLE nephritis. Renal consulted who rec renal u/s, urine studies, however patient will need to have biopsy done before tx plan. Noteable that patient also pancoptypenic including low plt so will likely need plt to improve before biopsy performed   -f/u renal recs including sono, lytes  -Biopsy once plt counts improve (will make NPO after Mn just in case) Cr 1.53 on admission, severe proteinuria, and concern for SLE nephritis. KARENA vs. progression of Lupus Nephritis. Renal consulted. who rec renal u/s, urine studies, however patient will need to have biopsy done before tx plan. Noteable that patient also pancoptypenic including low plt so will likely need plt to improve before biopsy performed   -f/u renal recs including sono, lytes  -Biopsy once plt counts improve (will make NPO after Mn just in case) Cr 1.53 on admission (1.22 in June), severe proteinuria, hx of SLE nephritis. KARENA vs. progression of Lupus Nephritis. Renal consulted. Renal US is consistent with chronic medical renal disease.     - hold mycophenolate and Hydroxychloroquine for KARENA    Per Renal:  - pulse dose steroids 500mg of methylprednisolone x3 (last dose 9/17)  - Renal diet   - Lokelma if K+> 5.5  - ethacrynic acid 50 qd i/s/o sulfa drug allergy.    - c/w nifedipine fo BP  - pending kidney Bx 9/18

## 2023-09-15 NOTE — PROGRESS NOTE ADULT - PROBLEM SELECTOR PLAN 2
Hx of SLE since middle school per HIE chart review and currently on Prednisone, Mycophenolate, Hydroxychloroquine,   -AM rheum consult to follow along--suspect will need higher dose of prednisone right now  -holding MMF Hx of SLE. Currently on Prednisone, Mycophenolate, Hydroxychloroquine, Collateral from outpt Rheumatologist as per chart note. Rheum consulted.     - management as above  - f/u rheum recs   - f/u heme recs

## 2023-09-15 NOTE — PROGRESS NOTE ADULT - ASSESSMENT
33 F PMHx SLE since middle school c/o dyspnea, leg swelling progressively worsening over the last three months, found to have KARENA, pancytopenia needing transfusion, admitted for concern for SLE nephritis.

## 2023-09-15 NOTE — PROGRESS NOTE ADULT - PROBLEM SELECTOR PLAN 3
160 systolic BP, hx of HTN on ramipril 5   c/w therapeutic exchange   OK i/s/o nephrotic syndrome Hgb 6.8 on admission and 7.7 s/p 1u pRBC. Likely 2/2 Lupus, and/or mycophenolate. Iron study correlates with anemia of chronic disease. 9/15 post transfusion labs shows schistocytes and with lab results indicative of hemolysis. Jasmine negative     - holding mycophenolate  - will maintain 2 large bore IV  - TS if Hgb <7  - will continue to f/u renal recs

## 2023-09-15 NOTE — PROGRESS NOTE ADULT - PROBLEM SELECTOR PLAN 6
F: None   E: Replete as necessary K>4 Mg>2  N: Reg diet   DVT Prophylaxis: improv 0   GI prophylaxis: None   CODE STATUS: FULL 160 systolic BP, hx of HTN on ramipril 5 . i/s/o nephrotic syndrome    - start Nifedipine 30qd

## 2023-09-15 NOTE — PROGRESS NOTE ADULT - PROBLEM SELECTOR PLAN 5
Heme consulted for #pancotyopenia and #anemia  - likely a sequelae of mecyphenolate use. Agree with transfusion of 1unit prbc   - recommend anemia workup prior to transfusion of prbc: reticulocytes, iron, ferritin, B12, folate  -will hold MMF    Hematology will follow Likely 2/2 mecyphenolate use. s/p 1u PRBC    - holding MMF   - f/u heme recs

## 2023-09-15 NOTE — PROGRESS NOTE ADULT - SUBJECTIVE AND OBJECTIVE BOX
OVERNIGHT EVENTS: NISHANT    SUBJECTIVE  Pt was seen and examined at bedside. Appears comfortable.     Reports persistent swelling in legs, improving shortness of breath, denies chest pain, palpitation.       PHYSICAL EXAM     General: NAD  HEENT: NC/AT; PERRL; nonicteric, Neck Supple; no JVD  Respiratory: CTAB; no wheezes/rales/rhonchi, normal WOB  Cardiovascular: Regular rhythm/rate, + S1/S2; no murmurs, rubs gallops   Gastrointestinal: Soft; NTND; bowel sounds normal and present  Extremities: WWP; 2+ edema extended up to buttock b/l   Neurological: A&Ox3, CNII-XII grossly intact; no obvious focal deficits  Integument: intact; normal turgor, texture, temperature, color for age    Vitals:  ============  T(C): 36.7 (09-15-23 @ 11:49), Max: 37.1 (09-15-23 @ 05:59)  HR: 75 (09-15-23 @ 11:49) (61 - 84)  BP: 159/98 (09-15-23 @ 11:49) (146/87 - 172/106)  RR: 18 (09-15-23 @ 11:49) (16 - 18)  SpO2: 98% (09-15-23 @ 11:49) (97% - 100%)        =======================================================  Current Antibiotics:    Other medications:  NIFEdipine XL 30 milliGRAM(s) Oral daily  predniSONE   Tablet 10 milliGRAM(s) Oral daily      =======================================================  Labs:                        7.7    2.81  )-----------( 78       ( 15 Sep 2023 08:03 )             24.7     09-15    141  |  116<H>  |  51<H>  ----------------------------<  106<H>  5.4<H>   |  19<L>  |  1.71<H>    Ca    8.2<L>      15 Sep 2023 08:03  Phos  5.7     09-15  Mg     2.4     09-15    TPro  4.8<L>  /  Alb  2.0<L>  /  TBili  0.2  /  DBili  x   /  AST  23  /  ALT  10  /  AlkPhos  34<L>  09-15      Culture - Urine (collected 09-14-23 @ 19:49)  Source: Clean Catch Clean Catch (Midstream)       OVERNIGHT EVENTS: NISHANT    SUBJECTIVE  Pt was seen and examined at bedside. Appears comfortable.     Reports persistent swelling in legs, improving shortness of breath, denies chest pain, palpitation.       PHYSICAL EXAM     General: NAD  HEENT: NC/AT; PERRL; nonicteric, Neck Supple; no JVD  Respiratory: CTAB; no wheezes/rales/rhonchi, normal WOB  Cardiovascular: Regular rhythm/rate, + S1/S2; no murmurs, rubs gallops   Gastrointestinal: Soft; NTND; bowel sounds normal and present  Extremities: WWP; 2+ edema extended up to buttock b/l   Neurological: A&Ox3, CNII-XII grossly intact; no obvious focal deficits  Integument: intact; normal turgor, texture, temperature, color for age    Vitals:  ============  T(C): 36.7 (09-15-23 @ 11:49), Max: 37.1 (09-15-23 @ 05:59)  HR: 75 (09-15-23 @ 11:49) (61 - 84)  BP: 159/98 (09-15-23 @ 11:49) (146/87 - 172/106)  RR: 18 (09-15-23 @ 11:49) (16 - 18)  SpO2: 98% (09-15-23 @ 11:49) (97% - 100%)        =======================================================  Current Antibiotics:    Other medications:  NIFEdipine XL 30 milliGRAM(s) Oral daily  predniSONE   Tablet 10 milliGRAM(s) Oral daily      =======================================================  Labs:                        7.7    2.81  )-----------( 78       ( 15 Sep 2023 08:03 )             24.7     09-15    141  |  116<H>  |  51<H>  ----------------------------<  106<H>  5.4<H>   |  19<L>  |  1.71<H>    Ca    8.2<L>      15 Sep 2023 08:03  Phos  5.7     09-15  Mg     2.4     09-15    TPro  4.8<L>  /  Alb  2.0<L>  /  TBili  0.2  /  DBili  x   /  AST  23  /  ALT  10  /  AlkPhos  34<L>  09-15      Culture - Urine (collected 09-14-23 @ 19:49)  Source: Clean Catch Clean Catch (Midstream)      < from: US Retroperitoneal Complete (09.15.23 @ 13:28) >  IMPRESSION:  1. Echogenic kidneys and small volume perinephric fluid bilaterally   suggestive of medical renal disease. No hydronephrosis.  2. Small volume ascites.  3. 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  4. 0.7 cm cystic lesion at the posterior wall the bladder, possibly a   ureterocele. Correlate clinically.    < end of copied text >      < from: Xray Chest 1 View- PORTABLE-Urgent (Xray Chest 1 View- PORTABLE-Urgent .) (09.14.23 @ 19:07) >  Findings/  impression: Bilateral pleural effusions. Right upper lobe focal   atelectasis. Right upper lobe nodular cluster, unchanged. Heart,   mediastinum and thorax are unremarkable. Left nipple shadow.    --- End of Report ---    < end of copied text >   OVERNIGHT EVENTS: SEO    SUBJECTIVE  Pt was seen and examined at bedside. Appears comfortable.     Reports persistent swelling in legs, improving shortness of breath, denies chest pain, palpitation.       PHYSICAL EXAM     General: NAD  HEENT: NC/AT; PERRL; nonicteric, Neck Supple; no JVD  Respiratory: CTAB; no wheezes/rales/rhonchi, normal WOB  Cardiovascular: Regular rhythm/rate, + S1/S2; no murmurs, rubs gallops   Gastrointestinal: Soft; NTND; bowel sounds normal and present  Extremities: WWP; 2+ edema extended up to buttock b/l   Neurological: A&Ox3, no obvious focal deficits  Integument: appears pale, no rashes     Vitals:  ============  T(C): 36.7 (09-15-23 @ 11:49), Max: 37.1 (09-15-23 @ 05:59)  HR: 75 (09-15-23 @ 11:49) (61 - 84)  BP: 159/98 (09-15-23 @ 11:49) (146/87 - 172/106)  RR: 18 (09-15-23 @ 11:49) (16 - 18)  SpO2: 98% (09-15-23 @ 11:49) (97% - 100%)        =======================================================  Current Antibiotics:    Other medications:  NIFEdipine XL 30 milliGRAM(s) Oral daily  predniSONE   Tablet 10 milliGRAM(s) Oral daily      =======================================================  Labs:                        7.7    2.81  )-----------( 78       ( 15 Sep 2023 08:03 )             24.7     09-15    141  |  116<H>  |  51<H>  ----------------------------<  106<H>  5.4<H>   |  19<L>  |  1.71<H>    Ca    8.2<L>      15 Sep 2023 08:03  Phos  5.7     09-15  Mg     2.4     09-15    TPro  4.8<L>  /  Alb  2.0<L>  /  TBili  0.2  /  DBili  x   /  AST  23  /  ALT  10  /  AlkPhos  34<L>  09-15      Culture - Urine (collected 09-14-23 @ 19:49)  Source: Clean Catch Clean Catch (Midstream)      < from: US Retroperitoneal Complete (09.15.23 @ 13:28) >  IMPRESSION:  1. Echogenic kidneys and small volume perinephric fluid bilaterally   suggestive of medical renal disease. No hydronephrosis.  2. Small volume ascites.  3. 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  4. 0.7 cm cystic lesion at the posterior wall the bladder, possibly a   ureterocele. Correlate clinically.    < end of copied text >      < from: Xray Chest 1 View- PORTABLE-Urgent (Xray Chest 1 View- PORTABLE-Urgent .) (09.14.23 @ 19:07) >  Findings/  impression: Bilateral pleural effusions. Right upper lobe focal   atelectasis. Right upper lobe nodular cluster, unchanged. Heart,   mediastinum and thorax are unremarkable. Left nipple shadow.    --- End of Report ---    < end of copied text >

## 2023-09-15 NOTE — CONSULT NOTE ADULT - ASSESSMENT
33YF w/ PMHx SLE, Lupus nephritis, pancytopenia admitted to [facility] for SLE and nephritis flare    Hematology consulted for pancytopenia  Labs reviewed.   wbc 2.81, Hb 7.7 (s/p 1 unit prbc in the ED, Hb 6.8), Platelets 78   06/23 Labs: wbc 2.2, hb 7.8, plt 102, ldh 231  Ferritin 574  Iron 32  % saturation 23  Folate 11.9  Vit B12 659  Haptoglobin <10      # Pancytopenia  - Chronic appearing. Anemia workup c/w anemia of chronic inflammation.   Likely 2/2 SLE. Can be compounded by mycophenolate use.   Hemolysis labs positive.   Smear (post transfusion) reviewed by fellow: Few shistocytes, poikolicytosis, echinocytosis present. slight tear drops  Would recommend tony test, if positive, patient likely has a component of chronic auto immune hemolytic anemia and might be treated with steroids as recommended by nephrology/rheumatology teams     D/w Dr Rollins   Recommendations final following attending attestation 33YF w/ PMHx SLE, Lupus nephritis, pancytopenia admitted to [facility] for SLE and nephritis flare    Hematology consulted for pancytopenia  Labs reviewed.   wbc 2.81, Hb 7.7 (s/p 1 unit prbc in the ED, Hb 6.8), Platelets 78   06/23 Labs: wbc 2.2, hb 7.8, plt 102, ldh 231  Ferritin 574  Iron 32  % saturation 23  Folate 11.9  Vit B12 659  Haptoglobin <10      # Pancytopenia  - Chronic appearing. Anemia workup c/w anemia of chronic inflammation.   Likely 2/2 SLE. Can be compounded by mycophenolate use.   Hemolysis labs positive.   Smear (post transfusion) reviewed by fellow: Few shistocytes, poikolicytosis, echinocytosis present. slight tear drops  Would recommend tony test, if positive, patient might have a component of chronic auto immune hemolytic anemia and might be treated with steroids as recommended by nephrology/rheumatology teams     D/w Dr Rollins   Recommendations final following attending attestation 33YF w/ PMHx SLE, Lupus nephritis, pancytopenia admitted to [facility] for SLE and nephritis flare    Hematology consulted for pancytopenia  Labs reviewed.   wbc 2.81, Hb 7.7 (s/p 1 unit prbc in the ED, Hb 6.8), Platelets 78   06/23 Labs: wbc 2.2, hb 7.8, plt 102, ldh 231  Ferritin 574  Iron 32  % saturation 23  Folate 11.9  Vit B12 659  Haptoglobin <10      # Pancytopenia  - Chronic appearing. Anemia workup c/w anemia of chronic inflammation.   Likely 2/2 SLE. Can be compounded by mycophenolate use.   Hemolysis labs positive.   Smear (post transfusion) reviewed by fellow: Few shistocytes, poikolicytosis, echinocytosis present. slight tear drops  Would recommend tony test for differentiation between immune v nonimmune hemolytic anemia     D/w Dr Rollins   Recommendations final following attending attestation

## 2023-09-15 NOTE — PROGRESS NOTE ADULT - PROBLEM SELECTOR PLAN 4
K 5.4 i/s/o renal injury. No EKG changes   1x dose of lokelma  continue to monitor K 5.4 i/s/o renal injury. No EKG changes. s/p lokelma x1    - continue to monitor

## 2023-09-15 NOTE — CONSULT NOTE ADULT - SUBJECTIVE AND OBJECTIVE BOX
HPI:  HPI:  33 F PMHx SLE since middle school c/o dyspnea, leg swelling progressively worsening over the last three months. Patient was at her cardiologist at [facility] yesterday who did a TTE (wnl per patient) and blood work. Sent patient on steroids, hydroxychloroquine, prednisone- started on Ramipril for bp 3 months ago- now w 3 months worsening swelling of legs, fatigue and dyspnea. Patient was at [facility] cardiologist yesterday, who she saw for progressive leg swelling and dyspnea, who did TTE (wnl per pt) and sent labs and got the results and sent labs to patient's rheumatologist who told patient to come to ED--concern for lupus nephritis. Patient's bustos has been worsening and has trouble catching her brreath if she walks many blocks at normal pace. Nephrology consulted for further recommendation.            PAST MEDICAL & SURGICAL HISTORY:  Lupus      No significant past surgical history            Allergies:  sulfa drugs (Rash)      Home Medications:   cholecalciferol oral tablet: 1000 unit(s) orally once a day  hydroxychloroquine 200 mg oral tablet: 1 tab(s) orally 2 times a day  polyethylene glycol 3350 oral powder for reconstitution: 17 gram(s) orally every 12 hours  predniSONE 10 mg oral tablet: 3 tab(s) orally 2 times a day       Hospital Medications:   MEDICATIONS  (STANDING):  dextrose 5%. 1000 milliLiter(s) (100 mL/Hr) IV Continuous <Continuous>  dextrose 5%. 1000 milliLiter(s) (50 mL/Hr) IV Continuous <Continuous>  dextrose 50% Injectable 25 Gram(s) IV Push once  dextrose 50% Injectable 12.5 Gram(s) IV Push once  dextrose 50% Injectable 25 Gram(s) IV Push once  glucagon  Injectable 1 milliGRAM(s) IntraMuscular once  insulin lispro (ADMELOG) corrective regimen sliding scale   SubCutaneous three times a day before meals  insulin lispro (ADMELOG) corrective regimen sliding scale   SubCutaneous at bedtime  methylPREDNISolone sodium succinate IVPB 450 milliGRAM(s) IV Intermittent once  NIFEdipine XL 30 milliGRAM(s) Oral daily      SOCIAL HISTORY:  Denies ETOh, Smoking    Family History:  FAMILY HISTORY:  No pertinent family history in first degree relatives          VITALS:  T(F): 98 (09-15-23 @ 11:49), Max: 98.8 (09-15-23 @ 05:59)  HR: 75 (09-15-23 @ 11:49)  BP: 159/98 (09-15-23 @ 11:49)  RR: 18 (09-15-23 @ 11:49)  SpO2: 98% (09-15-23 @ 11:49)  Wt(kg): --    Height (cm): 160 (09-15 @ 00:15)  Weight (kg): 68.9 (09-15 @ 00:15)  BMI (kg/m2): 26.9 (09-15 @ 00:15)  BSA (m2): 1.72 (09-15 @ 00:15)  CAPILLARY BLOOD GLUCOSE      POCT Blood Glucose.: 140 mg/dL (15 Sep 2023 16:26)      Review of Systems:  CONSTITUTIONAL: No fever or chills.  RESPIRATORY: No shortness of breath, cough  CARDIOVASCULAR: No Chest pain, shortness of breath, palpitations  GASTROINTESTINAL: No abdominal pain, nausea, vomiting, diarrhea  GENITOURINARY: No urinary frequency, gross hematuria, dysuria  NEUROLOGICAL:  weakness  SKIN: No rash or skin lesion  MUSCULOSKELETAL:  b/l LE swelling and abdominal swelling       PHYSICAL EXAM:  GENERAL: Alert, awake, oriented x3   HEENT: HAWK, EOMI, neck supple, no JVP  CHEST/LUNG: Bilateral clear breath sounds  HEART: Regular rate and rhythm, no murmur, no gallops, no rub   ABDOMEN: Soft, nontender,   : No flank or supra pubic tenderness.  EXTREMITIES: b/l 2+ pitting edema   Neurology: AAOx3, no focal neurological deficit  SKIN: No rash or skin lesion     LABS:  09-15    141  |  116<H>  |  51<H>  ----------------------------<  106<H>  5.4<H>   |  19<L>  |  1.71<H>    Ca    8.2<L>      15 Sep 2023 08:03  Phos  5.7     09-15  Mg     2.4     09-15    TPro  4.8<L>  /  Alb  2.0<L>  /  TBili  0.2  /  DBili      /  AST  23  /  ALT  10  /  AlkPhos  34<L>  09-15    Creatinine Trend: 1.71 <--, 1.53 <--                        7.7    2.81  )-----------( 78       ( 15 Sep 2023 08:03 )             24.7     Urine Studies:  Urinalysis Basic - ( 15 Sep 2023 08:03 )    Color:  / Appearance:  / SG:  / pH:   Gluc: 106 mg/dL / Ketone:   / Bili:  / Urobili:    Blood:  / Protein:  / Nitrite:    Leuk Esterase:  / RBC:  / WBC    Sq Epi:  / Non Sq Epi:  / Bacteria:       Sodium, Random Urine: 46 mmol/L (09-14 @ 19:49)  Creatinine, Random Urine: 107 mg/dL (09-14 @ 19:49)  Osmolality, Random Urine: 472 mosm/kg (09-14 @ 19:49)  Potassium, Random Urine: 43 mmol/L (09-14 @ 19:49)           HPI:  HPI:  33 F PMHx SLE since middle school c/o dyspnea, leg swelling progressively worsening over the last three months. Patient was at her cardiologist at [facility] yesterday who did a TTE (wnl per patient) and blood work. Sent patient on steroids, hydroxychloroquine, prednisone- started on Ramipril for bp 3 months ago- now w 3 months worsening swelling of legs, fatigue and dyspnea. Patient was at [facility] cardiologist yesterday, who she saw for progressive leg swelling and dyspnea, who did TTE (wnl per pt) and sent labs and got the results and sent labs to patient's rheumatologist who told patient to come to ED--concern for lupus nephritis. Patient's bustos has been worsening and has trouble catching her brreath if she walks many blocks at normal pace. Nephrology consulted for further recommendation.    was only taking MMF at 500 mg/d for mos at least           PAST MEDICAL & SURGICAL HISTORY:  Lupus-- only complication per pt pancytopenia in past with flare-- no joint, derm or renal complications per pt   creat was 1.0 in recent labs   , 1 gm proteinuria -- from labs done in [facility] on vacation        No significant past surgical history            Allergies:  sulfa drugs (Rash)      Home Medications:   cholecalciferol oral tablet: 1000 unit(s) orally once a day  hydroxychloroquine 200 mg oral tablet: 1 tab(s) orally 2 times a day  polyethylene glycol 3350 oral powder for reconstitution: 17 gram(s) orally every 12 hours  predniSONE 10 mg oral tablet: 3 tab(s) orally 2 times a day       Hospital Medications:   MEDICATIONS  (STANDING):  dextrose 5%. 1000 milliLiter(s) (100 mL/Hr) IV Continuous <Continuous>  dextrose 5%. 1000 milliLiter(s) (50 mL/Hr) IV Continuous <Continuous>  dextrose 50% Injectable 25 Gram(s) IV Push once  dextrose 50% Injectable 12.5 Gram(s) IV Push once  dextrose 50% Injectable 25 Gram(s) IV Push once  glucagon  Injectable 1 milliGRAM(s) IntraMuscular once  insulin lispro (ADMELOG) corrective regimen sliding scale   SubCutaneous three times a day before meals  insulin lispro (ADMELOG) corrective regimen sliding scale   SubCutaneous at bedtime  methylPREDNISolone sodium succinate IVPB 450 milliGRAM(s) IV Intermittent once  NIFEdipine XL 30 milliGRAM(s) Oral daily      SOCIAL HISTORY:  Denies ETOh, Smoking    Family History:  FAMILY HISTORY:  No pertinent family history in first degree relatives          VITALS:  T(F): 98 (09-15-23 @ 11:49), Max: 98.8 (09-15-23 @ 05:59)  HR: 75 (09-15-23 @ 11:49)  BP: 159/98 (09-15-23 @ 11:49)  RR: 18 (09-15-23 @ 11:49)  SpO2: 98% (09-15-23 @ 11:49)  Wt(kg): --    Height (cm): 160 (09-15 @ 00:15)  Weight (kg): 68.9 (09-15 @ 00:15)  BMI (kg/m2): 26.9 (09-15 @ 00:15)  BSA (m2): 1.72 (09-15 @ 00:15)  CAPILLARY BLOOD GLUCOSE      POCT Blood Glucose.: 140 mg/dL (15 Sep 2023 16:26)      Review of Systems:  CONSTITUTIONAL: No fever or chills.  RESPIRATORY: No shortness of breath, cough  CARDIOVASCULAR: No Chest pain, shortness of breath, palpitations  GASTROINTESTINAL: No abdominal pain, nausea, vomiting, diarrhea  GENITOURINARY: No urinary frequency, gross hematuria, dysuria  NEUROLOGICAL:  weakness  SKIN: No rash or skin lesion  MUSCULOSKELETAL:  b/l LE swelling and abdominal swelling       PHYSICAL EXAM:  GENERAL: Alert, awake, oriented x3   HEENT: HAWK, EOMI, neck supple, no JVP  CHEST/LUNG: Bilateral clear breath sounds  HEART: Regular rate and rhythm, no murmur, no gallops, no rub   ABDOMEN: Soft, nontender,   : No flank or supra pubic tenderness.  EXTREMITIES: b/l 2+ pitting edema   Neurology: AAOx3, no focal neurological deficit  SKIN: No rash or skin lesion     LABS:  09-15    141  |  116<H>  |  51<H>  ----------------------------<  106<H>  5.4<H>   |  19<L>  |  1.71<H>    Ca    8.2<L>      15 Sep 2023 08:03  Phos  5.7     09-15  Mg     2.4     09-15    TPro  4.8<L>  /  Alb  2.0<L>  /  TBili  0.2  /  DBili      /  AST  23  /  ALT  10  /  AlkPhos  34<L>  09-15    Creatinine Trend: 1.71 <--, 1.53 <--                        7.7    2.81  )-----------( 78       ( 15 Sep 2023 08:03 )             24.7     Urine Studies:  Urinalysis Basic - ( 15 Sep 2023 08:03 )    Color:  / Appearance:  / SG:  / pH:   Gluc: 106 mg/dL / Ketone:   / Bili:  / Urobili:    Blood:  / Protein:  / Nitrite:    Leuk Esterase:  / RBC:  / WBC    Sq Epi:  / Non Sq Epi:  / Bacteria:       Sodium, Random Urine: 46 mmol/L (09-14 @ 19:49)  Creatinine, Random Urine: 107 mg/dL (09-14 @ 19:49)  Osmolality, Random Urine: 472 mosm/kg (09-14 @ 19:49)  Potassium, Random Urine: 43 mmol/L (09-14 @ 19:49)

## 2023-09-15 NOTE — CONSULT NOTE ADULT - ASSESSMENT
33 Y F with hx of lupus, now presents with KARENA and active urine sediment, edema, concern for possible lupus nephritis, nephrology consulted for further management       KARENA- likley due to lupus nephritis   Baseline sCr of 0.6-0.8, now presents with sCr of 1.71  likey etiology lupus nephritis, given acuity likely RPGN suspecting class IV/V lupus nephritis  Active UA, dysmorphic RBCs seen on urine microscopy   Pancytopenia most likey 2/2 to lupus i/s/o of active flare     Plan:  Start on pulse dose steroids 500mg of methylprednisolone which should also help with pancytopenia   Hold MMF   Renal diet   Lokelma if K+> 5.5  Lasix 40mg IV Qday   Continue with Losartan and Nifedipine   Daily BMP   Please send UPCR for quantification   Formal Renal Sono   Echocardiogram   Follow up PATRICE, dsDNA, C3/C4 Anticardiolipin, B2 glycoprotein   Bx scheduled for 9/18 at 8AM   ·	Patient consented for procedure and scheduled for procedure   ·	Please hold anticoagulation night before procedure   ·	NPO at midnight  ·	Will need active type and screen, Coags, CBC   ·	Continue to trend Hgb and Plts, may require possible transfusion to optimize for bx   ·	BP control goal SBP <130/80          33 Y F with hx of lupus, now presents with KARENA and active urine sediment, edema, concern for possible lupus nephritis, nephrology consulted for further management       KARENA- sydneeley due to lupus nephritis   Baseline sCr of 0.6-0.8, now presents with sCr of 1.71  likey etiology lupus nephritis, given acuity likely RPGN suspecting class IV/V lupus nephritis  Active UA, dysmorphic RBCs seen on urine microscopy   Pancytopenia most likey 2/2 to lupus i/s/o of active flare     Plan:  Start on pulse dose steroids 500mg of methylprednisolone which should also help with pancytopenia   Holding  MMF per heme but consider restart at increased dose for her lupus once counts improve   Renal diet   Lokelma if K+> 5.5  Lasix 40mg IV Qday   Continue with Losartan and Nifedipine   Daily BMP   Please send UPCR for quantification   Formal Renal Sono   Echocardiogram   Follow up PATRICE, dsDNA, C3/C4 Anticardiolipin, B2 glycoprotein   Bx scheduled for 9/18 at 8AM   ·	Patient consented for procedure and scheduled for procedure   ·	Please hold anticoagulation night before procedure   ·	NPO at midnight  ·	Will need active type and screen, Coags, CBC   ·	Continue to trend Hgb and Plts, may require possible transfusion to optimize for bx   ·	BP control goal SBP <130/80          33 Y F with hx of lupus, now presents with KARENA and active urine sediment, edema, concern for possible lupus nephritis, nephrology consulted for further management       KARENA- sydneeley due to lupus nephritis   Baseline sCr of 0.6-0.8, now presents with sCr of 1.71  likey etiology lupus nephritis, given acuity likely RPGN suspecting class IV/V lupus nephritis  Active UA, dysmorphic RBCs seen on urine microscopy   Pancytopenia most likey 2/2 to lupus i/s/o of active flare   Bedside Renal sono with no hydro, normal sized kidneys and b/l echogenicity     Plan:  Start on pulse dose steroids 500mg of methylprednisolone for 3 days last dose should be on 9/17 which should also help with pancytopenia   Holding  MMF per heme but consider restart at increased dose for her lupus once counts improve   Renal diet   Lokelma if K+> 5.5  Lasix 40mg IV Qday   Continue with Losartan and Nifedipine   Daily BMP   Please send UPCR for quantification   Formal Renal Sono   Echocardiogram   Follow up PATRICE, dsDNA, C3/C4 Anticardiolipin, B2 glycoprotein   Bx scheduled for 9/18 at 8AM   ·	Patient consented for procedure and scheduled for procedure   ·	Please hold anticoagulation night before procedure   ·	NPO at midnight  ·	Will need active type and screen, Coags, CBC, CMP   ·	Continue to trend Hgb and Plts, may require possible transfusion to optimize for bx would like Hgb > 8 prior to procedure   ·	BP control goal SBP <130/80

## 2023-09-16 LAB
A1C WITH ESTIMATED AVERAGE GLUCOSE RESULT: 5.2 % — SIGNIFICANT CHANGE UP (ref 4–5.6)
ALBUMIN SERPL ELPH-MCNC: 2.1 G/DL — LOW (ref 3.3–5)
ALP SERPL-CCNC: 34 U/L — LOW (ref 40–120)
ALT FLD-CCNC: 9 U/L — LOW (ref 10–45)
ANA PAT FLD IF-IMP: ABNORMAL
ANA TITR SER: ABNORMAL
ANION GAP SERPL CALC-SCNC: 11 MMOL/L — SIGNIFICANT CHANGE UP (ref 5–17)
ANION GAP SERPL CALC-SCNC: 7 MMOL/L — SIGNIFICANT CHANGE UP (ref 5–17)
ANION GAP SERPL CALC-SCNC: 9 MMOL/L — SIGNIFICANT CHANGE UP (ref 5–17)
ANISOCYTOSIS BLD QL: SLIGHT — SIGNIFICANT CHANGE UP
APTT BLD: 27.4 SEC — SIGNIFICANT CHANGE UP (ref 24.5–35.6)
AST SERPL-CCNC: 21 U/L — SIGNIFICANT CHANGE UP (ref 10–40)
BASOPHILS # BLD AUTO: 0 K/UL — SIGNIFICANT CHANGE UP (ref 0–0.2)
BASOPHILS NFR BLD AUTO: 0 % — SIGNIFICANT CHANGE UP (ref 0–2)
BILIRUB SERPL-MCNC: <0.2 MG/DL — SIGNIFICANT CHANGE UP (ref 0.2–1.2)
BUN SERPL-MCNC: 58 MG/DL — HIGH (ref 7–23)
BUN SERPL-MCNC: 65 MG/DL — HIGH (ref 7–23)
BUN SERPL-MCNC: 71 MG/DL — HIGH (ref 7–23)
BURR CELLS BLD QL SMEAR: PRESENT — SIGNIFICANT CHANGE UP
CALCIUM SERPL-MCNC: 8.1 MG/DL — LOW (ref 8.4–10.5)
CALCIUM SERPL-MCNC: 8.1 MG/DL — LOW (ref 8.4–10.5)
CALCIUM SERPL-MCNC: 8.3 MG/DL — LOW (ref 8.4–10.5)
CARDIOLIPIN AB SER-ACNC: NEGATIVE — SIGNIFICANT CHANGE UP
CHLORIDE SERPL-SCNC: 114 MMOL/L — HIGH (ref 96–108)
CHLORIDE SERPL-SCNC: 114 MMOL/L — HIGH (ref 96–108)
CHLORIDE SERPL-SCNC: 115 MMOL/L — HIGH (ref 96–108)
CO2 SERPL-SCNC: 18 MMOL/L — LOW (ref 22–31)
CO2 SERPL-SCNC: 18 MMOL/L — LOW (ref 22–31)
CO2 SERPL-SCNC: 19 MMOL/L — LOW (ref 22–31)
CREAT SERPL-MCNC: 1.95 MG/DL — HIGH (ref 0.5–1.3)
CREAT SERPL-MCNC: 2.09 MG/DL — HIGH (ref 0.5–1.3)
CREAT SERPL-MCNC: 2.11 MG/DL — HIGH (ref 0.5–1.3)
CULTURE RESULTS: NO GROWTH — SIGNIFICANT CHANGE UP
DSDNA AB SER-ACNC: >1000 IU/ML — HIGH
EGFR: 31 ML/MIN/1.73M2 — LOW
EGFR: 31 ML/MIN/1.73M2 — LOW
EGFR: 34 ML/MIN/1.73M2 — LOW
EOSINOPHIL # BLD AUTO: 0 K/UL — SIGNIFICANT CHANGE UP (ref 0–0.5)
EOSINOPHIL NFR BLD AUTO: 0 % — SIGNIFICANT CHANGE UP (ref 0–6)
ESTIMATED AVERAGE GLUCOSE: 103 MG/DL — SIGNIFICANT CHANGE UP (ref 68–114)
GIANT PLATELETS BLD QL SMEAR: PRESENT — SIGNIFICANT CHANGE UP
GLUCOSE BLDC GLUCOMTR-MCNC: 140 MG/DL — HIGH (ref 70–99)
GLUCOSE BLDC GLUCOMTR-MCNC: 146 MG/DL — HIGH (ref 70–99)
GLUCOSE BLDC GLUCOMTR-MCNC: 153 MG/DL — HIGH (ref 70–99)
GLUCOSE BLDC GLUCOMTR-MCNC: 54 MG/DL — CRITICAL LOW (ref 70–99)
GLUCOSE BLDC GLUCOMTR-MCNC: 57 MG/DL — LOW (ref 70–99)
GLUCOSE BLDC GLUCOMTR-MCNC: 62 MG/DL — LOW (ref 70–99)
GLUCOSE BLDC GLUCOMTR-MCNC: 87 MG/DL — SIGNIFICANT CHANGE UP (ref 70–99)
GLUCOSE SERPL-MCNC: 134 MG/DL — HIGH (ref 70–99)
GLUCOSE SERPL-MCNC: 155 MG/DL — HIGH (ref 70–99)
GLUCOSE SERPL-MCNC: 47 MG/DL — CRITICAL LOW (ref 70–99)
HAPTOGLOB SERPL-MCNC: <10 MG/DL — LOW (ref 34–200)
HCT VFR BLD CALC: 25.7 % — LOW (ref 34.5–45)
HGB BLD-MCNC: 8.1 G/DL — LOW (ref 11.5–15.5)
HYPOCHROMIA BLD QL: SLIGHT — SIGNIFICANT CHANGE UP
LDH SERPL L TO P-CCNC: 310 U/L — HIGH (ref 50–242)
LYMPHOCYTES # BLD AUTO: 0 % — LOW (ref 13–44)
LYMPHOCYTES # BLD AUTO: 0 K/UL — LOW (ref 1–3.3)
MACROCYTES BLD QL: SLIGHT — SIGNIFICANT CHANGE UP
MAGNESIUM SERPL-MCNC: 2.4 MG/DL — SIGNIFICANT CHANGE UP (ref 1.6–2.6)
MANUAL SMEAR VERIFICATION: SIGNIFICANT CHANGE UP
MCHC RBC-ENTMCNC: 29.2 PG — SIGNIFICANT CHANGE UP (ref 27–34)
MCHC RBC-ENTMCNC: 31.5 GM/DL — LOW (ref 32–36)
MCV RBC AUTO: 92.8 FL — SIGNIFICANT CHANGE UP (ref 80–100)
METAMYELOCYTES # FLD: 0.9 % — HIGH (ref 0–0)
MONOCYTES # BLD AUTO: 0.02 K/UL — SIGNIFICANT CHANGE UP (ref 0–0.9)
MONOCYTES NFR BLD AUTO: 0.9 % — LOW (ref 2–14)
MYELOCYTES NFR BLD: 0.9 % — HIGH (ref 0–0)
NEUTROPHILS # BLD AUTO: 2.62 K/UL — SIGNIFICANT CHANGE UP (ref 1.8–7.4)
NEUTROPHILS NFR BLD AUTO: 97.3 % — HIGH (ref 43–77)
OVALOCYTES BLD QL SMEAR: SIGNIFICANT CHANGE UP
PHOSPHATE SERPL-MCNC: 6 MG/DL — HIGH (ref 2.5–4.5)
PLAT MORPH BLD: ABNORMAL
PLATELET # BLD AUTO: 79 K/UL — LOW (ref 150–400)
POIKILOCYTOSIS BLD QL AUTO: SIGNIFICANT CHANGE UP
POLYCHROMASIA BLD QL SMEAR: SLIGHT — SIGNIFICANT CHANGE UP
POTASSIUM SERPL-MCNC: 5.1 MMOL/L — SIGNIFICANT CHANGE UP (ref 3.5–5.3)
POTASSIUM SERPL-MCNC: 5.7 MMOL/L — HIGH (ref 3.5–5.3)
POTASSIUM SERPL-MCNC: 5.8 MMOL/L — HIGH (ref 3.5–5.3)
POTASSIUM SERPL-SCNC: 5.1 MMOL/L — SIGNIFICANT CHANGE UP (ref 3.5–5.3)
POTASSIUM SERPL-SCNC: 5.7 MMOL/L — HIGH (ref 3.5–5.3)
POTASSIUM SERPL-SCNC: 5.8 MMOL/L — HIGH (ref 3.5–5.3)
PROT SERPL-MCNC: 5 G/DL — LOW (ref 6–8.3)
RBC # BLD: 2.77 M/UL — LOW (ref 3.8–5.2)
RBC # BLD: 2.77 M/UL — LOW (ref 3.8–5.2)
RBC # FLD: 20 % — HIGH (ref 10.3–14.5)
RBC BLD AUTO: ABNORMAL
RETICS #: 55.4 K/UL — SIGNIFICANT CHANGE UP (ref 25–125)
RETICS/RBC NFR: 2 % — SIGNIFICANT CHANGE UP (ref 0.5–2.5)
SCHISTOCYTES BLD QL AUTO: SLIGHT — SIGNIFICANT CHANGE UP
SMUDGE CELLS # BLD: PRESENT — SIGNIFICANT CHANGE UP
SODIUM SERPL-SCNC: 140 MMOL/L — SIGNIFICANT CHANGE UP (ref 135–145)
SODIUM SERPL-SCNC: 142 MMOL/L — SIGNIFICANT CHANGE UP (ref 135–145)
SODIUM SERPL-SCNC: 143 MMOL/L — SIGNIFICANT CHANGE UP (ref 135–145)
SPECIMEN SOURCE: SIGNIFICANT CHANGE UP
WBC # BLD: 2.69 K/UL — LOW (ref 3.8–10.5)
WBC # FLD AUTO: 2.69 K/UL — LOW (ref 3.8–10.5)

## 2023-09-16 PROCEDURE — 93010 ELECTROCARDIOGRAM REPORT: CPT

## 2023-09-16 PROCEDURE — 99233 SBSQ HOSP IP/OBS HIGH 50: CPT | Mod: GC

## 2023-09-16 RX ORDER — CALCIUM GLUCONATE 100 MG/ML
1 VIAL (ML) INTRAVENOUS ONCE
Refills: 0 | Status: COMPLETED | OUTPATIENT
Start: 2023-09-16 | End: 2023-09-16

## 2023-09-16 RX ORDER — DEXTROSE 50 % IN WATER 50 %
50 SYRINGE (ML) INTRAVENOUS ONCE
Refills: 0 | Status: COMPLETED | OUTPATIENT
Start: 2023-09-16 | End: 2023-09-16

## 2023-09-16 RX ORDER — INSULIN HUMAN 100 [IU]/ML
5 INJECTION, SOLUTION SUBCUTANEOUS ONCE
Refills: 0 | Status: COMPLETED | OUTPATIENT
Start: 2023-09-16 | End: 2023-09-16

## 2023-09-16 RX ORDER — LISINOPRIL 2.5 MG/1
10 TABLET ORAL DAILY
Refills: 0 | Status: DISCONTINUED | OUTPATIENT
Start: 2023-09-16 | End: 2023-09-16

## 2023-09-16 RX ORDER — SODIUM ZIRCONIUM CYCLOSILICATE 10 G/10G
10 POWDER, FOR SUSPENSION ORAL THREE TIMES A DAY
Refills: 0 | Status: DISCONTINUED | OUTPATIENT
Start: 2023-09-16 | End: 2023-09-18

## 2023-09-16 RX ORDER — FUROSEMIDE 40 MG
40 TABLET ORAL DAILY
Refills: 0 | Status: DISCONTINUED | OUTPATIENT
Start: 2023-09-16 | End: 2023-09-16

## 2023-09-16 RX ORDER — FUROSEMIDE 40 MG
40 TABLET ORAL ONCE
Refills: 0 | Status: COMPLETED | OUTPATIENT
Start: 2023-09-16 | End: 2023-09-16

## 2023-09-16 RX ORDER — DEXTROSE 50 % IN WATER 50 %
15 SYRINGE (ML) INTRAVENOUS ONCE
Refills: 0 | Status: COMPLETED | OUTPATIENT
Start: 2023-09-16 | End: 2023-09-16

## 2023-09-16 RX ORDER — SODIUM BICARBONATE 1 MEQ/ML
650 SYRINGE (ML) INTRAVENOUS THREE TIMES A DAY
Refills: 0 | Status: DISCONTINUED | OUTPATIENT
Start: 2023-09-16 | End: 2023-09-20

## 2023-09-16 RX ADMIN — LISINOPRIL 10 MILLIGRAM(S): 2.5 TABLET ORAL at 07:48

## 2023-09-16 RX ADMIN — INSULIN HUMAN 5 UNIT(S): 100 INJECTION, SOLUTION SUBCUTANEOUS at 18:21

## 2023-09-16 RX ADMIN — Medication 40 MILLIGRAM(S): at 11:02

## 2023-09-16 RX ADMIN — SODIUM ZIRCONIUM CYCLOSILICATE 10 GRAM(S): 10 POWDER, FOR SUSPENSION ORAL at 14:05

## 2023-09-16 RX ADMIN — Medication 50 MILLILITER(S): at 18:22

## 2023-09-16 RX ADMIN — Medication 1: at 13:43

## 2023-09-16 RX ADMIN — Medication 100 MILLIGRAM(S): at 07:54

## 2023-09-16 RX ADMIN — Medication 100 GRAM(S): at 18:21

## 2023-09-16 RX ADMIN — Medication 30 MILLIGRAM(S): at 05:03

## 2023-09-16 NOTE — PROGRESS NOTE ADULT - SUBJECTIVE AND OBJECTIVE BOX
Patient is a 33y Female seen and evaluated at bedside. No acute distress, does not offer any complaints at this time. sCr bumped to 1.91. Plan for bx on Monday 9/18      Meds:    dextrose 5%. 1000 <Continuous>  dextrose 5%. 1000 <Continuous>  dextrose 50% Injectable 12.5 once  dextrose 50% Injectable 25 once  dextrose 50% Injectable 25 once  dextrose Oral Gel 15 once PRN  ethacrynic acid IVPB 50 once  glucagon  Injectable 1 once  insulin lispro (ADMELOG) corrective regimen sliding scale  three times a day before meals  insulin lispro (ADMELOG) corrective regimen sliding scale  at bedtime  methylPREDNISolone sodium succinate IVPB 500 every 24 hours  NIFEdipine XL 30 daily  sodium zirconium cyclosilicate 10 three times a day      T(C): , Max: 36.9 (09-15-23 @ 20:30)  T(F): , Max: 98.5 (09-15-23 @ 20:30)  HR: 76 (09-16-23 @ 04:45)  BP: 162/96 (09-16-23 @ 04:45)  BP(mean): --  RR: 19 (09-16-23 @ 04:45)  SpO2: 97% (09-16-23 @ 04:45)  Wt(kg): --        Review of Systems:  ROS negative except as per HPI      PHYSICAL EXAM:  GENERAL: Alert, awake, oriented x3   HEENT: HAWK, EOMI, neck supple, no JVP  CHEST/LUNG: Bilateral clear breath sounds  HEART: Regular rate and rhythm, no murmur, no gallops, no rub   ABDOMEN: Soft, nontender,   : No flank or supra pubic tenderness.  EXTREMITIES: b/l 2+ pitting edema   Neurology: AAOx3, no focal neurological deficit  SKIN: No rash or skin lesion         LABS:                        8.1    2.69  )-----------( 79       ( 16 Sep 2023 08:28 )             25.7     09-16    140  |  115<H>  |  58<H>  ----------------------------<  134<H>  5.7<H>   |  18<L>  |  1.95<H>    Ca    8.1<L>      16 Sep 2023 08:28  Phos  6.0     09-16  Mg     2.4     09-16    TPro  5.0<L>  /  Alb  2.1<L>  /  TBili  <0.2  /  DBili  x   /  AST  21  /  ALT  9<L>  /  AlkPhos  34<L>  09-16      PT/INR - ( 14 Sep 2023 19:30 )   PT: 9.0 sec;   INR: 0.78          PTT - ( 16 Sep 2023 08:28 )  PTT:27.4 sec  Urinalysis Basic - ( 16 Sep 2023 08:28 )    Color: x / Appearance: x / SG: x / pH: x  Gluc: 134 mg/dL / Ketone: x  / Bili: x / Urobili: x   Blood: x / Protein: x / Nitrite: x   Leuk Esterase: x / RBC: x / WBC x   Sq Epi: x / Non Sq Epi: x / Bacteria: x      Creatinine, Random Urine: 60 mg/dL (09-15 @ 19:33)  Protein/Creatinine Ratio Calculation: 3.5 Ratio (09-15 @ 19:33)  Sodium, Random Urine: 46 mmol/L (09-14 @ 19:49)  Creatinine, Random Urine: 107 mg/dL (09-14 @ 19:49)  Protein/Creatinine Ratio Calculation: 7.6 Ratio (09-14 @ 19:49)  Osmolality, Random Urine: 472 mosm/kg (09-14 @ 19:49)  Potassium, Random Urine: 43 mmol/L (09-14 @ 19:49)        RADIOLOGY & ADDITIONAL STUDIES:           Statement Selected

## 2023-09-16 NOTE — PROGRESS NOTE ADULT - PROBLEM SELECTOR PLAN 1
KARENA vs. progression of Lupus Nephritis. Renal consulted. Renal US is consistent with chronic medical renal disease.     - hold mycophenolate and Hydroxychloroquine for KARENA  Plan for renal biopsy    pulse dose steroids 500mg of methylprednisolone x3 (last dose 9/17)  - Renal diet   - Lokelma if K+> 5.5  - ethacrynic acid 50 qd i/s/o sulfa drug allergy.    - c/w nifedipine fo BP  - pending kidney Bx 9/18

## 2023-09-16 NOTE — PROVIDER CONTACT NOTE (HYPOGLYCEMIA EVENT) - NS PROVIDER CONTACT RECOMMEND-HYPO
pt asymptomatic  continue to monitor finger stick
pt asymptomatic. 8 OZ JUICE GIVEN AS ORDERED, WILL CONTINUE TO MONITOR

## 2023-09-16 NOTE — PROGRESS NOTE ADULT - SUBJECTIVE AND OBJECTIVE BOX
pt with no acute complaints  denies sob/cp  ros otherwise negative     PHYSICAL EXAM     General: NAD  HEENT: NC/AT; PERRL; nonicteric, Neck Supple; no JVD  Respiratory: CTAB; no wheezes/rales/rhonchi, normal WOB  Cardiovascular: Regular rhythm/rate, + S1/S2; no murmurs, rubs gallops   Gastrointestinal: Soft; NTND; bowel sounds normal and present  Extremities: WWP; 2+ edema extended up to buttock b/l   Neurological: A&Ox3, no obvious focal deficits  Integument: appears pale, no rashes     Vitals:  ============  T(C): 36.7 (09-15-23 @ 11:49), Max: 37.1 (09-15-23 @ 05:59)  HR: 75 (09-15-23 @ 11:49) (61 - 84)  BP: 159/98 (09-15-23 @ 11:49) (146/87 - 172/106)  RR: 18 (09-15-23 @ 11:49) (16 - 18)  SpO2: 98% (09-15-23 @ 11:49) (97% - 100%)        =======================================================  Current Antibiotics:    Other medications:  NIFEdipine XL 30 milliGRAM(s) Oral daily  predniSONE   Tablet 10 milliGRAM(s) Oral daily      =======================================================  Labs:                        7.7    2.81  )-----------( 78       ( 15 Sep 2023 08:03 )             24.7     09-15    141  |  116<H>  |  51<H>  ----------------------------<  106<H>  5.4<H>   |  19<L>  |  1.71<H>    Ca    8.2<L>      15 Sep 2023 08:03  Phos  5.7     09-15  Mg     2.4     09-15    TPro  4.8<L>  /  Alb  2.0<L>  /  TBili  0.2  /  DBili  x   /  AST  23  /  ALT  10  /  AlkPhos  34<L>  09-15      Culture - Urine (collected 09-14-23 @ 19:49)  Source: Clean Catch Clean Catch (Midstream)      < from: US Retroperitoneal Complete (09.15.23 @ 13:28) >  IMPRESSION:  1. Echogenic kidneys and small volume perinephric fluid bilaterally   suggestive of medical renal disease. No hydronephrosis.  2. Small volume ascites.  3. 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  4. 0.7 cm cystic lesion at the posterior wall the bladder, possibly a   ureterocele. Correlate clinically.    < end of copied text >      < from: Xray Chest 1 View- PORTABLE-Urgent (Xray Chest 1 View- PORTABLE-Urgent .) (09.14.23 @ 19:07) >  Findings/  impression: Bilateral pleural effusions. Right upper lobe focal   atelectasis. Right upper lobe nodular cluster, unchanged. Heart,   mediastinum and thorax are unremarkable. Left nipple shadow.    --- End of Report ---    < end of copied text >

## 2023-09-16 NOTE — PROGRESS NOTE ADULT - ASSESSMENT
33Y F with non-oliguric KARENA 2/2 RPGN lupus nephritis, with plan for kidney bx on 9/18, continue to optimize volume status and electrolytes       Non-oliguric KARENA 2/2 RPGN lupus nephritis   Baseline sCr of 0.6-0.8, now sCr at 1.91  UPCR quantifed at 3.5  C3 and C4 low, indicating active flare     Plan:  C/w  pulse dose steroids 500mg of methylprednisolone day 2 today  last dose should be on 9/17   HOLD MMF per heme but consider restart at increased dose for her lupus once counts improve   Renal diet   Lokelma 10mg TID for 48hrs please recheck BMP at 2PM and EKG stat   Lasix 40mg IV Qday   Continue with  Nifedipine   Daily BMP   Follow up PATRICE, dsDNA, Anticardiolipin, B2 glycoprotein   Bx scheduled for 9/18 at 8AM   Patient consented for procedure and scheduled for procedure   Please hold anticoagulation night before procedure   NPO at midnight  Will need active type and screen, Coags, CBC, CMP   Continue to trend Hgb and Plts, may require possible transfusion to optimize for bx would like Hgb > 8 prior to procedure   BP control goal SBP <130/80

## 2023-09-16 NOTE — PROVIDER CONTACT NOTE (HYPOGLYCEMIA EVENT) - NS PROVIDER CONTACT BACKGROUND-HYPO
Age: 33y    Gender: Female    POCT Blood Glucose:  54 mg/dL (09-16-23 @ 20:42)  57 mg/dL (09-16-23 @ 19:53)  146 mg/dL (09-16-23 @ 18:18)  153 mg/dL (09-16-23 @ 13:14)  140 mg/dL (09-16-23 @ 09:16)  155 mg/dL (09-15-23 @ 22:35)      eMAR:dextrose 50% Injectable   50 milliLiter(s) IV Push (09-16-23 @ 18:22)    insulin lispro (ADMELOG) corrective regimen sliding scale   1 Unit(s) SubCutaneous (09-16-23 @ 13:43)    insulin lispro (ADMELOG) corrective regimen sliding scale   0 Unit(s) SubCutaneous (09-15-23 @ 22:43)    insulin regular  human recombinant   5 Unit(s) IV Push (09-16-23 @ 18:21)    methylPREDNISolone sodium succinate IVPB   100 mL/Hr IV Intermittent (09-16-23 @ 07:54)    
Age: 33y    Gender: Female    POCT Blood Glucose:  57 mg/dL (09-16-23 @ 19:53)  146 mg/dL (09-16-23 @ 18:18)  153 mg/dL (09-16-23 @ 13:14)  140 mg/dL (09-16-23 @ 09:16)  155 mg/dL (09-15-23 @ 22:35)      eMAR:  dextrose 50% Injectable   50 milliLiter(s) IV Push (09-16-23 @ 18:22)    insulin lispro (ADMELOG) corrective regimen sliding scale   1 Unit(s) SubCutaneous (09-16-23 @ 13:43)    insulin lispro (ADMELOG) corrective regimen sliding scale   0 Unit(s) SubCutaneous (09-15-23 @ 22:43)    insulin regular  human recombinant   5 Unit(s) IV Push (09-16-23 @ 18:21)    methylPREDNISolone sodium succinate IVPB   100 mL/Hr IV Intermittent (09-16-23 @ 07:54)

## 2023-09-16 NOTE — PROGRESS NOTE ADULT - PROBLEM SELECTOR PLAN 3
Hgb 6.8 on admission and 7.7 s/p 1u pRBC. Likely 2/2 Lupus, and/or mycophenolate. Iron study correlates with anemia of chronic disease. 9/15 post transfusion labs shows schistocytes and with lab results indicative of hemolysis. Jasmine negative     - holding mycophenolate  - will maintain 2 large bore IV  - TS if Hgb <7  - will continue to f/u renal recs

## 2023-09-17 LAB
ANION GAP SERPL CALC-SCNC: 7 MMOL/L — SIGNIFICANT CHANGE UP (ref 5–17)
ANION GAP SERPL CALC-SCNC: 9 MMOL/L — SIGNIFICANT CHANGE UP (ref 5–17)
APTT BLD: 25.5 SEC — SIGNIFICANT CHANGE UP (ref 24.5–35.6)
BLD GP AB SCN SERPL QL: NEGATIVE — SIGNIFICANT CHANGE UP
BUN SERPL-MCNC: 74 MG/DL — HIGH (ref 7–23)
BUN SERPL-MCNC: 82 MG/DL — HIGH (ref 7–23)
CALCIUM SERPL-MCNC: 8.2 MG/DL — LOW (ref 8.4–10.5)
CALCIUM SERPL-MCNC: 8.3 MG/DL — LOW (ref 8.4–10.5)
CHLORIDE SERPL-SCNC: 112 MMOL/L — HIGH (ref 96–108)
CHLORIDE SERPL-SCNC: 114 MMOL/L — HIGH (ref 96–108)
CO2 SERPL-SCNC: 20 MMOL/L — LOW (ref 22–31)
CO2 SERPL-SCNC: 20 MMOL/L — LOW (ref 22–31)
CREAT SERPL-MCNC: 2.11 MG/DL — HIGH (ref 0.5–1.3)
CREAT SERPL-MCNC: 2.25 MG/DL — HIGH (ref 0.5–1.3)
EGFR: 28 ML/MIN/1.73M2 — LOW
EGFR: 31 ML/MIN/1.73M2 — LOW
GLUCOSE BLDC GLUCOMTR-MCNC: 119 MG/DL — HIGH (ref 70–99)
GLUCOSE BLDC GLUCOMTR-MCNC: 161 MG/DL — HIGH (ref 70–99)
GLUCOSE BLDC GLUCOMTR-MCNC: 178 MG/DL — HIGH (ref 70–99)
GLUCOSE BLDC GLUCOMTR-MCNC: 83 MG/DL — SIGNIFICANT CHANGE UP (ref 70–99)
GLUCOSE BLDC GLUCOMTR-MCNC: 99 MG/DL — SIGNIFICANT CHANGE UP (ref 70–99)
GLUCOSE SERPL-MCNC: 163 MG/DL — HIGH (ref 70–99)
GLUCOSE SERPL-MCNC: 94 MG/DL — SIGNIFICANT CHANGE UP (ref 70–99)
HCT VFR BLD CALC: 26 % — LOW (ref 34.5–45)
HGB BLD-MCNC: 8.4 G/DL — LOW (ref 11.5–15.5)
INR BLD: 0.79 — LOW (ref 0.85–1.18)
MAGNESIUM SERPL-MCNC: 2.3 MG/DL — SIGNIFICANT CHANGE UP (ref 1.6–2.6)
MAGNESIUM SERPL-MCNC: 2.4 MG/DL — SIGNIFICANT CHANGE UP (ref 1.6–2.6)
MCHC RBC-ENTMCNC: 29.5 PG — SIGNIFICANT CHANGE UP (ref 27–34)
MCHC RBC-ENTMCNC: 32.3 GM/DL — SIGNIFICANT CHANGE UP (ref 32–36)
MCV RBC AUTO: 91.2 FL — SIGNIFICANT CHANGE UP (ref 80–100)
NRBC # BLD: 0 /100 WBCS — SIGNIFICANT CHANGE UP (ref 0–0)
PHOSPHATE SERPL-MCNC: 5.4 MG/DL — HIGH (ref 2.5–4.5)
PHOSPHATE SERPL-MCNC: 5.7 MG/DL — HIGH (ref 2.5–4.5)
PLATELET # BLD AUTO: 96 K/UL — LOW (ref 150–400)
POTASSIUM SERPL-MCNC: 5.1 MMOL/L — SIGNIFICANT CHANGE UP (ref 3.5–5.3)
POTASSIUM SERPL-MCNC: 5.4 MMOL/L — HIGH (ref 3.5–5.3)
POTASSIUM SERPL-SCNC: 5.1 MMOL/L — SIGNIFICANT CHANGE UP (ref 3.5–5.3)
POTASSIUM SERPL-SCNC: 5.4 MMOL/L — HIGH (ref 3.5–5.3)
PROTHROM AB SERPL-ACNC: 9.1 SEC — LOW (ref 9.5–13)
RBC # BLD: 2.85 M/UL — LOW (ref 3.8–5.2)
RBC # FLD: 19.7 % — HIGH (ref 10.3–14.5)
RH IG SCN BLD-IMP: POSITIVE — SIGNIFICANT CHANGE UP
SODIUM SERPL-SCNC: 139 MMOL/L — SIGNIFICANT CHANGE UP (ref 135–145)
SODIUM SERPL-SCNC: 143 MMOL/L — SIGNIFICANT CHANGE UP (ref 135–145)
WBC # BLD: 7.34 K/UL — SIGNIFICANT CHANGE UP (ref 3.8–10.5)
WBC # FLD AUTO: 7.34 K/UL — SIGNIFICANT CHANGE UP (ref 3.8–10.5)

## 2023-09-17 PROCEDURE — 99233 SBSQ HOSP IP/OBS HIGH 50: CPT | Mod: GC

## 2023-09-17 RX ORDER — FUROSEMIDE 40 MG
40 TABLET ORAL DAILY
Refills: 0 | Status: DISCONTINUED | OUTPATIENT
Start: 2023-09-18 | End: 2023-09-18

## 2023-09-17 RX ORDER — NIFEDIPINE 30 MG
60 TABLET, EXTENDED RELEASE 24 HR ORAL DAILY
Refills: 0 | Status: DISCONTINUED | OUTPATIENT
Start: 2023-09-18 | End: 2023-09-19

## 2023-09-17 RX ORDER — FUROSEMIDE 40 MG
40 TABLET ORAL ONCE
Refills: 0 | Status: COMPLETED | OUTPATIENT
Start: 2023-09-17 | End: 2023-09-17

## 2023-09-17 RX ORDER — POLYETHYLENE GLYCOL 3350 17 G/17G
17 POWDER, FOR SOLUTION ORAL ONCE
Refills: 0 | Status: COMPLETED | OUTPATIENT
Start: 2023-09-17 | End: 2023-09-17

## 2023-09-17 RX ORDER — POLYETHYLENE GLYCOL 3350 17 G/17G
17 POWDER, FOR SOLUTION ORAL DAILY
Refills: 0 | Status: DISCONTINUED | OUTPATIENT
Start: 2023-09-18 | End: 2023-09-21

## 2023-09-17 RX ORDER — SENNA PLUS 8.6 MG/1
2 TABLET ORAL AT BEDTIME
Refills: 0 | Status: DISCONTINUED | OUTPATIENT
Start: 2023-09-18 | End: 2023-09-21

## 2023-09-17 RX ORDER — FUROSEMIDE 40 MG
TABLET ORAL
Refills: 0 | Status: DISCONTINUED | OUTPATIENT
Start: 2023-09-17 | End: 2023-09-18

## 2023-09-17 RX ORDER — SENNA PLUS 8.6 MG/1
2 TABLET ORAL ONCE
Refills: 0 | Status: COMPLETED | OUTPATIENT
Start: 2023-09-17 | End: 2023-09-17

## 2023-09-17 RX ADMIN — POLYETHYLENE GLYCOL 3350 17 GRAM(S): 17 POWDER, FOR SOLUTION ORAL at 11:14

## 2023-09-17 RX ADMIN — SENNA PLUS 2 TABLET(S): 8.6 TABLET ORAL at 16:26

## 2023-09-17 RX ADMIN — Medication 650 MILLIGRAM(S): at 08:45

## 2023-09-17 RX ADMIN — Medication 40 MILLIGRAM(S): at 12:15

## 2023-09-17 RX ADMIN — SODIUM ZIRCONIUM CYCLOSILICATE 10 GRAM(S): 10 POWDER, FOR SUSPENSION ORAL at 22:44

## 2023-09-17 RX ADMIN — Medication 650 MILLIGRAM(S): at 13:29

## 2023-09-17 RX ADMIN — SODIUM ZIRCONIUM CYCLOSILICATE 10 GRAM(S): 10 POWDER, FOR SUSPENSION ORAL at 08:45

## 2023-09-17 RX ADMIN — Medication 30 MILLIGRAM(S): at 08:43

## 2023-09-17 RX ADMIN — SODIUM ZIRCONIUM CYCLOSILICATE 10 GRAM(S): 10 POWDER, FOR SUSPENSION ORAL at 13:29

## 2023-09-17 RX ADMIN — Medication 100 MILLIGRAM(S): at 08:46

## 2023-09-17 RX ADMIN — Medication 15 GRAM(S): at 04:29

## 2023-09-17 RX ADMIN — Medication 650 MILLIGRAM(S): at 22:44

## 2023-09-17 NOTE — PROGRESS NOTE ADULT - PROBLEM SELECTOR PLAN 3
Hgb 6.8 on admission and 7.7 s/p 1u pRBC. Likely 2/2 Lupus, and/or mycophenolate. Iron study correlates with anemia of chronic disease. 9/15 post transfusion labs shows schistocytes and with lab results indicative of hemolysis. Jasmine negative     - holding mycophenolate  - will maintain 2 large bore IV  - TS if Hgb <7  - will continue to f/u renal recs Stable. s/p 1u pRBC. Likely 2/2 Lupus, and/or mycophenolate. Iron study correlates with anemia of chronic disease. 9/15 post transfusion labs shows schistocytes and with lab results indicative of hemolysis. Jasmine negative     - holding mycophenolate  - will maintain 2 large bore IV  - TS if Hgb <7

## 2023-09-17 NOTE — PROGRESS NOTE ADULT - SUBJECTIVE AND OBJECTIVE BOX
Patient is a 33y Female seen and evaluated at bedside. No acute distress, states that she feels well. Stable kidney function, K+ at 5.1.       Meds:    dextrose 5%. 1000 <Continuous>  dextrose 5%. 1000 <Continuous>  dextrose 50% Injectable 25 once  dextrose 50% Injectable 12.5 once  dextrose 50% Injectable 25 once  dextrose Oral Gel 15 once PRN  glucagon  Injectable 1 once  insulin lispro (ADMELOG) corrective regimen sliding scale  at bedtime  insulin lispro (ADMELOG) corrective regimen sliding scale  three times a day before meals  NIFEdipine XL 30 daily  polyethylene glycol 3350 17 once  sodium bicarbonate 650 three times a day  sodium zirconium cyclosilicate 10 three times a day      T(C): , Max: 36.8 (09-16-23 @ 14:00)  T(F): , Max: 98.2 (09-16-23 @ 14:00)  HR: 75 (09-17-23 @ 06:42)  BP: 148/92 (09-17-23 @ 06:42)  BP(mean): --  RR: 17 (09-16-23 @ 14:00)  SpO2: 97% (09-17-23 @ 06:42)  Wt(kg): --        Review of Systems:  ROS negative except as per HPI      PHYSICAL EXAM:  GENERAL: Alert, awake, oriented x3   HEENT: HAWK, EOMI, neck supple, no JVP  CHEST/LUNG: Bilateral clear breath sounds  HEART: Regular rate and rhythm, no murmur, no gallops, no rub   ABDOMEN: Soft, nontender,   : No flank or supra pubic tenderness.  EXTREMITIES: b/l 2+ pitting edema   Neurology: AAOx3, no focal neurological deficit  SKIN: No rash or skin lesion       LABS:                        8.4    7.34  )-----------( 96       ( 17 Sep 2023 07:04 )             26.0     09-17    143  |  114<H>  |  74<H>  ----------------------------<  94  5.1   |  20<L>  |  2.11<H>    Ca    8.2<L>      17 Sep 2023 07:04  Phos  5.4     09-17  Mg     2.4     09-17    TPro  5.0<L>  /  Alb  2.1<L>  /  TBili  <0.2  /  DBili  x   /  AST  21  /  ALT  9<L>  /  AlkPhos  34<L>  09-16      PT/INR - ( 17 Sep 2023 07:04 )   PT: 9.1 sec;   INR: 0.79          PTT - ( 17 Sep 2023 07:04 )  PTT:25.5 sec  Urinalysis Basic - ( 17 Sep 2023 07:04 )    Color: x / Appearance: x / SG: x / pH: x  Gluc: 94 mg/dL / Ketone: x  / Bili: x / Urobili: x   Blood: x / Protein: x / Nitrite: x   Leuk Esterase: x / RBC: x / WBC x   Sq Epi: x / Non Sq Epi: x / Bacteria: x      Creatinine, Random Urine: 60 mg/dL (09-15 @ 19:33)  Protein/Creatinine Ratio Calculation: 3.5 Ratio (09-15 @ 19:33)        RADIOLOGY & ADDITIONAL STUDIES:

## 2023-09-17 NOTE — PROGRESS NOTE ADULT - ASSESSMENT
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, with sCr stable at 2 with stable lytes and volume status,  continue optimization for  scheduled for Kidney bx 9/18 in AM,       Non-oliguric KRAENA 2/2 RPGN lupus nephritis   Baseline sCr of 0.6-0.8, now sCr at 2.11  UPCR quantifed at 3.5  Double stranded DNA >1000 C3 and C4 low, indicating active flare   Negative anticardiolipin ab     Plan:  C/w pulse dose steroids 500mg of methylprednisolone day 3/3 today, last dose, plt count appropriately rising   HOLD MMF   Renal diet   Lokelma 10mg TID for 48hrs please recheck BMP at 6PM   Lasix 40mg IV Qday   Increase Nifedipine to 60mg QD   Daily BMP   Bx scheduled for 9/18 at 8AM   Patient consented for procedure and scheduled for procedure   Please hold anticoagulation night before procedure   NPO at midnight  Will need active type and screen, Coags, CBC, CMP   Continue to trend Hgb and Plts, may require possible transfusion to optimize for bx would like Hgb > 8 prior to procedure   BP control goal SBP <130/80

## 2023-09-17 NOTE — PROGRESS NOTE ADULT - PROBLEM SELECTOR PLAN 5
Likely 2/2 mecyphenolate use. s/p 1u PRBC    - holding MMF   - f/u heme recs IMPROVING. PLT and WBC uptrending. s/p pulse methylprednisone 500 x3. Likely 2/2 Lupus, and/or mycophenolate    - holding MMF   - f/u heme recs

## 2023-09-17 NOTE — PROGRESS NOTE ADULT - PROBLEM SELECTOR PLAN 2
Hx of SLE. Currently on Prednisone, Mycophenolate, Hydroxychloroquine, Collateral from outpt Rheumatologist as per chart note.   rheum following Hx of SLE. Currently on Prednisone, Mycophenolate, Hydroxychloroquine, Collateral from outpt Rheumatologist as per chart note. Rheum, heme following.     - management as above

## 2023-09-17 NOTE — CHART NOTE - NSCHARTNOTEFT_GEN_A_CORE
33YF w/ PMHx SLE, Lupus nephritis, pancytopenia admitted to [facility] for SLE and nephritis flare  Hematology consulted for pancytopenia  Labs reviewed.   wbc 2.81, Hb 7.7 (s/p 1 unit prbc in the ED, Hb 6.8), Platelets 78   06/23 Labs: wbc 2.2, hb 7.8, plt 102, ldh 231  Ferritin 574  Iron 32  % saturation 23  Folate 11.9  Vit B12 659  Haptoglobin <10      Patient s/p methylprednisolone 500mg IV x 2 days with improvement in wbc to 7.34 (likely reactive), hb 8.4 and platelets 96.    Hematology will sign off.  Please reconsult with any questions     D/w Dr collado

## 2023-09-17 NOTE — PROGRESS NOTE ADULT - PROBLEM SELECTOR PLAN 1
KARENA vs. progression of Lupus Nephritis. Renal consulted. Renal US is consistent with chronic medical renal disease.     - hold mycophenolate and Hydroxychloroquine for KARENA  Plan for renal biopsy    pulse dose steroids 500mg of methylprednisolone x3 (last dose 9/17)  - Renal diet   - Lokelma if K+> 5.5  - ethacrynic acid 50 qd i/s/o sulfa drug allergy.    - c/w nifedipine fo BP  - pending kidney Bx 9/18 KARENA vs. progression of Lupus Nephritis. Renal US is consistent with chronic medical renal disease. s/p pulse steroids methylprednisolone 500mg x3. s/p Lasix with no allergic reactions and s/e.     - hold mycophenolate and Hydroxychloroquine for KARENA    Per Renal:  - Lokelma TID x2 days  thru 9/17.   - Lasix 40mg IV qd   - pending kidney Bx 9/18.  - trend hgb and plt for Bx.

## 2023-09-17 NOTE — PROGRESS NOTE ADULT - ASSESSMENT
33 F PMHx SLE since middle school c/o dyspnea, leg swelling progressively worsening over the last three months, found to have KARENA, pancytopenia needing transfusion, admitted for concern for SLE nephritis.  33 F PMHx SLE since middle school c/o dyspnea, leg swelling progressively worsening over the last three months, found to have KARENA, pancytopenia needing transfusion, admitted for concern for SLE nephritis.

## 2023-09-17 NOTE — PROGRESS NOTE ADULT - PROBLEM SELECTOR PLAN 4
K 5.4 i/s/o renal injury. No EKG changes. s/p lokelma x1    - continue to monitor No EKG changes. s/p lokelma x1    - continue to monitor

## 2023-09-17 NOTE — PROGRESS NOTE ADULT - PROBLEM SELECTOR PLAN 6
160 systolic BP, hx of HTN on ramipril 5 . i/s/o nephrotic syndrome    - start Nifedipine 30qd 160 systolic BP, hx of HTN on ramipril 5 . i/s/o nephrotic syndrome. On Nifedipine 30 qd.     - increase Nifedipine to 60 qd. (BP <130/80) per renal

## 2023-09-18 ENCOUNTER — TRANSCRIPTION ENCOUNTER (OUTPATIENT)
Age: 33
End: 2023-09-18

## 2023-09-18 ENCOUNTER — RESULT REVIEW (OUTPATIENT)
Age: 33
End: 2023-09-18

## 2023-09-18 DIAGNOSIS — M32.14 GLOMERULAR DISEASE IN SYSTEMIC LUPUS ERYTHEMATOSUS: ICD-10-CM

## 2023-09-18 LAB
ACANTHOCYTES BLD QL SMEAR: SLIGHT — SIGNIFICANT CHANGE UP
ALBUMIN SERPL ELPH-MCNC: 2.3 G/DL — LOW (ref 3.3–5)
ALP SERPL-CCNC: 34 U/L — LOW (ref 40–120)
ALT FLD-CCNC: 11 U/L — SIGNIFICANT CHANGE UP (ref 10–45)
ANION GAP SERPL CALC-SCNC: 10 MMOL/L — SIGNIFICANT CHANGE UP (ref 5–17)
ANION GAP SERPL CALC-SCNC: 10 MMOL/L — SIGNIFICANT CHANGE UP (ref 5–17)
ANISOCYTOSIS BLD QL: SLIGHT — SIGNIFICANT CHANGE UP
APTT BLD: 24.6 SEC — SIGNIFICANT CHANGE UP (ref 24.5–35.6)
AST SERPL-CCNC: 20 U/L — SIGNIFICANT CHANGE UP (ref 10–40)
B2 GLYCOPROT1 AB SER QL: NEGATIVE — SIGNIFICANT CHANGE UP
BASOPHILS # BLD AUTO: 0 K/UL — SIGNIFICANT CHANGE UP (ref 0–0.2)
BASOPHILS NFR BLD AUTO: 0 % — SIGNIFICANT CHANGE UP (ref 0–2)
BILIRUB SERPL-MCNC: 0.2 MG/DL — SIGNIFICANT CHANGE UP (ref 0.2–1.2)
BUN SERPL-MCNC: 86 MG/DL — HIGH (ref 7–23)
BUN SERPL-MCNC: 87 MG/DL — HIGH (ref 7–23)
BURR CELLS BLD QL SMEAR: PRESENT — SIGNIFICANT CHANGE UP
CALCIUM SERPL-MCNC: 7.9 MG/DL — LOW (ref 8.4–10.5)
CALCIUM SERPL-MCNC: 8.3 MG/DL — LOW (ref 8.4–10.5)
CARDIOLIPIN AB SER-ACNC: NEGATIVE — SIGNIFICANT CHANGE UP
CHLORIDE SERPL-SCNC: 108 MMOL/L — SIGNIFICANT CHANGE UP (ref 96–108)
CHLORIDE SERPL-SCNC: 111 MMOL/L — HIGH (ref 96–108)
CO2 SERPL-SCNC: 20 MMOL/L — LOW (ref 22–31)
CO2 SERPL-SCNC: 21 MMOL/L — LOW (ref 22–31)
CREAT SERPL-MCNC: 2.1 MG/DL — HIGH (ref 0.5–1.3)
CREAT SERPL-MCNC: 2.14 MG/DL — HIGH (ref 0.5–1.3)
DACRYOCYTES BLD QL SMEAR: SLIGHT — SIGNIFICANT CHANGE UP
EGFR: 30 ML/MIN/1.73M2 — LOW
EGFR: 31 ML/MIN/1.73M2 — LOW
ELLIPTOCYTES BLD QL SMEAR: SLIGHT — SIGNIFICANT CHANGE UP
EOSINOPHIL # BLD AUTO: 0 K/UL — SIGNIFICANT CHANGE UP (ref 0–0.5)
EOSINOPHIL NFR BLD AUTO: 0 % — SIGNIFICANT CHANGE UP (ref 0–6)
GLUCOSE BLDC GLUCOMTR-MCNC: 103 MG/DL — HIGH (ref 70–99)
GLUCOSE BLDC GLUCOMTR-MCNC: 116 MG/DL — HIGH (ref 70–99)
GLUCOSE BLDC GLUCOMTR-MCNC: 118 MG/DL — HIGH (ref 70–99)
GLUCOSE BLDC GLUCOMTR-MCNC: 120 MG/DL — HIGH (ref 70–99)
GLUCOSE SERPL-MCNC: 115 MG/DL — HIGH (ref 70–99)
GLUCOSE SERPL-MCNC: 120 MG/DL — HIGH (ref 70–99)
HCT VFR BLD CALC: 27.1 % — LOW (ref 34.5–45)
HCT VFR BLD CALC: 33.5 % — LOW (ref 34.5–45)
HGB BLD-MCNC: 10.8 G/DL — LOW (ref 11.5–15.5)
HGB BLD-MCNC: 9 G/DL — LOW (ref 11.5–15.5)
INR BLD: 0.8 — LOW (ref 0.85–1.18)
LYMPHOCYTES # BLD AUTO: 0.22 K/UL — LOW (ref 1–3.3)
LYMPHOCYTES # BLD AUTO: 3.6 % — LOW (ref 13–44)
MACROCYTES BLD QL: SLIGHT — SIGNIFICANT CHANGE UP
MAGNESIUM SERPL-MCNC: 2.4 MG/DL — SIGNIFICANT CHANGE UP (ref 1.6–2.6)
MANUAL SMEAR VERIFICATION: SIGNIFICANT CHANGE UP
MCHC RBC-ENTMCNC: 29.4 PG — SIGNIFICANT CHANGE UP (ref 27–34)
MCHC RBC-ENTMCNC: 30.5 PG — SIGNIFICANT CHANGE UP (ref 27–34)
MCHC RBC-ENTMCNC: 32.2 GM/DL — SIGNIFICANT CHANGE UP (ref 32–36)
MCHC RBC-ENTMCNC: 33.2 GM/DL — SIGNIFICANT CHANGE UP (ref 32–36)
MCV RBC AUTO: 91.3 FL — SIGNIFICANT CHANGE UP (ref 80–100)
MCV RBC AUTO: 91.9 FL — SIGNIFICANT CHANGE UP (ref 80–100)
MONOCYTES # BLD AUTO: 0.27 K/UL — SIGNIFICANT CHANGE UP (ref 0–0.9)
MONOCYTES NFR BLD AUTO: 4.4 % — SIGNIFICANT CHANGE UP (ref 2–14)
NEUTROPHILS # BLD AUTO: 5.68 K/UL — SIGNIFICANT CHANGE UP (ref 1.8–7.4)
NEUTROPHILS NFR BLD AUTO: 92 % — HIGH (ref 43–77)
NRBC # BLD: 0 /100 WBCS — SIGNIFICANT CHANGE UP (ref 0–0)
PHOSPHATE SERPL-MCNC: 6.1 MG/DL — HIGH (ref 2.5–4.5)
PLAT MORPH BLD: NORMAL — SIGNIFICANT CHANGE UP
PLATELET # BLD AUTO: 90 K/UL — LOW (ref 150–400)
PLATELET # BLD AUTO: 92 K/UL — LOW (ref 150–400)
POIKILOCYTOSIS BLD QL AUTO: SIGNIFICANT CHANGE UP
POLYCHROMASIA BLD QL SMEAR: SLIGHT — SIGNIFICANT CHANGE UP
POTASSIUM SERPL-MCNC: 4.4 MMOL/L — SIGNIFICANT CHANGE UP (ref 3.5–5.3)
POTASSIUM SERPL-MCNC: 4.5 MMOL/L — SIGNIFICANT CHANGE UP (ref 3.5–5.3)
POTASSIUM SERPL-SCNC: 4.4 MMOL/L — SIGNIFICANT CHANGE UP (ref 3.5–5.3)
POTASSIUM SERPL-SCNC: 4.5 MMOL/L — SIGNIFICANT CHANGE UP (ref 3.5–5.3)
PROT SERPL-MCNC: 5.2 G/DL — LOW (ref 6–8.3)
PROTHROM AB SERPL-ACNC: 9.2 SEC — LOW (ref 9.5–13)
RBC # BLD: 2.95 M/UL — LOW (ref 3.8–5.2)
RBC # BLD: 3.67 M/UL — LOW (ref 3.8–5.2)
RBC # FLD: 18.2 % — HIGH (ref 10.3–14.5)
RBC # FLD: 18.3 % — HIGH (ref 10.3–14.5)
RBC BLD AUTO: ABNORMAL
SCHISTOCYTES BLD QL AUTO: SLIGHT — SIGNIFICANT CHANGE UP
SMUDGE CELLS # BLD: PRESENT — SIGNIFICANT CHANGE UP
SODIUM SERPL-SCNC: 138 MMOL/L — SIGNIFICANT CHANGE UP (ref 135–145)
SODIUM SERPL-SCNC: 142 MMOL/L — SIGNIFICANT CHANGE UP (ref 135–145)
SPHEROCYTES BLD QL SMEAR: SLIGHT — SIGNIFICANT CHANGE UP
WBC # BLD: 5.88 K/UL — SIGNIFICANT CHANGE UP (ref 3.8–10.5)
WBC # BLD: 6.17 K/UL — SIGNIFICANT CHANGE UP (ref 3.8–10.5)
WBC # FLD AUTO: 5.88 K/UL — SIGNIFICANT CHANGE UP (ref 3.8–10.5)
WBC # FLD AUTO: 6.17 K/UL — SIGNIFICANT CHANGE UP (ref 3.8–10.5)

## 2023-09-18 PROCEDURE — 88313 SPECIAL STAINS GROUP 2: CPT | Mod: 26

## 2023-09-18 PROCEDURE — 76942 ECHO GUIDE FOR BIOPSY: CPT | Mod: 26

## 2023-09-18 PROCEDURE — 88348 ELECTRON MICROSCOPY DX: CPT | Mod: 26

## 2023-09-18 PROCEDURE — 50200 RENAL BIOPSY PERQ: CPT | Mod: LT

## 2023-09-18 PROCEDURE — 88305 TISSUE EXAM BY PATHOLOGIST: CPT | Mod: 26

## 2023-09-18 PROCEDURE — 88346 IMFLUOR 1ST 1ANTB STAIN PX: CPT | Mod: 26

## 2023-09-18 PROCEDURE — 88350 IMFLUOR EA ADDL 1ANTB STN PX: CPT | Mod: 26

## 2023-09-18 PROCEDURE — 50200 RENAL BIOPSY PERQ: CPT

## 2023-09-18 PROCEDURE — 99233 SBSQ HOSP IP/OBS HIGH 50: CPT | Mod: GC

## 2023-09-18 RX ORDER — PANTOPRAZOLE SODIUM 20 MG/1
40 TABLET, DELAYED RELEASE ORAL
Refills: 0 | Status: DISCONTINUED | OUTPATIENT
Start: 2023-09-18 | End: 2023-09-21

## 2023-09-18 RX ORDER — MYCOPHENOLATE MOFETIL 250 MG/1
500 CAPSULE ORAL
Refills: 0 | Status: DISCONTINUED | OUTPATIENT
Start: 2023-09-18 | End: 2023-09-21

## 2023-09-18 RX ORDER — ACETAMINOPHEN 500 MG
650 TABLET ORAL EVERY 6 HOURS
Refills: 0 | Status: DISCONTINUED | OUTPATIENT
Start: 2023-09-18 | End: 2023-09-21

## 2023-09-18 RX ORDER — ENOXAPARIN SODIUM 100 MG/ML
40 INJECTION SUBCUTANEOUS EVERY 24 HOURS
Refills: 0 | Status: DISCONTINUED | OUTPATIENT
Start: 2023-09-19 | End: 2023-09-21

## 2023-09-18 RX ORDER — FUROSEMIDE 40 MG
40 TABLET ORAL EVERY 12 HOURS
Refills: 0 | Status: DISCONTINUED | OUTPATIENT
Start: 2023-09-18 | End: 2023-09-19

## 2023-09-18 RX ORDER — SEVELAMER CARBONATE 2400 MG/1
800 POWDER, FOR SUSPENSION ORAL
Refills: 0 | Status: DISCONTINUED | OUTPATIENT
Start: 2023-09-18 | End: 2023-09-21

## 2023-09-18 RX ADMIN — Medication 650 MILLIGRAM(S): at 05:52

## 2023-09-18 RX ADMIN — Medication 60 MILLIGRAM(S): at 18:40

## 2023-09-18 RX ADMIN — MYCOPHENOLATE MOFETIL 500 MILLIGRAM(S): 250 CAPSULE ORAL at 21:19

## 2023-09-18 RX ADMIN — Medication 40 MILLIGRAM(S): at 05:53

## 2023-09-18 RX ADMIN — Medication 650 MILLIGRAM(S): at 21:18

## 2023-09-18 RX ADMIN — SEVELAMER CARBONATE 800 MILLIGRAM(S): 2400 POWDER, FOR SUSPENSION ORAL at 18:40

## 2023-09-18 RX ADMIN — Medication 650 MILLIGRAM(S): at 21:19

## 2023-09-18 RX ADMIN — Medication 650 MILLIGRAM(S): at 13:21

## 2023-09-18 RX ADMIN — SODIUM ZIRCONIUM CYCLOSILICATE 10 GRAM(S): 10 POWDER, FOR SUSPENSION ORAL at 05:52

## 2023-09-18 RX ADMIN — Medication 40 MILLIGRAM(S): at 18:40

## 2023-09-18 RX ADMIN — Medication 60 MILLIGRAM(S): at 05:55

## 2023-09-18 NOTE — DISCHARGE NOTE PROVIDER - PROVIDER TOKENS
PROVIDER:[TOKEN:[[id number]],SCHEDULEDAPPT:[10/25/2023],SCHEDULEDAPPTTIME:[08:30 AM],ESTABLISHEDPATIENT:[T]],PROVIDER:[TOKEN:[[id number]],SCHEDULEDAPPT:[09/28/2023],SCHEDULEDAPPTTIME:[01:30 PM],ESTABLISHEDPATIENT:[T]],PROVIDER:[[id number]],SCHEDULEDAPPT:[11/27/2023],SCHEDULEDAPPTTIME:[10:40 AM]],PROVIDER:[TOKEN:[[id number]],SCHEDULEDAPPT:[10/09/2023],SCHEDULEDAPPTTIME:[04:20 PM]] PROVIDER:[TOKEN:[[id number]],SCHEDULEDAPPT:[10/25/2023],SCHEDULEDAPPTTIME:[08:30 AM],ESTABLISHEDPATIENT:[T]],PROVIDER:[TOKEN:[[id number]],SCHEDULEDAPPT:[09/28/2023],SCHEDULEDAPPTTIME:[01:30 PM],ESTABLISHEDPATIENT:[T]],PROVIDER:[TOKEN:[[id number]],SCHEDULEDAPPT:[11/27/2023],SCHEDULEDAPPTTIME:[10:40 AM]],PROVIDER:[TOKEN:[[id number]],SCHEDULEDAPPT:[10/09/2023],SCHEDULEDAPPTTIME:[04:20 PM]],PROVIDER:[TOKEN:[[id number]],FOLLOWUP:[2 weeks]]

## 2023-09-18 NOTE — DISCHARGE NOTE PROVIDER - NPI NUMBER (FOR SYSADMIN USE ONLY) :
[[id number]],[[id number]] [[id number]],[[id number]],[[id number]]

## 2023-09-18 NOTE — DISCHARGE NOTE PROVIDER - NSDCCPTREATMENT_GEN_ALL_CORE_FT
PRINCIPAL PROCEDURE  Procedure: Kidney biopsy  Findings and Treatment: Left kidney  Final Diagnosis  1. Kidney, left, needle biopsy:  - Diffuse proliferative and membranous lupus nephritis, ISN/RPS  classification (2018 revision): class IV and V.  - Modified NIH activity and chronicity index (ISN/RPS 2018):  - Activity index: 5/24  - Chronicity index: 3/12  - Global glomerulosclerosis (14%) and focal segmental glomerular scar/  sclerosis.  - Immune deposits in tubular basement membranes and vascular walls.  - Mild interstitial fibrosis and tubular atrophy, associated with mild  interstitial inflammation.

## 2023-09-18 NOTE — PROGRESS NOTE ADULT - PROBLEM SELECTOR PLAN 6
RESOLVED. No EKG changes. s/p lokelma TID x2 d F: None   E: Replete as necessary K>4 Mg>2  N: Reg diet   DVT Prophylaxis: lovenox starting 9/19   GI prophylaxis: None   CODE STATUS: FULL

## 2023-09-18 NOTE — DISCHARGE NOTE PROVIDER - CARE PROVIDERS DIRECT ADDRESSES
,DirectAddress_Unknown,[email or web address] ,[email or web address],[email or web address],[email or web address]

## 2023-09-18 NOTE — DISCHARGE NOTE PROVIDER - HOSPITAL COURSE
#Discharge: do not delete    33 F PMHx SLE since middle school c/o dyspnea, leg swelling progressively worsening over the last three months, found to have KARENA, pancytopenia needing transfusion, admitted for concern for SLE nephritis.     Problem List/Main Diagnoses:     Inpatient treatment course:   New medications:   Labs to be followed outpatient:   Exam to be followed outpatient: #Discharge: do not delete    33 F PMHx SLE since middle school c/o dyspnea, leg swelling progressively worsening over the last three months, found to have KARENA, pancytopenia needing transfusion, admitted for concern for SLE nephritis.     Problem List/Main Diagnoses:     #Lupus nephritis.  #hyperkalemia  Hx of SLE. Collateral from outpt Rheumatologist as per chart note. Rheum, heme nephro consulted. Worsening kidney Fx is likely 2/2 progression of Lupus Nephritis. Renal US is consistent with chronic medical renal disease. s/p pulse steroids methylprednisolone 500mg x3. s/p Lasix with no allergic reactions and s/e. s/p Kidney Bx 9/18. Resumed MMF, PO prednisone on higher dose.   Lokelma TID x2 days for hyperK. no EKG changes; started sodium bicarb 650mg TID, sevelamer 800mg TID with meals       #Pancytopenia.  #anemia  PLT and WBC uptrending s/p pulse steroid.  H/H stable s/p 2u pRBC. Likely 2/2 Lupus, and/or mycophenolate. Iron study correlates with anemia of chronic disease. CBC shows schistocytes and with lab results 9/16 indicative of hemolysis. Jasmine negative. Likely 2/2 Lupus      #HTN (hypertension).  started Nifedipine 60 qd. lasix 40 BID      Inpatient treatment course:   s/p 2u pRBC  s/p pulse steroids methylprednisolone 500mg x3. s/p Lasix with no allergic reactions and s/e. Lokelma TID x2 days for hyperK w/ no EKG changes;   s/p Kidney Bx 9/18. Resumed MMF, PO prednisone on higher dose post biopsy. started sodium bicarb 650mg TID, sevelamer 800mg TID with meals     New medications:     Labs to be followed outpatient:     Exam to be followed outpatient: #Discharge: do not delete    33 F PMHx SLE since middle school c/o dyspnea, leg swelling progressively worsening over the last three months, found to have KARENA, pancytopenia needing transfusion, admitted for concern for SLE nephritis. Clinically improving s/p pulse steroid; s/p kidney Bx.        # Lupus nephritis.   Hx of SLE. Collateral from outpt Rheumatologist as per chart note. Elevated Cr is likely 2/2 progression of Lupus Nephritis. Renal US is consistent with chronic medical renal disease.   s/p pulse steroids methylprednisolone 500mg x3.   s/p Lasix with no allergic reactions and s/e. s/p Kidney Bx 9/18.    Bx: RPGN- bx with class 4 and 5 SLE  C3 32, C4 3, DS DNA ab >1000  Currently on Lasix 80mg IV BID, MMF 500mg BID, Prednisone 60mg PO qd, sevelamer  800mg TID with meals, Sodium bicarb 650mg TID      - will continue to hold Hydroxychloroquine (home med  Prednisone, Mycophenolate, Hydroxychloroquine)    Per Renal:  - c/w metalozone 5 mg qd  - c/w lisinopril 20 mg qd (equivalent to home med ramipril 5)   - AC for nephrotic syndrome.    # Pancytopenia.   IMPROVED s/p pulse methylprednisone 500 x3. PLT and WBC slightly decreased after starting MMF. Likely 2/2 Lupus, and/or mycophenolate    # HTN (hypertension).   150s systolic BP, hx of HTN on ramipril 5 . i/s/o nephrotic syndrome.     - d/c Nifedipine 30  - c/w lisinopril 20 qd.    # Bilateral lower extremity edema.   Patient complained of significant BL lower extremity edema up to the abdomen. Patient reports that since increased furosemide dose edema has improved.  See Lupus nephritis for management.    # Anemia.   Stable. s/p 2u pRBC. Likely 2/2 Lupus, and/or mycophenolate. Iron study correlates with anemia of chronic disease. CBC shows schistocytes and with lab results 9/16 indicative of hemolysis. Jasmine negative. Asymptomatic    # Hyperkalemia.   RESOLVED. No EKG changes. s/p lokelma TID x2 d.        Inpatient treatment course:   s/p 2u pRBC  s/p pulse steroids methylprednisolone 500mg x3. s/p Lasix with no allergic reactions and s/e. Lokelma TID x2 days for hyperK w/ no EKG changes;   s/p Kidney Bx 9/18. Resumed MMF, PO prednisone on higher dose post biopsy. started sodium bicarb 650mg TID, sevelamer 800mg TID with meals     New medications:     Labs to be followed outpatient:     Exam to be followed outpatient: #Discharge: do not delete    33 F PMHx SLE since middle school c/o dyspnea, leg swelling progressively worsening over the last three months, found to have KARENA, pancytopenia needing transfusion, admitted for concern for SLE nephritis. Clinically improving s/p pulse steroid; s/p kidney Bx.        # Lupus nephritis.   Hx of SLE. Collateral from outpt Rheumatologist as per chart note. Elevated Cr is likely 2/2 progression of Lupus Nephritis. Renal US is consistent with chronic medical renal disease.   s/p pulse steroids methylprednisolone 500mg x3.   s/p Lasix with no allergic reactions and s/e. s/p Kidney Bx 9/18.    Bx: RPGN- bx with class 4 and 5 SLE  C3 32, C4 3, DS DNA ab >1000  Current medications: Lasix 80mg BID, metolazone 5mg BID, lisinopril 20mg qd, MMF 500mg BID, Prednisone 60mg PO qd, sevelamer 800mg TID with meals, Sodium bicarb 650mg TID  - continue to hold Hydroxychloroquine   - AC for nephrotic syndrome?    # Pancytopenia.   IMPROVED s/p pulse methylprednisone 500 x3. PLT and WBC slightly decreased after starting MMF. Likely 2/2 Lupus, and/or mycophenolate    # HTN (hypertension).   150s systolic BP, hx of HTN on ramipril 5 . i/s/o nephrotic syndrome.   - c/w lisinopril 20 qd?    # Bilateral lower extremity edema.   Patient complained of significant BL lower extremity edema up to the abdomen. Patient reports that since increased furosemide dose edema has improved.  See Lupus nephritis for management.    # Anemia.   Stable. s/p 2u pRBC. Likely 2/2 Lupus, and/or mycophenolate. Iron study correlates with anemia of chronic disease. CBC shows schistocytes and with lab results 9/16 indicative of hemolysis. Jasmine negative. Asymptomatic    # Hyperkalemia.   RESOLVED. No EKG changes. s/p lokelma TID x2 d.        Inpatient treatment course:   s/p 2u pRBC  s/p pulse steroids methylprednisolone 500mg x3. s/p Lasix with no allergic reactions and s/e. Lokelma TID x2 days for hyperK w/ no EKG changes;   s/p Kidney Bx 9/18. Resumed MMF, PO prednisone on higher dose post biopsy. started sodium bicarb 650mg TID, sevelamer 800mg TID with meals     New medications:     Labs to be followed outpatient:     Exam to be followed outpatient:    Physical Exam at Time of Discharge  General: NAD, laying in bed comfortably  Head: NC/AT; MMM; EOMI;  Neck: Supple; no JVD  Respiratory: CTAB; no wheezes/rales/rhonchi  Cardiovascular: Regular rhythm/rate; S1/S2+, no murmurs, rubs gallops   Gastrointestinal: Soft; NTND  Extremities: WWP; +2 pitting edema up to mid thigh  Neurological: A&Ox3, CNII-XII grossly intact; no obvious focal deficits #Discharge: do not delete    33 F PMHx SLE since middle school c/o dyspnea, leg swelling progressively worsening over the last three months, found to have KARENA, pancytopenia needing transfusion, admitted for concern for SLE nephritis. Clinically improving s/p pulse steroid; s/p kidney Bx.        # Lupus nephritis.   Hx of SLE. Collateral from outpt Rheumatologist as per chart note. Elevated Cr is likely 2/2 progression of Lupus Nephritis. Renal US is consistent with chronic medical renal disease.   s/p pulse steroids methylprednisolone 500mg x3.   s/p Lasix with no allergic reactions and s/e. s/p Kidney Bx 9/18.    Bx: RPGN- bx with class 4 and 5 SLE  C3 32, C4 3, DS DNA ab >1000  Discharge medications: Lasix 40mg BID, metolazone 5mg BID, lisinopril 40mg qd, MMF 500mg BID, Prednisone 60mg PO qd  - continue to hold home Hydroxychloroquine     # Pancytopenia.   IMPROVED s/p pulse methylprednisone 500 x3. PLT and WBC slightly decreased after starting MMF. Likely 2/2 Lupus, and/or mycophenolate    # HTN (hypertension).   150s systolic BP, hx of HTN on ramipril 5 . i/s/o nephrotic syndrome.   Lisinopril 40 qd    # Bilateral lower extremity edema.   Patient complained of significant BL lower extremity edema up to the abdomen. Patient reports that since increased furosemide dose edema has improved.  See Lupus nephritis for management.    # Anemia.   Stable. s/p 2u pRBC. Likely 2/2 Lupus, and/or mycophenolate. Iron study correlates with anemia of chronic disease. CBC shows schistocytes and with lab results 9/16 indicative of hemolysis. Jasmine negative. Asymptomatic    # Hyperkalemia.   RESOLVED. No EKG changes. s/p lokelma TID x2 d.        Inpatient treatment course:   s/p 2u pRBC  s/p pulse steroids methylprednisolone 500mg x3. s/p Lasix with no allergic reactions and s/e. Lokelma TID x2 days for hyperK w/ no EKG changes;   s/p Kidney Bx 9/18. Resumed MMF, PO prednisone on higher dose post biopsy. started sodium bicarb 650mg TID, sevelamer 800mg TID with meals     New medications: Lasix 40mg BID, metolazone 5mg BID, lisinopril 40mg qd,     Labs to be followed outpatient:     Exam to be followed outpatient:    Physical Exam at Time of Discharge  General: NAD, laying in bed comfortably  Head: NC/AT; MMM; EOMI;  Neck: Supple; no JVD  Respiratory: CTAB; no wheezes/rales/rhonchi  Cardiovascular: Regular rhythm/rate; S1/S2+, no murmurs, rubs gallops   Gastrointestinal: Soft; NTND  Extremities: WWP; +2 pitting edema up to mid thigh  Neurological: A&Ox3, CNII-XII grossly intact; no obvious focal deficits #Discharge: do not delete    33 F PMHx SLE since middle school c/o dyspnea, leg swelling progressively worsening over the last three months, found to have KARENA, pancytopenia needing transfusion, admitted for concern for SLE nephritis. Clinically improving s/p pulse steroid; s/p kidney Bx.        # Lupus nephritis.   Hx of SLE. Collateral from outpt Rheumatologist as per chart note. Elevated Cr is likely 2/2 progression of Lupus Nephritis. Renal US is consistent with chronic medical renal disease.   s/p pulse steroids methylprednisolone 500mg x3.   s/p Lasix with no allergic reactions and s/e. s/p Kidney Bx 9/18.    Bx: RPGN- bx with class 4 and 5 SLE  C3 32, C4 3, DS DNA ab >1000  Discharge medications: Lasix 40mg BID, metolazone 5mg BID, lisinopril 40mg qd, MMF 500mg BID, Prednisone 60mg PO qd  - continue to hold home Hydroxychloroquine     # Pancytopenia.   IMPROVED s/p pulse methylprednisone 500 x3. PLT and WBC slightly decreased after starting MMF. Likely 2/2 Lupus, and/or mycophenolate    # HTN (hypertension).   150s systolic BP, hx of HTN on ramipril 5 . i/s/o nephrotic syndrome.   Lisinopril 40 qd    # Bilateral lower extremity edema.   Patient complained of significant BL lower extremity edema up to the abdomen. Patient reports that since increased furosemide dose edema has improved.  See Lupus nephritis for management.    # Anemia.   Stable. s/p 2u pRBC. Likely 2/2 Lupus, and/or mycophenolate. Iron study correlates with anemia of chronic disease. CBC shows schistocytes and with lab results 9/16 indicative of hemolysis. Jasmine negative. Asymptomatic    # Hyperkalemia.   RESOLVED. No EKG changes. s/p lokelma TID x2 d.        Inpatient treatment course:   s/p 2u pRBC  s/p pulse steroids methylprednisolone 500mg x3. s/p Lasix with no allergic reactions and s/e. Lokelma TID x2 days for hyperK w/ no EKG changes;   s/p Kidney Bx 9/18. Resumed MMF, PO prednisone on higher dose post biopsy. started sodium bicarb 650mg TID, sevelamer 800mg TID with meals     New medications: Lasix 40mg BID, metolazone 5mg BID, lisinopril 40mg qd,     Labs to be followed outpatient:     Exam to be followed outpatient:     Physical Exam at Time of Discharge  General: NAD, laying in bed comfortably  Head: NC/AT; MMM; EOMI;  Neck: Supple; no JVD  Respiratory: CTAB; no wheezes/rales/rhonchi  Cardiovascular: Regular rhythm/rate; S1/S2+, no murmurs, rubs gallops   Gastrointestinal: Soft; NTND  Extremities: WWP; +2 pitting edema up to mid thigh  Neurological: A&Ox3, CNII-XII grossly intact; no obvious focal deficits #Discharge: do not delete    33 F PMHx SLE since middle school c/o dyspnea, leg swelling progressively worsening over the last three months, found to have KARENA, pancytopenia needing transfusion, admitted for concern for SLE nephritis. Clinically improving s/p pulse steroid; s/p kidney Bx.        # Lupus nephritis.   Hx of SLE. Collateral from outpt Rheumatologist as per chart note. Elevated Cr is likely 2/2 progression of Lupus Nephritis. Renal US is consistent with chronic medical renal disease.   s/p pulse steroids methylprednisolone 500mg x3.   s/p Lasix with no allergic reactions and s/e. s/p Kidney Bx 9/18.    Bx: RPGN- bx with class 4 and 5 SLE  C3 32, C4 3, DS DNA ab >1000  Discharge medications: Lasix 40mg BID, metolazone 5mg BID, lisinopril 40mg qd, MMF 500mg BID, Prednisone 60mg PO qd  - continue to hold home Hydroxychloroquine     # Pancytopenia.   IMPROVED s/p pulse methylprednisone 500 x3. PLT and WBC slightly decreased after starting MMF. Likely 2/2 Lupus, and/or mycophenolate    # HTN (hypertension).   150s systolic BP, hx of HTN on ramipril 5 . i/s/o nephrotic syndrome.   Lisinopril 40 qd    # Bilateral lower extremity edema.   Patient complained of significant BL lower extremity edema up to the abdomen. Patient reports that since increased furosemide dose edema has improved.  See Lupus nephritis for management.    # Anemia.   Stable. s/p 2u pRBC. Likely 2/2 Lupus, and/or mycophenolate. Iron study correlates with anemia of chronic disease. CBC shows schistocytes and with lab results 9/16 indicative of hemolysis. Jasmine negative. Asymptomatic    # Hyperkalemia.   RESOLVED. No EKG changes. s/p lokelma TID x2 d.        Inpatient treatment course:   s/p 2u pRBC  s/p pulse steroids methylprednisolone 500mg x3. s/p Lasix with no allergic reactions and s/e. Lokelma TID x2 days for hyperK w/ no EKG changes;   s/p Kidney Bx 9/18. Resumed MMF, PO prednisone on higher dose post biopsy. started sodium bicarb 650mg TID, sevelamer 800mg TID with meals     New medications: Lasix 40mg BID, metolazone 5mg BID, lisinopril 40mg qd,     Labs to be followed outpatient:     Exam to be followed outpatient: Dr. Whitten (Cardiology), Dr. Nava (Rheumatology), Dr. Kyle (Nephrology), Ria (Hematology Oncology)    Physical Exam at Time of Discharge  General: NAD, laying in bed comfortably  Head: NC/AT; MMM; EOMI;  Neck: Supple; no JVD  Respiratory: CTAB; no wheezes/rales/rhonchi  Cardiovascular: Regular rhythm/rate; S1/S2+, no murmurs, rubs gallops   Gastrointestinal: Soft; NTND  Extremities: WWP; +2 pitting edema up to mid thigh  Neurological: A&Ox3, CNII-XII grossly intact; no obvious focal deficits #Discharge: do not delete    33 F PMHx SLE since middle school c/o dyspnea, leg swelling progressively worsening over the last three months, found to have KARENA, pancytopenia needing transfusion, admitted for concern for SLE nephritis. Clinically improving s/p pulse steroid; s/p kidney Bx.        # Lupus nephritis.   Hx of SLE. Collateral from outpt Rheumatologist as per chart note. Elevated Cr is likely 2/2 progression of Lupus Nephritis. Renal US is consistent with chronic medical renal disease.   s/p pulse steroids methylprednisolone 500mg x3.   s/p Lasix with no allergic reactions and s/e. s/p Kidney Bx 9/18.    Bx: RPGN- bx with class 4 and 5 SLE  C3 32, C4 3, DS DNA ab >1000  Discharge medications: Lasix 80mg BID, metolazone 5mg BID, lisinopril 40mg qd, MMF 500mg BID, Prednisone 60mg PO qd  - continue to hold home Hydroxychloroquine     # Pancytopenia.   IMPROVED s/p pulse methylprednisone 500 x3. PLT and WBC slightly decreased after starting MMF. Likely 2/2 Lupus, and/or mycophenolate    # HTN (hypertension).   150s systolic BP, hx of HTN on ramipril 5 . i/s/o nephrotic syndrome.   Lisinopril 40 qd    # Bilateral lower extremity edema.   Patient complained of significant BL lower extremity edema up to the abdomen. Patient reports that since increased furosemide dose edema has improved.  See Lupus nephritis for management.    # Anemia.   Stable. s/p 2u pRBC. Likely 2/2 Lupus, and/or mycophenolate. Iron study correlates with anemia of chronic disease. CBC shows schistocytes and with lab results 9/16 indicative of hemolysis. Jasmine negative. Asymptomatic    # Hyperkalemia.   RESOLVED. No EKG changes. s/p lokelma TID x2 d.        Inpatient treatment course:   s/p 2u pRBC  s/p pulse steroids methylprednisolone 500mg x3. s/p Lasix with no allergic reactions and s/e. Lokelma TID x2 days for hyperK w/ no EKG changes;   s/p Kidney Bx 9/18. Resumed MMF, PO prednisone on higher dose post biopsy. started sodium bicarb 650mg TID, sevelamer 800mg TID with meals     New medications: Lasix 80mg BID, metolazone 5mg BID, lisinopril 40mg qd,     Labs to be followed outpatient:     Exam to be followed outpatient: Dr. Whitten (Cardiology), Dr. Nava (Rheumatology), Dr. Kyle (Nephrology), Ria (Hematology Oncology)    Physical Exam at Time of Discharge  General: NAD, laying in bed comfortably  Head: NC/AT; MMM; EOMI;  Neck: Supple; no JVD  Respiratory: CTAB; no wheezes/rales/rhonchi  Cardiovascular: Regular rhythm/rate; S1/S2+, no murmurs, rubs gallops   Gastrointestinal: Soft; NTND  Extremities: WWP; +2 pitting edema up to mid thigh  Neurological: A&Ox3, CNII-XII grossly intact; no obvious focal deficits

## 2023-09-18 NOTE — PROGRESS NOTE ADULT - PROBLEM SELECTOR PLAN 3
IMPROVING. PLT and WBC uptrending. s/p pulse methylprednisone 500 x3. Likely 2/2 Lupus, and/or mycophenolate    - mgmt as above 160 systolic BP, hx of HTN on ramipril 5 . i/s/o nephrotic syndrome. On Nifedipine 60 qd.     - c/w Nifedipine to 60 qd.

## 2023-09-18 NOTE — DISCHARGE NOTE PROVIDER - NSDCCPCAREPLAN_GEN_ALL_CORE_FT
PRINCIPAL DISCHARGE DIAGNOSIS  Diagnosis: Lupus nephritis  Assessment and Plan of Treatment: Lupus nephritis is a frequent complication in people who have systemic lupus erythematosus — more commonly known as lupus.  Lupus is an autoimmune disease. It causes your immune system to produce proteins called autoantibodies that attack your own tissues and organs, including the kidneys.  Lupus nephritis occurs when lupus autoantibodies affect structures in your kidneys that filter out waste. This causes kidney inflammation and may lead to blood in the urine, protein in the urine, high blood pressure, impaired kidney function or even kidney failure.  It is very important to keep taking your medication to slow the disease progressin. We also recommend regular follow ups with your primary care doctor, nephrologist, cardiologist and rheumatologist to manage your symptoms and discuss medical management      SECONDARY DISCHARGE DIAGNOSES  Diagnosis: Anemia  Assessment and Plan of Treatment: Anemia is the medical term for when a person has too few red blood cells. Red blood cells are the cells in your blood that carry oxygen. If you have too few red blood cells, your body does not get all the oxygen it needs. Most people with anemia have no symptoms. They find out they have it after their doctor does blood tests for another reason. People who do have symptoms might feel tired or weak, especially if they try to exercise or have headaches.    It is easier for patients with lupus to develop anemia. If you start experiencing the symptoms above, please contact your doctors for the need to seek emergency medical attentino.

## 2023-09-18 NOTE — PROGRESS NOTE ADULT - PROBLEM SELECTOR PLAN 1
KARENA vs. progression of Lupus Nephritis. Renal US is consistent with chronic medical renal disease. s/p pulse steroids methylprednisolone 500mg x3. s/p Lasix with no allergic reactions and s/e. s/p Kidney Bx 9/18.     - will continue to hold Hydroxychloroquine  - f/u biopsy  - f/u post biopsy CBC    Per Renal:  - Increase Lasix 40mg IV BID   - f/u C3 C4 and anti ds DNA to monitor response   - start on MMF 500mg BID   - start AC 9/19 noon (ordered)  - Start Prednisone 60mg PO qd  - Start sevelamer  800mg TID with meals   - c/w Nifedipine to 60mg QD   - c/w Sodium bicarb 650mg TID Hx of SLE. Collateral from outpt Rheumatologist as per chart note. Elevated Cr is likely 2/2 progression of Lupus Nephritis. Renal US is consistent with chronic medical renal disease. s/p pulse steroids methylprednisolone 500mg x3. s/p Lasix with no allergic reactions and s/e. s/p Kidney Bx 9/18. home med  Prednisone, Mycophenolate, Hydroxychloroquine. Rheum, heme nephro following.     - will continue to hold Hydroxychloroquine  - f/u biopsy  - f/u post biopsy CBC    Per Renal:  - Increase Lasix 40mg IV BID   - f/u C3 C4 and anti ds DNA to monitor response   - start on MMF 500mg BID   - start AC 9/19 noon (ordered)  - Start Prednisone 60mg PO qd  - Start sevelamer  800mg TID with meals   - c/w Nifedipine to 60mg QD   - c/w Sodium bicarb 650mg TID

## 2023-09-18 NOTE — DISCHARGE NOTE PROVIDER - CARE PROVIDER_API CALL
Tylor Whitten  Cardiovascular Disease  [address] [address]  Phone: [phone]  Fax: [phone]  Established Patient  Scheduled Appointment: 10/25/2023 08:30 AM    Beltran Nava  Rheumatology  [address] [place] [address]  Phone: [phone]  Fax: [phone]  Established Patient  Scheduled Appointment: 09/28/2023 01:30 PM    Jaun Jerez  Nephrology  [address]  [place] [address]  Phone: [phone]  Fax: [phone]  Scheduled Appointment: 11/27/2023 10:40 AM    Poncho Simon  Nephrology  [address]  [place] [address]  Phone: [phone]  Fax: [phone]  Scheduled Appointment: 10/09/2023 04:20 PM   Tylor Whitten  Cardiovascular Disease  [address] [address]  Phone: [phone]  Fax: [phone]  Established Patient  Scheduled Appointment: 10/25/2023 08:30 AM    Beltran Nava  Rheumatology  [address] [place] [address]  Phone: [phone]  Fax: [phone]  Established Patient  Scheduled Appointment: 09/28/2023 01:30 PM    Jaun Jerez  Nephrology  [address]  [place] [address]  Phone: [phone]  Fax: [phone]  Scheduled Appointment: 11/27/2023 10:40 AM    Poncho Simon  Nephrology  [address]  [place] [address]  Phone: [phone]  Fax: [phone]  Scheduled Appointment: 10/09/2023 04:20 PM    Radha Rollins  Medical Oncology  [address]  [place] [address]  Phone: [phone]  Fax: [phone]  Follow Up Time: 2 weeks

## 2023-09-18 NOTE — DISCHARGE NOTE PROVIDER - NSDCMRMEDTOKEN_GEN_ALL_CORE_FT
cholecalciferol oral tablet: 1000 unit(s) orally once a day  hydroxychloroquine 200 mg oral tablet: 1 tab(s) orally 2 times a day  polyethylene glycol 3350 oral powder for reconstitution: 17 gram(s) orally every 12 hours  predniSONE 10 mg oral tablet: 3 tab(s) orally 2 times a day    hydroxychloroquine 200 mg oral tablet: 1 tab(s) orally 2 times a day  mycophenolate mofetil 500 mg oral tablet: 2 tab(s) orally 2 times a day  predniSONE 10 mg oral tablet: 3 tab(s) orally 2 times a day   ramipril 5 mg oral capsule: 1 cap(s) orally once a day   hydroxychloroquine 200 mg oral tablet: 1 tab(s) orally 2 times a day  mycophenolate mofetil 500 mg oral tablet: 2 tab(s) orally 2 times a day  predniSONE 10 mg oral tablet: 3 tab(s) orally 2 times a day    furosemide 40 mg oral tablet: 1 tab(s) orally every 12 hours  lisinopril 40 mg oral tablet: 1 tab(s) orally once a day  metOLazone 5 mg oral tablet: 1 tab(s) orally every 12 hours  mycophenolate mofetil 500 mg oral tablet: 2 tab(s) orally 2 times a day  pantoprazole 40 mg oral delayed release tablet: 1 tab(s) orally once a day  predniSONE 20 mg oral tablet: 3 tab(s) orally once a day   furosemide 80 mg oral tablet: 1 tab(s) orally every 12 hours  lisinopril 40 mg oral tablet: 1 tab(s) orally once a day  metOLazone 5 mg oral tablet: 1 tab(s) orally every 12 hours  mycophenolate mofetil 500 mg oral tablet: 2 tab(s) orally 2 times a day  pantoprazole 40 mg oral delayed release tablet: 1 tab(s) orally once a day  predniSONE 20 mg oral tablet: 3 tab(s) orally once a day   furosemide 80 mg oral tablet: 1 tab(s) orally every 12 hours  lisinopril 40 mg oral tablet: 1 tab(s) orally once a day  metOLazone 5 mg oral tablet: 1 tab(s) orally every 12 hours  mycophenolate mofetil 500 mg oral tablet: 2 tab(s) orally every 12 hours  pantoprazole 40 mg oral delayed release tablet: 1 tab(s) orally once a day  predniSONE 20 mg oral tablet: 3 tab(s) orally once a day

## 2023-09-18 NOTE — PROGRESS NOTE ADULT - PROBLEM SELECTOR PLAN 5
Stable. s/p 2u pRBC. Likely 2/2 Lupus, and/or mycophenolate. Iron study correlates with anemia of chronic disease. CBC shows schistocytes and with lab results 9/16 indicative of hemolysis. Jasmine negative. Asymptomatic    - will maintain 2 large bore IV  - TS if Hgb <7 RESOLVED. No EKG changes. s/p lokelma TID x2 d

## 2023-09-18 NOTE — DISCHARGE NOTE PROVIDER - ATTENDING DISCHARGE PHYSICAL EXAMINATION:
Physical Exam at Time of Discharge  General: NAD, laying in bed comfortably  Head: NC/AT; MMM; EOMI;  Neck: Supple; no JVD  Respiratory: CTAB; no wheezes/rales/rhonchi  Cardiovascular: Regular rhythm/rate; S1/S2+, no murmurs, rubs gallops   Gastrointestinal: Soft; NTND  Extremities: WWP; +3 pitting edema up to pelvis  Neurological: A&Ox3, CNII-XII grossly intact; no obvious focal deficits

## 2023-09-18 NOTE — PROGRESS NOTE ADULT - SUBJECTIVE AND OBJECTIVE BOX
Patient is a 33y Female seen and evaluated at bedside. No acute distress, tolerated bx procedure well. Stable kidney function and electrolytes, follow up post bx CBC       Meds:    cloNIDine 0.1 every 1 hour PRN  dextrose 5%. 1000 <Continuous>  dextrose 5%. 1000 <Continuous>  dextrose 50% Injectable 12.5 once  dextrose 50% Injectable 25 once  dextrose 50% Injectable 25 once  dextrose Oral Gel 15 once PRN  furosemide   Injectable 40 every 12 hours  glucagon  Injectable 1 once  insulin lispro (ADMELOG) corrective regimen sliding scale  at bedtime  insulin lispro (ADMELOG) corrective regimen sliding scale  three times a day before meals  NIFEdipine XL 60 daily  polyethylene glycol 3350 17 daily PRN  senna 2 at bedtime PRN  sodium bicarbonate 650 three times a day      T(C): , Max: 37.2 (09-17-23 @ 22:30)  T(F): , Max: 98.9 (09-17-23 @ 22:30)  HR: 70 (09-18-23 @ 10:09)  BP: 135/89 (09-18-23 @ 10:09)  BP(mean): 108 (09-18-23 @ 10:09)  RR: 17 (09-18-23 @ 10:09)  SpO2: 96% (09-18-23 @ 10:09)  Wt(kg): --        Review of Systems:  ROS negative except as per HPI      PHYSICAL EXAM:  GENERAL: Alert, awake, NAD   HEENT: HAWK, EOMI, neck supple, no JVP  CHEST/LUNG: Bilateral clear breath sounds  HEART: Regular rate and rhythm, no murmur, no gallops, no rub   ABDOMEN: Soft, nontender,   : No flank or supra pubic tenderness.  EXTREMITIES: b/l 2+ pitting edema   Neurology: AAOx3, no focal neurological deficit  SKIN: No rash or skin lesion       LABS:                        10.8   6.17  )-----------( 92       ( 18 Sep 2023 05:30 )             33.5     09-18    138  |  108  |  87<H>  ----------------------------<  115<H>  4.5   |  20<L>  |  2.14<H>    Ca    8.3<L>      18 Sep 2023 05:30  Phos  6.1     09-18  Mg     2.4     09-18    TPro  5.2<L>  /  Alb  2.3<L>  /  TBili  0.2  /  DBili  x   /  AST  20  /  ALT  11  /  AlkPhos  34<L>  09-18      PT/INR - ( 18 Sep 2023 05:30 )   PT: 9.2 sec;   INR: 0.80          PTT - ( 18 Sep 2023 05:30 )  PTT:24.6 sec  Urinalysis Basic - ( 18 Sep 2023 05:30 )    Color: x / Appearance: x / SG: x / pH: x  Gluc: 115 mg/dL / Ketone: x  / Bili: x / Urobili: x   Blood: x / Protein: x / Nitrite: x   Leuk Esterase: x / RBC: x / WBC x   Sq Epi: x / Non Sq Epi: x / Bacteria: x            RADIOLOGY & ADDITIONAL STUDIES:

## 2023-09-18 NOTE — PROGRESS NOTE ADULT - ASSESSMENT
33 F PMHx SLE since middle school c/o dyspnea, leg swelling progressively worsening over the last three months, found to have KARENA, pancytopenia needing transfusion, admitted for concern for SLE nephritis. Clinically improving s/p pulse steroid; s/p kidney Bx.

## 2023-09-18 NOTE — PROGRESS NOTE ADULT - SUBJECTIVE AND OBJECTIVE BOX
.. OVERNIGHT EVENTS: NAEO    SUBJECTIVE  Pt was seen and examined at bedside. Appears comfortable.     Reports SOB which has subjectively improved. Continues to endorse LE edema, last BM yesterday. No other active concerns.       PHYSICAL EXAM     General: NAD  HEENT: NC/AT; PERRL; Neck Supple; MMM  Respiratory: CTAB; no wheezes/rales/rhonchi  Cardiovascular: Regular rhythm/rate, + S1/S2; no murmurs, rubs gallops   Gastrointestinal: Soft; NTND; bowel sounds normal and present  Extremities: WWP; no edema/cyanosis  Neurological: A&Ox3, no obvious focal deficits  Integument: intact; normal turgor, texture, temperature, color for age      Vitals:  ============  T(C): 36.5 (09-18-23 @ 12:00), Max: 37.2 (09-17-23 @ 22:30)  HR: 76 (09-18-23 @ 12:00) (62 - 80)  BP: 121/80 (09-18-23 @ 12:00) (121/80 - 157/94)  RR: 18 (09-18-23 @ 12:00) (11 - 21)  SpO2: 97% (09-18-23 @ 12:00) (94% - 100%)        =======================================================  Current Antibiotics:    Other medications:  dextrose 5%. 1000 milliLiter(s) IV Continuous <Continuous>  dextrose 5%. 1000 milliLiter(s) IV Continuous <Continuous>  dextrose 50% Injectable 12.5 Gram(s) IV Push once  dextrose 50% Injectable 25 Gram(s) IV Push once  dextrose 50% Injectable 25 Gram(s) IV Push once  furosemide   Injectable 40 milliGRAM(s) IV Push every 12 hours  glucagon  Injectable 1 milliGRAM(s) IntraMuscular once  insulin lispro (ADMELOG) corrective regimen sliding scale   SubCutaneous at bedtime  insulin lispro (ADMELOG) corrective regimen sliding scale   SubCutaneous three times a day before meals  NIFEdipine XL 60 milliGRAM(s) Oral daily  sodium bicarbonate 650 milliGRAM(s) Oral three times a day      =======================================================  Labs:                        10.8   6.17  )-----------( 92       ( 18 Sep 2023 05:30 )             33.5     09-18    138  |  108  |  87<H>  ----------------------------<  115<H>  4.5   |  20<L>  |  2.14<H>    Ca    8.3<L>      18 Sep 2023 05:30  Phos  6.1     09-18  Mg     2.4     09-18    TPro  5.2<L>  /  Alb  2.3<L>  /  TBili  0.2  /  DBili  x   /  AST  20  /  ALT  11  /  AlkPhos  34<L>  09-18      Culture - Urine (collected 09-14-23 @ 19:49)  Source: Clean Catch Clean Catch (Midstream)  Final Report (09-16-23 @ 11:06):    No growth       OVERNIGHT EVENTS: NAEO    SUBJECTIVE  Pt was seen and examined at bedside. Appears comfortable.     Reports SOB which has subjectively improved. Continues to endorse LE edema, last BM yesterday. No other active concerns.       PHYSICAL EXAM   General: NAD  HEENT: NC/AT; PERRL; nonicteric, Neck Supple; no JVD  Respiratory: CTAB; no wheezes/rales/rhonchi, normal WOB  Cardiovascular: Regular rhythm/rate, + S1/S2; no murmurs, rubs gallops   Gastrointestinal: Soft; NTND; bowel sounds normal and present  Extremities: WWP; 2+ edema extended up to buttock b/l   Neurological: A&Ox3, no obvious focal deficits  Integument: appears pale, no rashes     Vitals:  ============  T(C): 36.5 (09-18-23 @ 12:00), Max: 37.2 (09-17-23 @ 22:30)  HR: 76 (09-18-23 @ 12:00) (62 - 80)  BP: 121/80 (09-18-23 @ 12:00) (121/80 - 157/94)  RR: 18 (09-18-23 @ 12:00) (11 - 21)  SpO2: 97% (09-18-23 @ 12:00) (94% - 100%)        =======================================================  Current Antibiotics:    Other medications:  dextrose 5%. 1000 milliLiter(s) IV Continuous <Continuous>  dextrose 5%. 1000 milliLiter(s) IV Continuous <Continuous>  dextrose 50% Injectable 12.5 Gram(s) IV Push once  dextrose 50% Injectable 25 Gram(s) IV Push once  dextrose 50% Injectable 25 Gram(s) IV Push once  furosemide   Injectable 40 milliGRAM(s) IV Push every 12 hours  glucagon  Injectable 1 milliGRAM(s) IntraMuscular once  insulin lispro (ADMELOG) corrective regimen sliding scale   SubCutaneous at bedtime  insulin lispro (ADMELOG) corrective regimen sliding scale   SubCutaneous three times a day before meals  NIFEdipine XL 60 milliGRAM(s) Oral daily  sodium bicarbonate 650 milliGRAM(s) Oral three times a day      =======================================================  Labs:                        10.8   6.17  )-----------( 92       ( 18 Sep 2023 05:30 )             33.5     09-18    138  |  108  |  87<H>  ----------------------------<  115<H>  4.5   |  20<L>  |  2.14<H>    Ca    8.3<L>      18 Sep 2023 05:30  Phos  6.1     09-18  Mg     2.4     09-18    TPro  5.2<L>  /  Alb  2.3<L>  /  TBili  0.2  /  DBili  x   /  AST  20  /  ALT  11  /  AlkPhos  34<L>  09-18      Culture - Urine (collected 09-14-23 @ 19:49)  Source: Clean Catch Clean Catch (Midstream)  Final Report (09-16-23 @ 11:06):    No growth

## 2023-09-18 NOTE — PROGRESS NOTE ADULT - PROBLEM SELECTOR PLAN 2
Hx of SLE. home med  Prednisone, Mycophenolate, Hydroxychloroquine, Collateral from outpt Rheumatologist as per chart note. Rheum, heme following.     - management as above IMPROVING. PLT and WBC uptrending. s/p pulse methylprednisone 500 x3. Likely 2/2 Lupus, and/or mycophenolate    - mgmt as above

## 2023-09-18 NOTE — PROGRESS NOTE ADULT - SUBJECTIVE AND OBJECTIVE BOX
Procedure Note – Kidney Biopsy    Patient seen and examined at bedside prior to procedure. A Time out was performed.   Lower pole of left kidney identified by sonogram. Skin cleaned in the usual sterile fashion, draped, and anesthetized with 10 cc lidocaine into the skin and subcutaneous tissues down to the kidney.   Under direct ultrasound guidance,  4 small cores of kidney tissue obtained using 18 gauge Argon BioPince biopsy needle.   Procedure performed by Dr Morales  under my direct supervision throughout. Pt tolerated procedure well.   post bx VS:  HR: 72  BP: 138/98

## 2023-09-18 NOTE — PROGRESS NOTE ADULT - ASSESSMENT
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   Baseline sCr of 0.6-0.8, now sCr stable at 2  UPCR down to 3.5 from 7.6        Plan:  Please obtain post bx CBC at 12PM   Please send C3 C4 and anti ds DNA to monitor response   HOLD MMF, will await bx results to guide further immunosuppressive  tx   Can start on anticoagulation in 24hrs   Start on Prednisone 60mg PO daily   Increase Lasix 40mg IV BID   C/w Nifedipine to 60mg QD   Can start on phos binders 800mg TID with meals   C/w Sodium bicarb 650mg TID   Daily BMP  Renal diet       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

## 2023-09-18 NOTE — PROGRESS NOTE ADULT - PROBLEM SELECTOR PLAN 4
160 systolic BP, hx of HTN on ramipril 5 . i/s/o nephrotic syndrome. On Nifedipine 60 qd.     - c/w Nifedipine to 60 qd. Stable. s/p 2u pRBC. Likely 2/2 Lupus, and/or mycophenolate. Iron study correlates with anemia of chronic disease. CBC shows schistocytes and with lab results 9/16 indicative of hemolysis. Jasmine negative. Asymptomatic    - will maintain 2 large bore IV  - TS if Hgb <7

## 2023-09-18 NOTE — DISCHARGE NOTE PROVIDER - NSDCFUSCHEDAPPT_GEN_ALL_CORE_FT
Poncho Simon  [facility] Physician [facility]  [address]  Scheduled Appointment: 10/09/2023    Jaun Jerez  [facility]  NE[address]  Scheduled Appointment: 11/27/2023

## 2023-09-19 LAB
ALBUMIN SERPL ELPH-MCNC: 2.3 G/DL — LOW (ref 3.3–5)
ALP SERPL-CCNC: 34 U/L — LOW (ref 40–120)
ALT FLD-CCNC: 10 U/L — SIGNIFICANT CHANGE UP (ref 10–45)
ANION GAP SERPL CALC-SCNC: 9 MMOL/L — SIGNIFICANT CHANGE UP (ref 5–17)
AST SERPL-CCNC: 17 U/L — SIGNIFICANT CHANGE UP (ref 10–40)
BASOPHILS # BLD AUTO: 0.01 K/UL — SIGNIFICANT CHANGE UP (ref 0–0.2)
BASOPHILS NFR BLD AUTO: 0.2 % — SIGNIFICANT CHANGE UP (ref 0–2)
BILIRUB SERPL-MCNC: 0.2 MG/DL — SIGNIFICANT CHANGE UP (ref 0.2–1.2)
BLD GP AB SCN SERPL QL: NEGATIVE — SIGNIFICANT CHANGE UP
BUN SERPL-MCNC: 90 MG/DL — HIGH (ref 7–23)
C3 SERPL-MCNC: 33 MG/DL — LOW (ref 81–157)
CALCIUM SERPL-MCNC: 8.3 MG/DL — LOW (ref 8.4–10.5)
CHLORIDE SERPL-SCNC: 111 MMOL/L — HIGH (ref 96–108)
CO2 SERPL-SCNC: 21 MMOL/L — LOW (ref 22–31)
CREAT SERPL-MCNC: 1.89 MG/DL — HIGH (ref 0.5–1.3)
EGFR: 35 ML/MIN/1.73M2 — LOW
EOSINOPHIL # BLD AUTO: 0 K/UL — SIGNIFICANT CHANGE UP (ref 0–0.5)
EOSINOPHIL NFR BLD AUTO: 0 % — SIGNIFICANT CHANGE UP (ref 0–6)
GLUCOSE BLDC GLUCOMTR-MCNC: 118 MG/DL — HIGH (ref 70–99)
GLUCOSE BLDC GLUCOMTR-MCNC: 121 MG/DL — HIGH (ref 70–99)
GLUCOSE BLDC GLUCOMTR-MCNC: 124 MG/DL — HIGH (ref 70–99)
GLUCOSE BLDC GLUCOMTR-MCNC: 129 MG/DL — HIGH (ref 70–99)
GLUCOSE SERPL-MCNC: 124 MG/DL — HIGH (ref 70–99)
HCT VFR BLD CALC: 27.7 % — LOW (ref 34.5–45)
HGB BLD-MCNC: 9.1 G/DL — LOW (ref 11.5–15.5)
IMM GRANULOCYTES NFR BLD AUTO: 1.3 % — HIGH (ref 0–0.9)
LYMPHOCYTES # BLD AUTO: 0.47 K/UL — LOW (ref 1–3.3)
LYMPHOCYTES # BLD AUTO: 8.9 % — LOW (ref 13–44)
MAGNESIUM SERPL-MCNC: 2.3 MG/DL — SIGNIFICANT CHANGE UP (ref 1.6–2.6)
MCHC RBC-ENTMCNC: 29.7 PG — SIGNIFICANT CHANGE UP (ref 27–34)
MCHC RBC-ENTMCNC: 32.9 GM/DL — SIGNIFICANT CHANGE UP (ref 32–36)
MCV RBC AUTO: 90.5 FL — SIGNIFICANT CHANGE UP (ref 80–100)
MONOCYTES # BLD AUTO: 0.33 K/UL — SIGNIFICANT CHANGE UP (ref 0–0.9)
MONOCYTES NFR BLD AUTO: 6.2 % — SIGNIFICANT CHANGE UP (ref 2–14)
NEUTROPHILS # BLD AUTO: 4.42 K/UL — SIGNIFICANT CHANGE UP (ref 1.8–7.4)
NEUTROPHILS NFR BLD AUTO: 83.4 % — HIGH (ref 43–77)
NRBC # BLD: 0 /100 WBCS — SIGNIFICANT CHANGE UP (ref 0–0)
PHOSPHATE SERPL-MCNC: 5.3 MG/DL — HIGH (ref 2.5–4.5)
PLATELET # BLD AUTO: 82 K/UL — LOW (ref 150–400)
POTASSIUM SERPL-MCNC: 4.5 MMOL/L — SIGNIFICANT CHANGE UP (ref 3.5–5.3)
POTASSIUM SERPL-SCNC: 4.5 MMOL/L — SIGNIFICANT CHANGE UP (ref 3.5–5.3)
PROT SERPL-MCNC: 5 G/DL — LOW (ref 6–8.3)
RBC # BLD: 3.06 M/UL — LOW (ref 3.8–5.2)
RBC # FLD: 17.8 % — HIGH (ref 10.3–14.5)
RH IG SCN BLD-IMP: POSITIVE — SIGNIFICANT CHANGE UP
SODIUM SERPL-SCNC: 141 MMOL/L — SIGNIFICANT CHANGE UP (ref 135–145)
WBC # BLD: 5.3 K/UL — SIGNIFICANT CHANGE UP (ref 3.8–10.5)
WBC # FLD AUTO: 5.3 K/UL — SIGNIFICANT CHANGE UP (ref 3.8–10.5)

## 2023-09-19 PROCEDURE — 93976 VASCULAR STUDY: CPT | Mod: 26

## 2023-09-19 PROCEDURE — 99232 SBSQ HOSP IP/OBS MODERATE 35: CPT | Mod: GC

## 2023-09-19 PROCEDURE — 93970 EXTREMITY STUDY: CPT | Mod: 26

## 2023-09-19 PROCEDURE — 99232 SBSQ HOSP IP/OBS MODERATE 35: CPT

## 2023-09-19 RX ORDER — FUROSEMIDE 40 MG
80 TABLET ORAL EVERY 12 HOURS
Refills: 0 | Status: DISCONTINUED | OUTPATIENT
Start: 2023-09-19 | End: 2023-09-21

## 2023-09-19 RX ORDER — LISINOPRIL 2.5 MG/1
20 TABLET ORAL EVERY 24 HOURS
Refills: 0 | Status: DISCONTINUED | OUTPATIENT
Start: 2023-09-19 | End: 2023-09-21

## 2023-09-19 RX ADMIN — PANTOPRAZOLE SODIUM 40 MILLIGRAM(S): 20 TABLET, DELAYED RELEASE ORAL at 06:37

## 2023-09-19 RX ADMIN — SEVELAMER CARBONATE 800 MILLIGRAM(S): 2400 POWDER, FOR SUSPENSION ORAL at 18:35

## 2023-09-19 RX ADMIN — SEVELAMER CARBONATE 800 MILLIGRAM(S): 2400 POWDER, FOR SUSPENSION ORAL at 14:08

## 2023-09-19 RX ADMIN — Medication 40 MILLIGRAM(S): at 06:38

## 2023-09-19 RX ADMIN — MYCOPHENOLATE MOFETIL 500 MILLIGRAM(S): 250 CAPSULE ORAL at 18:35

## 2023-09-19 RX ADMIN — SEVELAMER CARBONATE 800 MILLIGRAM(S): 2400 POWDER, FOR SUSPENSION ORAL at 09:09

## 2023-09-19 RX ADMIN — Medication 650 MILLIGRAM(S): at 21:58

## 2023-09-19 RX ADMIN — Medication 650 MILLIGRAM(S): at 06:37

## 2023-09-19 RX ADMIN — Medication 60 MILLIGRAM(S): at 06:38

## 2023-09-19 RX ADMIN — Medication 650 MILLIGRAM(S): at 14:07

## 2023-09-19 RX ADMIN — LISINOPRIL 20 MILLIGRAM(S): 2.5 TABLET ORAL at 14:08

## 2023-09-19 RX ADMIN — Medication 80 MILLIGRAM(S): at 18:35

## 2023-09-19 RX ADMIN — MYCOPHENOLATE MOFETIL 500 MILLIGRAM(S): 250 CAPSULE ORAL at 09:09

## 2023-09-19 NOTE — PROGRESS NOTE ADULT - SUBJECTIVE AND OBJECTIVE BOX
.. OVERNIGHT EVENTS: NISHANT ZIMMERMAN  Pt was seen and examined at bedside. Pt stated she experienced difficulty breathing while lying flat in bed. Pt also stated that her swelling is now up to her abdomen as compared to buttocks yesterday.       PHYSICAL EXAM     General: NAD  HEENT: NC/AT; PERRL; Neck Supple; MMM  Respiratory: CTAB; no wheezes/rales/rhonchi  Cardiovascular: Regular rhythm/rate, + S1/S2; no murmurs, rubs gallops   Gastrointestinal: Soft; NTND; bowel sounds normal and present  Extremities: WWP; no edema/cyanosis  Neurological: A&Ox3, no obvious focal deficits  Integument: intact; normal turgor, texture, temperature, color for age      Vitals:  ============  T(C): 36.9 (09-19-23 @ 13:59), Max: 36.9 (09-19-23 @ 06:04)  HR: 74 (09-19-23 @ 13:59) (72 - 89)  BP: 156/97 (09-19-23 @ 13:59) (118/74 - 156/97)  RR: 18 (09-19-23 @ 13:59) (17 - 18)  SpO2: 97% (09-19-23 @ 13:59) (96% - 98%)        =======================================================  Current Antibiotics:    Other medications:  dextrose 5%. 1000 milliLiter(s) IV Continuous <Continuous>  dextrose 5%. 1000 milliLiter(s) IV Continuous <Continuous>  dextrose 50% Injectable 12.5 Gram(s) IV Push once  dextrose 50% Injectable 25 Gram(s) IV Push once  dextrose 50% Injectable 25 Gram(s) IV Push once  enoxaparin Injectable 40 milliGRAM(s) SubCutaneous every 24 hours  furosemide   Injectable 80 milliGRAM(s) IV Push every 12 hours  glucagon  Injectable 1 milliGRAM(s) IntraMuscular once  insulin lispro (ADMELOG) corrective regimen sliding scale   SubCutaneous at bedtime  insulin lispro (ADMELOG) corrective regimen sliding scale   SubCutaneous three times a day before meals  lisinopril 20 milliGRAM(s) Oral every 24 hours  metolazone 5 milliGRAM(s) Oral every 24 hours  mycophenolate mofetil 500 milliGRAM(s) Oral two times a day  pantoprazole    Tablet 40 milliGRAM(s) Oral before breakfast  predniSONE   Tablet 60 milliGRAM(s) Oral daily  sevelamer carbonate 800 milliGRAM(s) Oral three times a day with meals  sodium bicarbonate 650 milliGRAM(s) Oral three times a day      =======================================================  Labs:                        9.1    5.30  )-----------( 82       ( 19 Sep 2023 05:30 )             27.7     09-19    141  |  111<H>  |  90<H>  ----------------------------<  124<H>  4.5   |  21<L>  |  1.89<H>    Ca    8.3<L>      19 Sep 2023 05:30  Phos  5.3     09-19  Mg     2.3     09-19    TPro  5.0<L>  /  Alb  2.3<L>  /  TBili  0.2  /  DBili  x   /  AST  17  /  ALT  10  /  AlkPhos  34<L>  09-19      Culture - Urine (collected 09-14-23 @ 19:49)  Source: Clean Catch Clean Catch (Midstream)  Final Report (09-16-23 @ 11:06):    No growth

## 2023-09-19 NOTE — PROGRESS NOTE ADULT - PROBLEM SELECTOR PLAN 3
150s systolic BP, hx of HTN on ramipril 5 . i/s/o nephrotic syndrome.     - c/w Nifedipine 30  - started lisinopril 20

## 2023-09-19 NOTE — PROGRESS NOTE ADULT - PROBLEM SELECTOR PLAN 6
F: None   E: Replete as necessary K>4 Mg>2  N: Reg diet   DVT Prophylaxis: lovenox starting 9/19   GI prophylaxis: None   CODE STATUS: FULL

## 2023-09-19 NOTE — PROGRESS NOTE ADULT - SUBJECTIVE AND OBJECTIVE BOX
Nephrology progress note    Seen at bedside. Endorses worsening LE and abdominal edema. No pain or bruising at biopsy site.    Allergies:  sulfa drugs (Rash)    Hospital Medications:   MEDICATIONS  (STANDING):  dextrose 5%. 1000 milliLiter(s) (100 mL/Hr) IV Continuous <Continuous>  dextrose 5%. 1000 milliLiter(s) (50 mL/Hr) IV Continuous <Continuous>  dextrose 50% Injectable 12.5 Gram(s) IV Push once  dextrose 50% Injectable 25 Gram(s) IV Push once  dextrose 50% Injectable 25 Gram(s) IV Push once  enoxaparin Injectable 40 milliGRAM(s) SubCutaneous every 24 hours  furosemide   Injectable 80 milliGRAM(s) IV Push every 12 hours  glucagon  Injectable 1 milliGRAM(s) IntraMuscular once  insulin lispro (ADMELOG) corrective regimen sliding scale   SubCutaneous three times a day before meals  insulin lispro (ADMELOG) corrective regimen sliding scale   SubCutaneous at bedtime  lisinopril 20 milliGRAM(s) Oral every 24 hours  metolazone 5 milliGRAM(s) Oral every 24 hours  mycophenolate mofetil 500 milliGRAM(s) Oral two times a day  pantoprazole    Tablet 40 milliGRAM(s) Oral before breakfast  predniSONE   Tablet 60 milliGRAM(s) Oral daily  sevelamer carbonate 800 milliGRAM(s) Oral three times a day with meals  sodium bicarbonate 650 milliGRAM(s) Oral three times a day    REVIEW OF SYSTEMS:  All other review of systems is negative unless indicated above.    VITALS:  T(F): 98.4 (09-19-23 @ 13:59), Max: 98.4 (09-19-23 @ 06:04)  HR: 74 (09-19-23 @ 13:59)  BP: 156/97 (09-19-23 @ 13:59)  RR: 18 (09-19-23 @ 13:59)  SpO2: 97% (09-19-23 @ 13:59)  Wt(kg): --      PHYSICAL EXAM:  GENERAL: Alert, awake, NAD   HEENT: NCAT, EOMI, neck supple, no JVP  CHEST/LUNG: CTAB  HEART: Regular rate and rhythm  ABDOMEN: Soft, nontender, mildly distended  : No flank or supra pubic tenderness.  EXTREMITIES: b/l 2+ pitting edema   Neurology: AAOx3, no focal neurological deficit  SKIN: No rash or skin lesion, biopsy site c/d/i    LABS:  09-19    141  |  111<H>  |  90<H>  ----------------------------<  124<H>  4.5   |  21<L>  |  1.89<H>    Ca    8.3<L>      19 Sep 2023 05:30  Phos  5.3     09-19  Mg     2.3     09-19    TPro  5.0<L>  /  Alb  2.3<L>  /  TBili  0.2  /  DBili      /  AST  17  /  ALT  10  /  AlkPhos  34<L>  09-19                          9.1    5.30  )-----------( 82       ( 19 Sep 2023 05:30 )             27.7       Urine Studies:  Creatinine Trend: 1.89<--, 2.10<--, 2.14<--, 2.25<--, 2.11<--, 2.11<--  Urinalysis Basic - ( 19 Sep 2023 05:30 )    Color:  / Appearance:  / SG:  / pH:   Gluc: 124 mg/dL / Ketone:   / Bili:  / Urobili:    Blood:  / Protein:  / Nitrite:    Leuk Esterase:  / RBC:  / WBC    Sq Epi:  / Non Sq Epi:  / Bacteria:       Creatinine, Random Urine: 60 mg/dL (09-15 @ 19:33)  Protein/Creatinine Ratio Calculation: 3.5 Ratio (09-15 @ 19:33)  Sodium, Random Urine: 46 mmol/L (09-14 @ 19:49)  Creatinine, Random Urine: 107 mg/dL (09-14 @ 19:49)  Protein/Creatinine Ratio Calculation: 7.6 Ratio (09-14 @ 19:49)  Osmolality, Random Urine: 472 mosm/kg (09-14 @ 19:49)  Potassium, Random Urine: 43 mmol/L (09-14 @ 19:49)    RADIOLOGY & ADDITIONAL STUDIES:  Reviewed

## 2023-09-19 NOTE — CHART NOTE - NSCHARTNOTEFT_GEN_A_CORE
pt refused Lovenox per RN. Resident notified and spoke with pt and family (mother) about benefit and risk of anticoagulation with pt's underlying medical condition and hypercoagulable state.     Pt and family stated understanding and said would reconsider.

## 2023-09-19 NOTE — PROGRESS NOTE ADULT - PROBLEM SELECTOR PLAN 4
Stable. s/p 2u pRBC. Likely 2/2 Lupus, and/or mycophenolate. Iron study correlates with anemia of chronic disease. CBC shows schistocytes and with lab results 9/16 indicative of hemolysis. Jasmine negative. Asymptomatic    - will maintain 2 large bore IV  - TS if Hgb <7

## 2023-09-19 NOTE — PROGRESS NOTE ADULT - PROBLEM SELECTOR PLAN 1
Hx of SLE. Collateral from outpt Rheumatologist as per chart note. Elevated Cr is likely 2/2 progression of Lupus Nephritis. Renal US is consistent with chronic medical renal disease. s/p pulse steroids methylprednisolone 500mg x3. s/p Lasix with no allergic reactions and s/e. s/p Kidney Bx 9/18.  Rheum, heme nephro following. Currently on Lasix 40mg IV BID, MMF 500mg BID, Prednisone 60mg PO qd, sevelamer  800mg TID with meals, Nifedipine to 30mg QD, Sodium bicarb 650mg TID    - will continue to hold Hydroxychloroquine (home med  Prednisone, Mycophenolate, Hydroxychloroquine)  - f/u biopsy  - f/u post biopsy CBC    Per Renal:  - f/u C3 C4 and anti ds DNA to monitor response   - started on metalozone 5  - started on lisinopril 20 (equivalent to home med ramipril 5)   - decreased nifedipine to 30  - AC for nephrotic syndrome

## 2023-09-19 NOTE — PROGRESS NOTE ADULT - PROBLEM SELECTOR PLAN 2
IMPROVED s/p pulse methylprednisone 500 x3. PLT and WBC slightly decreased after starting MMF. Likely 2/2 Lupus, and/or mycophenolate    - mgmt as above

## 2023-09-19 NOTE — PROGRESS NOTE ADULT - ASSESSMENT
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   Baseline sCr of 0.6-0.8, now sCr stable at 2  UPCR down to 3.5 from 7.6    Plan:  Please send C3 C4 and anti ds DNA to monitor response   Obtain LE dopplers, renal artery doppler  Would start DVT ppx if no evidence of thrombosis, otherwise needs AC  Cont MMF 500mg BID   Cont on Prednisone 60mg PO daily   Increase Lasix 80mg IV BID   Add metolazone 5mg daily  Decrease Nifedipine to 30mg QD   Cont phos binders 800mg TID with meals   C/w Sodium bicarb 650mg TID   Daily BMP  Renal diet  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

## 2023-09-20 DIAGNOSIS — R60.0 LOCALIZED EDEMA: ICD-10-CM

## 2023-09-20 LAB
ALBUMIN SERPL ELPH-MCNC: 2.2 G/DL — LOW (ref 3.3–5)
ALP SERPL-CCNC: 34 U/L — LOW (ref 40–120)
ALT FLD-CCNC: 10 U/L — SIGNIFICANT CHANGE UP (ref 10–45)
ANION GAP SERPL CALC-SCNC: 8 MMOL/L — SIGNIFICANT CHANGE UP (ref 5–17)
ANISOCYTOSIS BLD QL: SLIGHT — SIGNIFICANT CHANGE UP
AST SERPL-CCNC: 18 U/L — SIGNIFICANT CHANGE UP (ref 10–40)
BASOPHILS # BLD AUTO: 0 K/UL — SIGNIFICANT CHANGE UP (ref 0–0.2)
BASOPHILS NFR BLD AUTO: 0 % — SIGNIFICANT CHANGE UP (ref 0–2)
BILIRUB SERPL-MCNC: 0.2 MG/DL — SIGNIFICANT CHANGE UP (ref 0.2–1.2)
BUN SERPL-MCNC: 98 MG/DL — HIGH (ref 7–23)
BURR CELLS BLD QL SMEAR: PRESENT — SIGNIFICANT CHANGE UP
CALCIUM SERPL-MCNC: 8.2 MG/DL — LOW (ref 8.4–10.5)
CHLORIDE SERPL-SCNC: 111 MMOL/L — HIGH (ref 96–108)
CO2 SERPL-SCNC: 24 MMOL/L — SIGNIFICANT CHANGE UP (ref 22–31)
CONFIRM APTT STACLOT: NEGATIVE — SIGNIFICANT CHANGE UP
CREAT SERPL-MCNC: 1.95 MG/DL — HIGH (ref 0.5–1.3)
DACRYOCYTES BLD QL SMEAR: SLIGHT — SIGNIFICANT CHANGE UP
DRVVT RATIO: 1.01 RATIO — SIGNIFICANT CHANGE UP (ref 0–1.03)
DRVVT SCREEN TO CONFIRM RATIO: SIGNIFICANT CHANGE UP
DSDNA AB SER-ACNC: >1000 IU/ML — HIGH
EGFR: 34 ML/MIN/1.73M2 — LOW
EOSINOPHIL # BLD AUTO: 0 K/UL — SIGNIFICANT CHANGE UP (ref 0–0.5)
EOSINOPHIL NFR BLD AUTO: 0 % — SIGNIFICANT CHANGE UP (ref 0–6)
GIANT PLATELETS BLD QL SMEAR: PRESENT — SIGNIFICANT CHANGE UP
GLUCOSE BLDC GLUCOMTR-MCNC: 121 MG/DL — HIGH (ref 70–99)
GLUCOSE BLDC GLUCOMTR-MCNC: 128 MG/DL — HIGH (ref 70–99)
GLUCOSE BLDC GLUCOMTR-MCNC: 138 MG/DL — HIGH (ref 70–99)
GLUCOSE BLDC GLUCOMTR-MCNC: 99 MG/DL — SIGNIFICANT CHANGE UP (ref 70–99)
GLUCOSE SERPL-MCNC: 93 MG/DL — SIGNIFICANT CHANGE UP (ref 70–99)
HCT VFR BLD CALC: 27.8 % — LOW (ref 34.5–45)
HGB BLD-MCNC: 9.1 G/DL — LOW (ref 11.5–15.5)
LYMPHOCYTES # BLD AUTO: 0.34 K/UL — LOW (ref 1–3.3)
LYMPHOCYTES # BLD AUTO: 5.8 % — LOW (ref 13–44)
MACROCYTES BLD QL: SLIGHT — SIGNIFICANT CHANGE UP
MAGNESIUM SERPL-MCNC: 2.4 MG/DL — SIGNIFICANT CHANGE UP (ref 1.6–2.6)
MANUAL SMEAR VERIFICATION: SIGNIFICANT CHANGE UP
MCHC RBC-ENTMCNC: 29.6 PG — SIGNIFICANT CHANGE UP (ref 27–34)
MCHC RBC-ENTMCNC: 32.7 GM/DL — SIGNIFICANT CHANGE UP (ref 32–36)
MCV RBC AUTO: 90.6 FL — SIGNIFICANT CHANGE UP (ref 80–100)
MONOCYTES # BLD AUTO: 0.34 K/UL — SIGNIFICANT CHANGE UP (ref 0–0.9)
MONOCYTES NFR BLD AUTO: 5.8 % — SIGNIFICANT CHANGE UP (ref 2–14)
NEUTROPHILS # BLD AUTO: 5.11 K/UL — SIGNIFICANT CHANGE UP (ref 1.8–7.4)
NEUTROPHILS NFR BLD AUTO: 88.4 % — HIGH (ref 43–77)
OVALOCYTES BLD QL SMEAR: SLIGHT — SIGNIFICANT CHANGE UP
PHOSPHATE SERPL-MCNC: 4.4 MG/DL — SIGNIFICANT CHANGE UP (ref 2.5–4.5)
PLAT MORPH BLD: ABNORMAL
PLATELET # BLD AUTO: 74 K/UL — LOW (ref 150–400)
POIKILOCYTOSIS BLD QL AUTO: SIGNIFICANT CHANGE UP
POTASSIUM SERPL-MCNC: 4.4 MMOL/L — SIGNIFICANT CHANGE UP (ref 3.5–5.3)
POTASSIUM SERPL-SCNC: 4.4 MMOL/L — SIGNIFICANT CHANGE UP (ref 3.5–5.3)
PROT SERPL-MCNC: 4.9 G/DL — LOW (ref 6–8.3)
RBC # BLD: 3.07 M/UL — LOW (ref 3.8–5.2)
RBC # FLD: 17.6 % — HIGH (ref 10.3–14.5)
RBC BLD AUTO: ABNORMAL
SCHISTOCYTES BLD QL AUTO: SLIGHT — SIGNIFICANT CHANGE UP
SMUDGE CELLS # BLD: PRESENT — SIGNIFICANT CHANGE UP
SODIUM SERPL-SCNC: 143 MMOL/L — SIGNIFICANT CHANGE UP (ref 135–145)
WBC # BLD: 5.78 K/UL — SIGNIFICANT CHANGE UP (ref 3.8–10.5)
WBC # FLD AUTO: 5.78 K/UL — SIGNIFICANT CHANGE UP (ref 3.8–10.5)

## 2023-09-20 PROCEDURE — 99232 SBSQ HOSP IP/OBS MODERATE 35: CPT | Mod: GC

## 2023-09-20 PROCEDURE — 99232 SBSQ HOSP IP/OBS MODERATE 35: CPT

## 2023-09-20 RX ADMIN — Medication 80 MILLIGRAM(S): at 06:00

## 2023-09-20 RX ADMIN — MYCOPHENOLATE MOFETIL 500 MILLIGRAM(S): 250 CAPSULE ORAL at 17:21

## 2023-09-20 RX ADMIN — Medication 650 MILLIGRAM(S): at 05:57

## 2023-09-20 RX ADMIN — POLYETHYLENE GLYCOL 3350 17 GRAM(S): 17 POWDER, FOR SOLUTION ORAL at 12:08

## 2023-09-20 RX ADMIN — SEVELAMER CARBONATE 800 MILLIGRAM(S): 2400 POWDER, FOR SUSPENSION ORAL at 12:06

## 2023-09-20 RX ADMIN — Medication 650 MILLIGRAM(S): at 13:03

## 2023-09-20 RX ADMIN — SEVELAMER CARBONATE 800 MILLIGRAM(S): 2400 POWDER, FOR SUSPENSION ORAL at 17:21

## 2023-09-20 RX ADMIN — MYCOPHENOLATE MOFETIL 500 MILLIGRAM(S): 250 CAPSULE ORAL at 06:00

## 2023-09-20 RX ADMIN — LISINOPRIL 20 MILLIGRAM(S): 2.5 TABLET ORAL at 12:08

## 2023-09-20 RX ADMIN — SEVELAMER CARBONATE 800 MILLIGRAM(S): 2400 POWDER, FOR SUSPENSION ORAL at 09:06

## 2023-09-20 RX ADMIN — ENOXAPARIN SODIUM 40 MILLIGRAM(S): 100 INJECTION SUBCUTANEOUS at 12:06

## 2023-09-20 RX ADMIN — Medication 60 MILLIGRAM(S): at 06:00

## 2023-09-20 RX ADMIN — Medication 80 MILLIGRAM(S): at 17:21

## 2023-09-20 RX ADMIN — PANTOPRAZOLE SODIUM 40 MILLIGRAM(S): 20 TABLET, DELAYED RELEASE ORAL at 06:00

## 2023-09-20 NOTE — PROGRESS NOTE ADULT - PROBLEM SELECTOR PLAN 1
Hx of SLE. Collateral from outpt Rheumatologist as per chart note. Elevated Cr is likely 2/2 progression of Lupus Nephritis. Renal US is consistent with chronic medical renal disease. s/p pulse steroids methylprednisolone 500mg x3. s/p Lasix with no allergic reactions and s/e. s/p Kidney Bx 9/18.  Rheum, heme nephro following. Currently on Lasix 40mg IV BID, MMF 500mg BID, Prednisone 60mg PO qd, sevelamer  800mg TID with meals, Nifedipine to 30mg QD, Sodium bicarb 650mg TID    - will continue to hold Hydroxychloroquine (home med  Prednisone, Mycophenolate, Hydroxychloroquine)  - f/u biopsy  - f/u post biopsy CBC    Per Renal:  - f/u C3 C4 and anti ds DNA to monitor response   - started on metalozone 5  - started on lisinopril 20 (equivalent to home med ramipril 5)   - decreased nifedipine to 30  - AC for nephrotic syndrome Hx of SLE. Collateral from outpt Rheumatologist as per chart note. Elevated Cr is likely 2/2 progression of Lupus Nephritis. Renal US is consistent with chronic medical renal disease.   s/p pulse steroids methylprednisolone 500mg x3.   s/p Lasix with no allergic reactions and s/e. s/p Kidney Bx 9/18.    Rheum, heme nephro following.   Currently on Lasix 80mg IV BID, MMF 500mg BID, Prednisone 60mg PO qd, sevelamer  800mg TID with meals, Sodium bicarb 650mg TID    - will continue to hold Hydroxychloroquine (home med  Prednisone, Mycophenolate, Hydroxychloroquine)  - f/u biopsy  - f/u post biopsy CBC    Per Renal:  - f/u C3 C4 and anti ds DNA to monitor response   - started on metalozone 5  - started on lisinopril 20 (equivalent to home med ramipril 5)   - AC for nephrotic syndrome Hx of SLE. Collateral from outpt Rheumatologist as per chart note. Elevated Cr is likely 2/2 progression of Lupus Nephritis. Renal US is consistent with chronic medical renal disease.   s/p pulse steroids methylprednisolone 500mg x3.   s/p Lasix with no allergic reactions and s/e. s/p Kidney Bx 9/18.    Rheum, heme nephro following.   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 Collateral from outpt Rheumatologist as per chart note. Elevated Cr is likely 2/2 progression of Lupus Nephritis. Renal US is consistent with chronic medical renal disease.   s/p pulse steroids methylprednisolone 500mg x3.   s/p Lasix with no allergic reactions and s/e. s/p Kidney Bx 9/18.    Rheum, heme nephro following.   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 Collateral from outpt Rheumatologist as per chart note. Elevated Cr is likely 2/2 progression of Lupus Nephritis. Renal US is consistent with chronic medical renal disease.   s/p pulse steroids methylprednisolone 500mg x3.   s/p Lasix with no allergic reactions and s/e. s/p Kidney Bx 9/18.    Rheum, heme nephro following.   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  C3 32, C4 3, DS DNA ab >1000  Currently on Lasix 80mg IV BID, MMF 500mg BID, Prednisone 60mg PO qd, sevelamer 800mg TID with meals, Sodium bicarb 650mg TID      - will continue to hold Hydroxychloroquine (home med  Prednisone, Mycophenolate, Hydroxychloroquine)    Per Renal:  - c/w metalozone 5 mg qd  - c/w lisinopril 20 mg qd (equivalent to home med ramipril 5)   - AC for nephrotic syndrome

## 2023-09-20 NOTE — PROGRESS NOTE ADULT - PROBLEM SELECTOR PLAN 4
Stable. s/p 2u pRBC. Likely 2/2 Lupus, and/or mycophenolate. Iron study correlates with anemia of chronic disease. CBC shows schistocytes and with lab results 9/16 indicative of hemolysis. Jasmine negative. Asymptomatic    - will maintain 2 large bore IV  - TS if Hgb <7 Patient complained of significant BL lower extremity edema up to the abdomen.  See Lupus nephritis for management. Patient complained of significant BL lower extremity edema up to the abdomen. Patient reports that since increased furosemide dose edema has improved.  See Lupus nephritis for management.

## 2023-09-20 NOTE — DIETITIAN INITIAL EVALUATION ADULT - WEIGHT USED FOR CALCULATIONS
IBW Cheek-To-Nose Interpolation Flap Text: A decision was made to reconstruct the defect utilizing an interpolation axial flap and a staged reconstruction.  A telfa template was made of the defect.  This telfa template was then used to outline the Cheek-To-Nose Interpolation flap.  The donor area for the pedicle flap was then injected with anesthesia.  The flap was excised through the skin and subcutaneous tissue down to the layer of the underlying musculature.  The interpolation flap was carefully excised within this deep plane to maintain its blood supply.  The edges of the donor site were undermined.   The donor site was closed in a primary fashion.  The pedicle was then rotated into position and sutured.  Once the tube was sutured into place, adequate blood supply was confirmed with blanching and refill.  The pedicle was then wrapped with xeroform gauze and dressed appropriately with a telfa and gauze bandage to ensure continued blood supply and protect the attached pedicle.

## 2023-09-20 NOTE — DIETITIAN INITIAL EVALUATION ADULT - OTHER CALCULATIONS
Based on Standards of Care patient above % IBW (132%) thus ideal body weight used for all calculations (115#). Needs adjusted for KARENA. Fluid recs per team.

## 2023-09-20 NOTE — DIETITIAN INITIAL EVALUATION ADULT - PROBLEM SELECTOR PLAN 5
Heme consulted for #pancotyopenia and #anemia  - likely a sequelae of mecyphenolate use. Agree with transfusion of 1unit prbc   - recommend anemia workup prior to transfusion of prbc: reticulocytes, iron, ferritin, B12, folate  -will hold MMF    Hematology will follow

## 2023-09-20 NOTE — PROGRESS NOTE ADULT - SUBJECTIVE AND OBJECTIVE BOX
O/N Events: patient expressed to ON team that she is now amenable to anticoagulation    Subjective/ROS: Patient seen and examined at bedside. Patient states that her last BM was monday (9/18) and now she is feeling some discomfort from constipation and gas. Patient reiterated that she is amenable to anticoagulation. She states that she is urinating frequently 2/2 to furosemide. Patient states that the abdominal swelling and BL LE edema is improved since yesterday    Denies Fever/Chills, HA, CP, SOB, n/v.  12pt ROS otherwise negative.    VITALS  Vital Signs Last 24 Hrs  T(C): 37.1 (20 Sep 2023 05:45), Max: 37.1 (20 Sep 2023 05:45)  T(F): 98.7 (20 Sep 2023 05:45), Max: 98.7 (20 Sep 2023 05:45)  HR: 72 (20 Sep 2023 05:45) (72 - 77)  BP: 135/87 (20 Sep 2023 05:45) (129/86 - 156/97)  BP(mean): --  RR: 18 (20 Sep 2023 05:45) (17 - 18)  SpO2: 96% (20 Sep 2023 05:45) (96% - 97%)    Parameters below as of 20 Sep 2023 05:45  Patient On (Oxygen Delivery Method): room air        CAPILLARY BLOOD GLUCOSE      POCT Blood Glucose.: 121 mg/dL (19 Sep 2023 21:58)  POCT Blood Glucose.: 129 mg/dL (19 Sep 2023 17:46)  POCT Blood Glucose.: 124 mg/dL (19 Sep 2023 13:09)  POCT Blood Glucose.: 118 mg/dL (19 Sep 2023 09:06)      PHYSICAL EXAM  General: NAD, laying in bed comfortably  Head: NC/AT; MMM; EOMI;  Neck: Supple; no JVD  Respiratory: CTAB; no wheezes/rales/rhonchi  Cardiovascular: Regular rhythm/rate; S1/S2+, no murmurs, rubs gallops   Gastrointestinal: Soft; NTND  Extremities: WWP; +2 pitting edema up to knee  Neurological: A&Ox3, CNII-XII grossly intact; no obvious focal deficits        Standing Medications:  MEDICATIONS  (STANDING):  bisacodyl Suppository 10 milliGRAM(s) Rectal once  dextrose 5%. 1000 milliLiter(s) (100 mL/Hr) IV Continuous <Continuous>  dextrose 5%. 1000 milliLiter(s) (50 mL/Hr) IV Continuous <Continuous>  dextrose 50% Injectable 12.5 Gram(s) IV Push once  dextrose 50% Injectable 25 Gram(s) IV Push once  dextrose 50% Injectable 25 Gram(s) IV Push once  enoxaparin Injectable 40 milliGRAM(s) SubCutaneous every 24 hours  furosemide   Injectable 80 milliGRAM(s) IV Push every 12 hours  glucagon  Injectable 1 milliGRAM(s) IntraMuscular once  insulin lispro (ADMELOG) corrective regimen sliding scale   SubCutaneous three times a day before meals  insulin lispro (ADMELOG) corrective regimen sliding scale   SubCutaneous at bedtime  lisinopril 20 milliGRAM(s) Oral every 24 hours  metolazone 5 milliGRAM(s) Oral every 24 hours  mycophenolate mofetil 500 milliGRAM(s) Oral two times a day  pantoprazole    Tablet 40 milliGRAM(s) Oral before breakfast  predniSONE   Tablet 60 milliGRAM(s) Oral daily  sevelamer carbonate 800 milliGRAM(s) Oral three times a day with meals  sodium bicarbonate 650 milliGRAM(s) Oral three times a day      PRN Medications:  MEDICATIONS  (PRN):  acetaminophen     Tablet .. 650 milliGRAM(s) Oral every 6 hours PRN Temp greater or equal to 38C (100.4F), Mild Pain (1 - 3)  dextrose Oral Gel 15 Gram(s) Oral once PRN Blood Glucose LESS THAN 70 milliGRAM(s)/deciliter  polyethylene glycol 3350 17 Gram(s) Oral daily PRN Constipation  senna 2 Tablet(s) Oral at bedtime PRN Constipation      sulfa drugs (Rash)      LABS                        9.1    5.30  )-----------( 82       ( 19 Sep 2023 05:30 )             27.7     09-19    141  |  111<H>  |  90<H>  ----------------------------<  124<H>  4.5   |  21<L>  |  1.89<H>    Ca    8.3<L>      19 Sep 2023 05:30  Phos  5.3     09-19  Mg     2.3     09-19    TPro  5.0<L>  /  Alb  2.3<L>  /  TBili  0.2  /  DBili  x   /  AST  17  /  ALT  10  /  AlkPhos  34<L>  09-19      Urinalysis Basic - ( 19 Sep 2023 05:30 )    Color: x / Appearance: x / SG: x / pH: x  Gluc: 124 mg/dL / Ketone: x  / Bili: x / Urobili: x   Blood: x / Protein: x / Nitrite: x   Leuk Esterase: x / RBC: x / WBC x   Sq Epi: x / Non Sq Epi: x / Bacteria: x              IMAGING/EKG/ETC

## 2023-09-20 NOTE — PROGRESS NOTE ADULT - PROBLEM SELECTOR PLAN 5
RESOLVED. No EKG changes. s/p lokelma TID x2 d Stable. s/p 2u pRBC. Likely 2/2 Lupus, and/or mycophenolate. Iron study correlates with anemia of chronic disease. CBC shows schistocytes and with lab results 9/16 indicative of hemolysis. Jasmine negative. Asymptomatic    - will maintain 2 large bore IV  - TS if Hgb <7

## 2023-09-20 NOTE — DIETITIAN INITIAL EVALUATION ADULT - ADD RECOMMEND
1. Recommend 60gm protein, low sodium diet  >>if phos elevated, can add No Conc Phos  >>if K elevated, can add No Conc K   2. Encourage pt to meet nutritional needs as able   3. Monitor labs: electrolytes, cmp  4. Monitor weights   5. Pain and bowel regimen per team   6. Will continue to assess/honor food preferences as able

## 2023-09-20 NOTE — DIETITIAN INITIAL EVALUATION ADULT - PERTINENT MEDS FT
MEDICATIONS  (STANDING):  dextrose 5%. 1000 milliLiter(s) (100 mL/Hr) IV Continuous <Continuous>  dextrose 5%. 1000 milliLiter(s) (50 mL/Hr) IV Continuous <Continuous>  dextrose 50% Injectable 12.5 Gram(s) IV Push once  dextrose 50% Injectable 25 Gram(s) IV Push once  dextrose 50% Injectable 25 Gram(s) IV Push once  enoxaparin Injectable 40 milliGRAM(s) SubCutaneous every 24 hours  furosemide   Injectable 80 milliGRAM(s) IV Push every 12 hours  glucagon  Injectable 1 milliGRAM(s) IntraMuscular once  insulin lispro (ADMELOG) corrective regimen sliding scale   SubCutaneous three times a day before meals  insulin lispro (ADMELOG) corrective regimen sliding scale   SubCutaneous at bedtime  lisinopril 20 milliGRAM(s) Oral every 24 hours  metolazone 5 milliGRAM(s) Oral every 24 hours  mycophenolate mofetil 500 milliGRAM(s) Oral two times a day  pantoprazole    Tablet 40 milliGRAM(s) Oral before breakfast  predniSONE   Tablet 60 milliGRAM(s) Oral daily  sevelamer carbonate 800 milliGRAM(s) Oral three times a day with meals  sodium bicarbonate 650 milliGRAM(s) Oral three times a day    MEDICATIONS  (PRN):  acetaminophen     Tablet .. 650 milliGRAM(s) Oral every 6 hours PRN Temp greater or equal to 38C (100.4F), Mild Pain (1 - 3)  dextrose Oral Gel 15 Gram(s) Oral once PRN Blood Glucose LESS THAN 70 milliGRAM(s)/deciliter  polyethylene glycol 3350 17 Gram(s) Oral daily PRN Constipation  senna 2 Tablet(s) Oral at bedtime PRN Constipation

## 2023-09-20 NOTE — DIETITIAN INITIAL EVALUATION ADULT - OTHER INFO
33 F PMHx SLE since middle school c/o dyspnea, leg swelling progressively worsening over the last three months. Patient was at her cardiologist at [facility] yesterday who did a TTE (wnl per patient) and blood work. Sent patient on steroids, hydroxychloroquine, prednisone- started on Ramipril for bp 3 months ago- now w 3 months worsening swelling of legs, fatigue and dyspnea. Patient was at [facility] cardiologist yesterday, who she saw for progressive leg swelling and dyspnea, who did TTE (wnl per pt) and sent labs and got the results and sent labs to patient's rheumatologist who told patient to come to ED--concern for lupus nephritis. Patient's bustos has been worsening and has trouble catching her brreath if she walks many blocks at normal pace.     Patient seen at bedside for length of stay initial assessment. Current diet order: Renal, Consistent Carb, DASH/TLC. Confirms NKFA. No difficulty chewing/swallowing reported. Reports good appetite, consumed 75% of breakfast this AM. PTA, patient reports normal appetite and PO intake. Endorses significant weight gain x 3 months related to edema, noted with +3 to BL legs. # and current dosing weight 152#, BMI 26.9. No pressure injuries documented. Endorses constipation, last BM 9/18 per EMR. Labs: BUN/Cr elevated, GFR 34, glucose trending  x 24 hours. Meds: insulin, diuretic, sevelamer, sodium bicarbonate, bowel regimen. Observed with no overt signs and symptoms of muscle or fat wasting. Based on ASPEN guidelines, patient does not meet criteria for malnutrition. No cultural, ethnic, Scientologist food preferences noted. See nutrition recommendations below.  33 F PMHx SLE since middle school c/o dyspnea, leg swelling progressively worsening over the last three months. Patient was at her cardiologist at [facility] yesterday who did a TTE (wnl per patient) and blood work. Sent patient on steroids, hydroxychloroquine, prednisone- started on Ramipril for bp 3 months ago- now w 3 months worsening swelling of legs, fatigue and dyspnea. Patient was at [facility] cardiologist yesterday, who she saw for progressive leg swelling and dyspnea, who did TTE (wnl per pt) and sent labs and got the results and sent labs to patient's rheumatologist who told patient to come to ED--concern for lupus nephritis. Patient's bustos has been worsening and has trouble catching her brreath if she walks many blocks at normal pace.     Patient seen at bedside for length of stay initial assessment. Current diet order: Renal, Consistent Carb, DASH/TLC. Confirms NKFA. No difficulty chewing/swallowing reported. Reports good appetite, consumed 75% of breakfast this AM. Asking for more variety in meals- recommend to liberalize diet to allow for more menu options, see full recs below. PTA, patient reports normal appetite and PO intake. Endorses significant weight gain x 3 months related to edema, noted with +3 to BL legs. # and current dosing weight 152#, BMI 26.9. No pressure injuries documented. Endorses constipation, last BM 9/18 per EMR. Labs: BUN/Cr elevated, GFR 34, glucose trending  x 24 hours. Meds: insulin, diuretic, sevelamer, sodium bicarbonate, bowel regimen. Observed with no overt signs and symptoms of muscle or fat wasting. Based on ASPEN guidelines, patient does not meet criteria for malnutrition. No cultural, ethnic, Voodoo food preferences noted. See nutrition recommendations below.

## 2023-09-20 NOTE — PROGRESS NOTE ADULT - PROBLEM SELECTOR PLAN 6
F: None   E: Replete as necessary K>4 Mg>2  N: Reg diet   DVT Prophylaxis: lovenox starting 9/19   GI prophylaxis: None   CODE STATUS: FULL RESOLVED. No EKG changes. s/p lokelma TID x2 d

## 2023-09-20 NOTE — DIETITIAN INITIAL EVALUATION ADULT - PERTINENT LABORATORY DATA
09-20    143  |  111<H>  |  98<H>  ----------------------------<  93  4.4   |  24  |  1.95<H>    Ca    8.2<L>      20 Sep 2023 05:30  Phos  4.4     09-20  Mg     2.4     09-20    TPro  4.9<L>  /  Alb  2.2<L>  /  TBili  0.2  /  DBili  x   /  AST  18  /  ALT  10  /  AlkPhos  34<L>  09-20  POCT Blood Glucose.: 121 mg/dL (09-20-23 @ 12:53)  A1C with Estimated Average Glucose Result: 5.2 % (09-16-23 @ 08:28)

## 2023-09-20 NOTE — PROGRESS NOTE ADULT - SUBJECTIVE AND OBJECTIVE BOX
Nephrology progress note    Seen at bedside. Feeling well, states that swelling has improved. Ambulating but notes some LE heaviness due to edema.    Allergies:  sulfa drugs (Rash)    Hospital Medications:   MEDICATIONS  (STANDING):  dextrose 5%. 1000 milliLiter(s) (100 mL/Hr) IV Continuous <Continuous>  dextrose 5%. 1000 milliLiter(s) (50 mL/Hr) IV Continuous <Continuous>  dextrose 50% Injectable 12.5 Gram(s) IV Push once  dextrose 50% Injectable 25 Gram(s) IV Push once  dextrose 50% Injectable 25 Gram(s) IV Push once  enoxaparin Injectable 40 milliGRAM(s) SubCutaneous every 24 hours  furosemide   Injectable 80 milliGRAM(s) IV Push every 12 hours  glucagon  Injectable 1 milliGRAM(s) IntraMuscular once  insulin lispro (ADMELOG) corrective regimen sliding scale   SubCutaneous three times a day before meals  insulin lispro (ADMELOG) corrective regimen sliding scale   SubCutaneous at bedtime  lisinopril 20 milliGRAM(s) Oral every 24 hours  metolazone 5 milliGRAM(s) Oral every 24 hours  mycophenolate mofetil 500 milliGRAM(s) Oral two times a day  pantoprazole    Tablet 40 milliGRAM(s) Oral before breakfast  predniSONE   Tablet 60 milliGRAM(s) Oral daily  sevelamer carbonate 800 milliGRAM(s) Oral three times a day with meals  sodium bicarbonate 650 milliGRAM(s) Oral three times a day    REVIEW OF SYSTEMS:  All other review of systems is negative unless indicated above.    VITALS:  T(F): 98.7 (09-20-23 @ 12:00), Max: 98.7 (09-20-23 @ 05:45)  HR: 69 (09-20-23 @ 12:00)  BP: 155/98 (09-20-23 @ 12:00)  RR: 17 (09-20-23 @ 12:00)  SpO2: 97% (09-20-23 @ 12:00)  Wt(kg): --      PHYSICAL EXAM:  GENERAL: Alert, awake, NAD   HEENT: NCAT, EOMI, neck supple, no JVP  CHEST/LUNG: CTAB  HEART: Regular rate and rhythm  ABDOMEN: Soft, nontender, non-distended  : No flank or supra pubic tenderness.  EXTREMITIES: b/l 2+ pitting edema   Neurology: AAOx3, no focal neurological deficit  SKIN: No rash or skin lesion, biopsy site c/d/i    LABS:  09-20    143  |  111<H>  |  98<H>  ----------------------------<  93  4.4   |  24  |  1.95<H>    Ca    8.2<L>      20 Sep 2023 05:30  Phos  4.4     09-20  Mg     2.4     09-20    TPro  4.9<L>  /  Alb  2.2<L>  /  TBili  0.2  /  DBili      /  AST  18  /  ALT  10  /  AlkPhos  34<L>  09-20                          9.1    5.78  )-----------( 74       ( 20 Sep 2023 05:30 )             27.8       Urine Studies:  Creatinine Trend: 1.95<--, 1.89<--, 2.10<--, 2.14<--, 2.25<--, 2.11<--  Urinalysis Basic - ( 20 Sep 2023 05:30 )    Color:  / Appearance:  / SG:  / pH:   Gluc: 93 mg/dL / Ketone:   / Bili:  / Urobili:    Blood:  / Protein:  / Nitrite:    Leuk Esterase:  / RBC:  / WBC    Sq Epi:  / Non Sq Epi:  / Bacteria:       Creatinine, Random Urine: 60 mg/dL (09-15 @ 19:33)  Protein/Creatinine Ratio Calculation: 3.5 Ratio (09-15 @ 19:33)  Sodium, Random Urine: 46 mmol/L (09-14 @ 19:49)  Creatinine, Random Urine: 107 mg/dL (09-14 @ 19:49)  Protein/Creatinine Ratio Calculation: 7.6 Ratio (09-14 @ 19:49)  Osmolality, Random Urine: 472 mosm/kg (09-14 @ 19:49)  Potassium, Random Urine: 43 mmol/L (09-14 @ 19:49)    RADIOLOGY & ADDITIONAL STUDIES:  Reviewed

## 2023-09-20 NOTE — DIETITIAN INITIAL EVALUATION ADULT - PROBLEM SELECTOR PLAN 1
Patient presenting with progressively worsening dyspnea found to have Cr 1.4, severe proteinuria, and concern for SLE nephritis. Renal consulted who rec renal u/s, urine studies, however patient will need to have biopsy done before tx plan. Noteable that patient also pancoptypenic including low plt so will likely need plt to improve before biopsy performed   -f/u renal recs including sono, lytes  -Biopsy once plt counts improve (will make NPO after Mn just in case)

## 2023-09-20 NOTE — PROGRESS NOTE ADULT - ASSESSMENT
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, with sCr stable ~ 2, s/p renal bx, and finished course of pulse dose steroids (9/17) with improvement in UPCR    Assessment:  - Non-oliguric KARENA 2/2 RPGN lupus nephritis   - Baseline sCr of 0.6-0.8, now sCr stable at 2  - UPCR down to 3.5 from 7.6    Plan:  - Please send C3, C4, and anti-dsDNA to monitor response   - Dopplers negative for DVT/RVT  - Cont DVT ppx  - Cont MMF 500mg BID   - Cont on Prednisone 60mg PO daily   - Cont Lasix 80mg IV BID with metolazone 5mg daily  - Cont Nifedipine to 30mg QD, lisinopril  - Cont phos binders 800mg TID with meals   - Cont Sodium bicarb 650mg TID   - Daily BMP  - Renal diet

## 2023-09-20 NOTE — DIETITIAN INITIAL EVALUATION ADULT - PROBLEM SELECTOR PLAN 2
Hx of SLE since middle school per [facility] chart review and currently on Prednisone, Mycophenolate, Hydroxychloroquine,   -AM rheum consult to follow along--suspect will need higher dose of prednisone right now  -holding MMF

## 2023-09-21 ENCOUNTER — TRANSCRIPTION ENCOUNTER (OUTPATIENT)
Age: 33
End: 2023-09-21

## 2023-09-21 VITALS
TEMPERATURE: 98 F | DIASTOLIC BLOOD PRESSURE: 90 MMHG | HEART RATE: 70 BPM | SYSTOLIC BLOOD PRESSURE: 158 MMHG | OXYGEN SATURATION: 100 % | RESPIRATION RATE: 18 BRPM

## 2023-09-21 LAB
ALBUMIN SERPL ELPH-MCNC: 2.5 G/DL — LOW (ref 3.3–5)
ALP SERPL-CCNC: 34 U/L — LOW (ref 40–120)
ALT FLD-CCNC: 11 U/L — SIGNIFICANT CHANGE UP (ref 10–45)
ANION GAP SERPL CALC-SCNC: 7 MMOL/L — SIGNIFICANT CHANGE UP (ref 5–17)
ANISOCYTOSIS BLD QL: SLIGHT — SIGNIFICANT CHANGE UP
AST SERPL-CCNC: 19 U/L — SIGNIFICANT CHANGE UP (ref 10–40)
BASOPHILS # BLD AUTO: 0 K/UL — SIGNIFICANT CHANGE UP (ref 0–0.2)
BASOPHILS NFR BLD AUTO: 0 % — SIGNIFICANT CHANGE UP (ref 0–2)
BILIRUB SERPL-MCNC: 0.2 MG/DL — SIGNIFICANT CHANGE UP (ref 0.2–1.2)
BUN SERPL-MCNC: 98 MG/DL — HIGH (ref 7–23)
BURR CELLS BLD QL SMEAR: PRESENT — SIGNIFICANT CHANGE UP
CALCIUM SERPL-MCNC: 8.8 MG/DL — SIGNIFICANT CHANGE UP (ref 8.4–10.5)
CHLORIDE SERPL-SCNC: 109 MMOL/L — HIGH (ref 96–108)
CO2 SERPL-SCNC: 25 MMOL/L — SIGNIFICANT CHANGE UP (ref 22–31)
CREAT SERPL-MCNC: 1.71 MG/DL — HIGH (ref 0.5–1.3)
DACRYOCYTES BLD QL SMEAR: SLIGHT — SIGNIFICANT CHANGE UP
EGFR: 40 ML/MIN/1.73M2 — LOW
EOSINOPHIL # BLD AUTO: 0 K/UL — SIGNIFICANT CHANGE UP (ref 0–0.5)
EOSINOPHIL NFR BLD AUTO: 0 % — SIGNIFICANT CHANGE UP (ref 0–6)
GIANT PLATELETS BLD QL SMEAR: PRESENT — SIGNIFICANT CHANGE UP
GLUCOSE BLDC GLUCOMTR-MCNC: 107 MG/DL — HIGH (ref 70–99)
GLUCOSE BLDC GLUCOMTR-MCNC: 145 MG/DL — HIGH (ref 70–99)
GLUCOSE SERPL-MCNC: 111 MG/DL — HIGH (ref 70–99)
HCT VFR BLD CALC: 27.3 % — LOW (ref 34.5–45)
HGB BLD-MCNC: 8.9 G/DL — LOW (ref 11.5–15.5)
LYMPHOCYTES # BLD AUTO: 0.05 K/UL — LOW (ref 1–3.3)
LYMPHOCYTES # BLD AUTO: 1 % — LOW (ref 13–44)
MAGNESIUM SERPL-MCNC: 2.4 MG/DL — SIGNIFICANT CHANGE UP (ref 1.6–2.6)
MANUAL SMEAR VERIFICATION: SIGNIFICANT CHANGE UP
MCHC RBC-ENTMCNC: 29.5 PG — SIGNIFICANT CHANGE UP (ref 27–34)
MCHC RBC-ENTMCNC: 32.6 GM/DL — SIGNIFICANT CHANGE UP (ref 32–36)
MCV RBC AUTO: 90.4 FL — SIGNIFICANT CHANGE UP (ref 80–100)
MONOCYTES # BLD AUTO: 0.14 K/UL — SIGNIFICANT CHANGE UP (ref 0–0.9)
MONOCYTES NFR BLD AUTO: 2.8 % — SIGNIFICANT CHANGE UP (ref 2–14)
NEUTROPHILS # BLD AUTO: 4.71 K/UL — SIGNIFICANT CHANGE UP (ref 1.8–7.4)
NEUTROPHILS NFR BLD AUTO: 96.2 % — HIGH (ref 43–77)
OVALOCYTES BLD QL SMEAR: SIGNIFICANT CHANGE UP
PHOSPHATE SERPL-MCNC: 4.1 MG/DL — SIGNIFICANT CHANGE UP (ref 2.5–4.5)
PLAT MORPH BLD: ABNORMAL
PLATELET # BLD AUTO: 64 K/UL — LOW (ref 150–400)
POIKILOCYTOSIS BLD QL AUTO: SIGNIFICANT CHANGE UP
POLYCHROMASIA BLD QL SMEAR: SLIGHT — SIGNIFICANT CHANGE UP
POTASSIUM SERPL-MCNC: 4.3 MMOL/L — SIGNIFICANT CHANGE UP (ref 3.5–5.3)
POTASSIUM SERPL-SCNC: 4.3 MMOL/L — SIGNIFICANT CHANGE UP (ref 3.5–5.3)
PROT SERPL-MCNC: 5.1 G/DL — LOW (ref 6–8.3)
RBC # BLD: 3.02 M/UL — LOW (ref 3.8–5.2)
RBC # FLD: 17 % — HIGH (ref 10.3–14.5)
RBC BLD AUTO: ABNORMAL
SMUDGE CELLS # BLD: PRESENT — SIGNIFICANT CHANGE UP
SODIUM SERPL-SCNC: 141 MMOL/L — SIGNIFICANT CHANGE UP (ref 135–145)
SPHEROCYTES BLD QL SMEAR: SLIGHT — SIGNIFICANT CHANGE UP
WBC # BLD: 4.9 K/UL — SIGNIFICANT CHANGE UP (ref 3.8–10.5)
WBC # FLD AUTO: 4.9 K/UL — SIGNIFICANT CHANGE UP (ref 3.8–10.5)

## 2023-09-21 PROCEDURE — 99285 EMERGENCY DEPT VISIT HI MDM: CPT | Mod: 25

## 2023-09-21 PROCEDURE — 86900 BLOOD TYPING SEROLOGIC ABO: CPT

## 2023-09-21 PROCEDURE — 36415 COLL VENOUS BLD VENIPUNCTURE: CPT

## 2023-09-21 PROCEDURE — 86225 DNA ANTIBODY NATIVE: CPT

## 2023-09-21 PROCEDURE — 86147 CARDIOLIPIN ANTIBODY EA IG: CPT

## 2023-09-21 PROCEDURE — 83010 ASSAY OF HAPTOGLOBIN QUANT: CPT

## 2023-09-21 PROCEDURE — 84300 ASSAY OF URINE SODIUM: CPT

## 2023-09-21 PROCEDURE — 82330 ASSAY OF CALCIUM: CPT

## 2023-09-21 PROCEDURE — 82746 ASSAY OF FOLIC ACID SERUM: CPT

## 2023-09-21 PROCEDURE — 82607 VITAMIN B-12: CPT

## 2023-09-21 PROCEDURE — 85613 RUSSELL VIPER VENOM DILUTED: CPT

## 2023-09-21 PROCEDURE — 81001 URINALYSIS AUTO W/SCOPE: CPT

## 2023-09-21 PROCEDURE — 82553 CREATINE MB FRACTION: CPT

## 2023-09-21 PROCEDURE — 88348 ELECTRON MICROSCOPY DX: CPT

## 2023-09-21 PROCEDURE — 86038 ANTINUCLEAR ANTIBODIES: CPT

## 2023-09-21 PROCEDURE — 83540 ASSAY OF IRON: CPT

## 2023-09-21 PROCEDURE — 88350 IMFLUOR EA ADDL 1ANTB STN PX: CPT

## 2023-09-21 PROCEDURE — 99232 SBSQ HOSP IP/OBS MODERATE 35: CPT

## 2023-09-21 PROCEDURE — 83880 ASSAY OF NATRIURETIC PEPTIDE: CPT

## 2023-09-21 PROCEDURE — 85025 COMPLETE CBC W/AUTO DIFF WBC: CPT

## 2023-09-21 PROCEDURE — 80053 COMPREHEN METABOLIC PANEL: CPT

## 2023-09-21 PROCEDURE — 85610 PROTHROMBIN TIME: CPT

## 2023-09-21 PROCEDURE — 93970 EXTREMITY STUDY: CPT

## 2023-09-21 PROCEDURE — 85652 RBC SED RATE AUTOMATED: CPT

## 2023-09-21 PROCEDURE — 82962 GLUCOSE BLOOD TEST: CPT

## 2023-09-21 PROCEDURE — 84156 ASSAY OF PROTEIN URINE: CPT

## 2023-09-21 PROCEDURE — 50200 RENAL BIOPSY PERQ: CPT

## 2023-09-21 PROCEDURE — 36430 TRANSFUSION BLD/BLD COMPNT: CPT

## 2023-09-21 PROCEDURE — 76770 US EXAM ABDO BACK WALL COMP: CPT

## 2023-09-21 PROCEDURE — 83615 LACTATE (LD) (LDH) ENZYME: CPT

## 2023-09-21 PROCEDURE — 84540 ASSAY OF URINE/UREA-N: CPT

## 2023-09-21 PROCEDURE — 86880 COOMBS TEST DIRECT: CPT

## 2023-09-21 PROCEDURE — 82728 ASSAY OF FERRITIN: CPT

## 2023-09-21 PROCEDURE — 88313 SPECIAL STAINS GROUP 2: CPT

## 2023-09-21 PROCEDURE — 86160 COMPLEMENT ANTIGEN: CPT

## 2023-09-21 PROCEDURE — 82570 ASSAY OF URINE CREATININE: CPT

## 2023-09-21 PROCEDURE — 86146 BETA-2 GLYCOPROTEIN ANTIBODY: CPT

## 2023-09-21 PROCEDURE — 88305 TISSUE EXAM BY PATHOLOGIST: CPT

## 2023-09-21 PROCEDURE — 82550 ASSAY OF CK (CPK): CPT

## 2023-09-21 PROCEDURE — 86850 RBC ANTIBODY SCREEN: CPT

## 2023-09-21 PROCEDURE — 83935 ASSAY OF URINE OSMOLALITY: CPT

## 2023-09-21 PROCEDURE — 84702 CHORIONIC GONADOTROPIN TEST: CPT

## 2023-09-21 PROCEDURE — 83735 ASSAY OF MAGNESIUM: CPT

## 2023-09-21 PROCEDURE — 93976 VASCULAR STUDY: CPT

## 2023-09-21 PROCEDURE — 83036 HEMOGLOBIN GLYCOSYLATED A1C: CPT

## 2023-09-21 PROCEDURE — 84100 ASSAY OF PHOSPHORUS: CPT

## 2023-09-21 PROCEDURE — 80048 BASIC METABOLIC PNL TOTAL CA: CPT

## 2023-09-21 PROCEDURE — 83550 IRON BINDING TEST: CPT

## 2023-09-21 PROCEDURE — 84295 ASSAY OF SERUM SODIUM: CPT

## 2023-09-21 PROCEDURE — 85027 COMPLETE CBC AUTOMATED: CPT

## 2023-09-21 PROCEDURE — 83930 ASSAY OF BLOOD OSMOLALITY: CPT

## 2023-09-21 PROCEDURE — 82803 BLOOD GASES ANY COMBINATION: CPT

## 2023-09-21 PROCEDURE — 86923 COMPATIBILITY TEST ELECTRIC: CPT

## 2023-09-21 PROCEDURE — 71045 X-RAY EXAM CHEST 1 VIEW: CPT

## 2023-09-21 PROCEDURE — 93005 ELECTROCARDIOGRAM TRACING: CPT

## 2023-09-21 PROCEDURE — 84132 ASSAY OF SERUM POTASSIUM: CPT

## 2023-09-21 PROCEDURE — 87086 URINE CULTURE/COLONY COUNT: CPT

## 2023-09-21 PROCEDURE — 99239 HOSP IP/OBS DSCHRG MGMT >30: CPT | Mod: GC

## 2023-09-21 PROCEDURE — 86140 C-REACTIVE PROTEIN: CPT

## 2023-09-21 PROCEDURE — 84133 ASSAY OF URINE POTASSIUM: CPT

## 2023-09-21 PROCEDURE — 76942 ECHO GUIDE FOR BIOPSY: CPT

## 2023-09-21 PROCEDURE — 88346 IMFLUOR 1ST 1ANTB STAIN PX: CPT

## 2023-09-21 PROCEDURE — P9016: CPT

## 2023-09-21 PROCEDURE — 86901 BLOOD TYPING SEROLOGIC RH(D): CPT

## 2023-09-21 PROCEDURE — 85598 HEXAGNAL PHOSPH PLTLT NEUTRL: CPT

## 2023-09-21 PROCEDURE — 85045 AUTOMATED RETICULOCYTE COUNT: CPT

## 2023-09-21 PROCEDURE — 85730 THROMBOPLASTIN TIME PARTIAL: CPT

## 2023-09-21 RX ORDER — MYCOPHENOLATE MOFETIL 250 MG/1
2 CAPSULE ORAL
Refills: 0 | DISCHARGE

## 2023-09-21 RX ORDER — MYCOPHENOLATE MOFETIL 250 MG/1
2 CAPSULE ORAL
Qty: 120 | Refills: 3
Start: 2023-09-21 | End: 2024-01-18

## 2023-09-21 RX ORDER — METOLAZONE 5 MG/1
1 TABLET ORAL
Qty: 60 | Refills: 3
Start: 2023-09-21 | End: 2024-01-18

## 2023-09-21 RX ORDER — FUROSEMIDE 40 MG
1 TABLET ORAL
Qty: 60 | Refills: 3
Start: 2023-09-21 | End: 2024-01-18

## 2023-09-21 RX ORDER — LISINOPRIL 2.5 MG/1
1 TABLET ORAL
Qty: 30 | Refills: 3
Start: 2023-09-21 | End: 2024-01-18

## 2023-09-21 RX ORDER — PANTOPRAZOLE SODIUM 20 MG/1
1 TABLET, DELAYED RELEASE ORAL
Qty: 30 | Refills: 3
Start: 2023-09-21 | End: 2024-01-18

## 2023-09-21 RX ORDER — RAMIPRIL 5 MG
1 CAPSULE ORAL
Refills: 0 | DISCHARGE

## 2023-09-21 RX ADMIN — SEVELAMER CARBONATE 800 MILLIGRAM(S): 2400 POWDER, FOR SUSPENSION ORAL at 12:04

## 2023-09-21 RX ADMIN — SEVELAMER CARBONATE 800 MILLIGRAM(S): 2400 POWDER, FOR SUSPENSION ORAL at 09:40

## 2023-09-21 RX ADMIN — MYCOPHENOLATE MOFETIL 500 MILLIGRAM(S): 250 CAPSULE ORAL at 05:09

## 2023-09-21 RX ADMIN — ENOXAPARIN SODIUM 40 MILLIGRAM(S): 100 INJECTION SUBCUTANEOUS at 12:04

## 2023-09-21 RX ADMIN — Medication 80 MILLIGRAM(S): at 05:08

## 2023-09-21 RX ADMIN — PANTOPRAZOLE SODIUM 40 MILLIGRAM(S): 20 TABLET, DELAYED RELEASE ORAL at 05:09

## 2023-09-21 RX ADMIN — Medication 60 MILLIGRAM(S): at 05:09

## 2023-09-21 NOTE — DISCHARGE NOTE NURSING/CASE MANAGEMENT/SOCIAL WORK - PATIENT PORTAL LINK FT
You can access the FollowMyHealth Patient Portal offered by [facility] by registering at the following website: [email or web address]. By joining [email or web address]’s FollowMyHealth portal, you will also be able to view your health information using other applications (apps) compatible with our system.

## 2023-09-21 NOTE — PROGRESS NOTE ADULT - ATTENDING COMMENTS
I: HTN uncontrolled. K 5.7 - 5.8. Worsening renal function.    A: KARENA c/w RPGN/SLE nephritis.   P: No indications for dialysis. Follow SCr. Rx hyperkalemia and follow K and BGs closely. Renal bx planned.
dc on regimen except lasix dose should be 80 BID PO  will f/u as ouptatient  should see rhuem and heme as well   ,may add Benlysta or CNI as outpatient
I:  Hyperkalemia improved. Cr up to 2.11  A: KARENA c/w RPGN/class IV lupus nephritis.  P: Renal biopsy. Steroids. Further therapy to be determined.
RPGN- bx with class 4 and 5 SLE  cont steroids ,MMF as above  diuresis as  above, ACEi restart   can stop nifedipine  r/o RVT and LE DVT
cont IV diuresis as still quite overloaded  IS as above  metolazone BID  can dc [id number]   hopefully if diuresis well today can dc tomorrow
plan as above-- MMF to be titrated  diuresis, antihypertensives- likely restart ACEi or ARB   await bx findings
33 F PMHx SLE since middle school c/o dyspnea, leg swelling progressively worsening over the last three months, found to have KARENA, pancytopenia needing transfusion, admitted for concern for SLE nephritis. Clinically improving s/p pulse steroid; s/p kidney Bx.      #SLE  #KARENA likely 2/2 Lupus Nephritis   #HTN  #Anemia and thrombocytopenia    -s/p renal bx 09/18  -c/w prednisone, MMF as per nephro. Recs appreciated   -On IV lasix 80BID, metolazone for worsening edema as per nephro. Nifedipine d/c and started on lisinopril.   -HARMEET humphreys -ve  -Presented with pancytopenia likely 2/2 the combination of her lupus flare and myelosuppression from Cellcept. Counts improved after pulse dose steroids for 3 days. Heme/onc following.   DVT ppx: lovenox
33 F PMHx SLE since middle school c/o dyspnea, leg swelling progressively worsening over the last three months, found to have KARENA, pancytopenia needing transfusion, admitted for concern for SLE nephritis. Plan for renal biopsy on Monday. Started on pulse dose steroids. Started nifedipine for HTN. Follow hemolysis labs. Heme, renal and rheum consulted.
33 F PMHx SLE since middle school c/o dyspnea, leg swelling progressively worsening over the last three months, found to have KARENA, pancytopenia needing transfusion, admitted for concern for SLE nephritis. Clinically improving s/p pulse steroid; s/p kidney Bx.      #SLE  #KARENA likely 2/2 Lupus Nephritis   #HTN  #Anemia and thrombocytopenia    -s/p renal bx 09/18  -c/w prednisone, MMF as per nephro. Recs appreciated   -On IV lasix 80BID, metolazone increased for worsening edema as per nephro. Started on lisinopril. Sodium bicarb discontinued as high doses of lasix will lead to contraction alkalosis.  -HARMEET humphreys -ve  -Presented with pancytopenia likely 2/2 the combination of her lupus flare and myelosuppression from Cellcept. Counts improved after pulse dose steroids for 3 days. Heme/onc following.   DVT ppx: lovenox .
33 F PMHx SLE since middle school c/o dyspnea, leg swelling progressively worsening over the last three months, found to have KARENA, pancytopenia needing transfusion, admitted for concern for SLE nephritis. Clinically improving s/p pulse steroid; s/p kidney Bx.      #SLE  #KARENA likely 2/2 Lupus Nephritis   #HTN  #Anemia and thrombocytopenia    -s/p renal bx 09/18  -Started on prednisone, MMF as per nephro. Recs appreciated   -Continue Lasix and nifedipine. Ok to start ACE/ARB as per nephro  -Presented with pancytopenia likely 2/2 the combination of her lupus flare and myelosuppression from Cellcept. Counts improved after pulse dose steroids for 3 days. Heme/onc following.   DVT ppx: lovenox to start from tomorrow

## 2023-09-21 NOTE — PROGRESS NOTE ADULT - PROBLEM SELECTOR PLAN 4
Patient complained of significant BL lower extremity edema up to the abdomen. Patient reports that since increased furosemide dose edema has improved.  See Lupus nephritis for management.

## 2023-09-21 NOTE — PROGRESS NOTE ADULT - ASSESSMENT
33Y F with non-oliguric KARENA most likley 2/2 RPGN lupus nephritis, with sCr stable ~ 2, s/p renal bx, and finished course of pulse dose steroids (9/17) with improvement in UPCR    Assessment:  - Non-oliguric KARENA 2/2 RPGN lupus nephritis   - Baseline sCr of 0.6-0.8, now sCr stable at 2  - UPCR down to 3.5 from 7.6    Plan:  - Please send C3, C4, and anti-dsDNA to monitor response   - Will need close outpatient follow up with Nephrology  - Cont DVT ppx, do not need to start therapeutic AC now, can decide as outpatient  - Cont MMF 500mg BID   - Cont on Prednisone 60mg PO daily   - Cont Lasix 80mg IV BID, can switch to PO 40mg BID with metolazone 5mg BID  - Can increase lisinopril to 40mg daily given elevated BP  - Can stop phos binder  - Daily BMP  - Renal diet  33Y F with non-oliguric KARENA most likley 2/2 RPGN lupus nephritis, with sCr stable ~ 2, s/p renal bx, and finished course of pulse dose steroids (9/17) with improvement in UPCR    Assessment:  - Non-oliguric KARENA 2/2 RPGN lupus nephritis   - Baseline sCr of 0.6-0.8, now sCr stable at 2  - UPCR down to 3.5 from 7.6    Plan:  - Please send C3, C4, and anti-dsDNA to monitor response   - Will need close outpatient follow up with Nephrology  - Cont DVT ppx, do not need to start therapeutic AC now, can decide as outpatient  - Cont MMF 500mg BID   - Cont on Prednisone 60mg PO daily   - Cont Lasix 80mg IV BID, can switch to PO 80mg BID with metolazone 5mg BID  - Can increase lisinopril to 40mg daily given elevated BP  - Can stop phos binder  - Daily BMP  - Renal diet

## 2023-09-21 NOTE — PROGRESS NOTE ADULT - PROBLEM SELECTOR PLAN 1
Hx of SLE. Collateral from outpt Rheumatologist as per chart note. Elevated Cr is likely 2/2 progression of Lupus Nephritis. Renal US is consistent with chronic medical renal disease.   s/p pulse steroids methylprednisolone 500mg x3.   s/p Lasix with no allergic reactions and s/e. s/p Kidney Bx 9/18.    Rheum, heme nephro following.   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  C3 32, C4 3, DS DNA ab >1000  Currently on Lasix 80mg IV BID, MMF 500mg BID, Prednisone 60mg PO qd, sevelamer 800mg TID with meals, Sodium bicarb 650mg TID      - will continue to hold Hydroxychloroquine (home med  Prednisone, Mycophenolate, Hydroxychloroquine)    Per Renal:  - c/w metalozone 5 mg qd  - c/w lisinopril 20 mg qd (equivalent to home med ramipril 5)   - AC for nephrotic syndrome

## 2023-09-21 NOTE — PROGRESS NOTE ADULT - PROVIDER SPECIALTY LIST ADULT
Internal Medicine
Nephrology
Internal Medicine
Internal Medicine

## 2023-09-21 NOTE — PROGRESS NOTE ADULT - REASON FOR ADMISSION
Lupus nephritis, Pancytopenia

## 2023-09-21 NOTE — PROGRESS NOTE ADULT - PROBLEM SELECTOR PLAN 7
F: None   E: Replete as necessary K>4 Mg>2  N: Reg diet   DVT Prophylaxis: lovenox starting 9/19   GI prophylaxis: None   CODE STATUS: FULL
F: None   E: Replete as necessary K>4 Mg>2  N: Reg diet   DVT Prophylaxis: improv 0   GI prophylaxis: None   CODE STATUS: FULL
F: None   E: Replete as necessary K>4 Mg>2  N: Reg diet   DVT Prophylaxis: lovenox starting 9/19   GI prophylaxis: None   CODE STATUS: FULL
F: None   E: Replete as necessary K>4 Mg>2  N: Reg diet   DVT Prophylaxis: improv 0   GI prophylaxis: None   CODE STATUS: FULL
F: None   E: Replete as necessary K>4 Mg>2  N: Reg diet   DVT Prophylaxis: improv 0   GI prophylaxis: None   CODE STATUS: FULL
F: None   E: Replete as necessary K>4 Mg>2  N: Reg diet   DVT Prophylaxis: lovenox starting 9/19   GI prophylaxis: None   CODE STATUS: FULL

## 2023-09-21 NOTE — PROGRESS NOTE ADULT - PROBLEM SELECTOR PLAN 3
150s systolic BP, hx of HTN on ramipril 5 . i/s/o nephrotic syndrome.     - d/c Nifedipine 30  - c/w lisinopril 20 qd

## 2023-09-21 NOTE — DISCHARGE NOTE NURSING/CASE MANAGEMENT/SOCIAL WORK - NSDCPEFALRISK_GEN_ALL_CORE
For information on Fall & Injury Prevention, visit: [email or web address] OR  [email or web address] OR  https://www.cdc.gov/steadi/patient.html

## 2023-09-21 NOTE — PROGRESS NOTE ADULT - PROBLEM/PLAN-4
Interventional Radiology Procedure Note    Procedure: suprpubic catheter, 8 fr    Pre procedure diagnosis: painful urination    Post procedure diagnosis: same    : MD Veto    Specimens removed: none     Estimated Blood Loss: 0 ml     Complications: none     Findings: 8 fr cath placed    
DISPLAY PLAN FREE TEXT

## 2023-09-21 NOTE — PROGRESS NOTE ADULT - SUBJECTIVE AND OBJECTIVE BOX
Nephrology progress note    Seen at bedside, no new complaints. Reports improved edema.    Allergies:  sulfa drugs (Rash)    Hospital Medications:   MEDICATIONS  (STANDING):  dextrose 5%. 1000 milliLiter(s) (50 mL/Hr) IV Continuous <Continuous>  dextrose 5%. 1000 milliLiter(s) (100 mL/Hr) IV Continuous <Continuous>  dextrose 50% Injectable 12.5 Gram(s) IV Push once  dextrose 50% Injectable 25 Gram(s) IV Push once  dextrose 50% Injectable 25 Gram(s) IV Push once  enoxaparin Injectable 40 milliGRAM(s) SubCutaneous every 24 hours  furosemide   Injectable 80 milliGRAM(s) IV Push every 12 hours  glucagon  Injectable 1 milliGRAM(s) IntraMuscular once  insulin lispro (ADMELOG) corrective regimen sliding scale   SubCutaneous three times a day before meals  insulin lispro (ADMELOG) corrective regimen sliding scale   SubCutaneous at bedtime  lisinopril 20 milliGRAM(s) Oral every 24 hours  metolazone 5 milliGRAM(s) Oral <User Schedule>  mycophenolate mofetil 500 milliGRAM(s) Oral two times a day  pantoprazole    Tablet 40 milliGRAM(s) Oral before breakfast  predniSONE   Tablet 60 milliGRAM(s) Oral daily  sevelamer carbonate 800 milliGRAM(s) Oral three times a day with meals    REVIEW OF SYSTEMS:   All other review of systems is negative unless indicated above.    VITALS:  T(F): 98.2 (09-21-23 @ 13:07), Max: 98.4 (09-20-23 @ 20:50)  HR: 70 (09-21-23 @ 13:07)  BP: 158/90 (09-21-23 @ 13:07)  RR: 18 (09-21-23 @ 13:07)  SpO2: 100% (09-21-23 @ 13:07)  Wt(kg): --      PHYSICAL EXAM:  GENERAL: Alert, awake, NAD   HEENT: NCAT, EOMI, neck supple, no JVP  CHEST/LUNG: CTAB  HEART: Regular rate and rhythm  ABDOMEN: Soft, nontender, non-distended  : No flank or supra pubic tenderness.  EXTREMITIES: b/l 2+ pitting edema   Neurology: AAOx3, no focal neurological deficit  SKIN: No rash or skin lesion, biopsy site c/d/i    LABS:  09-21    141  |  109<H>  |  98<H>  ----------------------------<  111<H>  4.3   |  25  |  1.71<H>    Ca    8.8      21 Sep 2023 10:41  Phos  4.1     09-21  Mg     2.4     09-21    TPro  5.1<L>  /  Alb  2.5<L>  /  TBili  0.2  /  DBili      /  AST  19  /  ALT  11  /  AlkPhos  34<L>  09-21                          8.9    4.90  )-----------( 64       ( 21 Sep 2023 10:41 )             27.3       Urine Studies:  Creatinine Trend: 1.71<--, 1.95<--, 1.89<--, 2.10<--, 2.14<--, 2.25<--  Urinalysis Basic - ( 21 Sep 2023 10:41 )    Color:  / Appearance:  / SG:  / pH:   Gluc: 111 mg/dL / Ketone:   / Bili:  / Urobili:    Blood:  / Protein:  / Nitrite:    Leuk Esterase:  / RBC:  / WBC    Sq Epi:  / Non Sq Epi:  / Bacteria:       Creatinine, Random Urine: 60 mg/dL (09-15 @ 19:33)  Protein/Creatinine Ratio Calculation: 3.5 Ratio (09-15 @ 19:33)  Sodium, Random Urine: 46 mmol/L (09-14 @ 19:49)  Creatinine, Random Urine: 107 mg/dL (09-14 @ 19:49)  Protein/Creatinine Ratio Calculation: 7.6 Ratio (09-14 @ 19:49)  Osmolality, Random Urine: 472 mosm/kg (09-14 @ 19:49)  Potassium, Random Urine: 43 mmol/L (09-14 @ 19:49)    RADIOLOGY & ADDITIONAL STUDIES:  Reviewed

## 2023-09-26 ENCOUNTER — NON-APPOINTMENT (OUTPATIENT)
Age: 33
End: 2023-09-26

## 2023-09-27 DIAGNOSIS — N17.8 OTHER ACUTE KIDNEY FAILURE: ICD-10-CM

## 2023-09-27 DIAGNOSIS — E16.2 HYPOGLYCEMIA, UNSPECIFIED: ICD-10-CM

## 2023-09-27 DIAGNOSIS — M32.14 GLOMERULAR DISEASE IN SYSTEMIC LUPUS ERYTHEMATOSUS: ICD-10-CM

## 2023-09-27 DIAGNOSIS — I10 ESSENTIAL (PRIMARY) HYPERTENSION: ICD-10-CM

## 2023-09-27 DIAGNOSIS — Z88.2 ALLERGY STATUS TO SULFONAMIDES: ICD-10-CM

## 2023-09-27 DIAGNOSIS — D63.8 ANEMIA IN OTHER CHRONIC DISEASES CLASSIFIED ELSEWHERE: ICD-10-CM

## 2023-09-27 DIAGNOSIS — D61.818 OTHER PANCYTOPENIA: ICD-10-CM

## 2023-09-27 DIAGNOSIS — E87.5 HYPERKALEMIA: ICD-10-CM

## 2023-09-28 LAB
ALBUMIN SERPL ELPH-MCNC: 2.4 G/DL
ALP BLD-CCNC: 50 U/L
ALT SERPL-CCNC: 14 U/L
ANION GAP SERPL CALC-SCNC: 14 MMOL/L
APPEARANCE: ABNORMAL
AST SERPL-CCNC: 25 U/L
BACTERIA: NEGATIVE /HPF
BILIRUB SERPL-MCNC: 0.3 MG/DL
BILIRUBIN URINE: NEGATIVE
BLOOD URINE: ABNORMAL
BUN SERPL-MCNC: 98 MG/DL
C3 SERPL-MCNC: 47 MG/DL
C4 SERPL-MCNC: 10 MG/DL
CALCIUM SERPL-MCNC: 8.1 MG/DL
CAST: 12 /LPF
CHLORIDE SERPL-SCNC: 104 MMOL/L
CO2 SERPL-SCNC: 25 MMOL/L
COARSE GRANULAR CASTS: PRESENT
COLOR: YELLOW
CREAT SERPL-MCNC: 2.21 MG/DL
CREAT SPEC-SCNC: 59 MG/DL
CREAT/PROT UR: 8.2 RATIO
EGFR: 29 ML/MIN/1.73M2
EPITHELIAL CELLS: 5 /HPF
GLUCOSE QUALITATIVE U: NEGATIVE MG/DL
GLUCOSE SERPL-MCNC: 113 MG/DL
HBV SURFACE AG SER QL: NONREACTIVE
HCT VFR BLD CALC: 27 %
HGB BLD-MCNC: 8.4 G/DL
HYALINE CASTS: PRESENT
KETONES URINE: NEGATIVE MG/DL
LEUKOCYTE ESTERASE URINE: ABNORMAL
MAGNESIUM SERPL-MCNC: 2.2 MG/DL
MCHC RBC-ENTMCNC: 30.1 PG
MCHC RBC-ENTMCNC: 31.1 GM/DL
MCV RBC AUTO: 96.8 FL
MICROSCOPIC-UA: NORMAL
NITRITE URINE: NEGATIVE
PH URINE: 5.5
PHOSPHATE SERPL-MCNC: 4.4 MG/DL
PLATELET # BLD AUTO: 125 K/UL
POTASSIUM SERPL-SCNC: 3.4 MMOL/L
PROT SERPL-MCNC: 5.4 G/DL
PROT UR-MCNC: 482 MG/DL
PROTEIN URINE: 300 MG/DL
RBC # BLD: 2.79 M/UL
RBC # FLD: 17.4 %
RED BLOOD CELLS URINE: 14 /HPF
REVIEW: NORMAL
SODIUM SERPL-SCNC: 144 MMOL/L
SPECIFIC GRAVITY URINE: 1.01
URATE SERPL-MCNC: 13.7 MG/DL
UROBILINOGEN URINE: 0.2 MG/DL
WBC # FLD AUTO: 7.32 K/UL
WHITE BLOOD CELLS URINE: 19 /HPF

## 2023-09-29 ENCOUNTER — APPOINTMENT (OUTPATIENT)
Dept: ULTRASOUND IMAGING | Facility: HOSPITAL | Age: 33
End: 2023-09-29

## 2023-09-29 ENCOUNTER — APPOINTMENT (OUTPATIENT)
Dept: NEPHROLOGY | Facility: CLINIC | Age: 33
End: 2023-09-29
Payer: MEDICAID

## 2023-09-29 ENCOUNTER — OUTPATIENT (OUTPATIENT)
Dept: OUTPATIENT SERVICES | Facility: HOSPITAL | Age: 33
LOS: 1 days | End: 2023-09-29
Payer: MEDICAID

## 2023-09-29 VITALS
HEIGHT: 63 IN | BODY MASS INDEX: 24.8 KG/M2 | HEART RATE: 73 BPM | SYSTOLIC BLOOD PRESSURE: 185 MMHG | WEIGHT: 140 LBS | DIASTOLIC BLOOD PRESSURE: 109 MMHG

## 2023-09-29 DIAGNOSIS — Z78.9 OTHER SPECIFIED HEALTH STATUS: ICD-10-CM

## 2023-09-29 PROCEDURE — 93971 EXTREMITY STUDY: CPT | Mod: 26,LT

## 2023-09-29 PROCEDURE — 93971 EXTREMITY STUDY: CPT

## 2023-09-29 PROCEDURE — 99215 OFFICE O/P EST HI 40 MIN: CPT

## 2023-09-29 RX ORDER — HYDROXYCHLOROQUINE SULFATE 200 MG/1
200 TABLET, FILM COATED ORAL DAILY
Qty: 30 | Refills: 5 | Status: ACTIVE | COMMUNITY
Start: 2023-09-29

## 2023-09-29 RX ORDER — MULTIVIT-MIN/FOLIC/VIT K/LYCOP 400-300MCG
50 MCG TABLET ORAL
Qty: 30 | Refills: 5 | Status: ACTIVE | COMMUNITY
Start: 2023-09-29

## 2023-10-06 NOTE — PHARMACOTHERAPY INTERVENTION NOTE - COMMENTS
Truxima ordered for a patient. Only Hepatitis B surface antigen lab test was done on 9/27/2023. Maribell Neff facilitated getting Dr. Jerez to add Hepatitis B core antibody (total) and Hepatitis B surface antibody to the laboratory orders for the patient to complete HBV monitoring for Truxima.  Truxima ordered for a patient to begin on 10/9/2023. Only Hepatitis B surface antigen lab test was done on 9/27/2023. Maribell Neff facilitated getting Dr. Jerez to add Hepatitis B core antibody (total) and Hepatitis B surface antibody to the laboratory orders for the patient to complete HBV monitoring prior to initiation of Truxima.

## 2023-10-09 ENCOUNTER — APPOINTMENT (OUTPATIENT)
Dept: NEPHROLOGY | Facility: CLINIC | Age: 33
End: 2023-10-09

## 2023-10-09 ENCOUNTER — RX RENEWAL (OUTPATIENT)
Age: 33
End: 2023-10-09

## 2023-10-09 DIAGNOSIS — Z00.00 ENCOUNTER FOR GENERAL ADULT MEDICAL EXAMINATION W/OUT ABNORMAL FINDINGS: ICD-10-CM

## 2023-10-10 DIAGNOSIS — E87.6 HYPOKALEMIA: ICD-10-CM

## 2023-10-11 LAB
HBV CORE IGG+IGM SER QL: NONREACTIVE
HBV SURFACE AG SER QL: NONREACTIVE

## 2023-10-12 ENCOUNTER — OUTPATIENT (OUTPATIENT)
Dept: OUTPATIENT SERVICES | Facility: HOSPITAL | Age: 33
LOS: 1 days | End: 2023-10-12
Payer: MEDICAID

## 2023-10-12 ENCOUNTER — APPOINTMENT (OUTPATIENT)
Dept: INFUSION THERAPY | Facility: CLINIC | Age: 33
End: 2023-10-12

## 2023-10-12 VITALS
HEART RATE: 91 BPM | TEMPERATURE: 98 F | DIASTOLIC BLOOD PRESSURE: 79 MMHG | SYSTOLIC BLOOD PRESSURE: 120 MMHG | HEIGHT: 63 IN | RESPIRATION RATE: 18 BRPM | WEIGHT: 117.07 LBS | OXYGEN SATURATION: 100 %

## 2023-10-12 DIAGNOSIS — N04.9 NEPHROTIC SYNDROME WITH UNSPECIFIED MORPHOLOGIC CHANGES: ICD-10-CM

## 2023-10-12 LAB
ALBUMIN SERPL ELPH-MCNC: 2.4 G/DL — LOW (ref 3.3–5)
ALP SERPL-CCNC: 43 U/L — SIGNIFICANT CHANGE UP (ref 40–120)
ALT FLD-CCNC: 15 U/L — SIGNIFICANT CHANGE UP (ref 10–45)
ANION GAP SERPL CALC-SCNC: 9 MMOL/L — SIGNIFICANT CHANGE UP (ref 5–17)
ANISOCYTOSIS BLD QL: SLIGHT — SIGNIFICANT CHANGE UP
ANISOCYTOSIS BLD QL: SLIGHT — SIGNIFICANT CHANGE UP
AST SERPL-CCNC: 19 U/L — SIGNIFICANT CHANGE UP (ref 10–40)
BASO STIPL BLD QL SMEAR: PRESENT — SIGNIFICANT CHANGE UP
BASO STIPL BLD QL SMEAR: PRESENT — SIGNIFICANT CHANGE UP
BASOPHILS # BLD AUTO: 0 K/UL — SIGNIFICANT CHANGE UP (ref 0–0.2)
BASOPHILS # BLD AUTO: 0 K/UL — SIGNIFICANT CHANGE UP (ref 0–0.2)
BASOPHILS NFR BLD AUTO: 0 % — SIGNIFICANT CHANGE UP (ref 0–2)
BASOPHILS NFR BLD AUTO: 0 % — SIGNIFICANT CHANGE UP (ref 0–2)
BILIRUB SERPL-MCNC: 0.6 MG/DL — SIGNIFICANT CHANGE UP (ref 0.2–1.2)
BUN SERPL-MCNC: 121 MG/DL — HIGH (ref 7–23)
BURR CELLS BLD QL SMEAR: PRESENT — SIGNIFICANT CHANGE UP
BURR CELLS BLD QL SMEAR: PRESENT — SIGNIFICANT CHANGE UP
CALCIUM SERPL-MCNC: 8.8 MG/DL — SIGNIFICANT CHANGE UP (ref 8.4–10.5)
CHLORIDE SERPL-SCNC: 107 MMOL/L — SIGNIFICANT CHANGE UP (ref 96–108)
CO2 SERPL-SCNC: 27 MMOL/L — SIGNIFICANT CHANGE UP (ref 22–31)
CREAT SERPL-MCNC: 2.8 MG/DL — HIGH (ref 0.5–1.3)
DACRYOCYTES BLD QL SMEAR: SLIGHT — SIGNIFICANT CHANGE UP
DACRYOCYTES BLD QL SMEAR: SLIGHT — SIGNIFICANT CHANGE UP
EGFR: 22 ML/MIN/1.73M2 — LOW
EOSINOPHIL # BLD AUTO: 0 K/UL — SIGNIFICANT CHANGE UP (ref 0–0.5)
EOSINOPHIL # BLD AUTO: 0 K/UL — SIGNIFICANT CHANGE UP (ref 0–0.5)
EOSINOPHIL NFR BLD AUTO: 0 % — SIGNIFICANT CHANGE UP (ref 0–6)
EOSINOPHIL NFR BLD AUTO: 0 % — SIGNIFICANT CHANGE UP (ref 0–6)
GIANT PLATELETS BLD QL SMEAR: PRESENT — SIGNIFICANT CHANGE UP
GIANT PLATELETS BLD QL SMEAR: PRESENT — SIGNIFICANT CHANGE UP
GLUCOSE SERPL-MCNC: 105 MG/DL — HIGH (ref 70–99)
HBV SURFACE AB SER-ACNC: REACTIVE
HCT VFR BLD CALC: 20.6 % — CRITICAL LOW (ref 34.5–45)
HCT VFR BLD CALC: 20.6 % — CRITICAL LOW (ref 34.5–45)
HGB BLD-MCNC: 6.9 G/DL — CRITICAL LOW (ref 11.5–15.5)
HGB BLD-MCNC: 6.9 G/DL — CRITICAL LOW (ref 11.5–15.5)
ISTAT TOTAL BETA HCG, PLASMA: <5 IU/L — SIGNIFICANT CHANGE UP
ISTAT TOTAL BETA HCG, PLASMA: <5 IU/L — SIGNIFICANT CHANGE UP
LYMPHOCYTES # BLD AUTO: 0.05 K/UL — LOW (ref 1–3.3)
LYMPHOCYTES # BLD AUTO: 0.05 K/UL — LOW (ref 1–3.3)
LYMPHOCYTES # BLD AUTO: 0.9 % — LOW (ref 13–44)
LYMPHOCYTES # BLD AUTO: 0.9 % — LOW (ref 13–44)
MACROCYTES BLD QL: SLIGHT — SIGNIFICANT CHANGE UP
MACROCYTES BLD QL: SLIGHT — SIGNIFICANT CHANGE UP
MANUAL SMEAR VERIFICATION: SIGNIFICANT CHANGE UP
MANUAL SMEAR VERIFICATION: SIGNIFICANT CHANGE UP
MCHC RBC-ENTMCNC: 30.4 PG — SIGNIFICANT CHANGE UP (ref 27–34)
MCHC RBC-ENTMCNC: 30.4 PG — SIGNIFICANT CHANGE UP (ref 27–34)
MCHC RBC-ENTMCNC: 33.5 GM/DL — SIGNIFICANT CHANGE UP (ref 32–36)
MCHC RBC-ENTMCNC: 33.5 GM/DL — SIGNIFICANT CHANGE UP (ref 32–36)
MCV RBC AUTO: 90.7 FL — SIGNIFICANT CHANGE UP (ref 80–100)
MCV RBC AUTO: 90.7 FL — SIGNIFICANT CHANGE UP (ref 80–100)
MICROCYTES BLD QL: SLIGHT — SIGNIFICANT CHANGE UP
MICROCYTES BLD QL: SLIGHT — SIGNIFICANT CHANGE UP
MONOCYTES # BLD AUTO: 0.18 K/UL — SIGNIFICANT CHANGE UP (ref 0–0.9)
MONOCYTES # BLD AUTO: 0.18 K/UL — SIGNIFICANT CHANGE UP (ref 0–0.9)
MONOCYTES NFR BLD AUTO: 3.5 % — SIGNIFICANT CHANGE UP (ref 2–14)
MONOCYTES NFR BLD AUTO: 3.5 % — SIGNIFICANT CHANGE UP (ref 2–14)
NEUTROPHILS # BLD AUTO: 5.05 K/UL — SIGNIFICANT CHANGE UP (ref 1.8–7.4)
NEUTROPHILS # BLD AUTO: 5.05 K/UL — SIGNIFICANT CHANGE UP (ref 1.8–7.4)
NEUTROPHILS NFR BLD AUTO: 95.6 % — HIGH (ref 43–77)
NEUTROPHILS NFR BLD AUTO: 95.6 % — HIGH (ref 43–77)
OVALOCYTES BLD QL SMEAR: SIGNIFICANT CHANGE UP
OVALOCYTES BLD QL SMEAR: SIGNIFICANT CHANGE UP
PLAT MORPH BLD: ABNORMAL
PLAT MORPH BLD: ABNORMAL
PLATELET # BLD AUTO: 112 K/UL — LOW (ref 150–400)
PLATELET # BLD AUTO: 112 K/UL — LOW (ref 150–400)
POIKILOCYTOSIS BLD QL AUTO: SIGNIFICANT CHANGE UP
POIKILOCYTOSIS BLD QL AUTO: SIGNIFICANT CHANGE UP
POTASSIUM SERPL-MCNC: 3.2 MMOL/L — LOW (ref 3.5–5.3)
POTASSIUM SERPL-SCNC: 3.2 MMOL/L — LOW (ref 3.5–5.3)
PROT SERPL-MCNC: 5.4 G/DL — LOW (ref 6–8.3)
RBC # BLD: 2.27 M/UL — LOW (ref 3.8–5.2)
RBC # BLD: 2.27 M/UL — LOW (ref 3.8–5.2)
RBC # FLD: 16.1 % — HIGH (ref 10.3–14.5)
RBC # FLD: 16.1 % — HIGH (ref 10.3–14.5)
RBC BLD AUTO: ABNORMAL
RBC BLD AUTO: ABNORMAL
SODIUM SERPL-SCNC: 143 MMOL/L — SIGNIFICANT CHANGE UP (ref 135–145)
WBC # BLD: 5.28 K/UL — SIGNIFICANT CHANGE UP (ref 3.8–10.5)
WBC # BLD: 5.28 K/UL — SIGNIFICANT CHANGE UP (ref 3.8–10.5)
WBC # FLD AUTO: 5.28 K/UL — SIGNIFICANT CHANGE UP (ref 3.8–10.5)
WBC # FLD AUTO: 5.28 K/UL — SIGNIFICANT CHANGE UP (ref 3.8–10.5)

## 2023-10-12 RX ORDER — RITUXIMAB 10 MG/ML
1000 INJECTION, SOLUTION INTRAVENOUS ONCE
Refills: 0 | Status: COMPLETED | OUTPATIENT
Start: 2023-10-12 | End: 2023-10-12

## 2023-10-12 RX ORDER — ACETAMINOPHEN 500 MG
500 TABLET ORAL ONCE
Refills: 0 | Status: COMPLETED | OUTPATIENT
Start: 2023-10-12 | End: 2023-10-12

## 2023-10-12 RX ORDER — DIPHENHYDRAMINE HCL 50 MG
25 CAPSULE ORAL ONCE
Refills: 0 | Status: COMPLETED | OUTPATIENT
Start: 2023-10-12 | End: 2023-10-12

## 2023-10-12 RX ADMIN — RITUXIMAB 1000 MILLIGRAM(S): 10 INJECTION, SOLUTION INTRAVENOUS at 11:48

## 2023-10-12 RX ADMIN — Medication 100 MILLIGRAM(S): at 10:47

## 2023-10-12 RX ADMIN — Medication 200 MILLIGRAM(S): at 10:32

## 2023-10-12 RX ADMIN — Medication 500 MILLIGRAM(S): at 11:34

## 2023-10-12 RX ADMIN — Medication 202 MILLIGRAM(S): at 10:50

## 2023-10-12 RX ADMIN — RITUXIMAB 1000 MILLIGRAM(S): 10 INJECTION, SOLUTION INTRAVENOUS at 14:30

## 2023-10-12 RX ADMIN — Medication 25 MILLIGRAM(S): at 11:05

## 2023-10-12 RX ADMIN — Medication 500 MILLIGRAM(S): at 10:34

## 2023-10-12 NOTE — PHARMACY COMMUNICATION NOTE - COMMENTS
Maribell in contact with Nazario Lezama and Dr. Jerez. Per Nazario, okay to treat patient today with Hgb 6.9 g/dL from 10/9/2023. Patient is to follow-up with hematology for chronic anemia. Patient's potassium level was 3.2 mg/dL on 10/9/2023. Nazario prescribed oral KCl. Per Nazario, send CBC/diff, CMP, and Hep B surface antibody today. No need to wait for results prior to treatment.  Maribell in contact with Nazario Sagastume and Dr. Jerez. Per Nazario, okay to treat patient today with Hgb 6.9 g/dL from 10/9/2023. Patient is to follow-up with hematology for chronic anemia. Patient's potassium level was 3.2 mmol/L on 10/9/2023. Nazario verified patient was prescribed oral KCl to take at home. Per Nazario, send CBC/diff, CMP, and Hep B surface antibody today. No need to wait for results prior to treatment.

## 2023-10-13 ENCOUNTER — APPOINTMENT (OUTPATIENT)
Dept: NEPHROLOGY | Facility: CLINIC | Age: 33
End: 2023-10-13
Payer: MEDICAID

## 2023-10-13 VITALS
HEART RATE: 76 BPM | DIASTOLIC BLOOD PRESSURE: 87 MMHG | SYSTOLIC BLOOD PRESSURE: 130 MMHG | WEIGHT: 117 LBS | BODY MASS INDEX: 20.73 KG/M2

## 2023-10-13 PROCEDURE — 99215 OFFICE O/P EST HI 40 MIN: CPT

## 2023-10-15 LAB
25(OH)D3 SERPL-MCNC: 19.5 NG/ML
ALBUMIN SERPL ELPH-MCNC: 2.7 G/DL
ALP BLD-CCNC: 41 U/L
ALT SERPL-CCNC: 7 U/L
ANION GAP SERPL CALC-SCNC: 13 MMOL/L
ANISOCYTOSIS BLD QL: SLIGHT
APPEARANCE: CLEAR
AST SERPL-CCNC: 16 U/L
BACTERIA: NEGATIVE /HPF
BILIRUB SERPL-MCNC: 0.2 MG/DL
BILIRUBIN URINE: NEGATIVE
BLOOD URINE: ABNORMAL
BUN SERPL-MCNC: 98 MG/DL
C3 SERPL-MCNC: 55 MG/DL
C4 SERPL-MCNC: 14 MG/DL
CALCIUM SERPL-MCNC: 8.5 MG/DL
CAST: 7 /LPF
CHLORIDE SERPL-SCNC: 104 MMOL/L
CHOLEST SERPL-MCNC: 376 MG/DL
CO2 SERPL-SCNC: 26 MMOL/L
COLOR: YELLOW
CREAT SERPL-MCNC: 3.02 MG/DL
CREAT SPEC-SCNC: 57 MG/DL
CREAT/PROT UR: 3.9 RATIO
DSDNA AB SER-ACNC: 637 IU/ML
EGFR: 20 ML/MIN/1.73M2
ELLIPTOCYTES BLD QL SMEAR: SLIGHT
EPITHELIAL CELLS: 2 /HPF
FERRITIN SERPL-MCNC: 1046 NG/ML
GLUCOSE QUALITATIVE U: NEGATIVE MG/DL
GLUCOSE SERPL-MCNC: 87 MG/DL
HAPTOGLOB SERPL-MCNC: <20 MG/DL
HCT VFR BLD CALC: 22.5 %
HDLC SERPL-MCNC: 57 MG/DL
HGB BLD-MCNC: 6.9 G/DL
HYALINE CASTS: PRESENT
IRON SATN MFR SERPL: 55 %
IRON SERPL-MCNC: 98 UG/DL
KETONES URINE: NEGATIVE MG/DL
LDH SERPL-CCNC: 370 U/L
LDLC SERPL CALC-MCNC: 232 MG/DL
LEUKOCYTE ESTERASE URINE: NEGATIVE
MAGNESIUM SERPL-MCNC: 1.6 MG/DL
MCHC RBC-ENTMCNC: 29.5 PG
MCHC RBC-ENTMCNC: 30.7 GM/DL
MCV RBC AUTO: 96.2 FL
MICROSCOPIC-UA: NORMAL
NITRITE URINE: NEGATIVE
NONHDLC SERPL-MCNC: 319 MG/DL
PH URINE: 5.5
PHOSPHATE SERPL-MCNC: 4.2 MG/DL
PLAT MORPH BLD: NORMAL
PLATELET # BLD AUTO: 118 K/UL
POTASSIUM SERPL-SCNC: 3.2 MMOL/L
PROT SERPL-MCNC: 5 G/DL
PROT UR-MCNC: 224 MG/DL
PROTEIN URINE: 300 MG/DL
RBC # BLD: 2.34 M/UL
RBC # FLD: 16.7 %
RBC BLD AUTO: ABNORMAL
RED BLOOD CELLS URINE: 6 /HPF
REVIEW: NORMAL
SCHISTOCYTES BLD QL SMEAR: SLIGHT
SODIUM SERPL-SCNC: 143 MMOL/L
SPECIFIC GRAVITY URINE: 1.01
TIBC SERPL-MCNC: 179 UG/DL
TRIGL SERPL-MCNC: 397 MG/DL
UIBC SERPL-MCNC: 81 UG/DL
URATE SERPL-MCNC: 12 MG/DL
UROBILINOGEN URINE: 0.2 MG/DL
WBC # FLD AUTO: 5.7 K/UL
WHITE BLOOD CELLS URINE: 11 /HPF

## 2023-10-17 ENCOUNTER — APPOINTMENT (OUTPATIENT)
Dept: HEMATOLOGY ONCOLOGY | Facility: CLINIC | Age: 33
End: 2023-10-17
Payer: MEDICAID

## 2023-10-17 ENCOUNTER — LABORATORY RESULT (OUTPATIENT)
Age: 33
End: 2023-10-17

## 2023-10-17 VITALS
DIASTOLIC BLOOD PRESSURE: 87 MMHG | TEMPERATURE: 97.3 F | BODY MASS INDEX: 20.02 KG/M2 | HEART RATE: 92 BPM | SYSTOLIC BLOOD PRESSURE: 123 MMHG | RESPIRATION RATE: 18 BRPM | HEIGHT: 63 IN | OXYGEN SATURATION: 100 % | WEIGHT: 113 LBS

## 2023-10-17 PROCEDURE — 86850 RBC ANTIBODY SCREEN: CPT

## 2023-10-17 PROCEDURE — 84702 CHORIONIC GONADOTROPIN TEST: CPT

## 2023-10-17 PROCEDURE — 96413 CHEMO IV INFUSION 1 HR: CPT

## 2023-10-17 PROCEDURE — 96375 TX/PRO/DX INJ NEW DRUG ADDON: CPT

## 2023-10-17 PROCEDURE — 96415 CHEMO IV INFUSION ADDL HR: CPT

## 2023-10-17 PROCEDURE — 86706 HEP B SURFACE ANTIBODY: CPT

## 2023-10-17 PROCEDURE — 36415 COLL VENOUS BLD VENIPUNCTURE: CPT

## 2023-10-17 PROCEDURE — 80053 COMPREHEN METABOLIC PANEL: CPT

## 2023-10-17 PROCEDURE — 85025 COMPLETE CBC W/AUTO DIFF WBC: CPT

## 2023-10-17 PROCEDURE — 86901 BLOOD TYPING SEROLOGIC RH(D): CPT

## 2023-10-17 PROCEDURE — 99215 OFFICE O/P EST HI 40 MIN: CPT

## 2023-10-17 PROCEDURE — 86900 BLOOD TYPING SEROLOGIC ABO: CPT

## 2023-10-17 RX ORDER — SPIRONOLACTONE 25 MG/1
25 TABLET ORAL DAILY
Qty: 30 | Refills: 5 | Status: DISCONTINUED | COMMUNITY
Start: 2023-09-29 | End: 2023-10-17

## 2023-10-18 ENCOUNTER — APPOINTMENT (OUTPATIENT)
Dept: INFUSION THERAPY | Facility: CLINIC | Age: 33
End: 2023-10-18

## 2023-10-18 ENCOUNTER — OUTPATIENT (OUTPATIENT)
Dept: OUTPATIENT SERVICES | Facility: HOSPITAL | Age: 33
LOS: 1 days | End: 2023-10-18
Payer: MEDICAID

## 2023-10-18 DIAGNOSIS — D64.9 ANEMIA, UNSPECIFIED: ICD-10-CM

## 2023-10-18 LAB
ALBUMIN SERPL ELPH-MCNC: 2.6 G/DL
ALP BLD-CCNC: 44 U/L
ALT SERPL-CCNC: 13 U/L
ANION GAP SERPL CALC-SCNC: 9 MMOL/L
APTT BLD: 27.2 SEC
AST SERPL-CCNC: 20 U/L
BILIRUB SERPL-MCNC: 0.6 MG/DL
BUN SERPL-MCNC: 94 MG/DL
CALCIUM SERPL-MCNC: 9.3 MG/DL
CHLORIDE SERPL-SCNC: 109 MMOL/L
CO2 SERPL-SCNC: 25 MMOL/L
CREAT SERPL-MCNC: 2.6 MG/DL
EGFR: 24 ML/MIN/1.73M2
GLUCOSE SERPL-MCNC: 104 MG/DL
HAPTOGLOB SERPL-MCNC: 79 MG/DL
INR PPP: 0.85
LDH SERPL-CCNC: 324 U/L
POTASSIUM SERPL-SCNC: 4.1 MMOL/L
PROT SERPL-MCNC: 5.9 G/DL
PT BLD: 9.7 SEC
SODIUM SERPL-SCNC: 143 MMOL/L

## 2023-10-18 PROCEDURE — 36430 TRANSFUSION BLD/BLD COMPNT: CPT

## 2023-10-18 PROCEDURE — 86923 COMPATIBILITY TEST ELECTRIC: CPT

## 2023-10-18 PROCEDURE — P9040: CPT

## 2023-10-24 ENCOUNTER — INPATIENT (INPATIENT)
Facility: HOSPITAL | Age: 33
LOS: 4 days | Discharge: HOME | DRG: 872 | End: 2023-10-29
Attending: GENERAL ACUTE CARE HOSPITAL | Admitting: STUDENT IN AN ORGANIZED HEALTH CARE EDUCATION/TRAINING PROGRAM
Payer: MEDICAID

## 2023-10-24 VITALS
TEMPERATURE: 98 F | WEIGHT: 111.99 LBS | RESPIRATION RATE: 16 BRPM | HEIGHT: 63 IN | OXYGEN SATURATION: 100 % | HEART RATE: 76 BPM | SYSTOLIC BLOOD PRESSURE: 157 MMHG | DIASTOLIC BLOOD PRESSURE: 108 MMHG

## 2023-10-24 DIAGNOSIS — Z79.52 LONG TERM (CURRENT) USE OF SYSTEMIC STEROIDS: ICD-10-CM

## 2023-10-24 DIAGNOSIS — R73.9 HYPERGLYCEMIA, UNSPECIFIED: ICD-10-CM

## 2023-10-24 DIAGNOSIS — Z88.2 ALLERGY STATUS TO SULFONAMIDES: ICD-10-CM

## 2023-10-24 DIAGNOSIS — E83.39 OTHER DISORDERS OF PHOSPHORUS METABOLISM: ICD-10-CM

## 2023-10-24 DIAGNOSIS — L03.114 CELLULITIS OF LEFT UPPER LIMB: ICD-10-CM

## 2023-10-24 DIAGNOSIS — A41.9 SEPSIS, UNSPECIFIED ORGANISM: ICD-10-CM

## 2023-10-24 DIAGNOSIS — M79.89 OTHER SPECIFIED SOFT TISSUE DISORDERS: ICD-10-CM

## 2023-10-24 DIAGNOSIS — L03.90 CELLULITIS, UNSPECIFIED: ICD-10-CM

## 2023-10-24 DIAGNOSIS — I10 ESSENTIAL (PRIMARY) HYPERTENSION: ICD-10-CM

## 2023-10-24 DIAGNOSIS — N17.9 ACUTE KIDNEY FAILURE, UNSPECIFIED: ICD-10-CM

## 2023-10-24 DIAGNOSIS — Z29.9 ENCOUNTER FOR PROPHYLACTIC MEASURES, UNSPECIFIED: ICD-10-CM

## 2023-10-24 DIAGNOSIS — N18.4 CHRONIC KIDNEY DISEASE, STAGE 4 (SEVERE): ICD-10-CM

## 2023-10-24 DIAGNOSIS — E83.42 HYPOMAGNESEMIA: ICD-10-CM

## 2023-10-24 DIAGNOSIS — I15.1 HYPERTENSION SECONDARY TO OTHER RENAL DISORDERS: ICD-10-CM

## 2023-10-24 DIAGNOSIS — I12.9 HYPERTENSIVE CHRONIC KIDNEY DISEASE WITH STAGE 1 THROUGH STAGE 4 CHRONIC KIDNEY DISEASE, OR UNSPECIFIED CHRONIC KIDNEY DISEASE: ICD-10-CM

## 2023-10-24 DIAGNOSIS — M32.14 GLOMERULAR DISEASE IN SYSTEMIC LUPUS ERYTHEMATOSUS: ICD-10-CM

## 2023-10-24 DIAGNOSIS — M10.032 IDIOPATHIC GOUT, LEFT WRIST: ICD-10-CM

## 2023-10-24 LAB
ANION GAP SERPL CALC-SCNC: 12 MMOL/L — SIGNIFICANT CHANGE UP (ref 5–17)
ANION GAP SERPL CALC-SCNC: 12 MMOL/L — SIGNIFICANT CHANGE UP (ref 5–17)
ANISOCYTOSIS BLD QL: SLIGHT — SIGNIFICANT CHANGE UP
ANISOCYTOSIS BLD QL: SLIGHT — SIGNIFICANT CHANGE UP
BASOPHILS # BLD AUTO: 0 K/UL — SIGNIFICANT CHANGE UP (ref 0–0.2)
BASOPHILS # BLD AUTO: 0 K/UL — SIGNIFICANT CHANGE UP (ref 0–0.2)
BASOPHILS NFR BLD AUTO: 0 % — SIGNIFICANT CHANGE UP (ref 0–2)
BASOPHILS NFR BLD AUTO: 0 % — SIGNIFICANT CHANGE UP (ref 0–2)
BUN SERPL-MCNC: 81 MG/DL — HIGH (ref 7–23)
BUN SERPL-MCNC: 81 MG/DL — HIGH (ref 7–23)
CALCIUM SERPL-MCNC: 9 MG/DL — SIGNIFICANT CHANGE UP (ref 8.4–10.5)
CALCIUM SERPL-MCNC: 9 MG/DL — SIGNIFICANT CHANGE UP (ref 8.4–10.5)
CHLORIDE SERPL-SCNC: 112 MMOL/L — HIGH (ref 96–108)
CHLORIDE SERPL-SCNC: 112 MMOL/L — HIGH (ref 96–108)
CO2 SERPL-SCNC: 18 MMOL/L — LOW (ref 22–31)
CO2 SERPL-SCNC: 18 MMOL/L — LOW (ref 22–31)
CREAT SERPL-MCNC: 2.37 MG/DL — HIGH (ref 0.5–1.3)
CREAT SERPL-MCNC: 2.37 MG/DL — HIGH (ref 0.5–1.3)
CRP SERPL-MCNC: 3.1 MG/L — SIGNIFICANT CHANGE UP (ref 0–4)
CRP SERPL-MCNC: 3.1 MG/L — SIGNIFICANT CHANGE UP (ref 0–4)
DACRYOCYTES BLD QL SMEAR: SLIGHT — SIGNIFICANT CHANGE UP
DACRYOCYTES BLD QL SMEAR: SLIGHT — SIGNIFICANT CHANGE UP
EGFR: 27 ML/MIN/1.73M2 — LOW
EGFR: 27 ML/MIN/1.73M2 — LOW
EOSINOPHIL # BLD AUTO: 0 K/UL — SIGNIFICANT CHANGE UP (ref 0–0.5)
EOSINOPHIL # BLD AUTO: 0 K/UL — SIGNIFICANT CHANGE UP (ref 0–0.5)
EOSINOPHIL NFR BLD AUTO: 0 % — SIGNIFICANT CHANGE UP (ref 0–6)
EOSINOPHIL NFR BLD AUTO: 0 % — SIGNIFICANT CHANGE UP (ref 0–6)
ERYTHROCYTE [SEDIMENTATION RATE] IN BLOOD: 101 MM/HR — HIGH
ERYTHROCYTE [SEDIMENTATION RATE] IN BLOOD: 101 MM/HR — HIGH
GLUCOSE SERPL-MCNC: 99 MG/DL — SIGNIFICANT CHANGE UP (ref 70–99)
GLUCOSE SERPL-MCNC: 99 MG/DL — SIGNIFICANT CHANGE UP (ref 70–99)
HCT VFR BLD CALC: 28.3 % — LOW (ref 34.5–45)
HCT VFR BLD CALC: 28.3 % — LOW (ref 34.5–45)
HGB BLD-MCNC: 9.4 G/DL — LOW (ref 11.5–15.5)
HGB BLD-MCNC: 9.4 G/DL — LOW (ref 11.5–15.5)
HYPOCHROMIA BLD QL: SLIGHT — SIGNIFICANT CHANGE UP
HYPOCHROMIA BLD QL: SLIGHT — SIGNIFICANT CHANGE UP
LYMPHOCYTES # BLD AUTO: 0.05 K/UL — LOW (ref 1–3.3)
LYMPHOCYTES # BLD AUTO: 0.05 K/UL — LOW (ref 1–3.3)
LYMPHOCYTES # BLD AUTO: 0.9 % — LOW (ref 13–44)
LYMPHOCYTES # BLD AUTO: 0.9 % — LOW (ref 13–44)
MACROCYTES BLD QL: SLIGHT — SIGNIFICANT CHANGE UP
MACROCYTES BLD QL: SLIGHT — SIGNIFICANT CHANGE UP
MANUAL SMEAR VERIFICATION: SIGNIFICANT CHANGE UP
MANUAL SMEAR VERIFICATION: SIGNIFICANT CHANGE UP
MCHC RBC-ENTMCNC: 30 PG — SIGNIFICANT CHANGE UP (ref 27–34)
MCHC RBC-ENTMCNC: 30 PG — SIGNIFICANT CHANGE UP (ref 27–34)
MCHC RBC-ENTMCNC: 33.2 GM/DL — SIGNIFICANT CHANGE UP (ref 32–36)
MCHC RBC-ENTMCNC: 33.2 GM/DL — SIGNIFICANT CHANGE UP (ref 32–36)
MCV RBC AUTO: 90.4 FL — SIGNIFICANT CHANGE UP (ref 80–100)
MCV RBC AUTO: 90.4 FL — SIGNIFICANT CHANGE UP (ref 80–100)
MICROCYTES BLD QL: SLIGHT — SIGNIFICANT CHANGE UP
MICROCYTES BLD QL: SLIGHT — SIGNIFICANT CHANGE UP
MONOCYTES # BLD AUTO: 0.05 K/UL — SIGNIFICANT CHANGE UP (ref 0–0.9)
MONOCYTES # BLD AUTO: 0.05 K/UL — SIGNIFICANT CHANGE UP (ref 0–0.9)
MONOCYTES NFR BLD AUTO: 0.9 % — LOW (ref 2–14)
MONOCYTES NFR BLD AUTO: 0.9 % — LOW (ref 2–14)
NEUTROPHILS # BLD AUTO: 5.69 K/UL — SIGNIFICANT CHANGE UP (ref 1.8–7.4)
NEUTROPHILS # BLD AUTO: 5.69 K/UL — SIGNIFICANT CHANGE UP (ref 1.8–7.4)
NEUTROPHILS NFR BLD AUTO: 98.2 % — HIGH (ref 43–77)
NEUTROPHILS NFR BLD AUTO: 98.2 % — HIGH (ref 43–77)
NRBC # BLD: 1 /100 — HIGH (ref 0–0)
NRBC # BLD: 1 /100 — HIGH (ref 0–0)
NRBC # BLD: SIGNIFICANT CHANGE UP /100 WBCS (ref 0–0)
NRBC # BLD: SIGNIFICANT CHANGE UP /100 WBCS (ref 0–0)
OVALOCYTES BLD QL SMEAR: SLIGHT — SIGNIFICANT CHANGE UP
OVALOCYTES BLD QL SMEAR: SLIGHT — SIGNIFICANT CHANGE UP
PLAT MORPH BLD: NORMAL — SIGNIFICANT CHANGE UP
PLAT MORPH BLD: NORMAL — SIGNIFICANT CHANGE UP
PLATELET # BLD AUTO: 145 K/UL — LOW (ref 150–400)
PLATELET # BLD AUTO: 145 K/UL — LOW (ref 150–400)
POIKILOCYTOSIS BLD QL AUTO: SIGNIFICANT CHANGE UP
POIKILOCYTOSIS BLD QL AUTO: SIGNIFICANT CHANGE UP
POLYCHROMASIA BLD QL SMEAR: SLIGHT — SIGNIFICANT CHANGE UP
POLYCHROMASIA BLD QL SMEAR: SLIGHT — SIGNIFICANT CHANGE UP
POTASSIUM SERPL-MCNC: 4.1 MMOL/L — SIGNIFICANT CHANGE UP (ref 3.5–5.3)
POTASSIUM SERPL-MCNC: 4.1 MMOL/L — SIGNIFICANT CHANGE UP (ref 3.5–5.3)
POTASSIUM SERPL-SCNC: 4.1 MMOL/L — SIGNIFICANT CHANGE UP (ref 3.5–5.3)
POTASSIUM SERPL-SCNC: 4.1 MMOL/L — SIGNIFICANT CHANGE UP (ref 3.5–5.3)
RBC # BLD: 3.13 M/UL — LOW (ref 3.8–5.2)
RBC # BLD: 3.13 M/UL — LOW (ref 3.8–5.2)
RBC # FLD: 15.3 % — HIGH (ref 10.3–14.5)
RBC # FLD: 15.3 % — HIGH (ref 10.3–14.5)
RBC BLD AUTO: ABNORMAL
RBC BLD AUTO: ABNORMAL
SCHISTOCYTES BLD QL AUTO: SIGNIFICANT CHANGE UP
SCHISTOCYTES BLD QL AUTO: SIGNIFICANT CHANGE UP
SODIUM SERPL-SCNC: 142 MMOL/L — SIGNIFICANT CHANGE UP (ref 135–145)
SODIUM SERPL-SCNC: 142 MMOL/L — SIGNIFICANT CHANGE UP (ref 135–145)
SPHEROCYTES BLD QL SMEAR: SLIGHT — SIGNIFICANT CHANGE UP
SPHEROCYTES BLD QL SMEAR: SLIGHT — SIGNIFICANT CHANGE UP
URATE SERPL-MCNC: 7.1 MG/DL — HIGH (ref 2.5–7)
URATE SERPL-MCNC: 7.1 MG/DL — HIGH (ref 2.5–7)
WBC # BLD: 5.79 K/UL — SIGNIFICANT CHANGE UP (ref 3.8–10.5)
WBC # BLD: 5.79 K/UL — SIGNIFICANT CHANGE UP (ref 3.8–10.5)
WBC # FLD AUTO: 5.79 K/UL — SIGNIFICANT CHANGE UP (ref 3.8–10.5)
WBC # FLD AUTO: 5.79 K/UL — SIGNIFICANT CHANGE UP (ref 3.8–10.5)

## 2023-10-24 PROCEDURE — 99285 EMERGENCY DEPT VISIT HI MDM: CPT

## 2023-10-24 PROCEDURE — 99223 1ST HOSP IP/OBS HIGH 75: CPT | Mod: GC

## 2023-10-24 PROCEDURE — 73110 X-RAY EXAM OF WRIST: CPT | Mod: 26,LT

## 2023-10-24 RX ORDER — HEPARIN SODIUM 5000 [USP'U]/ML
5000 INJECTION INTRAVENOUS; SUBCUTANEOUS EVERY 8 HOURS
Refills: 0 | Status: DISCONTINUED | OUTPATIENT
Start: 2023-10-25 | End: 2023-10-29

## 2023-10-24 RX ORDER — FUROSEMIDE 40 MG
80 TABLET ORAL EVERY 12 HOURS
Refills: 0 | Status: DISCONTINUED | OUTPATIENT
Start: 2023-10-24 | End: 2023-10-25

## 2023-10-24 RX ORDER — PANTOPRAZOLE SODIUM 20 MG/1
40 TABLET, DELAYED RELEASE ORAL
Refills: 0 | Status: DISCONTINUED | OUTPATIENT
Start: 2023-10-25 | End: 2023-10-29

## 2023-10-24 RX ORDER — LISINOPRIL 2.5 MG/1
40 TABLET ORAL DAILY
Refills: 0 | Status: DISCONTINUED | OUTPATIENT
Start: 2023-10-24 | End: 2023-10-29

## 2023-10-24 RX ORDER — ONDANSETRON 8 MG/1
4 TABLET, FILM COATED ORAL EVERY 8 HOURS
Refills: 0 | Status: DISCONTINUED | OUTPATIENT
Start: 2023-10-24 | End: 2023-10-29

## 2023-10-24 RX ORDER — ALLOPURINOL 300 MG
100 TABLET ORAL DAILY
Refills: 0 | Status: DISCONTINUED | OUTPATIENT
Start: 2023-10-24 | End: 2023-10-29

## 2023-10-24 RX ORDER — MYCOPHENOLATE MOFETIL 250 MG/1
1000 CAPSULE ORAL
Refills: 0 | Status: DISCONTINUED | OUTPATIENT
Start: 2023-10-24 | End: 2023-10-25

## 2023-10-24 RX ORDER — CEFAZOLIN SODIUM 1 G
1000 VIAL (EA) INJECTION ONCE
Refills: 0 | Status: COMPLETED | OUTPATIENT
Start: 2023-10-24 | End: 2023-10-24

## 2023-10-24 RX ORDER — ACETAMINOPHEN 500 MG
650 TABLET ORAL EVERY 6 HOURS
Refills: 0 | Status: DISCONTINUED | OUTPATIENT
Start: 2023-10-24 | End: 2023-10-29

## 2023-10-24 RX ORDER — NIFEDIPINE 30 MG
1 TABLET, EXTENDED RELEASE 24 HR ORAL
Refills: 0 | DISCHARGE

## 2023-10-24 RX ORDER — KETOROLAC TROMETHAMINE 30 MG/ML
15 SYRINGE (ML) INJECTION ONCE
Refills: 0 | Status: DISCONTINUED | OUTPATIENT
Start: 2023-10-24 | End: 2023-10-24

## 2023-10-24 RX ORDER — COLCHICINE 0.6 MG
0.6 TABLET ORAL
Refills: 0 | Status: DISCONTINUED | OUTPATIENT
Start: 2023-10-24 | End: 2023-10-29

## 2023-10-24 RX ORDER — HYDROXYCHLOROQUINE SULFATE 200 MG
1 TABLET ORAL
Refills: 0 | DISCHARGE

## 2023-10-24 RX ORDER — ALLOPURINOL 300 MG
0 TABLET ORAL
Refills: 0 | DISCHARGE

## 2023-10-24 RX ORDER — SODIUM CHLORIDE 9 MG/ML
1000 INJECTION INTRAMUSCULAR; INTRAVENOUS; SUBCUTANEOUS ONCE
Refills: 0 | Status: COMPLETED | OUTPATIENT
Start: 2023-10-24 | End: 2023-10-24

## 2023-10-24 RX ORDER — NIFEDIPINE 30 MG
30 TABLET, EXTENDED RELEASE 24 HR ORAL DAILY
Refills: 0 | Status: DISCONTINUED | OUTPATIENT
Start: 2023-10-24 | End: 2023-10-29

## 2023-10-24 RX ORDER — VANCOMYCIN HCL 1 G
750 VIAL (EA) INTRAVENOUS EVERY 24 HOURS
Refills: 0 | Status: COMPLETED | OUTPATIENT
Start: 2023-10-24 | End: 2023-10-27

## 2023-10-24 RX ORDER — CEFTRIAXONE 500 MG/1
2000 INJECTION, POWDER, FOR SOLUTION INTRAMUSCULAR; INTRAVENOUS EVERY 24 HOURS
Refills: 0 | Status: DISCONTINUED | OUTPATIENT
Start: 2023-10-24 | End: 2023-10-29

## 2023-10-24 RX ORDER — HYDROXYCHLOROQUINE SULFATE 200 MG
200 TABLET ORAL EVERY 24 HOURS
Refills: 0 | Status: DISCONTINUED | OUTPATIENT
Start: 2023-10-25 | End: 2023-10-29

## 2023-10-24 RX ADMIN — LISINOPRIL 40 MILLIGRAM(S): 2.5 TABLET ORAL at 22:40

## 2023-10-24 RX ADMIN — Medication 30 MILLIGRAM(S): at 22:39

## 2023-10-24 RX ADMIN — Medication 0.6 MILLIGRAM(S): at 22:39

## 2023-10-24 RX ADMIN — Medication 80 MILLIGRAM(S): at 22:43

## 2023-10-24 RX ADMIN — CEFTRIAXONE 100 MILLIGRAM(S): 500 INJECTION, POWDER, FOR SOLUTION INTRAMUSCULAR; INTRAVENOUS at 22:29

## 2023-10-24 RX ADMIN — Medication 650 MILLIGRAM(S): at 21:22

## 2023-10-24 RX ADMIN — SODIUM CHLORIDE 1000 MILLILITER(S): 9 INJECTION INTRAMUSCULAR; INTRAVENOUS; SUBCUTANEOUS at 17:24

## 2023-10-24 RX ADMIN — Medication 100 MILLIGRAM(S): at 17:24

## 2023-10-24 RX ADMIN — Medication 15 MILLIGRAM(S): at 15:28

## 2023-10-24 NOTE — H&P ADULT - PROBLEM SELECTOR PLAN 2
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   - trend creatinine  - consult nephrology  - consult rheumatology   - c/w Cellcept 500mg PO - 2 tablets q12  - c/w Lasix 80mg PO q12  - c/w spironolactone  - c/w allopurinol   - c/w hydroxychloroquine  - c/w metolazone 5mg PO q12  - Continue with prednisone 40mg PO qd  - maintenance hydration. 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   - trend creatinine  - consult nephrology  - consult rheumatology   - c/w Cellcept 500mg PO - 2 tablets q12  - c/w Lasix 80mg PO q12  - c/w spironolactone  - c/w allopurinol   - c/w hydroxychloroquine  - c/w metolazone 5mg PO q12  - c/w prednisone 40mg PO qd  - c/w Pantoprazole 40mg PO qd  - maintenance hydration. 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   - trend creatinine  - consult nephrology  - consult rheumatology   - c/w Cellcept 500mg PO - 2 tablets q12  - c/w Lasix 80mg PO q12  - c/w allopurinol 100mg PO qd  - c/w hydroxychloroquine 200mg PO qd  - c/w metolazone 5mg PO q12  - c/w prednisone 60mg PO qd  - c/w Pantoprazole 40mg PO qd  - maintenance hydration. Patient with a 20-year history of lupus nephritis presents with diffuse swelling of the left wrist and hand with warmth, pain, and rubor, 2 weeks following a first-time administration of Rituxumab. Consider gout vs septic joint vs lupus flare  Plan as above   - Start vancomycin 750mg IV qd and ceftriaxone 2g IV qd  - Consult ortho  - Colchicine 0.6mg BID  - Rheumatology consult  - Hand consult  - compartment checks Patient with a 20-year history of lupus nephritis presents with diffuse swelling of the left wrist and hand with warmth, pain, and rubor, 2 weeks following a first-time administration of Rituxumab. c/f gout flare with superimposed cellulitis vs septic joint   -c/w Vanc/zosyn, complete sepsis fluids,   Plan as above   - Start vancomycin 750mg IV qd and ceftriaxone 2g IV qd  - Consult ortho  - Colchicine 0.6mg BID  - Rheumatology consult  - Hand consult  - compartment checks

## 2023-10-24 NOTE — H&P ADULT - PROBLEM SELECTOR PLAN 4
Problem 4: Prophylactic measure  Fluids: Maintenance  Nutrition: DASH renal diet  DVT;   Code: Full    Dispo: [facility] Problem 4: Prophylactic measure  Fluids: Maintenance  Electrolytes: Replete as needed  Nutrition: DASH renal diet  DVT; Heparin 5000 BID  Code: Full    Dispo: [facility] Problem 3: Hypertension  Patient with a history of hypertension likely 2/2 to lupus nephritis  Plan:   - c/w Lisinopril 40mg PO qd  - c/w Nifedipine ER 30mg PO qd

## 2023-10-24 NOTE — H&P ADULT - NSHPPHYSICALEXAM_GEN_ALL_CORE
.  VITAL SIGNS:  T(C): 36.9 (10-24-23 @ 18:09), Max: 36.9 (10-24-23 @ 18:09)  T(F): 98.4 (10-24-23 @ 18:09), Max: 98.4 (10-24-23 @ 18:09)  HR: 83 (10-24-23 @ 18:09) (76 - 83)  BP: 144/89 (10-24-23 @ 18:09) (144/89 - 157/108)  BP(mean): --  RR: 16 (10-24-23 @ 18:09) (16 - 16)  SpO2: 99% (10-24-23 @ 18:09) (99% - 100%)  Wt(kg): --    PHYSICAL EXAM:    Constitutional: resting comfortably in bed; NAD  Head: NC/AT  Eyes: PERRL, EOMI, anicteric sclera  ENT: no nasal discharge; uvula midline, no oropharyngeal erythema or exudates; MMM  Neck: supple; no JVD or thyromegaly  Respiratory: CTA B/L; no W/R/R, no retractions  Cardiac: +S1/S2; RRR; no M/R/G;   Gastrointestinal: abdomen soft, NT/ND; no rebound or guarding; +BSx4  Back: spine midline, no bony tenderness or step-offs; no CVAT B/L  Extremities: WWP, no clubbing or cyanosis; pitting edema to 3+ on b/l LE to the level of the proximal ankle; pitting edema to 2+ on L hand/wrist  Musculoskeletal: NROM x3; no joint swelling, tenderness or erythema; Left wrist and hand swollen, erythematous, and painful.  Vascular: 2+ radial, DP pulses B/L  Dermatologic: skin warm, dry and intact; no rashes, wounds, or scars  Lymphatic: no submandibular or cervical LAD  Neurologic: AAOx3; CNII-XII grossly intact; no focal deficits  Psychiatric: affect and characteristics of appearance, verbalizations, behaviors are appropriate .  VITAL SIGNS:  T(C): 36.9 (10-24-23 @ 18:09), Max: 36.9 (10-24-23 @ 18:09)  T(F): 98.4 (10-24-23 @ 18:09), Max: 98.4 (10-24-23 @ 18:09)  HR: 83 (10-24-23 @ 18:09) (76 - 83)  BP: 144/89 (10-24-23 @ 18:09) (144/89 - 157/108)  BP(mean): --  RR: 16 (10-24-23 @ 18:09) (16 - 16)  SpO2: 99% (10-24-23 @ 18:09) (99% - 100%)  Wt(kg): --    PHYSICAL EXAM:    Constitutional: resting comfortably in bed; NAD  Head: NC/AT  Eyes: PERRL, EOMI, anicteric sclera  ENT: no nasal discharge; uvula midline, no oropharyngeal erythema or exudates; MMM  Neck: supple; no JVD or thyromegaly  Respiratory: CTA B/L; no W/R/R, no retractions  Cardiac: +S1/S2; RRR; no M/R/G;   Gastrointestinal: abdomen soft, NT/ND; no rebound or guarding; +BSx4  Back: spine midline, no bony tenderness or step-offs; no CVAT B/L  Extremities: WWP, no clubbing or cyanosis; pitting edema to 3+ on b/l LE to the level of the proximal ankle; pitting edema to 2+ on L hand/wrist  Musculoskeletal: NROM x3; Left wrist and hand swollen, erythematous, and painful.  Vascular: 2+ radial, DP pulses B/L  Dermatologic: skin warm, dry and intact; no rashes, wounds, or scars  Lymphatic: no submandibular or cervical LAD  Neurologic: AAOx3; CNII-XII grossly intact; no focal deficits; weakness in b/l lower extremities from prior admission for inflammation - improving  Psychiatric: affect and characteristics of appearance, verbalizations, behaviors are appropriate

## 2023-10-24 NOTE — H&P ADULT - PROBLEM SELECTOR PLAN 1
Patient with a 20-year history of lupus nephritis presents with diffuse swelling of the left wrist and hand with warmth, pain, and rubor, 2 weeks following a first-time administration of Rituxumab. Consider cellulitis vs. gout vs. allergic reaction vs. toxic exposure (less likely).  Plan:  - c/w cefazolin IVPB 1g BID  - c/w ketorolac 15mg IVP PRN Patient with a 20-year history of lupus nephritis presents with diffuse swelling of the left wrist and hand with warmth, pain, and rubor, 2 weeks following a first-time administration of Rituxumab. Consider gout vs septic joint vs lupus flare  Plan:  - Start vancomycin and ceftriaxone  - c/w ketorolac 15mg IVP PRN for pain  - Consult ortho  - Colchicine 0.6mg BID  - Rheumatology consult  - Hand consult Patient with a 20-year history of lupus nephritis presents with diffuse swelling of the left wrist and hand with warmth, pain, and rubor, 2 weeks following a first-time administration of Rituxumab. Consider gout vs septic joint vs lupus flare  Plan:  - Start vancomycin 750mg IV qd and ceftriaxone 2g IV qd  - c/w ketorolac 15mg IVP PRN for pain  - Consult ortho  - Colchicine 0.6mg BID  - Rheumatology consult  - Hand consult #Sepsis: fever 103 overnight w/ tachycardia. c/f L wrist  gout flare vs septic joint vs cellulitis.   -c/w Vanc/zosyn, complete sepsis fluids,   -f/up blood cx, hand xray,   - ortho consulted for possible arthrocentesis  - serial exams #Sepsis: fever 103 overnight w/ tachycardia. c/f L wrist  gout flare with superimposed cellulitis vs septic joint   -c/w Vanc/zosyn, complete sepsis fluids,   -f/up blood cx, hand xray,   - ortho consulted for possible arthrocentesis  - serial exams

## 2023-10-24 NOTE — H&P ADULT - HISTORY OF PRESENT ILLNESS
The patient is a 32 y/o female with a PMHx of lupus nephritis and hypertension who presets with a chief complaint of left wrist pain and swelling of 1 weeks' duration. She was recently admitted with a similar presentation earlier this month, but at that time, the inflammatory condition was from mid-torso all the way down the b/l extremities. The patient thas never had this occurrance in her hands/arms before. She denied any recent travel or injury to the afffected area. The patient endorses receiving a Rituximab dose for the first time two weeks ago, but otherwise denies any new medications or changes to current medications. She also denies known toxic exposure. The patient denies pruritis, but endorses 10/10 pain that improves only modestly with Tylenol. She denies fever, chills, and sweats, and also denies SOB, chest pain, and palpitations. The patient endorses diarrhea 3-5x daily, attributed to Cellcept therapy. She also endorses increased frequency of urination, but no dysuria or hematuria. The patient was diagnosed with lupus nephritis 20+ years ago, but has no family history of the disease. She folows Dr. Beltran King, Rheumatology and Dr. Jerez, Nephrology as an outpatient.     ED Course:  V/S: Temp: 97.7, HR: 76, BP: 157/108, RR: 16, SPO2: 100 RA  Labs: ESR: 100, RBC: 3.13, Hgb: 9.4, Hct: 28.3, Cl: 112, CO2: 18, BUN: 81, Crt: 2.37, eGFR: 27, Uric acid: 7.1  Imaging:   X-ray Left wrist: No fractures or dislocations  EKG: Pending    Interventions:  - Cefazolin IVPB 1g x 1  - Ketorolac 15mg IVP x 1  - NS Bolus 1L x 11   The patient is a 32 y/o female with a PMHx of lupus nephritis and hypertension who presets with a chief complaint of left wrist pain and swelling of 1 weeks' duration. She was recently admitted with a similar presentation earlier this month, but at that time, the inflammatory condition was from mid-torso all the way down the b/l extremities. The patient thas never had this occurrance in her hands/arms before. She denied any recent travel or injury to the afffected area. The patient endorses receiving a Rituximab dose for the first time two weeks ago, but otherwise denies any new medications or changes to current medications. She also denies known toxic exposure. The patient denies pruritis, but endorses 10/10 pain that improves only modestly with Tylenol. She denies fever, chills, and sweats, and also denies SOB, chest pain, and palpitations. The patient endorses diarrhea 3-5x daily, attributed to Cellcept therapy. She also endorses increased frequency of urination, but no dysuria or hematuria. The patient was diagnosed with lupus nephritis 20+ years ago, but has no family history of the disease. She folows Dr. Beltran King, Rheumatology and Dr. Jerez, Nephrology as an outpatient. The patient has not taken medications today and the last time she ate was 10am.    ED Course:  V/S: Temp: 97.7, HR: 76, BP: 157/108, RR: 16, SPO2: 100 RA  Labs: ESR: 100, RBC: 3.13, Hgb: 9.4, Hct: 28.3, Cl: 112, CO2: 18, BUN: 81, Crt: 2.37, eGFR: 27, Uric acid: 7.1  Imaging:   X-ray Left wrist: No fractures or dislocations  EKG: Pending    Interventions:  - Cefazolin IVPB 1g x 1  - Ketorolac 15mg IVP x 1  - NS Bolus 1L x 11   The patient is a 32 y/o female with a PMHx of lupus nephritis and hypertension who presets with a chief complaint of left wrist pain and swelling of 1 weeks' duration. She was recently admitted with a similar presentation earlier this month, but at that time, the inflammatory condition was from mid-torso all the way down the b/l extremities. The patient has never had this occurrance in a single joint before. She denied any recent travel or injury to the affected area. The patient endorses receiving a Rituximab dose for the first time two weeks ago, but otherwise denies any new medications or changes to current medications. She also denies known toxic exposure. She saw her rheumatologist 1 week ago, and he removed some fluid from the hand (unclear if sent for culture), and he also prescribed her colchicine. She initially felt better, but began feeling worse again, and her provider sent her to the ED. The patient denies pruritis, but endorses 10/10 pain that improves only modestly with Tylenol. She denies fever, chills, and sweats, and also denies SOB, chest pain, and palpitations. The patient endorses diarrhea 3-5x daily, attributed to Cellcept therapy. She also endorses increased frequency of urination, but no dysuria or hematuria. The patient was diagnosed with lupus nephritis 20+ years ago, but has no family history of the disease. She follows Dr. Beltran King, Rheumatology and Dr. Jerez, Nephrology as an outpatient. The patient has not taken medications today and the last time she ate was 10am.    ED Course:  V/S: Temp: 97.7, HR: 76, BP: 157/108, RR: 16, SPO2: 100 RA  Labs: ESR: 100, RBC: 3.13, Hgb: 9.4, Hct: 28.3, Cl: 112, CO2: 18, BUN: 81, Crt: 2.37, eGFR: 27, Uric acid: 7.1  Imaging:   X-ray Left wrist: No fractures or dislocations  EKG: Pending    Interventions:  - Cefazolin IVPB 1g x 1  - Ketorolac 15mg IVP x 1  - NS Bolus 1L x 11

## 2023-10-24 NOTE — H&P ADULT - PROBLEM SELECTOR PLAN 5
Problem 4: Prophylactic measure  Fluids: Maintenance  Electrolytes: Replete as needed  Nutrition: DASH renal diet  DVT; Heparin 5000 BID  Code: Full    Dispo: [facility]

## 2023-10-24 NOTE — ED ADULT NURSE NOTE - OBJECTIVE STATEMENT
patient presents to ED with Lt. wrist pain, hx of lupus nephritis, chronic anemia. was told that she might have gout. denies cp, sob, dizziness, fever, cough,

## 2023-10-24 NOTE — H&P ADULT - NSHPREVIEWOFSYSTEMS_GEN_ALL_CORE
REVIEW OF SYSTEMS:  CONSTITUTIONAL: +weakness in LE; no fevers or chills  EYES/ENT: No visual changes;  No vertigo or throat pain   NECK: No pain or stiffness  RESPIRATORY: No cough, wheezing, hemoptysis; No shortness of breath  CARDIOVASCULAR: No chest pain or palpitations  GASTROINTESTINAL: No abdominal or epigastric pain. No nausea, vomiting, or hematemesis; +diarrhea (medication induced); no constipation. No melena or hematochezia.  GENITOURINARY: No dysuria, frequency or hematuria  NEUROLOGICAL: No numbness; + weakness in lower extremities b/l  SKIN: No itching, rashes

## 2023-10-24 NOTE — ED ADULT TRIAGE NOTE - CHIEF COMPLAINT QUOTE
Pt sent by MD for wrist pain, swelling x 1 week. Per pt, "he thought it was a gout attack, but he wants me to get tested". Denies injury, numbness/tingling. PMH HTN.

## 2023-10-24 NOTE — H&P ADULT - NSHPLABSRESULTS_GEN_ALL_CORE
.  LABS:                         9.4    5.79  )-----------( 145      ( 24 Oct 2023 15:09 )             28.3     10-24    142  |  112<H>  |  81<H>  ----------------------------<  99  4.1   |  18<L>  |  2.37<H>    Ca    9.0      24 Oct 2023 15:09        Urinalysis Basic - ( 24 Oct 2023 15:09 )    Color: x / Appearance: x / SG: x / pH: x  Gluc: 99 mg/dL / Ketone: x  / Bili: x / Urobili: x   Blood: x / Protein: x / Nitrite: x   Leuk Esterase: x / RBC: x / WBC x   Sq Epi: x / Non Sq Epi: x / Bacteria: x                RADIOLOGY, EKG & ADDITIONAL TESTS: Reviewed.

## 2023-10-24 NOTE — ED PROVIDER NOTE - CLINICAL SUMMARY MEDICAL DECISION MAKING FREE TEXT BOX
SS Noted concerning for cellulitis, gout, arthritis, reactive autoimmune process, less likely septic joint.  Hand surgery consulted - Dr. Schaefer.  Labs, IV abx, admit, serial exams.

## 2023-10-24 NOTE — H&P ADULT - ATTENDING COMMENTS
#Sepsis: fever 103 overnight w/ tachycardia. c/f L wrist  gout flare vs septic joint vs cellulitis. c/w Vanc/zosyn, complete sepsis fluids, f/up blood cx, hand xray, ortho consulted for possible arthrocentesis.      #r/o gout flare vs septic arthritis: Progressively worsening pain and swelling for one week, s/p colchicine and outpt arthrocentesis and intraarticular steroid injection by rheumatologist; unclear results of arthrocentesis. Presents today given worsening swelling /pain. +fever 103; c/f superimposed cellulitis vs septic joint. Ortho consulted. c/w vanc/ceft, f/up blood cx, hand xray, serial exams, compartment checks, c/w colchicine. F/up ortho and rheum recs. Collateral in am #Sepsis: fever 103 overnight w/ tachycardia. c/f L wrist  gout flare with superimposed cellulitis vs septic joint   -c/w Vanc/zosyn, complete sepsis fluids,  c/w Vanc/zosyn, complete sepsis fluids, f/up blood cx, hand xray, ortho consulted for possible arthrocentesis.      #r/o Cellulitis: c/f gout flare w/ superimposed cellulitis vs septic arthritis: Progressively worsening pain and swelling for one week, s/p colchicine and outpt arthrocentesis and intraarticular steroid injection by rheumatologist; unclear results of arthrocentesis. Presents today given worsening swelling /pain. +fever 103; c/f superimposed cellulitis vs septic joint. Ortho consulted. c/w vanc/ceft, f/up blood cx, hand xray, serial exams, compartment checks, c/w colchicine. F/up ortho and rheum recs. Collateral in am

## 2023-10-24 NOTE — ED PROVIDER NOTE - PROGRESS NOTE DETAILS
Patient with history of lupus nephritis.  Baseline creatinine noted.  Patient having significant for inflammatory pain.  Likely due to gout versus cellulitis.  Given severe pain weighed all risk benefits alternatives for analgesics.  Patient's optimal analgesic is nonsteroidal anti-inflammatory at this time.  In attempt to avoid opioids patient given single minimal dose of IV Toradol.  Patient will need to be admitted for IV antibiotics and creatinine can be trended across this time.  We will also give additional fluids with single dose of Toradol.

## 2023-10-24 NOTE — H&P ADULT - PROBLEM SELECTOR PLAN 3
Problem 3: Hypertension  Patient with a history of hypertension likely 2/2 to lupus nephritis  Plan:   - c/w Lisinopril  - c/w Nifedipine Problem 3: Hypertension  Patient with a history of hypertension likely 2/2 to lupus nephritis  Plan:   - c/w Lisinopril 40mg PO qd  - c/w Nifedipine Problem 3: Hypertension  Patient with a history of hypertension likely 2/2 to lupus nephritis  Plan:   - c/w Lisinopril 40mg PO qd  - c/w Nifedipine ER 30mg PO qd Patient with a 20-year history of lupus nephritis presents with possible cellulitis in the setting of recent Retuximab administration plus daily steroid use (prednisone; cellcept).   - trend creatinine  - consult nephrology  - consult rheumatology   - c/w Cellcept 500mg PO - 2 tablets q12  - c/w Lasix 80mg PO q12  - c/w allopurinol 100mg PO qd  - c/w hydroxychloroquine 200mg PO qd  - c/w metolazone 5mg PO q12  - c/w prednisone 60mg PO qd  - c/w Pantoprazole 40mg PO qd  - maintenance hydration.

## 2023-10-24 NOTE — ED PROVIDER NOTE - OBJECTIVE STATEMENT
33 F SLE nephritis L wrist pain x 1 week.  Failed attempt at aspiration by rheumatologist as outpt today.  Recent IA cortisone injection.  No hx gout.  No fever or chills.  DROM due to pain and swelling.  Denies trauma.

## 2023-10-24 NOTE — H&P ADULT - ASSESSMENT
The patient is a 34 y/o female with a PMHx of lupus nephritis and hypertension who presets with a chief complaint of left wrist pain and swelling of 1 weeks' duration.

## 2023-10-24 NOTE — ED ADULT TRIAGE NOTE - WEIGHT METHOD
Called and discussed normal NIPT results with Chuyita. Results indicate a low risk for fetal aneuploidy involving chromosomes 21, 18, 13, or sex chromosomes (XY). Fetal sex (male) was disclosed per patient wishes. This puts her current pregnancy at low risk for Down syndrome, trisomy 18, trisomy 13 and sex chromosome abnormalities. Although these results are reassuring, this does not replace a standard chromosome analysis from a chorionic villus sampling or amniocentesis.     Plan: Level II ultrasound is scheduled on 8/16/2023. MSAFP is the appropriate second trimester screening test for open neural tube defects; the maternal quad screen is not recommended. Her results are available in her Epic chart for her primary OB to review.    Makeda Sanford MS, [facility]  Maternal Fetal Medicine  [phone]  
stated
Airway patent, Nasal mucosa clear. Mouth with normal mucosa. Throat has no vesicles, no oropharyngeal exudates and uvula is midline.

## 2023-10-24 NOTE — ED ADULT NURSE NOTE - NSFALLRISKASMT_ED_ALL_ED_DT
24-Oct-2023 23:29 Glycopyrrolate Counseling:  I discussed with the patient the risks of glycopyrrolate including but not limited to skin rash, drowsiness, dry mouth, difficulty urinating, and blurred vision.

## 2023-10-25 ENCOUNTER — TRANSCRIPTION ENCOUNTER (OUTPATIENT)
Age: 33
End: 2023-10-25

## 2023-10-25 LAB
ALBUMIN SERPL ELPH-MCNC: 2.7 G/DL — LOW (ref 3.3–5)
ALBUMIN SERPL ELPH-MCNC: 2.7 G/DL — LOW (ref 3.3–5)
ALP SERPL-CCNC: 49 U/L — SIGNIFICANT CHANGE UP (ref 40–120)
ALP SERPL-CCNC: 49 U/L — SIGNIFICANT CHANGE UP (ref 40–120)
ALT FLD-CCNC: 12 U/L — SIGNIFICANT CHANGE UP (ref 10–45)
ALT FLD-CCNC: 12 U/L — SIGNIFICANT CHANGE UP (ref 10–45)
ANION GAP SERPL CALC-SCNC: 11 MMOL/L — SIGNIFICANT CHANGE UP (ref 5–17)
ANION GAP SERPL CALC-SCNC: 11 MMOL/L — SIGNIFICANT CHANGE UP (ref 5–17)
ANISOCYTOSIS BLD QL: SLIGHT — SIGNIFICANT CHANGE UP
ANISOCYTOSIS BLD QL: SLIGHT — SIGNIFICANT CHANGE UP
APPEARANCE UR: ABNORMAL
APPEARANCE UR: ABNORMAL
AST SERPL-CCNC: 15 U/L — SIGNIFICANT CHANGE UP (ref 10–40)
AST SERPL-CCNC: 15 U/L — SIGNIFICANT CHANGE UP (ref 10–40)
BACTERIA # UR AUTO: NEGATIVE /HPF — SIGNIFICANT CHANGE UP
BACTERIA # UR AUTO: NEGATIVE /HPF — SIGNIFICANT CHANGE UP
BASOPHILS # BLD AUTO: 0 K/UL — SIGNIFICANT CHANGE UP (ref 0–0.2)
BASOPHILS # BLD AUTO: 0 K/UL — SIGNIFICANT CHANGE UP (ref 0–0.2)
BASOPHILS NFR BLD AUTO: 0 % — SIGNIFICANT CHANGE UP (ref 0–2)
BASOPHILS NFR BLD AUTO: 0 % — SIGNIFICANT CHANGE UP (ref 0–2)
BILIRUB DIRECT SERPL-MCNC: <0.2 MG/DL — SIGNIFICANT CHANGE UP (ref 0–0.3)
BILIRUB DIRECT SERPL-MCNC: <0.2 MG/DL — SIGNIFICANT CHANGE UP (ref 0–0.3)
BILIRUB INDIRECT FLD-MCNC: SIGNIFICANT CHANGE UP MG/DL (ref 0.2–1)
BILIRUB INDIRECT FLD-MCNC: SIGNIFICANT CHANGE UP MG/DL (ref 0.2–1)
BILIRUB SERPL-MCNC: <0.2 MG/DL — SIGNIFICANT CHANGE UP (ref 0.2–1.2)
BILIRUB SERPL-MCNC: <0.2 MG/DL — SIGNIFICANT CHANGE UP (ref 0.2–1.2)
BILIRUB UR-MCNC: NEGATIVE — SIGNIFICANT CHANGE UP
BILIRUB UR-MCNC: NEGATIVE — SIGNIFICANT CHANGE UP
BUN SERPL-MCNC: 79 MG/DL — HIGH (ref 7–23)
BUN SERPL-MCNC: 79 MG/DL — HIGH (ref 7–23)
CALCIUM SERPL-MCNC: 8.3 MG/DL — LOW (ref 8.4–10.5)
CALCIUM SERPL-MCNC: 8.3 MG/DL — LOW (ref 8.4–10.5)
CAST: 6 /LPF — HIGH (ref 0–4)
CAST: 6 /LPF — HIGH (ref 0–4)
CHLORIDE SERPL-SCNC: 110 MMOL/L — HIGH (ref 96–108)
CHLORIDE SERPL-SCNC: 110 MMOL/L — HIGH (ref 96–108)
CO2 SERPL-SCNC: 16 MMOL/L — LOW (ref 22–31)
CO2 SERPL-SCNC: 16 MMOL/L — LOW (ref 22–31)
COARSE GRAN CASTS #/AREA URNS AUTO: PRESENT
COARSE GRAN CASTS #/AREA URNS AUTO: PRESENT
COLOR SPEC: YELLOW — SIGNIFICANT CHANGE UP
COLOR SPEC: YELLOW — SIGNIFICANT CHANGE UP
CREAT SERPL-MCNC: 2.34 MG/DL — HIGH (ref 0.5–1.3)
CREAT SERPL-MCNC: 2.34 MG/DL — HIGH (ref 0.5–1.3)
DACRYOCYTES BLD QL SMEAR: SLIGHT — SIGNIFICANT CHANGE UP
DACRYOCYTES BLD QL SMEAR: SLIGHT — SIGNIFICANT CHANGE UP
DIFF PNL FLD: ABNORMAL
DIFF PNL FLD: ABNORMAL
EGFR: 27 ML/MIN/1.73M2 — LOW
EGFR: 27 ML/MIN/1.73M2 — LOW
EOSINOPHIL # BLD AUTO: 0 K/UL — SIGNIFICANT CHANGE UP (ref 0–0.5)
EOSINOPHIL # BLD AUTO: 0 K/UL — SIGNIFICANT CHANGE UP (ref 0–0.5)
EOSINOPHIL NFR BLD AUTO: 0 % — SIGNIFICANT CHANGE UP (ref 0–6)
EOSINOPHIL NFR BLD AUTO: 0 % — SIGNIFICANT CHANGE UP (ref 0–6)
ERYTHROCYTE [SEDIMENTATION RATE] IN BLOOD: 86 MM/HR — HIGH
ERYTHROCYTE [SEDIMENTATION RATE] IN BLOOD: 86 MM/HR — HIGH
GLUCOSE SERPL-MCNC: 130 MG/DL — HIGH (ref 70–99)
GLUCOSE SERPL-MCNC: 130 MG/DL — HIGH (ref 70–99)
GLUCOSE UR QL: NEGATIVE MG/DL — SIGNIFICANT CHANGE UP
GLUCOSE UR QL: NEGATIVE MG/DL — SIGNIFICANT CHANGE UP
HCG SERPL-ACNC: <0 MIU/ML — SIGNIFICANT CHANGE UP
HCG SERPL-ACNC: <0 MIU/ML — SIGNIFICANT CHANGE UP
HCT VFR BLD CALC: 25.1 % — LOW (ref 34.5–45)
HCT VFR BLD CALC: 25.1 % — LOW (ref 34.5–45)
HGB BLD-MCNC: 8.1 G/DL — LOW (ref 11.5–15.5)
HGB BLD-MCNC: 8.1 G/DL — LOW (ref 11.5–15.5)
HYALINE CASTS # UR AUTO: PRESENT
HYALINE CASTS # UR AUTO: PRESENT
KETONES UR-MCNC: NEGATIVE MG/DL — SIGNIFICANT CHANGE UP
KETONES UR-MCNC: NEGATIVE MG/DL — SIGNIFICANT CHANGE UP
LEUKOCYTE ESTERASE UR-ACNC: ABNORMAL
LEUKOCYTE ESTERASE UR-ACNC: ABNORMAL
LYMPHOCYTES # BLD AUTO: 0.12 K/UL — LOW (ref 1–3.3)
LYMPHOCYTES # BLD AUTO: 0.12 K/UL — LOW (ref 1–3.3)
LYMPHOCYTES # BLD AUTO: 1.8 % — LOW (ref 13–44)
LYMPHOCYTES # BLD AUTO: 1.8 % — LOW (ref 13–44)
MAGNESIUM SERPL-MCNC: 1.4 MG/DL — LOW (ref 1.6–2.6)
MAGNESIUM SERPL-MCNC: 1.4 MG/DL — LOW (ref 1.6–2.6)
MANUAL SMEAR VERIFICATION: SIGNIFICANT CHANGE UP
MANUAL SMEAR VERIFICATION: SIGNIFICANT CHANGE UP
MCHC RBC-ENTMCNC: 29.5 PG — SIGNIFICANT CHANGE UP (ref 27–34)
MCHC RBC-ENTMCNC: 29.5 PG — SIGNIFICANT CHANGE UP (ref 27–34)
MCHC RBC-ENTMCNC: 32.3 GM/DL — SIGNIFICANT CHANGE UP (ref 32–36)
MCHC RBC-ENTMCNC: 32.3 GM/DL — SIGNIFICANT CHANGE UP (ref 32–36)
MCV RBC AUTO: 91.3 FL — SIGNIFICANT CHANGE UP (ref 80–100)
MCV RBC AUTO: 91.3 FL — SIGNIFICANT CHANGE UP (ref 80–100)
MONOCYTES # BLD AUTO: 0.12 K/UL — SIGNIFICANT CHANGE UP (ref 0–0.9)
MONOCYTES # BLD AUTO: 0.12 K/UL — SIGNIFICANT CHANGE UP (ref 0–0.9)
MONOCYTES NFR BLD AUTO: 1.7 % — LOW (ref 2–14)
MONOCYTES NFR BLD AUTO: 1.7 % — LOW (ref 2–14)
NEUTROPHILS # BLD AUTO: 6.61 K/UL — SIGNIFICANT CHANGE UP (ref 1.8–7.4)
NEUTROPHILS # BLD AUTO: 6.61 K/UL — SIGNIFICANT CHANGE UP (ref 1.8–7.4)
NEUTROPHILS NFR BLD AUTO: 96.5 % — HIGH (ref 43–77)
NEUTROPHILS NFR BLD AUTO: 96.5 % — HIGH (ref 43–77)
NITRITE UR-MCNC: NEGATIVE — SIGNIFICANT CHANGE UP
NITRITE UR-MCNC: NEGATIVE — SIGNIFICANT CHANGE UP
OVALOCYTES BLD QL SMEAR: SIGNIFICANT CHANGE UP
OVALOCYTES BLD QL SMEAR: SIGNIFICANT CHANGE UP
PH UR: 5 — SIGNIFICANT CHANGE UP (ref 5–8)
PH UR: 5 — SIGNIFICANT CHANGE UP (ref 5–8)
PHOSPHATE SERPL-MCNC: 4.3 MG/DL — SIGNIFICANT CHANGE UP (ref 2.5–4.5)
PHOSPHATE SERPL-MCNC: 4.3 MG/DL — SIGNIFICANT CHANGE UP (ref 2.5–4.5)
PLAT MORPH BLD: NORMAL — SIGNIFICANT CHANGE UP
PLAT MORPH BLD: NORMAL — SIGNIFICANT CHANGE UP
PLATELET # BLD AUTO: 107 K/UL — LOW (ref 150–400)
PLATELET # BLD AUTO: 107 K/UL — LOW (ref 150–400)
POIKILOCYTOSIS BLD QL AUTO: SIGNIFICANT CHANGE UP
POIKILOCYTOSIS BLD QL AUTO: SIGNIFICANT CHANGE UP
POLYCHROMASIA BLD QL SMEAR: SLIGHT — SIGNIFICANT CHANGE UP
POLYCHROMASIA BLD QL SMEAR: SLIGHT — SIGNIFICANT CHANGE UP
POTASSIUM SERPL-MCNC: 4.7 MMOL/L — SIGNIFICANT CHANGE UP (ref 3.5–5.3)
POTASSIUM SERPL-MCNC: 4.7 MMOL/L — SIGNIFICANT CHANGE UP (ref 3.5–5.3)
POTASSIUM SERPL-SCNC: 4.7 MMOL/L — SIGNIFICANT CHANGE UP (ref 3.5–5.3)
POTASSIUM SERPL-SCNC: 4.7 MMOL/L — SIGNIFICANT CHANGE UP (ref 3.5–5.3)
PROCALCITONIN SERPL-MCNC: 0.2 NG/ML — HIGH (ref 0.02–0.1)
PROCALCITONIN SERPL-MCNC: 0.2 NG/ML — HIGH (ref 0.02–0.1)
PROT SERPL-MCNC: 5.8 G/DL — LOW (ref 6–8.3)
PROT SERPL-MCNC: 5.8 G/DL — LOW (ref 6–8.3)
PROT UR-MCNC: 300 MG/DL
PROT UR-MCNC: 300 MG/DL
RBC # BLD: 2.75 M/UL — LOW (ref 3.8–5.2)
RBC # BLD: 2.75 M/UL — LOW (ref 3.8–5.2)
RBC # FLD: 15.5 % — HIGH (ref 10.3–14.5)
RBC # FLD: 15.5 % — HIGH (ref 10.3–14.5)
RBC BLD AUTO: ABNORMAL
RBC BLD AUTO: ABNORMAL
RBC CASTS # UR COMP ASSIST: 119 /HPF — HIGH (ref 0–4)
RBC CASTS # UR COMP ASSIST: 119 /HPF — HIGH (ref 0–4)
SCHISTOCYTES BLD QL AUTO: SLIGHT — SIGNIFICANT CHANGE UP
SCHISTOCYTES BLD QL AUTO: SLIGHT — SIGNIFICANT CHANGE UP
SODIUM SERPL-SCNC: 137 MMOL/L — SIGNIFICANT CHANGE UP (ref 135–145)
SODIUM SERPL-SCNC: 137 MMOL/L — SIGNIFICANT CHANGE UP (ref 135–145)
SP GR SPEC: 1.02 — SIGNIFICANT CHANGE UP (ref 1–1.03)
SP GR SPEC: 1.02 — SIGNIFICANT CHANGE UP (ref 1–1.03)
SQUAMOUS # UR AUTO: 0 /HPF — SIGNIFICANT CHANGE UP (ref 0–5)
SQUAMOUS # UR AUTO: 0 /HPF — SIGNIFICANT CHANGE UP (ref 0–5)
UROBILINOGEN FLD QL: 0.2 MG/DL — SIGNIFICANT CHANGE UP (ref 0.2–1)
UROBILINOGEN FLD QL: 0.2 MG/DL — SIGNIFICANT CHANGE UP (ref 0.2–1)
WBC # BLD: 6.85 K/UL — SIGNIFICANT CHANGE UP (ref 3.8–10.5)
WBC # BLD: 6.85 K/UL — SIGNIFICANT CHANGE UP (ref 3.8–10.5)
WBC # FLD AUTO: 6.85 K/UL — SIGNIFICANT CHANGE UP (ref 3.8–10.5)
WBC # FLD AUTO: 6.85 K/UL — SIGNIFICANT CHANGE UP (ref 3.8–10.5)
WBC UR QL: 17 /HPF — HIGH (ref 0–5)
WBC UR QL: 17 /HPF — HIGH (ref 0–5)

## 2023-10-25 PROCEDURE — 99233 SBSQ HOSP IP/OBS HIGH 50: CPT | Mod: GC

## 2023-10-25 PROCEDURE — 99223 1ST HOSP IP/OBS HIGH 75: CPT

## 2023-10-25 RX ORDER — FUROSEMIDE 40 MG
80 TABLET ORAL EVERY 24 HOURS
Refills: 0 | Status: DISCONTINUED | OUTPATIENT
Start: 2023-10-25 | End: 2023-10-25

## 2023-10-25 RX ORDER — MAGNESIUM SULFATE 500 MG/ML
1 VIAL (ML) INJECTION ONCE
Refills: 0 | Status: COMPLETED | OUTPATIENT
Start: 2023-10-25 | End: 2023-10-25

## 2023-10-25 RX ORDER — SODIUM BICARBONATE 1 MEQ/ML
650 SYRINGE (ML) INTRAVENOUS THREE TIMES A DAY
Refills: 0 | Status: DISCONTINUED | OUTPATIENT
Start: 2023-10-25 | End: 2023-10-29

## 2023-10-25 RX ADMIN — HEPARIN SODIUM 5000 UNIT(S): 5000 INJECTION INTRAVENOUS; SUBCUTANEOUS at 00:19

## 2023-10-25 RX ADMIN — Medication 30 MILLIGRAM(S): at 05:53

## 2023-10-25 RX ADMIN — CEFTRIAXONE 100 MILLIGRAM(S): 500 INJECTION, POWDER, FOR SOLUTION INTRAMUSCULAR; INTRAVENOUS at 22:30

## 2023-10-25 RX ADMIN — Medication 650 MILLIGRAM(S): at 05:30

## 2023-10-25 RX ADMIN — Medication 250 MILLIGRAM(S): at 05:54

## 2023-10-25 RX ADMIN — Medication 80 MILLIGRAM(S): at 09:58

## 2023-10-25 RX ADMIN — Medication 650 MILLIGRAM(S): at 23:32

## 2023-10-25 RX ADMIN — MYCOPHENOLATE MOFETIL 500 MILLIGRAM(S): 250 CAPSULE ORAL at 05:53

## 2023-10-25 RX ADMIN — Medication 200 MILLIGRAM(S): at 12:12

## 2023-10-25 RX ADMIN — LISINOPRIL 40 MILLIGRAM(S): 2.5 TABLET ORAL at 05:54

## 2023-10-25 RX ADMIN — Medication 100 MILLIGRAM(S): at 12:12

## 2023-10-25 RX ADMIN — PANTOPRAZOLE SODIUM 40 MILLIGRAM(S): 20 TABLET, DELAYED RELEASE ORAL at 06:06

## 2023-10-25 RX ADMIN — Medication 60 MILLIGRAM(S): at 05:53

## 2023-10-25 RX ADMIN — Medication 0.6 MILLIGRAM(S): at 17:18

## 2023-10-25 RX ADMIN — Medication 20 MILLIGRAM(S): at 17:18

## 2023-10-25 RX ADMIN — Medication 0.6 MILLIGRAM(S): at 09:58

## 2023-10-25 RX ADMIN — Medication 100 GRAM(S): at 09:59

## 2023-10-25 RX ADMIN — Medication 650 MILLIGRAM(S): at 22:32

## 2023-10-25 RX ADMIN — Medication 650 MILLIGRAM(S): at 22:30

## 2023-10-25 NOTE — DISCHARGE NOTE PROVIDER - NSDCFUSCHEDAPPT_GEN_ALL_CORE_FT
[facility] Physician [facility]  AMBCHEMO  [address]  Scheduled Appointment: 10/27/2023    Josh Grant  [facility]  HEMONC [address]  Scheduled Appointment: 10/31/2023    Jaun Jerez  [facility] Physician [facility]  NEPHRO [address]  Scheduled Appointment: 11/01/2023     Josh Grant  [facility] Physician Replaced by [facility]  HEMONC [address]  Scheduled Appointment: 10/31/2023    Jaun Jerez  [facility] Physician Replaced by [facility]  NEPHRO [address]  Scheduled Appointment: 11/01/2023

## 2023-10-25 NOTE — PROGRESS NOTE ADULT - PROBLEM SELECTOR PLAN 3
150s systolic BP, hx of HTN on ramipril 5 . i/s/o nephrotic syndrome.     - d/c Nifedipine 30  - c/w lisinopril 20 qd Patient with a 20-year history of lupus nephritis presents with possible cellulitis in the setting of recent Retuximab administration plus daily steroid use (prednisone; cellcept).   - trend creatinine  - consult nephrology  - consult rheumatology   - c/w Cellcept 500mg PO - 2 tablets q12  - c/w Lasix 80mg PO q12  - c/w allopurinol 100mg PO qd  - c/w hydroxychloroquine 200mg PO qd  - c/w metolazone 5mg PO q12  - c/w prednisone 60mg PO qd  - c/w Pantoprazole 40mg PO qd Patient with a 20-year history of lupus nephritis presents with possible cellulitis in the setting of recent Retuximab administration plus daily steroid use (prednisone; cellcept).   - trend creatinine  - consult nephrology  - consult rheumatology   - d/c Cellcept 500mg PO - 2 tablets q12  - c/w Lasix 80mg PO qd  - c/w allopurinol 100mg PO qd  - c/w hydroxychloroquine 200mg PO qd  - d/c metolazone 5mg PO q12  - c/w prednisone 60mg PO qd  - c/w Pantoprazole 40mg PO qd

## 2023-10-25 NOTE — PROGRESS NOTE ADULT - PROBLEM SELECTOR PLAN 2
IMPROVED s/p pulse methylprednisone 500 x3. PLT and WBC slightly decreased after starting MMF. Likely 2/2 Lupus, and/or mycophenolate    - mgmt as above Patient with a 20-year history of lupus, recently diagnosed with biopsy confirmed lupus nephritis presents with diffuse swelling of the left wrist and hand with warmth, pain, and rubor, 2 weeks following a first-time administration of Rituxumab. c/f gout flare with superimposed cellulitis vs septic joint   -c/w Vanc/zosyn, complete sepsis fluids,   Plan as above   - Continue vancomycin 750mg IV qd and ceftriaxone 2g IV qd  - Consult ortho  - Colchicine 0.6mg BID  - Rheumatology consult  - Hand consult  - compartment checks. Patient with a 20-year history of lupus, recently diagnosed with biopsy confirmed lupus nephritis presents with diffuse swelling of the left wrist and hand with warmth, pain, and rubor, 2 weeks following a first-time administration of Rituxumab. c/f gout flare with superimposed cellulitis vs septic joint vs inflammatory indications (SLE,   Plan:  - Continue vancomycin 750mg IV qd and ceftriaxone 2g IV qd  - Consult ortho  - Colchicine 0.6mg BID  - Rheumatology consult  - Hand consult  - compartment checks. Patient with a 20-year history of lupus, recently diagnosed with biopsy confirmed lupus nephritis presents with diffuse swelling of the left wrist and hand with warmth, pain, and rubor, 2 weeks following a first-time administration of Rituxumab. c/f gout flare with superimposed cellulitis vs septic joint vs inflammatory indications (SLE,   Plan:  - Continue vancomycin 750mg IV qd and ceftriaxone 2g IV qd  - Plastic surgery hand specialist consulted and will follow; ortho not following unless specifically requested since plastics is already following  - Colchicine 0.6mg BID  - Rheumatology consult  - compartment checks.

## 2023-10-25 NOTE — PATIENT PROFILE ADULT - NSPROPASSIVESMOKEEXPOSURE_GEN_A_NUR
58F PMH GIB, Bowel and Bladder incontinence, Smoking (3-4 cigarettes/day), ETOH abuse, hypothyroidism, and ongoing anemia, admitted to [facility] 1/31 for ams found to have UTI. Dev GIB, colonoscopy showing polyps, transferred to [facility] for polypectomy upgraded to CCU for hypotension, likely septic shock 2/2 aspiration pna.     #AMS with unclear etiology     - Pt upgraded from floor on 2/20 after RRT for hypotension and desaturation. RIJ CVC placed, started on levo, venti mask, o2 and hypotension subsequently improved   - CTH 2/20: no ICH, mass effect, or midline shift   - EEG 2/20: generalized slowing    - UCx 2/18 negative   - BCx 2/17, 2/19: NGTD  - COVID negative 2/20  - MRSA nares 2/20 negative  - LE duplex 2/21: no DVT however b/l peroneal veins not visualized   - ammonia elevated to 59 on 2/22, started on lactulose, possibly contributing to ams -> 42 (3/1/22)  - s.p aztreonam 2g q8h (stopped 3/4/22)  - s.p clinda 600mg q8h (stopped 3/4/22)    # Malnutrition  - Calorie count x 3 d, started 3/11/22  - f/u hypoglycemia  - Consider PEG tube if failed calorie count  - start standing Magnesium Oxide bid  - F/U Nutrition for suplements/stimulants    #Colonic Polyp  #Diarrhea in setting of recent abx use  #Hx of GIB   #Pneumoretroperitoneum   #Pancolitis   s/p colonoscopy with polypectomy 2/18  ct abd without free air but showed pancolitis  pt now passed for pureed diet with thin liquids  1 to 1 feeds, monitor FS  nutrition and S&S f/u  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 Polyp (13 mm) in the descending colon. (hot snare Polypectomy).  Polyp (25 mm to 30 mm) in the rectosigmoid junction at 20 cm from the anal verge. (Endoloop, Polypectomy, Endoclip).   - Bx -> Adenocarcinoma with lymphovascular invasion  - CTAP PO/IV contrast 2/21: findings c/w pancolitis, no intra or retroperitoneal free air  - CT Chest w/ IV contrast for staging -> small- moderate B/L Pleural effusions; Right IJ DVT  - CT Abdomen Pelvis w/ IV contrast for evaluation of pancolitis -> resolved colitis, increased anasarca, cholelithiasis, T12 chronic fracture, T6 fracture  - Monitor Diarrhea -> 5/24hrs on 3/9-3/10 -> Check C diff. GI PCR, & Horn stain -> -ve    #Cholelithiasis  - Downtrending AST/ALT/ALP, within normal limits  - Asymptomatic  - Monitor for now    #T6/T12 Fracture  - Monitor for pain    #Exfoliative skin lesions- possibly externally induced (e.g scratching), r/o TEN given vesicles and desquamation   #+ve C-ANCA  evaluated by Burn, rheumatology and dermatology -> unlikely vasculitis  on steroid cream  Sent PR4/MPO antibody assay, will repeat c-anca once acute illness resolves.  - pt with superficial scabs diffusely over body, most prominent over chest, as well as areas of desquamation over arms, legs, and hips   - Burn recs appreciated; dx with incontinence dermatitis    - derm recs appreciated, recalled burn for skin bx (done 2/28), f/u path   - rheum recs appreciated, no clear evidence for ANCA associated vasculitis could be elevated due to sepsis. Agree with skin bx. Sent PR4/MPO antibody assay, will rpt c-anca once acute illness resolves.  - arsenic negative   - s/p skin bx by burn 2/28 -> Psoriasiform dermatitis with suppurative scale crust The differential diagnosis includes infection, drug eruption, viral exanthem, irritant contact dermatitis, and psoriasis.  - Parakeratosis with hyperpigmentation, hypergranulosis, focal necrotic keratinocytes, mild perivascular lymphohistiocytic infiltrate, dermal melanophages, focal extravasated erythrocytes and telangiectatic vessels. The differential diagnosis includes a drug eruption and viral exanthem. Resolving Woo-Yash syndrome is less likely. No fungal hyphae seen    #Prolonged qtc  - active  - qtc 613 initially -> 492 (3/10) -> 526 (3/11) -> 542 (3/13) -> 508 (3/14)  - repleting mg PRN, keep Mg>2, K>4.   - on no qtc prolonging medications   - arsenic levels negative   - daily EKG  - continue to monitor     #Normocytic anemia   #Acute thrombocytopenia- resolved   #Elevated INR/PTT not on a/c- resolved   #presumed hemolytic anemia, with coagulopathy, possible DIC - resolved  completed prednisone taper per HemOnc recommendation  daily CBC  - cindy positive, possibly due to transfusion (not documented but likely received in [facility])  - HIV, hep B negative   - monitor INR     #Hypotension - monitoring  unclear etiology, possible septic shock  ct abd with pancolitis - resolved  s/p course of abx  BP now stable  - Changed midodrine 5mg TID to standing 3/12/22  - off levophed since 3/2    #Hypernatremia- improving   - c/w free water - now on pureed diet with thin liquids    #Hypothyroidism - active, improved  - TSH 15.6  - Switch synthroid to  mcg q24   - Patient will need to repeat TSH and FT4 in 6 wks (End of March)  - If hypotensive with bradycardia inc levothyroxine to 125mcg PO q24h (adjust IV dose if taking IV)    #Hx of UTI- resolved   - Suspected repeat UTI -> Repeat UA (3/9) -> stable  - D/C Tapia -> passed ToV    # Leukocytosis - resolved  # Neutrophilia - resolved  - Unasyn 3/9/22-3/10/22  - F/u BCx -> NGTD  - UA -> decreased WBC from prior & same large LE, no nitrites like before  - UCx -ve  - Stool studies -ve    #Right lower face cellulitis- resolved  - CT maxillofacial with C 2/12: Mild soft tissue inflammation suggesting cellulitis of the right lower face  - pt was on cefepime, vancomycin (end date 2/15)  - Started on Unasyn 2/16,/c 2/20 started on broad spectrum Antibiotics  - BCx 2/17 -ve, repeat -ve     #Hx NSTEMI   #Hx of Elevated Troponin  - in setting of hypotension   - Normal Lexiscan at [facility]    #Hx of ETOH abuse  #Possible Thiamine deficiency   - last drink  6 months ago  - c/w Folic acid and Thiamine    #Acute on chronic HFpEF - active  ECHO with preserved EF, grade 2 diastolic dysfunction  - Increased Lasix to 40mg IVP bid, improving CXR, stable CXR today  daily wts, I's and O's  stable on room air                                                                              ----------------------------------------------------  # DVT prophylaxis Lovenox (improved Platelets, Patient, PTT, & Hb)    # GI prophylaxis Protonix    # Diet DASH/TLC Pureed  -> Patient removed NG, attempt oral feeds, calorie count -> <25% of needs    # Activity Score (AM-PAC)    # Code status     # Disposition                                                                              --------------------------------------------------------    # Handoff   - If hypotensive with bradycardia inc levothyroxine to 125mcg q24h   No

## 2023-10-25 NOTE — DISCHARGE NOTE PROVIDER - CARE PROVIDER_API CALL
Zain Schaefer  Plastic Surgery  [address] [place] [address]  Phone: [phone]  Established Patient  Follow Up Time: 1 month

## 2023-10-25 NOTE — PROGRESS NOTE ADULT - ASSESSMENT
33 F PMHx SLE since middle school c/o dyspnea, leg swelling progressively worsening over the last three months, found to have KARENA, pancytopenia needing transfusion, admitted for concern for SLE nephritis. Clinically improving s/p pulse steroid; s/p kidney Bx.   33 F PMHx SLE since middle school, diagnosed with lupus nephritis 1 month ago, and hypertension currently admitted for evaluation of worsening left hand and wrist swelling for one week. Inflammatory vs infectious cause, antibiotics initiated.

## 2023-10-25 NOTE — DISCHARGE NOTE PROVIDER - NSDCCPCAREPLAN_GEN_ALL_CORE_FT
PRINCIPAL DISCHARGE DIAGNOSIS  Diagnosis: Cellulitis of left wrist  Assessment and Plan of Treatment: Cellulitis is a common bacterial skin infection that causes redness, swelling, and pain in the infected area of the skin. If untreated, it can spread and cause serious health problems. Cellulitis treatment includes antibiotics as well as treatment of any underlying condition that led to the skin infection. While you were in the hospital, we gave you antibiotics and kept your arm elevated to reduce swelling and speed up healing. You will be discharged with oral antibiotics to ensure the infection is fully gone.   Please go to the emergency room or call 911 if you experience the following: the red or tender area going numb, the reddened area becoming larger or hardening, a blackened area that feels tender, warm and swollen, the pain gets worse, vomiting or nausea, high fevers or chills        SECONDARY DISCHARGE DIAGNOSES  Diagnosis: Arthritis  Assessment and Plan of Treatment:      PRINCIPAL DISCHARGE DIAGNOSIS  Diagnosis: Cellulitis of left wrist  Assessment and Plan of Treatment: Cellulitis is a common bacterial skin infection that causes redness, swelling, and pain in the infected area of the skin. If untreated, it can spread and cause serious health problems. Cellulitis treatment includes antibiotics as well as treatment of any underlying condition that led to the skin infection. While you were in the hospital, we gave you antibiotics and kept your arm elevated to reduce swelling and speed up healing. You will be discharged with oral antibiotics to ensure the infection is fully gone.   Please go to the emergency room or call 911 if you experience the following: the red or tender area going numb, the reddened area becoming larger or hardening, a blackened area that feels tender, warm and swollen, the pain gets worse, vomiting or nausea, high fevers or chills        SECONDARY DISCHARGE DIAGNOSES  Diagnosis: Lupus nephritis  Assessment and Plan of Treatment:      PRINCIPAL DISCHARGE DIAGNOSIS  Diagnosis: Cellulitis of left wrist  Assessment and Plan of Treatment: Cellulitis is a common bacterial skin infection that causes redness, swelling, and pain in the infected area of the skin. If untreated, it can spread and cause serious health problems. Cellulitis treatment includes antibiotics as well as treatment of any underlying condition that led to the skin infection. While you were in the hospital, we gave you antibiotics and kept your arm elevated to reduce swelling and speed up healing. You will be discharged with oral antibiotics to ensure the infection is fully gone. The antibiotics are doxycycline and cefpodoxime, both of which you should take every 12 hours for 10 more days.   You were also seen by hand surgeon Dr. Schaefer, who you should schedule a follow up with as well.   Please go to the emergency room or call 911 if you experience the following: the red or tender area going numb, the reddened area becoming larger or hardening, a blackened area that feels tender, warm and swollen, the pain gets worse, vomiting or nausea, high fevers or chills        SECONDARY DISCHARGE DIAGNOSES  Diagnosis: Lupus nephritis  Assessment and Plan of Treatment: You have a known diagnosis of lupus nephritis in which you follow with rheumatology and nephrology. Nephrology saw you during your hospital stay. Please follow up with your outpatient rheumatologist and nephrologist. As per Nephrology reccomendations, please __     PRINCIPAL DISCHARGE DIAGNOSIS  Diagnosis: Cellulitis of left wrist  Assessment and Plan of Treatment: Cellulitis is a common bacterial skin infection that causes redness, swelling, and pain in the infected area of the skin. If untreated, it can spread and cause serious health problems. Cellulitis treatment includes antibiotics as well as treatment of any underlying condition that led to the skin infection. While you were in the hospital, we gave you antibiotics and kept your arm elevated to reduce swelling and speed up healing. You will be discharged with oral antibiotics to ensure the infection is fully gone. The antibiotics are doxycycline and cefpodoxime, both of which you should take every 12 hours for 10 more days.   You were also seen by hand surgeon Dr. Schaefer, who you should schedule a follow up with as well.   Please go to the emergency room or call 911 if you experience the following: the red or tender area going numb, the reddened area becoming larger or hardening, a blackened area that feels tender, warm and swollen, the pain gets worse, vomiting or nausea, high fevers or chills        SECONDARY DISCHARGE DIAGNOSES  Diagnosis: Lupus nephritis  Assessment and Plan of Treatment: You have a known diagnosis of lupus nephritis in which you follow with rheumatology and nephrology. Nephrology saw you during your hospital stay. Please follow up with your outpatient rheumatologist and nephrologist. As per Nephrology reccomendations, please keep holding MMF and torsemide. Pt has appt with nephrology Dr. Jerez on 11/1 so can follow up there and MMF and diuretic can be managed then. Ensure pt has BMP before her office visit on 11/1

## 2023-10-25 NOTE — PROGRESS NOTE ADULT - PROBLEM SELECTOR PLAN 1
Hx of SLE. Collateral from outpt Rheumatologist as per chart note. Elevated Cr is likely 2/2 progression of Lupus Nephritis. Renal US is consistent with chronic medical renal disease.   s/p pulse steroids methylprednisolone 500mg x3.   s/p Lasix with no allergic reactions and s/e. s/p Kidney Bx 9/18.    Rheum, heme nephro following.   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  C3 32, C4 3, DS DNA ab >1000  Currently on Lasix 80mg IV BID, MMF 500mg BID, Prednisone 60mg PO qd, sevelamer 800mg TID with meals, Sodium bicarb 650mg TID      - will continue to hold Hydroxychloroquine (home med  Prednisone, Mycophenolate, Hydroxychloroquine)    Per Renal:  - c/w metalozone 5 mg qd  - c/w lisinopril 20 mg qd (equivalent to home med ramipril 5)   - AC for nephrotic syndrome Fever 103 overnight w/ tachycardia. c/f L wrist  gout flare with superimposed cellulitis vs septic joint   -c/w Vanc/zosyn, complete sepsis fluids,   -f/up blood cx, hand xray,   - ortho consulted for possible arthrocentesis  - serial exams. Fever 103 overnight w/ tachycardia. c/f L wrist  gout flare with superimposed cellulitis vs septic joint   -continue Vanc/zosyn, complete sepsis fluids,   -f/up blood cx, hand xray,   - ortho consulted for possible arthrocentesis  - serial exams. Fever 103 overnight w/ tachycardia. c/f L wrist  gout flare with superimposed cellulitis vs septic joint   -continue Vanc/zosyn, complete sepsis fluids,   -f/up blood cx, hand xray,   - Plastic surgery hand specialty consulted- will follow  - Outpatient rheumatologist contacted; gave collateral on 10/25 - one week prior attempted a dorsal approach to aspirate, but was unsuccessful in obtaining any fluid.  - serial exams.

## 2023-10-25 NOTE — PROGRESS NOTE ADULT - PROBLEM SELECTOR PLAN 4
Patient complained of significant BL lower extremity edema up to the abdomen. Patient reports that since increased furosemide dose edema has improved.  See Lupus nephritis for management. Patient with a history of hypertension likely 2/2 to lupus nephritis  Plan:   - c/w Lisinopril 40mg PO qd  - c/w Nifedipine ER 30mg PO qd.

## 2023-10-25 NOTE — CONSULT NOTE ADULT - ATTENDING COMMENTS
SLE 4/5 -- fxn realtively stable now  edema and HTN much improved -- can lower diuretics  hold MMF with likely infection left hand/wrist  abx and ortho f/u

## 2023-10-25 NOTE — PATIENT PROFILE ADULT - FALL HARM RISK - HARM RISK INTERVENTIONS
Assistance with ambulation/Assistance OOB with selected safe patient handling equipment/Communicate Risk of Fall with Harm to all staff/Discuss with provider need for PT consult/Monitor gait and stability/Reinforce activity limits and safety measures with patient and family/Tailored Fall Risk Interventions/Visual Cue: Yellow wristband and red socks/Bed in lowest position, wheels locked, appropriate side rails in place/Call bell, personal items and telephone in reach/Instruct patient to call for assistance before getting out of bed or chair/Non-slip footwear when patient is out of bed/[place] to call system/Physically safe environment - no spills, clutter or unnecessary equipment/Purposeful Proactive Rounding/Room/bathroom lighting operational, light cord in reach

## 2023-10-25 NOTE — CONSULT NOTE ADULT - ASSESSMENT
34 y/o F with a PMH of Lupus Nephritis (Class IV-V), HTN, now admitted with left wrist pain, swelling and fever, suspicious for ?Septic Arthritis/OM/Cellulitis. Nephrology consulted for Lupus Nephritis management/recs.     Scr baseline: 1.5-2.0  Scr now ~2.3, close to baseline (iso sepsis)  UPCR: 3.5 (improved from 7.6)  BUN: 70s (iso steroid use)    Imp: Class IV/V Lupus Nephritis.     Plan:  Stop Mycophenolate for now.   Cont. home dose Prednisone/RAASi.   Pte received Rituximab OP, now developing Sepsis. Please send IgG/IgA/IgM levels, will f/u op.  Switch Lasix to Torsemide 20 daily.  Titrate allopurinol (as possible) to the minimum need it dose, in order to achieve desire U. acid levels.   Patient my benefit from SGLT2, will f/u OP.   Avoid sudden BP drop.   Treatment of sepsis as per primary team, renally dosed Vancomycin.   Sodium bicarb 650mg TID   Daily labs  34 y/o F with a PMH of Lupus Nephritis (Class IV-V), HTN, now admitted with left wrist pain, swelling and fever, suspicious for ?Septic Arthritis/OM/Cellulitis. Nephrology consulted for Lupus Nephritis management/recs.     Scr baseline: 1.5-2.0  Scr now ~2.3, close to baseline (iso sepsis)  UPCR: 3.5 (improved from 7.6)  BUN: 70s (iso steroid use)  XR and US reviwed    Imp: Class IV/V Lupus Nephritis.     Plan:  Stop Mycophenolate for now.   Cont. home dose Prednisone/RAASi.   Pte received Rituximab OP, now developing Sepsis. Please send IgG/IgA/IgM levels, will f/u op.  Switch Lasix to Torsemide 20 daily.  Titrate allopurinol (as possible) to the minimum need it dose, in order to achieve desire U. acid levels.   Patient my benefit from SGLT2, will f/u OP.   Avoid sudden BP drop.   Treatment of sepsis as per primary team, renally dosed Vancomycin.   Sodium bicarb 650mg TID   Daily labs

## 2023-10-25 NOTE — PROGRESS NOTE ADULT - PROBLEM SELECTOR PLAN 5
Stable. s/p 2u pRBC. Likely 2/2 Lupus, and/or mycophenolate. Iron study correlates with anemia of chronic disease. CBC shows schistocytes and with lab results 9/16 indicative of hemolysis. Jasmine negative. Asymptomatic    - will maintain 2 large bore IV  - TS if Hgb <7 F: None   E: Replete as necessary K>4 Mg>2  N: Reg diet   DVT Prophylaxis: lovenox starting 9/19   GI prophylaxis: None   CODE STATUS: FULL F: None   E: Replete as necessary K>4 Mg>2  N: Reg diet   DVT Prophylaxis: heparin 5000 TID  GI prophylaxis: None   CODE STATUS: FULL

## 2023-10-25 NOTE — DISCHARGE NOTE PROVIDER - HOSPITAL COURSE
33 year old female PMH SLE (diagnosed 20+ years ago), lupus nephritis (diagnosed 1 month ago via kidney biopsy), and HTN admitted due to left hand and wrist swelling and pain for 1 week    Problem List:    #Septic joint vs superimposed cellulitis vs inflammatory flare   Patient has a 20+ year history of SLE. She began to have worsening left hand and wrist swelling and pain starting 10/___. Patient saw her rheumatologist, prescribed colchicine due to elevated uric acid with no improvement. She presented to the ED on 10/24 due to continued worsening of swelling and pain. In the ED, she became febrile and hypertensive which persisted overnight with a Tmax of 103 F. Of note, she received first dose of rituxumab 2 weeks prior. Admitted for further workup and managment. Started on vancomycin 750 mg & ceftriaxone 2000 mg on 10/24.       #SLE/Lupus nephritis   History of lupus 20+ years, diagnosed with lupus nephritis via kidney biopsy 1 month ago. Outpatient medical therapy includes 33 year old female PMH SLE (diagnosed 20+ years ago), lupus nephritis (diagnosed 1 month ago via kidney biopsy), and HTN admitted due to left hand and wrist swelling and pain for 1 week    Problem List:    #Septic joint vs superimposed cellulitis vs inflammatory flare   Patient has a 20+ year history of SLE. She began to have worsening left hand and wrist swelling and pain starting 10/___. Patient saw her rheumatologist, prescribed colchicine due to elevated uric acid with no improvement. She presented to the ED on 10/24 due to continued worsening of swelling and pain. In the ED, she became febrile and hypertensive which persisted overnight with a Tmax of 103 F. Of note, she received first dose of rituxumab 2 weeks prior. Admitted for further workup and managment. Started on vancomycin 750 mg & ceftriaxone 2000 mg on 10/24.       #SLE/Lupus nephritis   History of lupus 20+ years, diagnosed with lupus nephritis via kidney biopsy 1 month ago. Mycophenolate mofetil held while inpatient in the setting of potential infectious etiology of hand swelling/pain. #Discharge: do not delete    33 year old female with a PMH of SLE (diagnosed 20+ years ago), lupus nephritis (diagnosed 1 month ago via kidney biopsy), and HTN admitted due to left hand and wrist swelling and pain for 1 week.    Hospital course (by problem):   #Septic joint vs superimposed cellulitis vs inflammatory flare   Patient has a 20+ year history of SLE. She began to have worsening left hand and wrist swelling and pain starting 10/___. Patient saw her rheumatologist, prescribed colchicine due to elevated uric acid with no improvement. She presented to the ED on 10/24 due to continued worsening of swelling and pain. In the ED, she became febrile and hypertensive which persisted overnight with a Tmax of 103 F. Of note, she received first dose of rituxumab 2 weeks prior. Admitted for further workup and managment. Started on vancomycin 750 mg & ceftriaxone 2000 mg on 10/24.     #SLE/Lupus nephritis   History of lupus 20+ years, diagnosed with lupus nephritis via kidney biopsy 1 month ago. Mycophenolate mofetil held while inpatient in the setting of potential infectious etiology of hand swelling/pain.     Patient was discharged to: Home    New medications:   Changes to old medications:   Medications that were stopped:    Items to follow up as outpatient:    Physical exam at the time of discharge:         #Discharge: do not delete    33 year old female with a PMH of SLE (diagnosed 20+ years ago), lupus nephritis (diagnosed 1 month ago via kidney biopsy), and HTN admitted due to left hand and wrist swelling and pain for 1 week.    Hospital course (by problem):   #superimposed cellulitis   Patient has a 20+ year history of SLE. She began to have worsening left hand and wrist swelling and pain starting 10/___. Patient saw her rheumatologist, prescribed colchicine due to elevated uric acid with no improvement. She presented to the ED on 10/24 due to continued worsening of swelling and pain. In the ED, she became febrile and hypertensive which persisted overnight with a Tmax of 103 F. Of note, she received first dose of rituxumab 2 weeks prior. Admitted for further workup and managment. Started on vancomycin 750 mg & ceftriaxone 2000 mg on 10/24.   Plan  - Patient to take Doxycycline and Cefpodoxime until 11/8  - please follow up with Dr. Schaefer and outpatient rheumatologist.     #SLE/Lupus nephritis   History of lupus 20+ years, diagnosed with lupus nephritis via kidney biopsy 1 month ago. Mycophenolate mofetil held while inpatient in the setting of potential infectious etiology of hand swelling/pain.   - please follow up with outpatient Nephrologist     Patient was discharged to: Home    New medications: Doxycycline, Cefpodoxime   Changes to old medications:   Medications that were stopped:    Items to follow up as outpatient:  Dr. Schaefer (hand surgeon), nephrologist, rheumatologist     Physical exam at the time of discharge:  Constitutional: NAD, comfortable in bed.  HEENT: NC/AT, PERRLA, EOMI, no conjunctival pallor or scleral icterus, MMM  Neck: Supple, no JVD  Respiratory: CTA B/L. No w/r/r.   Cardiovascular: RRR, normal S1 and S2, no m/r/g.   Gastrointestinal: +BS, soft NTND, no guarding or rebound tenderness, no palpable masses   Extremities WWP; no clubbing or cyanosis; left arm swelling to elbow (reduced from prior, most concentrated on dorsum of hand) w minimal erythema  Vascular: Pulses equal and strong throughout.   Neurological: AAOx3, no CN deficits, strength and sensation intact throughout.            #Discharge: do not delete    33 year old female with a PMH of SLE (diagnosed 20+ years ago), lupus nephritis (diagnosed 1 month ago via kidney biopsy), and HTN admitted due to left hand and wrist swelling and pain for 1 week.    Hospital course (by problem):   #superimposed cellulitis   Patient has a 20+ year history of SLE. She began to have worsening left hand and wrist swelling and pain. Patient saw her rheumatologist, prescribed colchicine due to elevated uric acid with no improvement. She presented to the ED on 10/24 due to continued worsening of swelling and pain. In the ED, she became febrile and hypertensive which persisted overnight with a Tmax of 103 F. Of note, she received first dose of rituxumab 2 weeks prior. Admitted for further workup and management. Started on vancomycin 750 mg & ceftriaxone 2000 mg on 10/24.   Plan  - Patient to take Doxycycline and Cefpodoxime until 11/8  - please follow up with Dr. Schaefer and outpatient rheumatologist.     #SLE/Lupus nephritis   History of lupus 20+ years, diagnosed with lupus nephritis via kidney biopsy 1 month ago. Mycophenolate mofetil held while inpatient in the setting of potential infectious etiology of hand swelling/pain.   - please follow up with outpatient Nephrologist   - As per nephrology: keep holding MMF and torsemide. Pt has appt with nephrology Dr. Jerez on 11/1 so can follow up there and MMF and diuretic can be managed then.     Patient was discharged to: Home    New medications: Doxycycline, Cefpodoxime   Changes to old medications:   Medications that were stopped: mycophenolate, torsemide    Items to follow up as outpatient:  Dr. cShaefer (hand surgeon), nephrologist, rheumatologist     Physical exam at the time of discharge:  Constitutional: NAD, comfortable in bed.  HEENT: NC/AT, PERRLA, EOMI, no conjunctival pallor or scleral icterus, MMM  Neck: Supple, no JVD  Respiratory: CTA B/L. No w/r/r.   Cardiovascular: RRR, normal S1 and S2, no m/r/g.   Gastrointestinal: +BS, soft NTND, no guarding or rebound tenderness, no palpable masses   Extremities WWP; no clubbing or cyanosis; left arm swelling to elbow (reduced from prior, most concentrated on dorsum of hand) w minimal erythema  Vascular: Pulses equal and strong throughout.   Neurological: AAOx3, no CN deficits, strength and sensation intact throughout.

## 2023-10-25 NOTE — CONSULT NOTE ADULT - SUBJECTIVE AND OBJECTIVE BOX
The patient is a 32 y/o female with a PMHx of lupus nephritis and hypertension who presets with a chief complaint of left wrist pain and swelling of 1 weeks' duration.   I have seen the patient in the emergency room on 10/24/2025 and the consult has been requested by the emergency room attending.  The recent medical history is that patient was recently admitted with a similar presentation earlier this monthwith bid joints pain. The patient has never had this occurrance in a single joint before. She denied any recent travel or injury to the affected area. The patient endorses receiving a Rituximab dose for the first time two weeks ago, but otherwise denies any new medications or changes to current medications.  She saw her rheumatologist Dr. Beltran King, and he injected with steroid into the left wrist and did arthrocentesis, removed some fluid from the hand (unclear if sent for culture), and he also prescribed her colchicine. She initially felt better, but began feeling worse again, and her provider sent her to the ED. T    The patient endorses diarrhea 3-5x daily, attributed to Cellcept therapy. She also endorses increased frequency of urination, but no dysuria or hematuria. The patient was diagnosed with lupus nephritis 20+ years ago, but has no family history of the disease. She follows Dr. Beltran King, Rheumatology and Dr. Jerez, Nephrology as an outpatient. The patient has not taken medications today and the last time she ate was 10am.    EXAM  the examination of the left wrist is slightly improved since yesterday when I have seen the patient in the emergency room, yestrday the wrist was more red and a little more painful  sensation intact  range of motion limited by swelling  pain upon compression on axial load  LABS reviewed and show normal WBC, left shift with elevated neutrophils (chronoc in nature), CRP 3, elevated uric acid  New findings and fever spikes and elevated glucose    PLAN  I would givea  try with IV antibiotics and treat underlying lupus and arthropathy such as elevate uric acid  I will keep monitoring her closely and paln arthrocentesis if clinical scenario gets worse

## 2023-10-25 NOTE — DISCHARGE NOTE PROVIDER - NSDCMRMEDTOKEN_GEN_ALL_CORE_FT
allopurinol 100 mg oral tablet: orally once a day  furosemide 80 mg oral tablet: 1 tab(s) orally every 12 hours  hydroxychloroquine 100 mg oral tablet: 1 tab(s) orally once a day  lisinopril 40 mg oral tablet: 1 tab(s) orally once a day  metOLazone 5 mg oral tablet: 1 tab(s) orally every 12 hours  mycophenolate mofetil 500 mg oral tablet: 2 tab(s) orally every 12 hours  Nifedical XL 30 mg oral tablet, extended release: 1 tab(s) orally once a day  pantoprazole 40 mg oral delayed release tablet: 1 tab(s) orally once a day  predniSONE 20 mg oral tablet: 3 tab(s) orally once a day   allopurinol 100 mg oral tablet: orally once a day  doxycycline monohydrate 100 mg oral tablet: 1 tab(s) orally 2 times a day  furosemide 80 mg oral tablet: 1 tab(s) orally every 12 hours  hydroxychloroquine 100 mg oral tablet: 1 tab(s) orally once a day  lisinopril 40 mg oral tablet: 1 tab(s) orally once a day  metOLazone 5 mg oral tablet: 1 tab(s) orally every 12 hours  mycophenolate mofetil 500 mg oral tablet: 2 tab(s) orally every 12 hours  Nifedical XL 30 mg oral tablet, extended release: 1 tab(s) orally once a day  pantoprazole 40 mg oral delayed release tablet: 1 tab(s) orally once a day  predniSONE 20 mg oral tablet: 3 tab(s) orally once a day   allopurinol 100 mg oral tablet: orally once a day  doxycycline hyclate 100 mg oral tablet: 1 tab(s) orally 2 times a day  furosemide 80 mg oral tablet: 1 tab(s) orally every 12 hours  hydroxychloroquine 100 mg oral tablet: 1 tab(s) orally once a day  lisinopril 40 mg oral tablet: 1 tab(s) orally once a day  metOLazone 5 mg oral tablet: 1 tab(s) orally every 12 hours  mycophenolate mofetil 500 mg oral tablet: 2 tab(s) orally every 12 hours  Nifedical XL 30 mg oral tablet, extended release: 1 tab(s) orally once a day  pantoprazole 40 mg oral delayed release tablet: 1 tab(s) orally once a day  predniSONE 20 mg oral tablet: 3 tab(s) orally once a day   allopurinol 100 mg oral tablet: orally once a day  doxycycline hyclate 100 mg oral tablet: 1 tab(s) orally 2 times a day  hydroxychloroquine 100 mg oral tablet: 1 tab(s) orally once a day  lisinopril 40 mg oral tablet: 1 tab(s) orally once a day  metOLazone 5 mg oral tablet: 1 tab(s) orally every 12 hours  mycophenolate mofetil 500 mg oral tablet: 2 tab(s) orally every 12 hours  Nifedical XL 30 mg oral tablet, extended release: 1 tab(s) orally once a day  pantoprazole 40 mg oral delayed release tablet: 1 tab(s) orally once a day  predniSONE 20 mg oral tablet: 3 tab(s) orally once a day   allopurinol 100 mg oral tablet: orally once a day  cefpodoxime 100 mg oral tablet: 1 tab(s) orally 2 times a day  doxycycline hyclate 100 mg oral tablet: 1 tab(s) orally 2 times a day  doxycycline hyclate 100 mg oral tablet: 1 tab(s) orally every 12 hours  hydroxychloroquine 100 mg oral tablet: 1 tab(s) orally once a day  lisinopril 40 mg oral tablet: 1 tab(s) orally once a day  metOLazone 5 mg oral tablet: 1 tab(s) orally every 12 hours  mycophenolate mofetil 500 mg oral tablet: 2 tab(s) orally every 12 hours  Nifedical XL 30 mg oral tablet, extended release: 1 tab(s) orally once a day  pantoprazole 40 mg oral delayed release tablet: 1 tab(s) orally once a day  predniSONE 20 mg oral tablet: 3 tab(s) orally once a day   allopurinol 100 mg oral tablet: orally once a day  cefpodoxime 100 mg oral tablet: 1 tab(s) orally 2 times a day  doxycycline hyclate 100 mg oral tablet: 1 tab(s) orally 2 times a day  doxycycline hyclate 100 mg oral tablet: 1 tab(s) orally every 12 hours  hydroxychloroquine 100 mg oral tablet: 1 tab(s) orally once a day  lisinopril 40 mg oral tablet: 1 tab(s) orally once a day  metOLazone 5 mg oral tablet: 1 tab(s) orally every 12 hours  Nifedical XL 30 mg oral tablet, extended release: 1 tab(s) orally once a day  pantoprazole 40 mg oral delayed release tablet: 1 tab(s) orally once a day  predniSONE 20 mg oral tablet: 3 tab(s) orally once a day   allopurinol 100 mg oral tablet: orally once a day  cefpodoxime 200 mg oral tablet: 2 tab(s) orally 2 times a day  doxycycline hyclate 100 mg oral tablet: 1 tab(s) orally 2 times a day  doxycycline hyclate 100 mg oral tablet: 1 tab(s) orally every 12 hours  hydroxychloroquine 100 mg oral tablet: 1 tab(s) orally once a day  lisinopril 40 mg oral tablet: 1 tab(s) orally once a day  metOLazone 5 mg oral tablet: 1 tab(s) orally every 12 hours  Nifedical XL 30 mg oral tablet, extended release: 1 tab(s) orally once a day  pantoprazole 40 mg oral delayed release tablet: 1 tab(s) orally once a day  predniSONE 20 mg oral tablet: 3 tab(s) orally once a day

## 2023-10-25 NOTE — PROGRESS NOTE ADULT - SUBJECTIVE AND OBJECTIVE BOX
OVERNIGHT EVENTS:    SUBJECTIVE / INTERVAL HPI: Patient seen and examined at bedside.     ROS: negative unless otherwise stated above.    VITAL SIGNS:  Vital Signs Last 24 Hrs  T(C): 37.8 (25 Oct 2023 06:54), Max: 39.4 (24 Oct 2023 21:15)  T(F): 100.1 (25 Oct 2023 06:54), Max: 103 (24 Oct 2023 21:15)  HR: 112 (25 Oct 2023 06:54) (76 - 126)  BP: 133/85 (25 Oct 2023 06:54) (133/85 - 174/99)  BP(mean): --  RR: 17 (25 Oct 2023 06:54) (16 - 19)  SpO2: 98% (25 Oct 2023 06:54) (98% - 100%)    Parameters below as of 25 Oct 2023 06:54  Patient On (Oxygen Delivery Method): room air        PHYSICAL EXAM:    General: In no apparent distress  HEENT: NC/AT; PERRL, anicteric sclera; MMM  Neck: supple  Cardiovascular: +S1/S2; RRR  Respiratory: CTA B/L; no W/R/R  Gastrointestinal: soft, NT/ND; +BSx4  Extremities: WWP; no edema, clubbing or cyanosis  Vascular: 2+ radial, DP/PT pulses B/L  Neurological: AAOx3; no focal deficits    MEDICATIONS:  MEDICATIONS  (STANDING):  allopurinol 100 milliGRAM(s) Oral daily  cefTRIAXone   IVPB 2000 milliGRAM(s) IV Intermittent every 24 hours  colchicine 0.6 milliGRAM(s) Oral two times a day  furosemide    Tablet 80 milliGRAM(s) Oral every 12 hours  heparin   Injectable 5000 Unit(s) SubCutaneous every 12 hours  hydroxychloroquine 200 milliGRAM(s) Oral every 24 hours  lisinopril 40 milliGRAM(s) Oral daily  magnesium sulfate  IVPB 1 Gram(s) IV Intermittent once  metolazone 5 milliGRAM(s) Oral <User Schedule>  mycophenolate mofetil 500 milliGRAM(s) Oral two times a day  NIFEdipine XL 30 milliGRAM(s) Oral daily  pantoprazole    Tablet 40 milliGRAM(s) Oral before breakfast  predniSONE   Tablet 60 milliGRAM(s) Oral daily  vancomycin  IVPB 750 milliGRAM(s) IV Intermittent every 24 hours    MEDICATIONS  (PRN):  acetaminophen     Tablet .. 650 milliGRAM(s) Oral every 6 hours PRN Temp greater or equal to 38C (100.4F), Mild Pain (1 - 3)  ondansetron Injectable 4 milliGRAM(s) IV Push every 8 hours PRN Nausea and/or Vomiting      ALLERGIES:  Allergies    sulfa drugs (Rash)    Intolerances        LABS:                        8.1    6.85  )-----------( 107      ( 25 Oct 2023 05:30 )             25.1     10-25    137  |  110<H>  |  79<H>  ----------------------------<  130<H>  4.7   |  16<L>  |  2.34<H>    Ca    8.3<L>      25 Oct 2023 05:30  Phos  4.3     10-25  Mg     1.4     10-25    TPro  5.8<L>  /  Alb  2.7<L>  /  TBili  <0.2  /  DBili  <0.2  /  AST  15  /  ALT  12  /  AlkPhos  49  10-24      Urinalysis Basic - ( 25 Oct 2023 05:30 )    Color: x / Appearance: x / SG: x / pH: x  Gluc: 130 mg/dL / Ketone: x  / Bili: x / Urobili: x   Blood: x / Protein: x / Nitrite: x   Leuk Esterase: x / RBC: x / WBC x   Sq Epi: x / Non Sq Epi: x / Bacteria: x      CAPILLARY BLOOD GLUCOSE          RADIOLOGY & ADDITIONAL TESTS: Reviewed. OVERNIGHT EVENTS: Febrile and tachycardic from ~9 pm last night. Afebrile at 06:54 (100.1 F). Tachycardia ongoing.     SUBJECTIVE / INTERVAL HPI: Patient seen and examined at bedside. Left hand and wrist is wrapped in ace bandage and resting on a pillow. Hand pain rated 3/10 and tolerable, burning in character. Previous sharp pain has resolved with pain medication regimen.     ROS: negative unless otherwise stated above.    VITAL SIGNS:  Vital Signs Last 24 Hrs  T(C): 37.8 (25 Oct 2023 06:54), Max: 39.4 (24 Oct 2023 21:15)  T(F): 100.1 (25 Oct 2023 06:54), Max: 103 (24 Oct 2023 21:15)  HR: 112 (25 Oct 2023 06:54) (76 - 126)  BP: 133/85 (25 Oct 2023 06:54) (133/85 - 174/99)  BP(mean): --  RR: 17 (25 Oct 2023 06:54) (16 - 19)  SpO2: 98% (25 Oct 2023 06:54) (98% - 100%)    Parameters below as of 25 Oct 2023 06:54  Patient On (Oxygen Delivery Method): room air    PHYSICAL EXAM:    General: In no apparent distress  HEENT: NC/AT; PERRL, anicteric sclera; MMM  Neck: supple  Cardiovascular: +S1/S2; RRR  Respiratory: CTA B/L; no W/R/R  Gastrointestinal: soft, NT/ND; +BSx4  Extremities: WWP; no edema, clubbing or cyanosis. Swelling and erythema in digits and wrist   Vascular: 2+ radial, DP/PT pulses B/L  Neurological: AAOx3; no focal deficits    MEDICATIONS:  MEDICATIONS  (STANDING):  allopurinol 100 milliGRAM(s) Oral daily  cefTRIAXone   IVPB 2000 milliGRAM(s) IV Intermittent every 24 hours  colchicine 0.6 milliGRAM(s) Oral two times a day  furosemide    Tablet 80 milliGRAM(s) Oral every 12 hours  heparin   Injectable 5000 Unit(s) SubCutaneous every 12 hours  hydroxychloroquine 200 milliGRAM(s) Oral every 24 hours  lisinopril 40 milliGRAM(s) Oral daily  magnesium sulfate  IVPB 1 Gram(s) IV Intermittent once  metolazone 5 milliGRAM(s) Oral <User Schedule>  mycophenolate mofetil 500 milliGRAM(s) Oral two times a day  NIFEdipine XL 30 milliGRAM(s) Oral daily  pantoprazole    Tablet 40 milliGRAM(s) Oral before breakfast  predniSONE   Tablet 60 milliGRAM(s) Oral daily  vancomycin  IVPB 750 milliGRAM(s) IV Intermittent every 24 hours    MEDICATIONS  (PRN):  acetaminophen     Tablet .. 650 milliGRAM(s) Oral every 6 hours PRN Temp greater or equal to 38C (100.4F), Mild Pain (1 - 3)  ondansetron Injectable 4 milliGRAM(s) IV Push every 8 hours PRN Nausea and/or Vomiting      ALLERGIES:  Allergies    sulfa drugs (Rash)    Intolerances        LABS:                        8.1    6.85  )-----------( 107      ( 25 Oct 2023 05:30 )             25.1     10-25    137  |  110<H>  |  79<H>  ----------------------------<  130<H>  4.7   |  16<L>  |  2.34<H>    Ca    8.3<L>      25 Oct 2023 05:30  Phos  4.3     10-25  Mg     1.4     10-25    TPro  5.8<L>  /  Alb  2.7<L>  /  TBili  <0.2  /  DBili  <0.2  /  AST  15  /  ALT  12  /  AlkPhos  49  10-24      Urinalysis Basic - ( 25 Oct 2023 05:30 )    Color: x / Appearance: x / SG: x / pH: x  Gluc: 130 mg/dL / Ketone: x  / Bili: x / Urobili: x   Blood: x / Protein: x / Nitrite: x   Leuk Esterase: x / RBC: x / WBC x   Sq Epi: x / Non Sq Epi: x / Bacteria: x      CAPILLARY BLOOD GLUCOSE          RADIOLOGY & ADDITIONAL TESTS: Reviewed. OVERNIGHT EVENTS: Patient developed a new TMax o/n to 103F. She was also tachycardic to 126. Infectious workup completed including blood cx, urine cx. and EKG. IV antibiotics started. Fever resolved to 99.2 with administration of tylenol, and tachycardia was reduced to 112.    SUBJECTIVE / INTERVAL HPI: Patient seen and examined at bedside. Left hand and wrist is wrapped in ace bandage and resting on a pillow. Hand pain rated 3/10 and tolerable, burning in character. Previous sharp pain has resolved with pain medication regimen.     ROS: negative unless otherwise stated above.    VITAL SIGNS:  Vital Signs Last 24 Hrs  T(C): 37.8 (25 Oct 2023 06:54), Max: 39.4 (24 Oct 2023 21:15)  T(F): 100.1 (25 Oct 2023 06:54), Max: 103 (24 Oct 2023 21:15)  HR: 112 (25 Oct 2023 06:54) (76 - 126)  BP: 133/85 (25 Oct 2023 06:54) (133/85 - 174/99)  BP(mean): --  RR: 17 (25 Oct 2023 06:54) (16 - 19)  SpO2: 98% (25 Oct 2023 06:54) (98% - 100%)    Parameters below as of 25 Oct 2023 06:54  Patient On (Oxygen Delivery Method): room air    PHYSICAL EXAM:    General: In no apparent distress  HEENT: NC/AT; PERRL, anicteric sclera; MMM  Neck: supple  Cardiovascular: +S1/S2; RRR  Respiratory: CTA B/L; no W/R/R  Gastrointestinal: soft, NT/ND; +BSx4  Extremities: WWP; no edema, clubbing or cyanosis. Swelling and erythema in digits and wrist   Vascular: 2+ radial, DP/PT pulses B/L  Neurological: AAOx3; no focal deficits    MEDICATIONS:  MEDICATIONS  (STANDING):  allopurinol 100 milliGRAM(s) Oral daily  cefTRIAXone   IVPB 2000 milliGRAM(s) IV Intermittent every 24 hours  colchicine 0.6 milliGRAM(s) Oral two times a day  furosemide    Tablet 80 milliGRAM(s) Oral every 12 hours  heparin   Injectable 5000 Unit(s) SubCutaneous every 12 hours  hydroxychloroquine 200 milliGRAM(s) Oral every 24 hours  lisinopril 40 milliGRAM(s) Oral daily  magnesium sulfate  IVPB 1 Gram(s) IV Intermittent once  metolazone 5 milliGRAM(s) Oral <User Schedule>  mycophenolate mofetil 500 milliGRAM(s) Oral two times a day  NIFEdipine XL 30 milliGRAM(s) Oral daily  pantoprazole    Tablet 40 milliGRAM(s) Oral before breakfast  predniSONE   Tablet 60 milliGRAM(s) Oral daily  vancomycin  IVPB 750 milliGRAM(s) IV Intermittent every 24 hours    MEDICATIONS  (PRN):  acetaminophen     Tablet .. 650 milliGRAM(s) Oral every 6 hours PRN Temp greater or equal to 38C (100.4F), Mild Pain (1 - 3)  ondansetron Injectable 4 milliGRAM(s) IV Push every 8 hours PRN Nausea and/or Vomiting      ALLERGIES:  Allergies    sulfa drugs (Rash)    Intolerances        LABS:                        8.1    6.85  )-----------( 107      ( 25 Oct 2023 05:30 )             25.1     10-25    137  |  110<H>  |  79<H>  ----------------------------<  130<H>  4.7   |  16<L>  |  2.34<H>    Ca    8.3<L>      25 Oct 2023 05:30  Phos  4.3     10-25  Mg     1.4     10-25    TPro  5.8<L>  /  Alb  2.7<L>  /  TBili  <0.2  /  DBili  <0.2  /  AST  15  /  ALT  12  /  AlkPhos  49  10-24      Urinalysis Basic - ( 25 Oct 2023 05:30 )    Color: x / Appearance: x / SG: x / pH: x  Gluc: 130 mg/dL / Ketone: x  / Bili: x / Urobili: x   Blood: x / Protein: x / Nitrite: x   Leuk Esterase: x / RBC: x / WBC x   Sq Epi: x / Non Sq Epi: x / Bacteria: x      CAPILLARY BLOOD GLUCOSE          RADIOLOGY & ADDITIONAL TESTS: Reviewed.

## 2023-10-25 NOTE — CONSULT NOTE ADULT - SUBJECTIVE AND OBJECTIVE BOX
NEPHROLOGY CONSULTATION NOTE    32 y/o F with a PMH of Lupus Nephritis (Class IV-V), HTN, now admitted with left wrist pain, swelling and fever, suspicious for ?Septic Arthritis/OM/Cellulitis. Nephrology consulted for Lupus Nephritis management/recs.       PAST MEDICAL & SURGICAL HISTORY:  Lupus  No significant past surgical history    Allergies:  sulfa drugs (Rash)    Home Medications Reviewed  Hospital Medications:   MEDICATIONS  (STANDING):  allopurinol 100 milliGRAM(s) Oral daily  cefTRIAXone   IVPB 2000 milliGRAM(s) IV Intermittent every 24 hours  colchicine 0.6 milliGRAM(s) Oral two times a day  furosemide    Tablet 80 milliGRAM(s) Oral every 24 hours  heparin   Injectable 5000 Unit(s) SubCutaneous every 8 hours  hydroxychloroquine 200 milliGRAM(s) Oral every 24 hours  lisinopril 40 milliGRAM(s) Oral daily  NIFEdipine XL 30 milliGRAM(s) Oral daily  pantoprazole    Tablet 40 milliGRAM(s) Oral before breakfast  predniSONE   Tablet 40 milliGRAM(s) Oral daily  vancomycin  IVPB 750 milliGRAM(s) IV Intermittent every 24 hours    SOCIAL HISTORY:  Denies ETOH,Smoking,   FAMILY HISTORY:  No pertinent family history in first degree relatives    REVIEW OF SYSTEMS:  All other review of systems is negative unless indicated above.    PHYSICAL EXAM:  Constitutional: NAD  HEENT: anicteric sclera, oropharynx clear, MMM  Neck: No JVD  Respiratory: CTAB, no wheezes, rales or rhonchi  Cardiovascular: S1, S2, RRR  Gastrointestinal: BS+, soft, NT/ND  Extremities: LUE swollen and tender at the level of the wrist, decreased ROM due to pain, trace peripheral edema in LEs.   Neurological: A/O x 3, no focal deficits    VITALS:  Vital Signs Last 24 Hrs  T(C): 36.9 (25 Oct 2023 09:55), Max: 39.4 (24 Oct 2023 21:15)  T(F): 98.5 (25 Oct 2023 09:55), Max: 103 (24 Oct 2023 21:15)  HR: 101 (25 Oct 2023 09:55) (76 - 126)  BP: 128/82 (25 Oct 2023 09:55) (128/82 - 174/99)  BP(mean): --  RR: 18 (25 Oct 2023 09:55) (16 - 19)  SpO2: 98% (25 Oct 2023 09:55) (98% - 100%)    Parameters below as of 25 Oct 2023 09:55  Patient On (Oxygen Delivery Method): room air        Height (cm): 160 (10-24 @ 14:01)  Weight (kg): 50.8 (10-24 @ 14:01)  BMI (kg/m2): 19.8 (10-24 @ 14:01)  BSA (m2): 1.51 (10-24 @ 14:01)      LABS:  10-25    137  |  110<H>  |  79<H>  ----------------------------<  130<H>  4.7   |  16<L>  |  2.34<H>    Ca    8.3<L>      25 Oct 2023 05:30  Phos  4.3     10-25  Mg     1.4     10-25    TPro  5.8<L>  /  Alb  2.7<L>  /  TBili  <0.2  /  DBili  <0.2  /  AST  15  /  ALT  12  /  AlkPhos  49  10-24    Creatinine Trend: 2.34 <--, 2.37 <--, 2.80 <--                        8.1    6.85  )-----------( 107      ( 25 Oct 2023 05:30 )             25.1     Urine Studies:  Urinalysis Basic - ( 25 Oct 2023 05:30 )    Color:  / Appearance:  / SG:  / pH:   Gluc: 130 mg/dL / Ketone:   / Bili:  / Urobili:    Blood:  / Protein:  / Nitrite:    Leuk Esterase:  / RBC:  / WBC    Sq Epi:  / Non Sq Epi:  / Bacteria:                 RADIOLOGY & ADDITIONAL STUDIES:                 NEPHROLOGY CONSULTATION NOTE    34 y/o F with a PMH of Lupus Nephritis (Class IV-V), HTN, now admitted with left wrist pain, swelling and fever, suspicious for ?Septic Arthritis/OM/Cellulitis. Nephrology consulted for Lupus Nephritis management/recs.       PAST MEDICAL & SURGICAL HISTORY:  Lupus  No significant past surgical history    Allergies:  sulfa drugs (Rash)    Home Medications Reviewed  Hospital Medications:   MEDICATIONS  (STANDING):  allopurinol 100 milliGRAM(s) Oral daily  cefTRIAXone   IVPB 2000 milliGRAM(s) IV Intermittent every 24 hours  colchicine 0.6 milliGRAM(s) Oral two times a day  furosemide    Tablet 80 milliGRAM(s) Oral every 24 hours  heparin   Injectable 5000 Unit(s) SubCutaneous every 8 hours  hydroxychloroquine 200 milliGRAM(s) Oral every 24 hours  lisinopril 40 milliGRAM(s) Oral daily  NIFEdipine XL 30 milliGRAM(s) Oral daily  pantoprazole    Tablet 40 milliGRAM(s) Oral before breakfast  predniSONE   Tablet 40 milliGRAM(s) Oral daily  vancomycin  IVPB 750 milliGRAM(s) IV Intermittent every 24 hours    SOCIAL HISTORY:  Denies ETOH,Smoking,   FAMILY HISTORY:  No pertinent family history in first degree relatives    REVIEW OF SYSTEMS:  All other review of systems is negative unless indicated above.    PHYSICAL EXAM:  Constitutional: NAD  HEENT: anicteric sclera, oropharynx clear, MMM  Neck: No JVD  Respiratory: CTAB, no wheezes, rales or rhonchi  Cardiovascular: S1, S2, RRR  Gastrointestinal: BS+, soft, NT/ND  Extremities: LUE swollen and tender at the level of the wrist and hand  decreased ROM due to pain, 1+ peripheral edema in LEs (sig improved) .   Neurological: A/O x 3, no focal deficits    VITALS:  Vital Signs Last 24 Hrs  T(C): 36.9 (25 Oct 2023 09:55), Max: 39.4 (24 Oct 2023 21:15)  T(F): 98.5 (25 Oct 2023 09:55), Max: 103 (24 Oct 2023 21:15)  HR: 101 (25 Oct 2023 09:55) (76 - 126)  BP: 128/82 (25 Oct 2023 09:55) (128/82 - 174/99)  BP(mean): --  RR: 18 (25 Oct 2023 09:55) (16 - 19)  SpO2: 98% (25 Oct 2023 09:55) (98% - 100%)    Parameters below as of 25 Oct 2023 09:55  Patient On (Oxygen Delivery Method): room air        Height (cm): 160 (10-24 @ 14:01)  Weight (kg): 50.8 (10-24 @ 14:01)  BMI (kg/m2): 19.8 (10-24 @ 14:01)  BSA (m2): 1.51 (10-24 @ 14:01)      LABS:  10-25    137  |  110<H>  |  79<H>  ----------------------------<  130<H>  4.7   |  16<L>  |  2.34<H>    Ca    8.3<L>      25 Oct 2023 05:30  Phos  4.3     10-25  Mg     1.4     10-25    TPro  5.8<L>  /  Alb  2.7<L>  /  TBili  <0.2  /  DBili  <0.2  /  AST  15  /  ALT  12  /  AlkPhos  49  10-24    Creatinine Trend: 2.34 <--, 2.37 <--, 2.80 <--                        8.1    6.85  )-----------( 107      ( 25 Oct 2023 05:30 )             25.1     Urine Studies:  Urinalysis Basic - ( 25 Oct 2023 05:30 )    Color:  / Appearance:  / SG:  / pH:   Gluc: 130 mg/dL / Ketone:   / Bili:  / Urobili:    Blood:  / Protein:  / Nitrite:    Leuk Esterase:  / RBC:  / WBC    Sq Epi:  / Non Sq Epi:  / Bacteria:                 RADIOLOGY & ADDITIONAL STUDIES:

## 2023-10-25 NOTE — PATIENT PROFILE ADULT - FUNCTIONAL ASSESSMENT - BASIC MOBILITY 6.
4-calculated by average/Not able to assess (calculate score using [facility] averaging method)

## 2023-10-26 LAB
ALBUMIN SERPL ELPH-MCNC: 2.4 G/DL — LOW (ref 3.3–5)
ALBUMIN SERPL ELPH-MCNC: 2.4 G/DL — LOW (ref 3.3–5)
ALP SERPL-CCNC: 63 U/L — SIGNIFICANT CHANGE UP (ref 40–120)
ALP SERPL-CCNC: 63 U/L — SIGNIFICANT CHANGE UP (ref 40–120)
ALT FLD-CCNC: 9 U/L — LOW (ref 10–45)
ALT FLD-CCNC: 9 U/L — LOW (ref 10–45)
ANION GAP SERPL CALC-SCNC: 14 MMOL/L — SIGNIFICANT CHANGE UP (ref 5–17)
ANION GAP SERPL CALC-SCNC: 14 MMOL/L — SIGNIFICANT CHANGE UP (ref 5–17)
AST SERPL-CCNC: 14 U/L — SIGNIFICANT CHANGE UP (ref 10–40)
AST SERPL-CCNC: 14 U/L — SIGNIFICANT CHANGE UP (ref 10–40)
BILIRUB SERPL-MCNC: <0.2 MG/DL — SIGNIFICANT CHANGE UP (ref 0.2–1.2)
BILIRUB SERPL-MCNC: <0.2 MG/DL — SIGNIFICANT CHANGE UP (ref 0.2–1.2)
BUN SERPL-MCNC: 81 MG/DL — HIGH (ref 7–23)
BUN SERPL-MCNC: 81 MG/DL — HIGH (ref 7–23)
CALCIUM SERPL-MCNC: 8.8 MG/DL — SIGNIFICANT CHANGE UP (ref 8.4–10.5)
CALCIUM SERPL-MCNC: 8.8 MG/DL — SIGNIFICANT CHANGE UP (ref 8.4–10.5)
CHLORIDE SERPL-SCNC: 104 MMOL/L — SIGNIFICANT CHANGE UP (ref 96–108)
CHLORIDE SERPL-SCNC: 104 MMOL/L — SIGNIFICANT CHANGE UP (ref 96–108)
CO2 SERPL-SCNC: 18 MMOL/L — LOW (ref 22–31)
CO2 SERPL-SCNC: 18 MMOL/L — LOW (ref 22–31)
CREAT SERPL-MCNC: 2.61 MG/DL — HIGH (ref 0.5–1.3)
CREAT SERPL-MCNC: 2.61 MG/DL — HIGH (ref 0.5–1.3)
CULTURE RESULTS: NO GROWTH — SIGNIFICANT CHANGE UP
CULTURE RESULTS: NO GROWTH — SIGNIFICANT CHANGE UP
EGFR: 24 ML/MIN/1.73M2 — LOW
EGFR: 24 ML/MIN/1.73M2 — LOW
GLUCOSE SERPL-MCNC: 104 MG/DL — HIGH (ref 70–99)
GLUCOSE SERPL-MCNC: 104 MG/DL — HIGH (ref 70–99)
HCT VFR BLD CALC: 27.2 % — LOW (ref 34.5–45)
HCT VFR BLD CALC: 27.2 % — LOW (ref 34.5–45)
HGB BLD-MCNC: 8.9 G/DL — LOW (ref 11.5–15.5)
HGB BLD-MCNC: 8.9 G/DL — LOW (ref 11.5–15.5)
MAGNESIUM SERPL-MCNC: 2 MG/DL — SIGNIFICANT CHANGE UP (ref 1.6–2.6)
MAGNESIUM SERPL-MCNC: 2 MG/DL — SIGNIFICANT CHANGE UP (ref 1.6–2.6)
MCHC RBC-ENTMCNC: 29.8 PG — SIGNIFICANT CHANGE UP (ref 27–34)
MCHC RBC-ENTMCNC: 29.8 PG — SIGNIFICANT CHANGE UP (ref 27–34)
MCHC RBC-ENTMCNC: 32.7 GM/DL — SIGNIFICANT CHANGE UP (ref 32–36)
MCHC RBC-ENTMCNC: 32.7 GM/DL — SIGNIFICANT CHANGE UP (ref 32–36)
MCV RBC AUTO: 91 FL — SIGNIFICANT CHANGE UP (ref 80–100)
MCV RBC AUTO: 91 FL — SIGNIFICANT CHANGE UP (ref 80–100)
NRBC # BLD: 0 /100 WBCS — SIGNIFICANT CHANGE UP (ref 0–0)
NRBC # BLD: 0 /100 WBCS — SIGNIFICANT CHANGE UP (ref 0–0)
PHOSPHATE SERPL-MCNC: 5.4 MG/DL — HIGH (ref 2.5–4.5)
PHOSPHATE SERPL-MCNC: 5.4 MG/DL — HIGH (ref 2.5–4.5)
PLATELET # BLD AUTO: 133 K/UL — LOW (ref 150–400)
PLATELET # BLD AUTO: 133 K/UL — LOW (ref 150–400)
POTASSIUM SERPL-MCNC: 3.7 MMOL/L — SIGNIFICANT CHANGE UP (ref 3.5–5.3)
POTASSIUM SERPL-MCNC: 3.7 MMOL/L — SIGNIFICANT CHANGE UP (ref 3.5–5.3)
POTASSIUM SERPL-SCNC: 3.7 MMOL/L — SIGNIFICANT CHANGE UP (ref 3.5–5.3)
POTASSIUM SERPL-SCNC: 3.7 MMOL/L — SIGNIFICANT CHANGE UP (ref 3.5–5.3)
PROT SERPL-MCNC: 5.5 G/DL — LOW (ref 6–8.3)
PROT SERPL-MCNC: 5.5 G/DL — LOW (ref 6–8.3)
RBC # BLD: 2.99 M/UL — LOW (ref 3.8–5.2)
RBC # BLD: 2.99 M/UL — LOW (ref 3.8–5.2)
RBC # FLD: 15.6 % — HIGH (ref 10.3–14.5)
RBC # FLD: 15.6 % — HIGH (ref 10.3–14.5)
SODIUM SERPL-SCNC: 136 MMOL/L — SIGNIFICANT CHANGE UP (ref 135–145)
SODIUM SERPL-SCNC: 136 MMOL/L — SIGNIFICANT CHANGE UP (ref 135–145)
SPECIMEN SOURCE: SIGNIFICANT CHANGE UP
SPECIMEN SOURCE: SIGNIFICANT CHANGE UP
WBC # BLD: 10.98 K/UL — HIGH (ref 3.8–10.5)
WBC # BLD: 10.98 K/UL — HIGH (ref 3.8–10.5)
WBC # FLD AUTO: 10.98 K/UL — HIGH (ref 3.8–10.5)
WBC # FLD AUTO: 10.98 K/UL — HIGH (ref 3.8–10.5)

## 2023-10-26 PROCEDURE — 99232 SBSQ HOSP IP/OBS MODERATE 35: CPT

## 2023-10-26 PROCEDURE — 99233 SBSQ HOSP IP/OBS HIGH 50: CPT | Mod: GC

## 2023-10-26 RX ORDER — POTASSIUM CHLORIDE 20 MEQ
40 PACKET (EA) ORAL ONCE
Refills: 0 | Status: COMPLETED | OUTPATIENT
Start: 2023-10-26 | End: 2023-10-27

## 2023-10-26 RX ADMIN — Medication 650 MILLIGRAM(S): at 12:59

## 2023-10-26 RX ADMIN — Medication 650 MILLIGRAM(S): at 06:12

## 2023-10-26 RX ADMIN — Medication 40 MILLIGRAM(S): at 06:12

## 2023-10-26 RX ADMIN — Medication 0.6 MILLIGRAM(S): at 17:24

## 2023-10-26 RX ADMIN — Medication 30 MILLIGRAM(S): at 06:10

## 2023-10-26 RX ADMIN — Medication 250 MILLIGRAM(S): at 06:10

## 2023-10-26 RX ADMIN — CEFTRIAXONE 100 MILLIGRAM(S): 500 INJECTION, POWDER, FOR SOLUTION INTRAMUSCULAR; INTRAVENOUS at 22:31

## 2023-10-26 RX ADMIN — Medication 20 MILLIGRAM(S): at 17:24

## 2023-10-26 RX ADMIN — Medication 100 MILLIGRAM(S): at 12:59

## 2023-10-26 RX ADMIN — Medication 0.6 MILLIGRAM(S): at 06:12

## 2023-10-26 RX ADMIN — ONDANSETRON 4 MILLIGRAM(S): 8 TABLET, FILM COATED ORAL at 18:17

## 2023-10-26 RX ADMIN — Medication 200 MILLIGRAM(S): at 12:58

## 2023-10-26 RX ADMIN — Medication 650 MILLIGRAM(S): at 22:33

## 2023-10-26 RX ADMIN — PANTOPRAZOLE SODIUM 40 MILLIGRAM(S): 20 TABLET, DELAYED RELEASE ORAL at 06:11

## 2023-10-26 RX ADMIN — LISINOPRIL 40 MILLIGRAM(S): 2.5 TABLET ORAL at 06:11

## 2023-10-26 NOTE — PROGRESS NOTE ADULT - PROBLEM SELECTOR PLAN 4
Patient with a history of hypertension likely 2/2 to lupus nephritis  Plan:   - c/w Lisinopril 40mg PO qd  - c/w Nifedipine ER 30mg PO qd.

## 2023-10-26 NOTE — OCCUPATIONAL THERAPY INITIAL EVALUATION ADULT - RANGE OF MOTION EXAMINATION
L elbow flex/ext to mod range, unable to achieve wrist extension 2/2 swelling ; L wrist flexion to mod range

## 2023-10-26 NOTE — PROGRESS NOTE ADULT - SUBJECTIVE AND OBJECTIVE BOX
OVERNIGHT EVENTS:    SUBJECTIVE / INTERVAL HPI: Patient seen and examined at bedside.     ROS: negative unless otherwise stated above.    VITAL SIGNS:  Vital Signs Last 24 Hrs  T(C): 36.8 (26 Oct 2023 05:36), Max: 37.8 (25 Oct 2023 06:54)  T(F): 98.2 (26 Oct 2023 05:36), Max: 100.1 (25 Oct 2023 06:54)  HR: 88 (26 Oct 2023 05:36) (88 - 112)  BP: 110/68 (26 Oct 2023 05:36) (106/68 - 133/85)  BP(mean): 81 (25 Oct 2023 17:10) (81 - 81)  RR: 18 (26 Oct 2023 05:36) (17 - 18)  SpO2: 98% (26 Oct 2023 05:36) (98% - 98%)    Parameters below as of 25 Oct 2023 22:08  Patient On (Oxygen Delivery Method): room air        PHYSICAL EXAM:    General: In no apparent distress  HEENT: NC/AT; PERRL, anicteric sclera; MMM  Neck: supple  Cardiovascular: +S1/S2; RRR  Respiratory: CTA B/L; no W/R/R  Gastrointestinal: soft, NT/ND; +BSx4  Extremities: WWP; no edema, clubbing or cyanosis  Vascular: 2+ radial, DP/PT pulses B/L  Neurological: AAOx3; no focal deficits    MEDICATIONS:  MEDICATIONS  (STANDING):  allopurinol 100 milliGRAM(s) Oral daily  cefTRIAXone   IVPB 2000 milliGRAM(s) IV Intermittent every 24 hours  colchicine 0.6 milliGRAM(s) Oral two times a day  heparin   Injectable 5000 Unit(s) SubCutaneous every 8 hours  hydroxychloroquine 200 milliGRAM(s) Oral every 24 hours  lisinopril 40 milliGRAM(s) Oral daily  NIFEdipine XL 30 milliGRAM(s) Oral daily  pantoprazole    Tablet 40 milliGRAM(s) Oral before breakfast  predniSONE   Tablet 40 milliGRAM(s) Oral daily  sodium bicarbonate 650 milliGRAM(s) Oral three times a day  torsemide 20 milliGRAM(s) Oral every 24 hours  vancomycin  IVPB 750 milliGRAM(s) IV Intermittent every 24 hours    MEDICATIONS  (PRN):  acetaminophen     Tablet .. 650 milliGRAM(s) Oral every 6 hours PRN Temp greater or equal to 38C (100.4F), Mild Pain (1 - 3)  ondansetron Injectable 4 milliGRAM(s) IV Push every 8 hours PRN Nausea and/or Vomiting      ALLERGIES:  Allergies    sulfa drugs (Rash)    Intolerances        LABS:                        8.1    6.85  )-----------( 107      ( 25 Oct 2023 05:30 )             25.1     10-25    137  |  110<H>  |  79<H>  ----------------------------<  130<H>  4.7   |  16<L>  |  2.34<H>    Ca    8.3<L>      25 Oct 2023 05:30  Phos  4.3     10-25  Mg     1.4     10-25    TPro  5.8<L>  /  Alb  2.7<L>  /  TBili  <0.2  /  DBili  <0.2  /  AST  15  /  ALT  12  /  AlkPhos  49  10-24      Urinalysis Basic - ( 25 Oct 2023 05:30 )    Color: x / Appearance: x / SG: x / pH: x  Gluc: 130 mg/dL / Ketone: x  / Bili: x / Urobili: x   Blood: x / Protein: x / Nitrite: x   Leuk Esterase: x / RBC: x / WBC x   Sq Epi: x / Non Sq Epi: x / Bacteria: x      CAPILLARY BLOOD GLUCOSE          RADIOLOGY & ADDITIONAL TESTS: Reviewed. OVERNIGHT EVENTS:    SUBJECTIVE / INTERVAL HPI: Patient seen and examined at bedside. Continues to have burning pain in the hand, rated 3/10 in intensity. She has been keeping her hand elevated, swelling in the hand has reduced but has now extended further towards the elbow.     ROS: negative unless otherwise stated above.    VITAL SIGNS:  Vital Signs Last 24 Hrs  T(C): 36.8 (26 Oct 2023 05:36), Max: 37.8 (25 Oct 2023 06:54)  T(F): 98.2 (26 Oct 2023 05:36), Max: 100.1 (25 Oct 2023 06:54)  HR: 88 (26 Oct 2023 05:36) (88 - 112)  BP: 110/68 (26 Oct 2023 05:36) (106/68 - 133/85)  BP(mean): 81 (25 Oct 2023 17:10) (81 - 81)  RR: 18 (26 Oct 2023 05:36) (17 - 18)  SpO2: 98% (26 Oct 2023 05:36) (98% - 98%)    Parameters below as of 25 Oct 2023 22:08  Patient On (Oxygen Delivery Method): room air        PHYSICAL EXAM:    General: In no apparent distress  HEENT: NC/AT; PERRL, anicteric sclera; MMM  Neck: supple  Cardiovascular: +S1/S2; RRR  Respiratory: CTA B/L; no W/R/R  Gastrointestinal: soft, NT/ND; +BSx4  Extremities: WWP; no edema, clubbing or cyanosis  Vascular: 2+ radial, DP/PT pulses B/L  Neurological: AAOx3; no focal deficits    MEDICATIONS:  MEDICATIONS  (STANDING):  allopurinol 100 milliGRAM(s) Oral daily  cefTRIAXone   IVPB 2000 milliGRAM(s) IV Intermittent every 24 hours  colchicine 0.6 milliGRAM(s) Oral two times a day  heparin   Injectable 5000 Unit(s) SubCutaneous every 8 hours  hydroxychloroquine 200 milliGRAM(s) Oral every 24 hours  lisinopril 40 milliGRAM(s) Oral daily  NIFEdipine XL 30 milliGRAM(s) Oral daily  pantoprazole    Tablet 40 milliGRAM(s) Oral before breakfast  predniSONE   Tablet 40 milliGRAM(s) Oral daily  sodium bicarbonate 650 milliGRAM(s) Oral three times a day  torsemide 20 milliGRAM(s) Oral every 24 hours  vancomycin  IVPB 750 milliGRAM(s) IV Intermittent every 24 hours    MEDICATIONS  (PRN):  acetaminophen     Tablet .. 650 milliGRAM(s) Oral every 6 hours PRN Temp greater or equal to 38C (100.4F), Mild Pain (1 - 3)  ondansetron Injectable 4 milliGRAM(s) IV Push every 8 hours PRN Nausea and/or Vomiting      ALLERGIES:  Allergies    sulfa drugs (Rash)    Intolerances        LABS:                        8.1    6.85  )-----------( 107      ( 25 Oct 2023 05:30 )             25.1     10-25    137  |  110<H>  |  79<H>  ----------------------------<  130<H>  4.7   |  16<L>  |  2.34<H>    Ca    8.3<L>      25 Oct 2023 05:30  Phos  4.3     10-25  Mg     1.4     10-25    TPro  5.8<L>  /  Alb  2.7<L>  /  TBili  <0.2  /  DBili  <0.2  /  AST  15  /  ALT  12  /  AlkPhos  49  10-24      Urinalysis Basic - ( 25 Oct 2023 05:30 )    Color: x / Appearance: x / SG: x / pH: x  Gluc: 130 mg/dL / Ketone: x  / Bili: x / Urobili: x   Blood: x / Protein: x / Nitrite: x   Leuk Esterase: x / RBC: x / WBC x   Sq Epi: x / Non Sq Epi: x / Bacteria: x      CAPILLARY BLOOD GLUCOSE          RADIOLOGY & ADDITIONAL TESTS: Reviewed. OVERNIGHT EVENTS: SE    SUBJECTIVE / INTERVAL HPI: Patient seen and examined at bedside. Continues to have burning pain in the hand, rated 7/10 in intensity. She has been keeping her hand elevated, swelling in the hand has reduced but has now extended further toward and enveloping the elbow. The patient denies a decrease in elbow ROM at this time. She denies n/v, but endorses diarrhea, which is baseline for her. She also denies SOB, chest pain.    ROS: negative unless otherwise stated above.    VITAL SIGNS:  Vital Signs Last 24 Hrs  T(C): 36.8 (26 Oct 2023 05:36), Max: 37.8 (25 Oct 2023 06:54)  T(F): 98.2 (26 Oct 2023 05:36), Max: 100.1 (25 Oct 2023 06:54)  HR: 88 (26 Oct 2023 05:36) (88 - 112)  BP: 110/68 (26 Oct 2023 05:36) (106/68 - 133/85)  BP(mean): 81 (25 Oct 2023 17:10) (81 - 81)  RR: 18 (26 Oct 2023 05:36) (17 - 18)  SpO2: 98% (26 Oct 2023 05:36) (98% - 98%)    Parameters below as of 25 Oct 2023 22:08  Patient On (Oxygen Delivery Method): room air        PHYSICAL EXAM:    General: In no apparent distress  HEENT: NC/AT; PERRL, anicteric sclera; MMM  Neck: supple  Cardiovascular: +S1/S2; RRR  Respiratory: CTA B/L; no W/R/R  Gastrointestinal: soft, NT/ND; +BSx4  Extremities: WWP; no edema, clubbing or cyanosis  Vascular: 2+ radial, DP/PT pulses B/L  Neurological: AAOx3; no focal deficits    MEDICATIONS:  MEDICATIONS  (STANDING):  allopurinol 100 milliGRAM(s) Oral daily  cefTRIAXone   IVPB 2000 milliGRAM(s) IV Intermittent every 24 hours  colchicine 0.6 milliGRAM(s) Oral two times a day  heparin   Injectable 5000 Unit(s) SubCutaneous every 8 hours  hydroxychloroquine 200 milliGRAM(s) Oral every 24 hours  lisinopril 40 milliGRAM(s) Oral daily  NIFEdipine XL 30 milliGRAM(s) Oral daily  pantoprazole    Tablet 40 milliGRAM(s) Oral before breakfast  predniSONE   Tablet 40 milliGRAM(s) Oral daily  sodium bicarbonate 650 milliGRAM(s) Oral three times a day  torsemide 20 milliGRAM(s) Oral every 24 hours  vancomycin  IVPB 750 milliGRAM(s) IV Intermittent every 24 hours    MEDICATIONS  (PRN):  acetaminophen     Tablet .. 650 milliGRAM(s) Oral every 6 hours PRN Temp greater or equal to 38C (100.4F), Mild Pain (1 - 3)  ondansetron Injectable 4 milliGRAM(s) IV Push every 8 hours PRN Nausea and/or Vomiting      ALLERGIES:  Allergies    sulfa drugs (Rash)    Intolerances        LABS:                        8.1    6.85  )-----------( 107      ( 25 Oct 2023 05:30 )             25.1     10-25    137  |  110<H>  |  79<H>  ----------------------------<  130<H>  4.7   |  16<L>  |  2.34<H>    Ca    8.3<L>      25 Oct 2023 05:30  Phos  4.3     10-25  Mg     1.4     10-25    TPro  5.8<L>  /  Alb  2.7<L>  /  TBili  <0.2  /  DBili  <0.2  /  AST  15  /  ALT  12  /  AlkPhos  49  10-24      Urinalysis Basic - ( 25 Oct 2023 05:30 )    Color: x / Appearance: x / SG: x / pH: x  Gluc: 130 mg/dL / Ketone: x  / Bili: x / Urobili: x   Blood: x / Protein: x / Nitrite: x   Leuk Esterase: x / RBC: x / WBC x   Sq Epi: x / Non Sq Epi: x / Bacteria: x      CAPILLARY BLOOD GLUCOSE          RADIOLOGY & ADDITIONAL TESTS: Reviewed.

## 2023-10-26 NOTE — OCCUPATIONAL THERAPY INITIAL EVALUATION ADULT - PERTINENT HX OF CURRENT PROBLEM, REHAB EVAL
33 F PMHx SLE since middle school, diagnosed with lupus nephritis 1 month ago, and hypertension currently admitted for evaluation of worsening left hand and wrist swelling for one week. Inflammatory vs infectious cause, antibiotics initiated.

## 2023-10-26 NOTE — PROGRESS NOTE ADULT - ASSESSMENT
34 y/o F with a PMH of Lupus Nephritis (Class IV-V), HTN, now admitted with left wrist pain, swelling and fever, suspicious for ?Septic Arthritis/OM/Cellulitis. Nephrology consulted for Lupus Nephritis management/recs.     Scr baseline: 1.5-2.0  Scr now ~2.6 close to baseline (iso sepsis)  UPCR: 3.5 (improved from 7.6)  BUN: 70s (iso steroid use)  XR and US reviwed    Imp: Class IV/V Lupus Nephritis.     Plan:  Do not restart Mycophenolate for now.   Cont. home dose Prednisone/RAASi.   Pte received Rituximab OP, now developing Sepsis. Please send IgG/IgA/IgM levels.  Cont. Torsemide 20 daily.  Titrate allopurinol (as possible) to the minimum need it dose, in order to achieve desire U. acid levels.   Patient my benefit from SGLT2, will f/u OP.   Avoid sudden BP drop.   Treatment of sepsis as per primary team.  Sodium bicarb 650mg TID   Daily labs

## 2023-10-26 NOTE — PROGRESS NOTE ADULT - PROBLEM SELECTOR PLAN 2
Patient with a 20-year history of lupus, recently diagnosed with biopsy confirmed lupus nephritis presents with diffuse swelling of the left wrist and hand with warmth, pain, and rubor, 2 weeks following a first-time administration of Rituxumab. c/f gout flare with superimposed cellulitis vs septic joint vs inflammatory indications (SLE,   Plan:  - Continue vancomycin 750mg IV qd and ceftriaxone 2g IV qd  - Plastic surgery hand specialist consulted and will follow; ortho not following unless specifically requested since plastics is already following  - Colchicine 0.6mg BID  - Rheumatology consult  - compartment checks.

## 2023-10-26 NOTE — PROGRESS NOTE ADULT - SUBJECTIVE AND OBJECTIVE BOX
Nephrology consulted for Lupus Nephritis management/recs. Evaluated at bedside, complains about L wrist pain.     REVIEW OF SYSTEMS:  All other review of systems is negative unless indicated above.    PHYSICAL EXAM:  Constitutional: NAD  HEENT: anicteric sclera, oropharynx clear, MMM  Neck: No JVD  Respiratory: CTAB, no wheezes, rales or rhonchi  Cardiovascular: S1, S2, RRR  Gastrointestinal: BS+, soft, NT/ND  Extremities: LUE swollen and tender at the level of the wrist and hand  decreased ROM due to pain, 1+ peripheral edema in LEs, improving .   Neurological: A/O x 3, no focal deficits    Allergies:  sulfa drugs (Rash)    Hospital Medications:   MEDICATIONS  (STANDING):  allopurinol 100 milliGRAM(s) Oral daily  cefTRIAXone   IVPB 2000 milliGRAM(s) IV Intermittent every 24 hours  colchicine 0.6 milliGRAM(s) Oral two times a day  heparin   Injectable 5000 Unit(s) SubCutaneous every 8 hours  hydroxychloroquine 200 milliGRAM(s) Oral every 24 hours  lisinopril 40 milliGRAM(s) Oral daily  NIFEdipine XL 30 milliGRAM(s) Oral daily  pantoprazole    Tablet 40 milliGRAM(s) Oral before breakfast  predniSONE   Tablet 40 milliGRAM(s) Oral daily  sodium bicarbonate 650 milliGRAM(s) Oral three times a day  torsemide 20 milliGRAM(s) Oral every 24 hours  vancomycin  IVPB 750 milliGRAM(s) IV Intermittent every 24 hours      VITALS:  T(F): 98.9 (10-26-23 @ 10:25), Max: 99.6 (10-25-23 @ 22:08)  HR: 100 (10-26-23 @ 10:25)  BP: 108/68 (10-26-23 @ 10:25)  RR: 18 (10-26-23 @ 10:25)  SpO2: 96% (10-26-23 @ 10:25)  Wt(kg):     LABS:  10-26    136  |  104  |  81<H>  ----------------------------<  104<H>  3.7   |  18<L>  |  2.61<H>    Ca    8.8      26 Oct 2023 05:30  Phos  5.4     10-26  Mg     2.0     10-26    TPro  5.5<L>  /  Alb  2.4<L>  /  TBili  <0.2  /  DBili      /  AST  14  /  ALT  9<L>  /  AlkPhos  63  10-26                          8.9    10.98 )-----------( 133      ( 26 Oct 2023 05:30 )             27.2       Urine Studies:  Creatinine Trend: 2.61<--, 2.34<--, 2.37<--, 2.80<--  Urinalysis Basic - ( 26 Oct 2023 05:30 )    Color:  / Appearance:  / SG:  / pH:   Gluc: 104 mg/dL / Ketone:   / Bili:  / Urobili:    Blood:  / Protein:  / Nitrite:    Leuk Esterase:  / RBC:  / WBC    Sq Epi:  / Non Sq Epi:  / Bacteria:         RADIOLOGY & ADDITIONAL STUDIES:

## 2023-10-26 NOTE — OCCUPATIONAL THERAPY INITIAL EVALUATION ADULT - GENERAL OBSERVATIONS, REHAB EVAL
venodynes Pt cleared by SWATHI Foley for OOB with OT. Pt received seated EOB +L sling +hep lock +NAD; pt left semisupine with L hand elevated above chest level, all needs met, RN aware.

## 2023-10-26 NOTE — OCCUPATIONAL THERAPY INITIAL EVALUATION ADULT - ADDITIONAL COMMENTS
Pt R handed and does not wear glasses. Pt lives with sister in elevator accessible apartment; pt independent at baseline and does not require DME or AD for ambulation.

## 2023-10-26 NOTE — CONSULT NOTE ADULT - SUBJECTIVE AND OBJECTIVE BOX
Patient today is doing better in terms of flexion extention and prono supination of the left wrist  there is still some increased skin temperature, swelling and light redness over the dorsum of the hand  the wbc is up to 10 today and other labs otherwise unchanged   no temperature or spikes of fevers    PLAN  we will continue with conservative management and tomorrow am we will take cbc with differential, ESR, CRP, uric acid to better observe the trend over time  I will follow up tomorrow as well  Plan communicate dit the primary team

## 2023-10-26 NOTE — OCCUPATIONAL THERAPY INITIAL EVALUATION ADULT - DIAGNOSIS, OT EVAL
Pt presents to [facility] with worsening of L hand and wrist swelling, evaluated by OT and pt demonstrating appropriate compensatory strategies to return home with family and followup with OP OT for further management. Pt discharged from acute OT program.

## 2023-10-26 NOTE — PROGRESS NOTE ADULT - PROBLEM SELECTOR PROBLEM 1
"Pt arrived to room via gurney; able to walk to bed independently; heart monitor in place; monitor room notified. Pt HR is 117; BP is 167/107; pt complaining of a headache. Pt given oral metoprolol; no PRN pain or BP medications ordered at this time; Dr. Vinson paged; orders received for tylenol; to check magnesium level, and to \"watch what the pt's blood pressure does for a while now that he's gotten the 50 of metoprolol.\" Orders read back.   " Sepsis

## 2023-10-26 NOTE — PROGRESS NOTE ADULT - PROBLEM SELECTOR PLAN 5
F: None   E: Replete as necessary K>4 Mg>2  N: Reg diet   DVT Prophylaxis: heparin 5000 TID  GI prophylaxis: None   CODE STATUS: FULL

## 2023-10-26 NOTE — PROGRESS NOTE ADULT - PROBLEM SELECTOR PLAN 1
Fever 103 overnight w/ tachycardia. c/f L wrist  gout flare with superimposed cellulitis vs septic joint   -continue Vanc/zosyn, complete sepsis fluids,   -f/up blood cx, hand xray,   - Plastic surgery hand specialty consulted- will follow  - Outpatient rheumatologist contacted; gave collateral on 10/25 - one week prior attempted a dorsal approach to aspirate, but was unsuccessful in obtaining any fluid.  - serial exams. Fever 103 overnight w/ tachycardia on 10/24/23. c/f L wrist  gout flare with superimposed cellulitis vs septic joint   -continue Vanc/zosyn, complete sepsis fluids,   -f/up blood cx, hand xray,   - Plastic surgery hand specialty consulted- will follow  - Outpatient rheumatologist contacted; gave collateral on 10/25 - one week prior attempted a dorsal approach to aspirate, but was unsuccessful in obtaining any fluid.  - serial exams. Fever 103 overnight w/ tachycardia on 10/24/23. c/f L wrist  gout flare with superimposed cellulitis vs septic joint   -continue Vanc/zosyn, complete sepsis fluids,   -f/up blood cx, hand x-ray,   - Plastic surgery hand specialty consulted- will follow  - Outpatient rheumatologist contacted; gave collateral on 10/25 - one week prior attempted a dorsal approach to aspirate, but was unsuccessful in obtaining any fluid.  - serial exams.

## 2023-10-26 NOTE — PROGRESS NOTE ADULT - PROBLEM SELECTOR PLAN 3
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   - trend creatinine  - consult nephrology  - consult rheumatology   - d/c Cellcept 500mg PO - 2 tablets q12  - c/w Lasix 80mg PO qd  - c/w allopurinol 100mg PO qd  - c/w hydroxychloroquine 200mg PO qd  - d/c metolazone 5mg PO q12  - c/w prednisone 60mg PO qd  - c/w Pantoprazole 40mg PO qd Patient with a 20-year history of lupus nephritis presents with possible cellulitis in the setting of recent Retuximab administration plus daily steroid use (prednisone; cellcept).   - trend creatinine  - nephrology consult recommendations appreciated  - hand surgery consult recommendations appreciated  - continue to hold Cellcept 500mg PO - 2 tablets q12  - switch Lasix 80mg PO qd to torsemide 20 mg po qd as per nephrology recommendation  - c/w allopurinol 100mg PO qd  - c/w hydroxychloroquine 200mg PO qd  - continue to hold metolazone 5mg PO q12  - c/w prednisone 60mg PO qd  - c/w Pantoprazole 40mg PO qd

## 2023-10-27 ENCOUNTER — APPOINTMENT (OUTPATIENT)
Dept: INFUSION THERAPY | Facility: CLINIC | Age: 33
End: 2023-10-27

## 2023-10-27 LAB
ALBUMIN SERPL ELPH-MCNC: 2 G/DL — LOW (ref 3.3–5)
ALBUMIN SERPL ELPH-MCNC: 2 G/DL — LOW (ref 3.3–5)
ALP SERPL-CCNC: 62 U/L — SIGNIFICANT CHANGE UP (ref 40–120)
ALP SERPL-CCNC: 62 U/L — SIGNIFICANT CHANGE UP (ref 40–120)
ALT FLD-CCNC: 7 U/L — LOW (ref 10–45)
ALT FLD-CCNC: 7 U/L — LOW (ref 10–45)
ANION GAP SERPL CALC-SCNC: 13 MMOL/L — SIGNIFICANT CHANGE UP (ref 5–17)
ANION GAP SERPL CALC-SCNC: 13 MMOL/L — SIGNIFICANT CHANGE UP (ref 5–17)
AST SERPL-CCNC: 12 U/L — SIGNIFICANT CHANGE UP (ref 10–40)
AST SERPL-CCNC: 12 U/L — SIGNIFICANT CHANGE UP (ref 10–40)
BASOPHILS # BLD AUTO: 0.01 K/UL — SIGNIFICANT CHANGE UP (ref 0–0.2)
BASOPHILS # BLD AUTO: 0.01 K/UL — SIGNIFICANT CHANGE UP (ref 0–0.2)
BASOPHILS NFR BLD AUTO: 0.1 % — SIGNIFICANT CHANGE UP (ref 0–2)
BASOPHILS NFR BLD AUTO: 0.1 % — SIGNIFICANT CHANGE UP (ref 0–2)
BILIRUB SERPL-MCNC: <0.2 MG/DL — SIGNIFICANT CHANGE UP (ref 0.2–1.2)
BILIRUB SERPL-MCNC: <0.2 MG/DL — SIGNIFICANT CHANGE UP (ref 0.2–1.2)
BUN SERPL-MCNC: 82 MG/DL — HIGH (ref 7–23)
BUN SERPL-MCNC: 82 MG/DL — HIGH (ref 7–23)
CALCIUM SERPL-MCNC: 8.4 MG/DL — SIGNIFICANT CHANGE UP (ref 8.4–10.5)
CALCIUM SERPL-MCNC: 8.4 MG/DL — SIGNIFICANT CHANGE UP (ref 8.4–10.5)
CHLORIDE SERPL-SCNC: 108 MMOL/L — SIGNIFICANT CHANGE UP (ref 96–108)
CHLORIDE SERPL-SCNC: 108 MMOL/L — SIGNIFICANT CHANGE UP (ref 96–108)
CO2 SERPL-SCNC: 17 MMOL/L — LOW (ref 22–31)
CO2 SERPL-SCNC: 17 MMOL/L — LOW (ref 22–31)
CREAT SERPL-MCNC: 2.83 MG/DL — HIGH (ref 0.5–1.3)
CREAT SERPL-MCNC: 2.83 MG/DL — HIGH (ref 0.5–1.3)
CRP SERPL-MCNC: 51.9 MG/L — HIGH (ref 0–4)
CRP SERPL-MCNC: 51.9 MG/L — HIGH (ref 0–4)
EGFR: 22 ML/MIN/1.73M2 — LOW
EGFR: 22 ML/MIN/1.73M2 — LOW
EOSINOPHIL # BLD AUTO: 0.01 K/UL — SIGNIFICANT CHANGE UP (ref 0–0.5)
EOSINOPHIL # BLD AUTO: 0.01 K/UL — SIGNIFICANT CHANGE UP (ref 0–0.5)
EOSINOPHIL NFR BLD AUTO: 0.1 % — SIGNIFICANT CHANGE UP (ref 0–6)
EOSINOPHIL NFR BLD AUTO: 0.1 % — SIGNIFICANT CHANGE UP (ref 0–6)
ERYTHROCYTE [SEDIMENTATION RATE] IN BLOOD: >140 MM/HR — HIGH
ERYTHROCYTE [SEDIMENTATION RATE] IN BLOOD: >140 MM/HR — HIGH
GLUCOSE SERPL-MCNC: 103 MG/DL — HIGH (ref 70–99)
GLUCOSE SERPL-MCNC: 103 MG/DL — HIGH (ref 70–99)
HCT VFR BLD CALC: 22.4 % — LOW (ref 34.5–45)
HCT VFR BLD CALC: 22.4 % — LOW (ref 34.5–45)
HGB BLD-MCNC: 7.4 G/DL — LOW (ref 11.5–15.5)
HGB BLD-MCNC: 7.4 G/DL — LOW (ref 11.5–15.5)
IMM GRANULOCYTES NFR BLD AUTO: 0.6 % — SIGNIFICANT CHANGE UP (ref 0–0.9)
IMM GRANULOCYTES NFR BLD AUTO: 0.6 % — SIGNIFICANT CHANGE UP (ref 0–0.9)
LYMPHOCYTES # BLD AUTO: 0.25 K/UL — LOW (ref 1–3.3)
LYMPHOCYTES # BLD AUTO: 0.25 K/UL — LOW (ref 1–3.3)
LYMPHOCYTES # BLD AUTO: 3 % — LOW (ref 13–44)
LYMPHOCYTES # BLD AUTO: 3 % — LOW (ref 13–44)
MAGNESIUM SERPL-MCNC: 1.7 MG/DL — SIGNIFICANT CHANGE UP (ref 1.6–2.6)
MAGNESIUM SERPL-MCNC: 1.7 MG/DL — SIGNIFICANT CHANGE UP (ref 1.6–2.6)
MCHC RBC-ENTMCNC: 30 PG — SIGNIFICANT CHANGE UP (ref 27–34)
MCHC RBC-ENTMCNC: 30 PG — SIGNIFICANT CHANGE UP (ref 27–34)
MCHC RBC-ENTMCNC: 33 GM/DL — SIGNIFICANT CHANGE UP (ref 32–36)
MCHC RBC-ENTMCNC: 33 GM/DL — SIGNIFICANT CHANGE UP (ref 32–36)
MCV RBC AUTO: 90.7 FL — SIGNIFICANT CHANGE UP (ref 80–100)
MCV RBC AUTO: 90.7 FL — SIGNIFICANT CHANGE UP (ref 80–100)
MONOCYTES # BLD AUTO: 0.38 K/UL — SIGNIFICANT CHANGE UP (ref 0–0.9)
MONOCYTES # BLD AUTO: 0.38 K/UL — SIGNIFICANT CHANGE UP (ref 0–0.9)
MONOCYTES NFR BLD AUTO: 4.6 % — SIGNIFICANT CHANGE UP (ref 2–14)
MONOCYTES NFR BLD AUTO: 4.6 % — SIGNIFICANT CHANGE UP (ref 2–14)
NEUTROPHILS # BLD AUTO: 7.56 K/UL — HIGH (ref 1.8–7.4)
NEUTROPHILS # BLD AUTO: 7.56 K/UL — HIGH (ref 1.8–7.4)
NEUTROPHILS NFR BLD AUTO: 91.6 % — HIGH (ref 43–77)
NEUTROPHILS NFR BLD AUTO: 91.6 % — HIGH (ref 43–77)
NRBC # BLD: 0 /100 WBCS — SIGNIFICANT CHANGE UP (ref 0–0)
NRBC # BLD: 0 /100 WBCS — SIGNIFICANT CHANGE UP (ref 0–0)
PHOSPHATE SERPL-MCNC: 5.1 MG/DL — HIGH (ref 2.5–4.5)
PHOSPHATE SERPL-MCNC: 5.1 MG/DL — HIGH (ref 2.5–4.5)
PLATELET # BLD AUTO: 123 K/UL — LOW (ref 150–400)
PLATELET # BLD AUTO: 123 K/UL — LOW (ref 150–400)
POTASSIUM SERPL-MCNC: 3.7 MMOL/L — SIGNIFICANT CHANGE UP (ref 3.5–5.3)
POTASSIUM SERPL-MCNC: 3.7 MMOL/L — SIGNIFICANT CHANGE UP (ref 3.5–5.3)
POTASSIUM SERPL-SCNC: 3.7 MMOL/L — SIGNIFICANT CHANGE UP (ref 3.5–5.3)
POTASSIUM SERPL-SCNC: 3.7 MMOL/L — SIGNIFICANT CHANGE UP (ref 3.5–5.3)
PROT SERPL-MCNC: 5.1 G/DL — LOW (ref 6–8.3)
PROT SERPL-MCNC: 5.1 G/DL — LOW (ref 6–8.3)
RBC # BLD: 2.47 M/UL — LOW (ref 3.8–5.2)
RBC # BLD: 2.47 M/UL — LOW (ref 3.8–5.2)
RBC # FLD: 15.5 % — HIGH (ref 10.3–14.5)
RBC # FLD: 15.5 % — HIGH (ref 10.3–14.5)
SODIUM SERPL-SCNC: 138 MMOL/L — SIGNIFICANT CHANGE UP (ref 135–145)
SODIUM SERPL-SCNC: 138 MMOL/L — SIGNIFICANT CHANGE UP (ref 135–145)
URATE SERPL-MCNC: 8 MG/DL — HIGH (ref 2.5–7)
URATE SERPL-MCNC: 8 MG/DL — HIGH (ref 2.5–7)
WBC # BLD: 8.26 K/UL — SIGNIFICANT CHANGE UP (ref 3.8–10.5)
WBC # BLD: 8.26 K/UL — SIGNIFICANT CHANGE UP (ref 3.8–10.5)
WBC # FLD AUTO: 8.26 K/UL — SIGNIFICANT CHANGE UP (ref 3.8–10.5)
WBC # FLD AUTO: 8.26 K/UL — SIGNIFICANT CHANGE UP (ref 3.8–10.5)

## 2023-10-27 PROCEDURE — 99232 SBSQ HOSP IP/OBS MODERATE 35: CPT

## 2023-10-27 PROCEDURE — 99232 SBSQ HOSP IP/OBS MODERATE 35: CPT | Mod: GC

## 2023-10-27 RX ORDER — MAGNESIUM OXIDE 400 MG ORAL TABLET 241.3 MG
400 TABLET ORAL ONCE
Refills: 0 | Status: COMPLETED | OUTPATIENT
Start: 2023-10-27 | End: 2023-10-27

## 2023-10-27 RX ORDER — POTASSIUM CHLORIDE 20 MEQ
40 PACKET (EA) ORAL ONCE
Refills: 0 | Status: DISCONTINUED | OUTPATIENT
Start: 2023-10-27 | End: 2023-10-27

## 2023-10-27 RX ADMIN — Medication 650 MILLIGRAM(S): at 06:11

## 2023-10-27 RX ADMIN — Medication 40 MILLIEQUIVALENT(S): at 11:12

## 2023-10-27 RX ADMIN — HEPARIN SODIUM 5000 UNIT(S): 5000 INJECTION INTRAVENOUS; SUBCUTANEOUS at 13:11

## 2023-10-27 RX ADMIN — Medication 200 MILLIGRAM(S): at 11:13

## 2023-10-27 RX ADMIN — Medication 40 MILLIGRAM(S): at 06:11

## 2023-10-27 RX ADMIN — Medication 0.6 MILLIGRAM(S): at 17:43

## 2023-10-27 RX ADMIN — Medication 250 MILLIGRAM(S): at 06:10

## 2023-10-27 RX ADMIN — CEFTRIAXONE 100 MILLIGRAM(S): 500 INJECTION, POWDER, FOR SOLUTION INTRAMUSCULAR; INTRAVENOUS at 22:07

## 2023-10-27 RX ADMIN — Medication 650 MILLIGRAM(S): at 13:11

## 2023-10-27 RX ADMIN — Medication 650 MILLIGRAM(S): at 22:07

## 2023-10-27 RX ADMIN — MAGNESIUM OXIDE 400 MG ORAL TABLET 400 MILLIGRAM(S): 241.3 TABLET ORAL at 11:13

## 2023-10-27 RX ADMIN — LISINOPRIL 40 MILLIGRAM(S): 2.5 TABLET ORAL at 06:11

## 2023-10-27 RX ADMIN — Medication 100 MILLIGRAM(S): at 11:13

## 2023-10-27 RX ADMIN — Medication 30 MILLIGRAM(S): at 06:11

## 2023-10-27 RX ADMIN — Medication 0.6 MILLIGRAM(S): at 06:11

## 2023-10-27 RX ADMIN — PANTOPRAZOLE SODIUM 40 MILLIGRAM(S): 20 TABLET, DELAYED RELEASE ORAL at 06:11

## 2023-10-27 NOTE — PROGRESS NOTE ADULT - SUBJECTIVE AND OBJECTIVE BOX
Nephrology consulted for Lupus Nephritis management/recs. Evaluated at bedside, complains about L wrist pain, improved, LEs edema also improved.     REVIEW OF SYSTEMS:  All other review of systems is negative unless indicated above.    PHYSICAL EXAM:  Constitutional: NAD  HEENT: anicteric sclera, oropharynx clear, MMM  Neck: No JVD  Respiratory: CTAB, no wheezes, rales or rhonchi  Cardiovascular: S1, S2, RRR  Gastrointestinal: BS+, soft, NT/ND  Extremities: LUE swollen and tender at the level of the wrist and hand, improved, decreased ROM due to pain; 1+ peripheral edema in LEs, improving.   Neurological: A/O x 3, no focal deficits    Allergies:  sulfa drugs (Rash)    Hospital Medications:   MEDICATIONS  (STANDING):  allopurinol 100 milliGRAM(s) Oral daily  cefTRIAXone   IVPB 2000 milliGRAM(s) IV Intermittent every 24 hours  colchicine 0.6 milliGRAM(s) Oral two times a day  heparin   Injectable 5000 Unit(s) SubCutaneous every 8 hours  hydroxychloroquine 200 milliGRAM(s) Oral every 24 hours  lisinopril 40 milliGRAM(s) Oral daily  magnesium oxide 400 milliGRAM(s) Oral once  NIFEdipine XL 30 milliGRAM(s) Oral daily  pantoprazole    Tablet 40 milliGRAM(s) Oral before breakfast  potassium chloride   Powder 40 milliEquivalent(s) Oral once  potassium chloride   Powder 40 milliEquivalent(s) Oral once  predniSONE   Tablet 40 milliGRAM(s) Oral daily  sodium bicarbonate 650 milliGRAM(s) Oral three times a day  torsemide 20 milliGRAM(s) Oral every 24 hours    VITALS:  T(F): 98.5 (10-27-23 @ 05:50), Max: 99.5 (10-26-23 @ 22:15)  HR: 90 (10-27-23 @ 05:50)  BP: 111/66 (10-27-23 @ 05:50)  RR: 18 (10-27-23 @ 05:50)  SpO2: 97% (10-27-23 @ 05:50)  Wt(kg): --    LABS:  10-27    138  |  108  |  82<H>  ----------------------------<  103<H>  3.7   |  17<L>  |  2.83<H>    Ca    8.4      27 Oct 2023 07:36  Phos  5.1     10-27  Mg     1.7     10-27    TPro  5.1<L>  /  Alb  2.0<L>  /  TBili  <0.2  /  DBili      /  AST  12  /  ALT  7<L>  /  AlkPhos  62  10-27                          7.4    8.26  )-----------( 123      ( 27 Oct 2023 07:36 )             22.4       Urine Studies:  Creatinine Trend: 2.83<--, 2.61<--, 2.34<--, 2.37<--, 2.80<--  Urinalysis Basic - ( 27 Oct 2023 07:36 )    Color:  / Appearance:  / SG:  / pH:   Gluc: 103 mg/dL / Ketone:   / Bili:  / Urobili:    Blood:  / Protein:  / Nitrite:    Leuk Esterase:  / RBC:  / WBC    Sq Epi:  / Non Sq Epi:  / Bacteria:         RADIOLOGY & ADDITIONAL STUDIES:

## 2023-10-27 NOTE — PROGRESS NOTE ADULT - ASSESSMENT
34 y/o F with a PMH of Lupus Nephritis (Class IV-V), HTN, now admitted with left wrist pain, swelling and fever, suspicious for ?Septic Arthritis/OM/Cellulitis. Nephrology consulted for Lupus Nephritis management/recs.     Scr baseline: 1.5-2.0  Scr now ~2.8 close to baseline (iso sepsis)  UPCR: 3.5 (improved from 7.6)  BUN: 70s (iso steroid use)  XR and US reviwed    Imp: Class IV/V Lupus Nephritis.     Plan:  Do not restart Mycophenolate for now.   Cont. home dose Prednisone/RAASi.   Pte received Rituximab OP, now developing Sepsis, will hold Rituximab for now.   Stop Torsemide 20 daily for now, LEs edema improved.   Titrate allopurinol (as possible) to the minimum need it dose, in order to achieve desire U. acid levels.   Patient my benefit from SGLT2, will f/u OP.   Avoid sudden BP drop.   Treatment of sepsis as per primary team.  Cont. Sodium bicarb 650mg TID   Daily labs   LUE US showed no sonographic evidence of deep venous thrombosis. 32 y/o F with a PMH of Lupus Nephritis (Class IV-V), HTN, now admitted with left wrist pain, swelling and fever, suspicious for ?Septic Arthritis/OM/Cellulitis. Nephrology consulted for Lupus Nephritis management/recs.     Scr baseline: 1.5-2.0  Scr now ~2.8 close to baseline (iso sepsis)  UPCR: 3.5 (improved from 7.6)  BUN: 70s (iso steroid use)  XR and US reviwed    Imp: Class IV/V Lupus Nephritis.     Plan:  Do not restart Mycophenolate for now.   Cont. home dose Prednisone/RAASi.   Pte received Rituximab OP, now developing Sepsis, will hold Rituximab for now.   Stop Torsemide 20 daily for now, LEs edema improved.   Titrate allopurinol (as possible) to the minimum need it dose, in order to achieve desire U. acid levels.   Patient may benefit from SGLT2, will f/u OP.   Avoid sudden BP drop.   Treatment of sepsis as per primary team.  Cont. Sodium bicarb 650mg TID   Daily labs   LUE US showed no sonographic evidence of deep venous thrombosis. 34 y/o F with a PMH of Lupus Nephritis (Class IV-V), HTN, now admitted with left wrist pain, swelling and fever, suspicious for ?Septic Arthritis/OM/Cellulitis. Nephrology consulted for Lupus Nephritis management/recs.     Scr baseline: 1.5-2.0  Scr now ~2.8 close to baseline (iso sepsis)  UPCR: 3.5 (improved from 7.6)  BUN: 70s (iso steroid use)  XR and US reviwed    Imp: Class IV/V Lupus Nephritis.     Plan:  Do not restart Mycophenolate for now.   Cont. home dose Prednisone/RAASi.   Pte received Rituximab OP, now developing Sepsis, will hold Rituximab for now.   Stop Torsemide 20 daily for now, LEs edema improved.   cont allopurinol - follow uric acid (seems not likely gout now)   Patient may benefit from SGLT2, will f/u OP.   Avoid sudden BP drop.   Treatment of sepsis as per primary team.  Cont. Sodium bicarb 650mg TID   Daily labs   LUE US showed no sonographic evidence of deep venous thrombosis.

## 2023-10-27 NOTE — PROGRESS NOTE ADULT - PROBLEM SELECTOR PLAN 3
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   - stop tosemide 20 mg daily as per nephrology recommendations, LE edema improved   - trend creatinine  - nephrology consult recommendations appreciated  - hand surgery consult recommendations appreciated  - continue to hold Cellcept 500mg PO - 2 tablets q12  - c/w allopurinol 100mg PO qd  - c/w hydroxychloroquine 200mg PO qd  - continue to hold metolazone 5mg PO q12  - c/w prednisone 60mg PO qd  - c/w Pantoprazole 40mg PO qd

## 2023-10-27 NOTE — PROGRESS NOTE ADULT - ASSESSMENT
33 F PMHx SLE since middle school, diagnosed with lupus nephritis 1 month ago, and hypertension currently admitted for evaluation of worsening left hand and wrist swelling for one week. Inflammatory vs infectious cause, antibiotics initiated.  33 F PMHx SLE since middle school, diagnosed with lupus nephritis 1 month ago, and hypertension currently admitted for evaluation of worsening left hand and wrist swelling for one week. Inflammatory vs infectious cause, antibiotics initiated. She is improving, with reduction in swelling and erythema on the left hand. Disposition to continue observation on IV ceftriaxone for 24-48 hours, potentially discharge after observation with PO antibiotics.

## 2023-10-27 NOTE — PROGRESS NOTE ADULT - TIME BILLING
Reviewed labs, imaging, records, hospital course to date. Case discussed with house staff, hand surgeon, renal, outpatient rheumatologist. Thorough H/P. Decision regarding continued inpatient management made.
Reviewed labs, imaging, records, hospital course to date. Managing chronic lupus with recent flare and severe infection insetting of immunosuppression. Case discussed with house staff, hand surgeon, renal, outpatient rheumatologist. Thorough H/P. Decision regarding continued inpatient management made.
Reviewed labs, imaging, records, hospital course to date. Case discussed with house staff, hand surgeon, renal. Thorough H/P. Decision regarding continued inpatient management made.

## 2023-10-27 NOTE — PROGRESS NOTE ADULT - PROBLEM SELECTOR PLAN 1
Fever 103 overnight w/ tachycardia on 10/24/23. c/f L wrist  gout flare with superimposed cellulitis vs septic joint   -continue Vanc/zosyn, complete sepsis fluids,   -f/up blood cx, hand x-ray,   - Plastic surgery hand specialty consulted- will follow  - Outpatient rheumatologist contacted; gave collateral on 10/25 - one week prior attempted a dorsal approach to aspirate, but was unsuccessful in obtaining any fluid.  - serial exams. Fever 103 overnight w/ tachycardia on 10/24/23. c/f L wrist gout flare with superimposed cellulitis vs septic joint   -f/up blood cx (no growth at 2 days),   -hand x-ray: no acute fracture   - Plastic surgery hand specialty consulted- will continue to follow  - Outpatient rheumatologist contacted; gave collateral on 10/25 - one week prior attempted a dorsal approach to aspirate, but was unsuccessful in obtaining any fluid.  - serial exams

## 2023-10-27 NOTE — CONSULT NOTE ADULT - SUBJECTIVE AND OBJECTIVE BOX
33 years old female seen previously in the emergency room and on the floor for possible left wrist infectio, patient has a complicated medical history of lupus and CRF    EXA  clinical exam has improved althiout there is still some pain on active motion  redness subsiding  LABS  - increase crp and esr  -wbc normal    PLAN   will conituie conservative treatment and monitor her closely

## 2023-10-27 NOTE — PROGRESS NOTE ADULT - SUBJECTIVE AND OBJECTIVE BOX
OVERNIGHT EVENTS:    SUBJECTIVE / INTERVAL HPI: Patient seen and examined at bedside.     ROS: negative unless otherwise stated above.    VITAL SIGNS:  Vital Signs Last 24 Hrs  T(C): 36.9 (27 Oct 2023 05:50), Max: 37.5 (26 Oct 2023 22:15)  T(F): 98.5 (27 Oct 2023 05:50), Max: 99.5 (26 Oct 2023 22:15)  HR: 90 (27 Oct 2023 05:50) (85 - 100)  BP: 111/66 (27 Oct 2023 05:50) (108/68 - 127/78)  BP(mean): 81 (26 Oct 2023 10:25) (81 - 81)  RR: 18 (27 Oct 2023 05:50) (18 - 18)  SpO2: 97% (27 Oct 2023 05:50) (96% - 99%)    Parameters below as of 26 Oct 2023 22:15  Patient On (Oxygen Delivery Method): room air        PHYSICAL EXAM:    General: In no apparent distress  HEENT: NC/AT; PERRL, anicteric sclera; MMM  Neck: supple  Cardiovascular: +S1/S2; RRR  Respiratory: CTA B/L; no W/R/R  Gastrointestinal: soft, NT/ND; +BSx4  Extremities: WWP; no edema, clubbing or cyanosis  Vascular: 2+ radial, DP/PT pulses B/L  Neurological: AAOx3; no focal deficits    MEDICATIONS:  MEDICATIONS  (STANDING):  allopurinol 100 milliGRAM(s) Oral daily  cefTRIAXone   IVPB 2000 milliGRAM(s) IV Intermittent every 24 hours  colchicine 0.6 milliGRAM(s) Oral two times a day  heparin   Injectable 5000 Unit(s) SubCutaneous every 8 hours  hydroxychloroquine 200 milliGRAM(s) Oral every 24 hours  lisinopril 40 milliGRAM(s) Oral daily  NIFEdipine XL 30 milliGRAM(s) Oral daily  pantoprazole    Tablet 40 milliGRAM(s) Oral before breakfast  potassium chloride   Powder 40 milliEquivalent(s) Oral once  predniSONE   Tablet 40 milliGRAM(s) Oral daily  sodium bicarbonate 650 milliGRAM(s) Oral three times a day  torsemide 20 milliGRAM(s) Oral every 24 hours    MEDICATIONS  (PRN):  acetaminophen     Tablet .. 650 milliGRAM(s) Oral every 6 hours PRN Temp greater or equal to 38C (100.4F), Mild Pain (1 - 3)  ondansetron Injectable 4 milliGRAM(s) IV Push every 8 hours PRN Nausea and/or Vomiting      ALLERGIES:  Allergies    sulfa drugs (Rash)    Intolerances        LABS:                        7.4    8.26  )-----------( 123      ( 27 Oct 2023 07:36 )             22.4     10-26    136  |  104  |  81<H>  ----------------------------<  104<H>  3.7   |  18<L>  |  2.61<H>    Ca    8.8      26 Oct 2023 05:30  Phos  5.4     10-26  Mg     2.0     10-26    TPro  5.5<L>  /  Alb  2.4<L>  /  TBili  <0.2  /  DBili  x   /  AST  14  /  ALT  9<L>  /  AlkPhos  63  10-26      Urinalysis Basic - ( 26 Oct 2023 05:30 )    Color: x / Appearance: x / SG: x / pH: x  Gluc: 104 mg/dL / Ketone: x  / Bili: x / Urobili: x   Blood: x / Protein: x / Nitrite: x   Leuk Esterase: x / RBC: x / WBC x   Sq Epi: x / Non Sq Epi: x / Bacteria: x      CAPILLARY BLOOD GLUCOSE          RADIOLOGY & ADDITIONAL TESTS: Reviewed. OVERNIGHT EVENTS: No acute events overnight     SUBJECTIVE / INTERVAL HPI: Patient seen and examined at bedside. She reports continued 7/10 pain, burning in nature. She has been keeping her left arm elevated, and reports the swelling and erythema has reduced in the left hand. Swelling persists past the elbow, but is reduced in size as well.     ROS: negative unless otherwise stated above.    VITAL SIGNS:  Vital Signs Last 24 Hrs  T(C): 36.9 (27 Oct 2023 05:50), Max: 37.5 (26 Oct 2023 22:15)  T(F): 98.5 (27 Oct 2023 05:50), Max: 99.5 (26 Oct 2023 22:15)  HR: 90 (27 Oct 2023 05:50) (85 - 100)  BP: 111/66 (27 Oct 2023 05:50) (108/68 - 127/78)  BP(mean): 81 (26 Oct 2023 10:25) (81 - 81)  RR: 18 (27 Oct 2023 05:50) (18 - 18)  SpO2: 97% (27 Oct 2023 05:50) (96% - 99%)    Parameters below as of 26 Oct 2023 22:15  Patient On (Oxygen Delivery Method): room air      PHYSICAL EXAM:    General: In no apparent distress  HEENT: NC/AT; PERRL, anicteric sclera; MMM  Neck: supple  Cardiovascular: +S1/S2; RRR  Respiratory: CTA B/L; no W/R/R  Gastrointestinal: soft, NT/ND; +BSx4  Extremities: WWP; no edema, clubbing or cyanosis  Vascular: 2+ radial, DP/PT pulses B/L  Neurological: AAOx3; no focal deficits    MEDICATIONS:  MEDICATIONS  (STANDING):  allopurinol 100 milliGRAM(s) Oral daily  cefTRIAXone   IVPB 2000 milliGRAM(s) IV Intermittent every 24 hours  colchicine 0.6 milliGRAM(s) Oral two times a day  heparin   Injectable 5000 Unit(s) SubCutaneous every 8 hours  hydroxychloroquine 200 milliGRAM(s) Oral every 24 hours  lisinopril 40 milliGRAM(s) Oral daily  NIFEdipine XL 30 milliGRAM(s) Oral daily  pantoprazole    Tablet 40 milliGRAM(s) Oral before breakfast  potassium chloride   Powder 40 milliEquivalent(s) Oral once  predniSONE   Tablet 40 milliGRAM(s) Oral daily  sodium bicarbonate 650 milliGRAM(s) Oral three times a day  torsemide 20 milliGRAM(s) Oral every 24 hours    MEDICATIONS  (PRN):  acetaminophen     Tablet .. 650 milliGRAM(s) Oral every 6 hours PRN Temp greater or equal to 38C (100.4F), Mild Pain (1 - 3)  ondansetron Injectable 4 milliGRAM(s) IV Push every 8 hours PRN Nausea and/or Vomiting      ALLERGIES:  Allergies    sulfa drugs (Rash)    Intolerances        LABS:                        7.4    8.26  )-----------( 123      ( 27 Oct 2023 07:36 )             22.4     10-26    136  |  104  |  81<H>  ----------------------------<  104<H>  3.7   |  18<L>  |  2.61<H>    Ca    8.8      26 Oct 2023 05:30  Phos  5.4     10-26  Mg     2.0     10-26    TPro  5.5<L>  /  Alb  2.4<L>  /  TBili  <0.2  /  DBili  x   /  AST  14  /  ALT  9<L>  /  AlkPhos  63  10-26      Urinalysis Basic - ( 26 Oct 2023 05:30 )    Color: x / Appearance: x / SG: x / pH: x  Gluc: 104 mg/dL / Ketone: x  / Bili: x / Urobili: x   Blood: x / Protein: x / Nitrite: x   Leuk Esterase: x / RBC: x / WBC x   Sq Epi: x / Non Sq Epi: x / Bacteria: x      CAPILLARY BLOOD GLUCOSE          RADIOLOGY & ADDITIONAL TESTS: Reviewed. OVERNIGHT EVENTS: No acute events overnight     SUBJECTIVE / INTERVAL HPI: Patient seen and examined at bedside. She reports continued 7/10 pain, burning in nature. She has been keeping her left arm elevated, and reports the swelling and erythema has reduced in the left hand. Swelling persists past the elbow, but is reduced in size as well. Lower extremity edema has improved.     ROS: negative unless otherwise stated above.    VITAL SIGNS:  Vital Signs Last 24 Hrs  T(C): 36.9 (27 Oct 2023 05:50), Max: 37.5 (26 Oct 2023 22:15)  T(F): 98.5 (27 Oct 2023 05:50), Max: 99.5 (26 Oct 2023 22:15)  HR: 90 (27 Oct 2023 05:50) (85 - 100)  BP: 111/66 (27 Oct 2023 05:50) (108/68 - 127/78)  BP(mean): 81 (26 Oct 2023 10:25) (81 - 81)  RR: 18 (27 Oct 2023 05:50) (18 - 18)  SpO2: 97% (27 Oct 2023 05:50) (96% - 99%)    Parameters below as of 26 Oct 2023 22:15  Patient On (Oxygen Delivery Method): room air      PHYSICAL EXAM:    General: In no apparent distress  HEENT: NC/AT; PERRL, anicteric sclera; MMM  Neck: supple  Cardiovascular: +S1/S2; RRR  Respiratory: CTA B/L; no W/R/R  Gastrointestinal: soft, NT/ND; +BSx4  Extremities: WWP; no edema, clubbing or cyanosis  Vascular: 2+ radial, DP/PT pulses B/L  Neurological: AAOx3; no focal deficits    MEDICATIONS:  MEDICATIONS  (STANDING):  allopurinol 100 milliGRAM(s) Oral daily  cefTRIAXone   IVPB 2000 milliGRAM(s) IV Intermittent every 24 hours  colchicine 0.6 milliGRAM(s) Oral two times a day  heparin   Injectable 5000 Unit(s) SubCutaneous every 8 hours  hydroxychloroquine 200 milliGRAM(s) Oral every 24 hours  lisinopril 40 milliGRAM(s) Oral daily  NIFEdipine XL 30 milliGRAM(s) Oral daily  pantoprazole    Tablet 40 milliGRAM(s) Oral before breakfast  potassium chloride   Powder 40 milliEquivalent(s) Oral once  predniSONE   Tablet 40 milliGRAM(s) Oral daily  sodium bicarbonate 650 milliGRAM(s) Oral three times a day  torsemide 20 milliGRAM(s) Oral every 24 hours    MEDICATIONS  (PRN):  acetaminophen     Tablet .. 650 milliGRAM(s) Oral every 6 hours PRN Temp greater or equal to 38C (100.4F), Mild Pain (1 - 3)  ondansetron Injectable 4 milliGRAM(s) IV Push every 8 hours PRN Nausea and/or Vomiting      ALLERGIES:  Allergies    sulfa drugs (Rash)    Intolerances        LABS:                        7.4    8.26  )-----------( 123      ( 27 Oct 2023 07:36 )             22.4     10-26    136  |  104  |  81<H>  ----------------------------<  104<H>  3.7   |  18<L>  |  2.61<H>    Ca    8.8      26 Oct 2023 05:30  Phos  5.4     10-26  Mg     2.0     10-26    TPro  5.5<L>  /  Alb  2.4<L>  /  TBili  <0.2  /  DBili  x   /  AST  14  /  ALT  9<L>  /  AlkPhos  63  10-26      Urinalysis Basic - ( 26 Oct 2023 05:30 )    Color: x / Appearance: x / SG: x / pH: x  Gluc: 104 mg/dL / Ketone: x  / Bili: x / Urobili: x   Blood: x / Protein: x / Nitrite: x   Leuk Esterase: x / RBC: x / WBC x   Sq Epi: x / Non Sq Epi: x / Bacteria: x      CAPILLARY BLOOD GLUCOSE          RADIOLOGY & ADDITIONAL TESTS: Reviewed. OVERNIGHT EVENTS: No acute events overnight     SUBJECTIVE / INTERVAL HPI: Patient seen and examined at bedside. She reports continued 7/10 pain, burning in nature. She has been keeping her left arm elevated, and reports the swelling and erythema has reduced in the left hand. Swelling persists past the elbow, but is reduced in size as well. Lower extremity edema has improved.     ROS: negative unless otherwise stated above.    VITAL SIGNS:  Vital Signs Last 24 Hrs  T(C): 36.9 (27 Oct 2023 05:50), Max: 37.5 (26 Oct 2023 22:15)  T(F): 98.5 (27 Oct 2023 05:50), Max: 99.5 (26 Oct 2023 22:15)  HR: 90 (27 Oct 2023 05:50) (85 - 100)  BP: 111/66 (27 Oct 2023 05:50) (108/68 - 127/78)  BP(mean): 81 (26 Oct 2023 10:25) (81 - 81)  RR: 18 (27 Oct 2023 05:50) (18 - 18)  SpO2: 97% (27 Oct 2023 05:50) (96% - 99%)    Parameters below as of 26 Oct 2023 22:15  Patient On (Oxygen Delivery Method): room air      PHYSICAL EXAM:    General: In no apparent distress  HEENT: NC/AT; PERRL, anicteric sclera; MMM  Neck: supple  Cardiovascular: +S1/S2; RRR  Respiratory: CTA B/L; no W/R/R  Gastrointestinal: soft, NT/ND; +BSx4  Extremities: WWP; no clubbing or cyanosis; pitting edema to 2+ on the LE b/l, improving; left arm swelling to the elbow, also improving. Redness in the left arm present, but improving.  Vascular: 2+ radial, DP/PT pulses B/L  Neurological: AAOx3; no focal deficits    MEDICATIONS:  MEDICATIONS  (STANDING):  allopurinol 100 milliGRAM(s) Oral daily  cefTRIAXone   IVPB 2000 milliGRAM(s) IV Intermittent every 24 hours  colchicine 0.6 milliGRAM(s) Oral two times a day  heparin   Injectable 5000 Unit(s) SubCutaneous every 8 hours  hydroxychloroquine 200 milliGRAM(s) Oral every 24 hours  lisinopril 40 milliGRAM(s) Oral daily  NIFEdipine XL 30 milliGRAM(s) Oral daily  pantoprazole    Tablet 40 milliGRAM(s) Oral before breakfast  potassium chloride   Powder 40 milliEquivalent(s) Oral once  predniSONE   Tablet 40 milliGRAM(s) Oral daily  sodium bicarbonate 650 milliGRAM(s) Oral three times a day  torsemide 20 milliGRAM(s) Oral every 24 hours    MEDICATIONS  (PRN):  acetaminophen     Tablet .. 650 milliGRAM(s) Oral every 6 hours PRN Temp greater or equal to 38C (100.4F), Mild Pain (1 - 3)  ondansetron Injectable 4 milliGRAM(s) IV Push every 8 hours PRN Nausea and/or Vomiting      ALLERGIES:  Allergies    sulfa drugs (Rash)    Intolerances        LABS:                        7.4    8.26  )-----------( 123      ( 27 Oct 2023 07:36 )             22.4     10-26    136  |  104  |  81<H>  ----------------------------<  104<H>  3.7   |  18<L>  |  2.61<H>    Ca    8.8      26 Oct 2023 05:30  Phos  5.4     10-26  Mg     2.0     10-26    TPro  5.5<L>  /  Alb  2.4<L>  /  TBili  <0.2  /  DBili  x   /  AST  14  /  ALT  9<L>  /  AlkPhos  63  10-26      Urinalysis Basic - ( 26 Oct 2023 05:30 )    Color: x / Appearance: x / SG: x / pH: x  Gluc: 104 mg/dL / Ketone: x  / Bili: x / Urobili: x   Blood: x / Protein: x / Nitrite: x   Leuk Esterase: x / RBC: x / WBC x   Sq Epi: x / Non Sq Epi: x / Bacteria: x      CAPILLARY BLOOD GLUCOSE          RADIOLOGY & ADDITIONAL TESTS: Reviewed.

## 2023-10-27 NOTE — PROGRESS NOTE ADULT - PROBLEM SELECTOR PLAN 2
Patient with a 20-year history of lupus, recently diagnosed with biopsy confirmed lupus nephritis presents with diffuse swelling of the left wrist and hand with warmth, pain, and rubor, 2 weeks following a first-time administration of Rituxumab. c/f gout flare with superimposed cellulitis vs septic joint vs inflammatory indications (SLE,   Plan:  - Continue vancomycin 750mg IV qd and ceftriaxone 2g IV qd  - Plastic surgery hand specialist consulted and will follow; ortho not following unless specifically requested since plastics is already following  - Colchicine 0.6mg BID  - Rheumatology consult  - compartment checks. Patient with a 20-year history of lupus, recently diagnosed with biopsy confirmed lupus nephritis presents with diffuse swelling of the left wrist and hand with warmth, pain, and rubor, 2 weeks following a first-time administration of Rituxumab. c/f gout flare with superimposed cellulitis vs septic joint vs inflammatory indications (SLE,   Plan:  - Completed vancomycin 750mg IV qd   -Continue ceftriaxone 2g IV qd under observation for 24-48 hours, potential discharge after observation with PO antibiotics  - Plastic surgery hand specialist consulted and will follow; ortho not following unless specifically requested since plastics is already following  - Colchicine 0.6mg BID  - Rheumatology consult  - compartment checks.

## 2023-10-28 LAB
ALBUMIN SERPL ELPH-MCNC: 2.1 G/DL — LOW (ref 3.3–5)
ALBUMIN SERPL ELPH-MCNC: 2.1 G/DL — LOW (ref 3.3–5)
ALP SERPL-CCNC: 61 U/L — SIGNIFICANT CHANGE UP (ref 40–120)
ALP SERPL-CCNC: 61 U/L — SIGNIFICANT CHANGE UP (ref 40–120)
ALT FLD-CCNC: 9 U/L — LOW (ref 10–45)
ALT FLD-CCNC: 9 U/L — LOW (ref 10–45)
ANION GAP SERPL CALC-SCNC: 13 MMOL/L — SIGNIFICANT CHANGE UP (ref 5–17)
ANION GAP SERPL CALC-SCNC: 13 MMOL/L — SIGNIFICANT CHANGE UP (ref 5–17)
ANISOCYTOSIS BLD QL: SLIGHT — SIGNIFICANT CHANGE UP
ANISOCYTOSIS BLD QL: SLIGHT — SIGNIFICANT CHANGE UP
AST SERPL-CCNC: 12 U/L — SIGNIFICANT CHANGE UP (ref 10–40)
AST SERPL-CCNC: 12 U/L — SIGNIFICANT CHANGE UP (ref 10–40)
BASOPHILS # BLD AUTO: 0 K/UL — SIGNIFICANT CHANGE UP (ref 0–0.2)
BASOPHILS # BLD AUTO: 0 K/UL — SIGNIFICANT CHANGE UP (ref 0–0.2)
BASOPHILS NFR BLD AUTO: 0 % — SIGNIFICANT CHANGE UP (ref 0–2)
BASOPHILS NFR BLD AUTO: 0 % — SIGNIFICANT CHANGE UP (ref 0–2)
BILIRUB SERPL-MCNC: <0.2 MG/DL — SIGNIFICANT CHANGE UP (ref 0.2–1.2)
BILIRUB SERPL-MCNC: <0.2 MG/DL — SIGNIFICANT CHANGE UP (ref 0.2–1.2)
BUN SERPL-MCNC: 84 MG/DL — HIGH (ref 7–23)
BUN SERPL-MCNC: 84 MG/DL — HIGH (ref 7–23)
BURR CELLS BLD QL SMEAR: PRESENT — SIGNIFICANT CHANGE UP
BURR CELLS BLD QL SMEAR: PRESENT — SIGNIFICANT CHANGE UP
CALCIUM SERPL-MCNC: 8.7 MG/DL — SIGNIFICANT CHANGE UP (ref 8.4–10.5)
CALCIUM SERPL-MCNC: 8.7 MG/DL — SIGNIFICANT CHANGE UP (ref 8.4–10.5)
CHLORIDE SERPL-SCNC: 108 MMOL/L — SIGNIFICANT CHANGE UP (ref 96–108)
CHLORIDE SERPL-SCNC: 108 MMOL/L — SIGNIFICANT CHANGE UP (ref 96–108)
CO2 SERPL-SCNC: 19 MMOL/L — LOW (ref 22–31)
CO2 SERPL-SCNC: 19 MMOL/L — LOW (ref 22–31)
CREAT SERPL-MCNC: 2.73 MG/DL — HIGH (ref 0.5–1.3)
CREAT SERPL-MCNC: 2.73 MG/DL — HIGH (ref 0.5–1.3)
CRP SERPL-MCNC: 26.4 MG/L — HIGH (ref 0–4)
CRP SERPL-MCNC: 26.4 MG/L — HIGH (ref 0–4)
DACRYOCYTES BLD QL SMEAR: SLIGHT — SIGNIFICANT CHANGE UP
DACRYOCYTES BLD QL SMEAR: SLIGHT — SIGNIFICANT CHANGE UP
EGFR: 23 ML/MIN/1.73M2 — LOW
EGFR: 23 ML/MIN/1.73M2 — LOW
ELLIPTOCYTES BLD QL SMEAR: SLIGHT — SIGNIFICANT CHANGE UP
ELLIPTOCYTES BLD QL SMEAR: SLIGHT — SIGNIFICANT CHANGE UP
EOSINOPHIL # BLD AUTO: 0 K/UL — SIGNIFICANT CHANGE UP (ref 0–0.5)
EOSINOPHIL # BLD AUTO: 0 K/UL — SIGNIFICANT CHANGE UP (ref 0–0.5)
EOSINOPHIL NFR BLD AUTO: 0 % — SIGNIFICANT CHANGE UP (ref 0–6)
EOSINOPHIL NFR BLD AUTO: 0 % — SIGNIFICANT CHANGE UP (ref 0–6)
ERYTHROCYTE [SEDIMENTATION RATE] IN BLOOD: >140 MM/HR — HIGH
ERYTHROCYTE [SEDIMENTATION RATE] IN BLOOD: >140 MM/HR — HIGH
GIANT PLATELETS BLD QL SMEAR: PRESENT — SIGNIFICANT CHANGE UP
GIANT PLATELETS BLD QL SMEAR: PRESENT — SIGNIFICANT CHANGE UP
GLUCOSE SERPL-MCNC: 90 MG/DL — SIGNIFICANT CHANGE UP (ref 70–99)
GLUCOSE SERPL-MCNC: 90 MG/DL — SIGNIFICANT CHANGE UP (ref 70–99)
HCT VFR BLD CALC: 22.9 % — LOW (ref 34.5–45)
HCT VFR BLD CALC: 22.9 % — LOW (ref 34.5–45)
HGB BLD-MCNC: 7.6 G/DL — LOW (ref 11.5–15.5)
HGB BLD-MCNC: 7.6 G/DL — LOW (ref 11.5–15.5)
HYPOCHROMIA BLD QL: SLIGHT — SIGNIFICANT CHANGE UP
HYPOCHROMIA BLD QL: SLIGHT — SIGNIFICANT CHANGE UP
LYMPHOCYTES # BLD AUTO: 0.09 K/UL — LOW (ref 1–3.3)
LYMPHOCYTES # BLD AUTO: 0.09 K/UL — LOW (ref 1–3.3)
LYMPHOCYTES # BLD AUTO: 1.7 % — LOW (ref 13–44)
LYMPHOCYTES # BLD AUTO: 1.7 % — LOW (ref 13–44)
MACROCYTES BLD QL: SLIGHT — SIGNIFICANT CHANGE UP
MACROCYTES BLD QL: SLIGHT — SIGNIFICANT CHANGE UP
MAGNESIUM SERPL-MCNC: 1.8 MG/DL — SIGNIFICANT CHANGE UP (ref 1.6–2.6)
MAGNESIUM SERPL-MCNC: 1.8 MG/DL — SIGNIFICANT CHANGE UP (ref 1.6–2.6)
MANUAL SMEAR VERIFICATION: SIGNIFICANT CHANGE UP
MANUAL SMEAR VERIFICATION: SIGNIFICANT CHANGE UP
MCHC RBC-ENTMCNC: 30 PG — SIGNIFICANT CHANGE UP (ref 27–34)
MCHC RBC-ENTMCNC: 30 PG — SIGNIFICANT CHANGE UP (ref 27–34)
MCHC RBC-ENTMCNC: 33.2 GM/DL — SIGNIFICANT CHANGE UP (ref 32–36)
MCHC RBC-ENTMCNC: 33.2 GM/DL — SIGNIFICANT CHANGE UP (ref 32–36)
MCV RBC AUTO: 90.5 FL — SIGNIFICANT CHANGE UP (ref 80–100)
MCV RBC AUTO: 90.5 FL — SIGNIFICANT CHANGE UP (ref 80–100)
MICROCYTES BLD QL: SLIGHT — SIGNIFICANT CHANGE UP
MICROCYTES BLD QL: SLIGHT — SIGNIFICANT CHANGE UP
MONOCYTES # BLD AUTO: 0.13 K/UL — SIGNIFICANT CHANGE UP (ref 0–0.9)
MONOCYTES # BLD AUTO: 0.13 K/UL — SIGNIFICANT CHANGE UP (ref 0–0.9)
MONOCYTES NFR BLD AUTO: 2.6 % — SIGNIFICANT CHANGE UP (ref 2–14)
MONOCYTES NFR BLD AUTO: 2.6 % — SIGNIFICANT CHANGE UP (ref 2–14)
NEUTROPHILS # BLD AUTO: 4.79 K/UL — SIGNIFICANT CHANGE UP (ref 1.8–7.4)
NEUTROPHILS # BLD AUTO: 4.79 K/UL — SIGNIFICANT CHANGE UP (ref 1.8–7.4)
NEUTROPHILS NFR BLD AUTO: 95.7 % — HIGH (ref 43–77)
NEUTROPHILS NFR BLD AUTO: 95.7 % — HIGH (ref 43–77)
OVALOCYTES BLD QL SMEAR: SLIGHT — SIGNIFICANT CHANGE UP
OVALOCYTES BLD QL SMEAR: SLIGHT — SIGNIFICANT CHANGE UP
PHOSPHATE SERPL-MCNC: 4.7 MG/DL — HIGH (ref 2.5–4.5)
PHOSPHATE SERPL-MCNC: 4.7 MG/DL — HIGH (ref 2.5–4.5)
PLAT MORPH BLD: ABNORMAL
PLAT MORPH BLD: ABNORMAL
PLATELET # BLD AUTO: 111 K/UL — LOW (ref 150–400)
PLATELET # BLD AUTO: 111 K/UL — LOW (ref 150–400)
POIKILOCYTOSIS BLD QL AUTO: SIGNIFICANT CHANGE UP
POIKILOCYTOSIS BLD QL AUTO: SIGNIFICANT CHANGE UP
POTASSIUM SERPL-MCNC: 3.7 MMOL/L — SIGNIFICANT CHANGE UP (ref 3.5–5.3)
POTASSIUM SERPL-MCNC: 3.7 MMOL/L — SIGNIFICANT CHANGE UP (ref 3.5–5.3)
POTASSIUM SERPL-SCNC: 3.7 MMOL/L — SIGNIFICANT CHANGE UP (ref 3.5–5.3)
POTASSIUM SERPL-SCNC: 3.7 MMOL/L — SIGNIFICANT CHANGE UP (ref 3.5–5.3)
PROT SERPL-MCNC: 5.3 G/DL — LOW (ref 6–8.3)
PROT SERPL-MCNC: 5.3 G/DL — LOW (ref 6–8.3)
RBC # BLD: 2.53 M/UL — LOW (ref 3.8–5.2)
RBC # BLD: 2.53 M/UL — LOW (ref 3.8–5.2)
RBC # FLD: 15.2 % — HIGH (ref 10.3–14.5)
RBC # FLD: 15.2 % — HIGH (ref 10.3–14.5)
RBC BLD AUTO: ABNORMAL
RBC BLD AUTO: ABNORMAL
SODIUM SERPL-SCNC: 140 MMOL/L — SIGNIFICANT CHANGE UP (ref 135–145)
SODIUM SERPL-SCNC: 140 MMOL/L — SIGNIFICANT CHANGE UP (ref 135–145)
URATE SERPL-MCNC: 8 MG/DL — HIGH (ref 2.5–7)
URATE SERPL-MCNC: 8 MG/DL — HIGH (ref 2.5–7)
VANCOMYCIN TROUGH SERPL-MCNC: 23.3 UG/ML — HIGH (ref 10–20)
VANCOMYCIN TROUGH SERPL-MCNC: 23.3 UG/ML — HIGH (ref 10–20)
WBC # BLD: 5 K/UL — SIGNIFICANT CHANGE UP (ref 3.8–10.5)
WBC # BLD: 5 K/UL — SIGNIFICANT CHANGE UP (ref 3.8–10.5)
WBC # FLD AUTO: 5 K/UL — SIGNIFICANT CHANGE UP (ref 3.8–10.5)
WBC # FLD AUTO: 5 K/UL — SIGNIFICANT CHANGE UP (ref 3.8–10.5)

## 2023-10-28 RX ORDER — MAGNESIUM SULFATE 500 MG/ML
2 VIAL (ML) INJECTION ONCE
Refills: 0 | Status: COMPLETED | OUTPATIENT
Start: 2023-10-28 | End: 2023-10-28

## 2023-10-28 RX ORDER — POTASSIUM CHLORIDE 20 MEQ
20 PACKET (EA) ORAL ONCE
Refills: 0 | Status: COMPLETED | OUTPATIENT
Start: 2023-10-28 | End: 2023-10-28

## 2023-10-28 RX ADMIN — Medication 40 MILLIGRAM(S): at 06:08

## 2023-10-28 RX ADMIN — Medication 25 GRAM(S): at 17:14

## 2023-10-28 RX ADMIN — Medication 650 MILLIGRAM(S): at 22:41

## 2023-10-28 RX ADMIN — PANTOPRAZOLE SODIUM 40 MILLIGRAM(S): 20 TABLET, DELAYED RELEASE ORAL at 06:09

## 2023-10-28 RX ADMIN — Medication 650 MILLIGRAM(S): at 06:08

## 2023-10-28 RX ADMIN — CEFTRIAXONE 100 MILLIGRAM(S): 500 INJECTION, POWDER, FOR SOLUTION INTRAMUSCULAR; INTRAVENOUS at 22:42

## 2023-10-28 RX ADMIN — Medication 20 MILLIEQUIVALENT(S): at 17:14

## 2023-10-28 RX ADMIN — LISINOPRIL 40 MILLIGRAM(S): 2.5 TABLET ORAL at 06:07

## 2023-10-28 RX ADMIN — Medication 100 MILLIGRAM(S): at 11:24

## 2023-10-28 RX ADMIN — Medication 0.6 MILLIGRAM(S): at 06:07

## 2023-10-28 RX ADMIN — Medication 30 MILLIGRAM(S): at 06:07

## 2023-10-28 RX ADMIN — Medication 650 MILLIGRAM(S): at 14:09

## 2023-10-28 RX ADMIN — Medication 0.6 MILLIGRAM(S): at 17:14

## 2023-10-28 RX ADMIN — Medication 200 MILLIGRAM(S): at 11:24

## 2023-10-28 RX ADMIN — HEPARIN SODIUM 5000 UNIT(S): 5000 INJECTION INTRAVENOUS; SUBCUTANEOUS at 06:08

## 2023-10-28 NOTE — PROGRESS NOTE ADULT - ASSESSMENT
33 F PMHx SLE since middle school, diagnosed with lupus nephritis 1 month ago, and hypertension currently admitted for evaluation of worsening left hand and wrist swelling for one week. Inflammatory vs infectious cause, antibiotics initiated. She is improving, with reduction in swelling and erythema on the left hand. Disposition to continue observation on IV ceftriaxone for 24-48 hours, potentially discharge after observation with PO antibiotics.

## 2023-10-28 NOTE — PROGRESS NOTE ADULT - PROBLEM SELECTOR PLAN 3
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   - stop tosemide 20 mg daily as per nephrology recommendations, LE edema improved   - trend creatinine  - nephrology consult recommendations appreciated  - hand surgery consult recommendations appreciated  - continue to hold Cellcept 500mg PO - 2 tablets q12  - c/w allopurinol 100mg PO qd  - c/w hydroxychloroquine 200mg PO qd  - continue to hold metolazone 5mg PO q12  - c/w prednisone 60mg PO qd  - c/w Pantoprazole 40mg PO qd Patient with a 20-year history of lupus nephritis presents with possible cellulitis in the setting of recent Retuximab administration plus daily steroid use (prednisone; cellcept).   - stop tosemide 20 mg daily as per nephrology recommendations, LE edema improved   - trend creatinine  - nephrology consult in agreement with PO regimen, recommending to obtain urine Na   - continue to hold Cellcept 500mg PO - 2 tablets q12  - c/w allopurinol 100mg PO qd  - c/w hydroxychloroquine 200mg PO qd  - continue to hold metolazone 5mg PO q12  - c/w prednisone 60mg PO qd  - c/w Pantoprazole 40mg PO qd

## 2023-10-28 NOTE — PROGRESS NOTE ADULT - SUBJECTIVE AND OBJECTIVE BOX
Patient is a 33y Female seen and evaluated at bedside. Lying comfortably in bed.  No acute events overnight.  Patient states left wrist swelling and pain slightly improving, now she is able to move her fingers a little bit.  Lower extremity edema also gradually improving.  Does complain of decreased p.o. intake and nausea, she believes it might be related to her medications.  Denies any shortness of breath, chest pain, urinary symptoms.  Is able to ambulate without any difficulty.      Meds:    acetaminophen     Tablet .. 650 every 6 hours PRN  allopurinol 100 daily  cefTRIAXone   IVPB 2000 every 24 hours  colchicine 0.6 two times a day  heparin   Injectable 5000 every 8 hours  hydroxychloroquine 200 every 24 hours  lisinopril 40 daily  NIFEdipine XL 30 daily  ondansetron Injectable 4 every 8 hours PRN  pantoprazole    Tablet 40 before breakfast  predniSONE   Tablet 40 daily  sodium bicarbonate 650 three times a day      T(C): , Max: 36.9 (10-27-23 @ 11:18)  T(F): , Max: 98.4 (10-27-23 @ 11:18)  HR: 76 (10-28-23 @ 05:50)  BP: 118/72 (10-28-23 @ 05:50)  BP(mean): --  RR: 18 (10-28-23 @ 05:50)  SpO2: 97% (10-28-23 @ 05:50)  Wt(kg): --        Review of Systems:  ROS negative except as per HPI      PHYSICAL EXAM:  Constitutional: NAD  HEENT: anicteric sclera, oropharynx clear, MMM  Neck: No JVD  Respiratory: CTAB, no wheezes, rales or rhonchi  Cardiovascular: S1, S2, RRR  Gastrointestinal: BS+, soft, NT/ND  Extremities: LUE swollen and tender at the level of the wrist and hand, improved, decreased ROM due to pain; 1+ peripheral edema in LEs, improving.   Neurological: A/O x 3, no focal deficits      LABS:                        7.6    5.00  )-----------( 111      ( 28 Oct 2023 07:42 )             22.9     10-28    140  |  108  |  84<H>  ----------------------------<  90  3.7   |  19<L>  |  2.73<H>    Ca    8.7      28 Oct 2023 07:42  Phos  4.7     10-28  Mg     1.8     10-28    TPro  5.3<L>  /  Alb  2.1<L>  /  TBili  <0.2  /  DBili  x   /  AST  12  /  ALT  9<L>  /  AlkPhos  61  10-28    Uric Acid: 8.0 mg/dL *H* [2.5 - 7.0] (10-28 @ 07:42)      Urinalysis Basic - ( 28 Oct 2023 07:42 )    Color: x / Appearance: x / SG: x / pH: x  Gluc: 90 mg/dL / Ketone: x  / Bili: x / Urobili: x   Blood: x / Protein: x / Nitrite: x   Leuk Esterase: x / RBC: x / WBC x   Sq Epi: x / Non Sq Epi: x / Bacteria: x            RADIOLOGY & ADDITIONAL STUDIES:

## 2023-10-28 NOTE — PROGRESS NOTE ADULT - PROBLEM SELECTOR PLAN 2
Patient with a 20-year history of lupus, recently diagnosed with biopsy confirmed lupus nephritis presents with diffuse swelling of the left wrist and hand with warmth, pain, and rubor, 2 weeks following a first-time administration of Rituxumab. c/f gout flare with superimposed cellulitis vs septic joint vs inflammatory indications (SLE,   Plan:  - Completed vancomycin 750mg IV qd   -Continue ceftriaxone 2g IV qd under observation for 24-48 hours, potential discharge after observation with PO antibiotics  - Plastic surgery hand specialist consulted and will follow; ortho not following unless specifically requested since plastics is already following  - Colchicine 0.6mg BID  - Rheumatology consult  - compartment checks. Patient with a 20-year history of lupus, recently diagnosed with biopsy confirmed lupus nephritis presents with diffuse swelling of the left wrist and hand with warmth, pain, and rubor, 2 weeks following a first-time administration of Rituxumab. c/f gout flare with superimposed cellulitis vs septic joint vs inflammatory indications (SLE,   Plan:  - Completed vancomycin 750mg IV qd   -Continue ceftriaxone 2g IV qd under observation for 24-48 hours, potential discharge after observation with PO antibiotics  - Hand surgery recomeding to DC on oral abx and follow up outpatient with rheum  - Colchicine 0.6mg BID  - Rheumatology consult  - compartment checks.

## 2023-10-28 NOTE — CONSULT NOTE ADULT - SUBJECTIVE AND OBJECTIVE BOX
33 years old female with left wrist swelling   clinical examination has significantly imrpoved with non painful flexo-etion and axial load into the left wrist, redness gone  I would send patient home on oral antibioitcs and follow up with me and her reumathologist

## 2023-10-28 NOTE — CONSULT NOTE ADULT - CONSULT REASON
lrft wrist pain and infection
Left wrist pain and swelling
left hand swelling
left wrist infection
Lupus Nephritis.

## 2023-10-28 NOTE — PROGRESS NOTE ADULT - ASSESSMENT
32 y/o F with a PMH of Lupus Nephritis (Class IV-V), HTN, now admitted with left wrist pain, swelling and fever, suspicious for ?Septic Arthritis/OM/Cellulitis. Nephrology consulted for Lupus Nephritis management/recs.     Scr baseline: 1.5-2.0  Admitted with Cr 2.3, rise to 2.8 10/27, today at 2.7   UPCR: 3.5 (improved from 7.6)  BUN: 84 (iso steroid use)  XR and US reviewed    Imp: Class IV/V Lupus Nephritis with possibly some pre-renal azotemia    Plan:  Do not restart Mycophenolate for now  Cont. home dose Prednisone/RAASi.   Check Leatha. Encourage PO intake  Pte received Rituximab OP, now developing Sepsis, will hold Rituximab for now.   Stop Torsemide 20 daily for now, LEs edema improved.   cont allopurinol - follow uric acid (seems not likely gout now)   Patient may benefit from SGLT2, will f/u OP.   Avoid sudden BP drop.   Treatment of sepsis as per primary team.  Cont. Sodium bicarb 650mg TID   Daily labs   LUE US showed no sonographic evidence of deep venous thrombosis.

## 2023-10-28 NOTE — CONSULT NOTE ADULT - REASON FOR ADMISSION
Left wrist pain and swelling

## 2023-10-28 NOTE — PROGRESS NOTE ADULT - PROBLEM SELECTOR PLAN 1
Fever 103 overnight w/ tachycardia on 10/24/23. c/f L wrist gout flare with superimposed cellulitis vs septic joint   -f/up blood cx (no growth at 2 days),   -hand x-ray: no acute fracture   - Plastic surgery hand specialty consulted- will continue to follow  - Outpatient rheumatologist contacted; gave collateral on 10/25 - one week prior attempted a dorsal approach to aspirate, but was unsuccessful in obtaining any fluid.  - serial exams

## 2023-10-28 NOTE — PROGRESS NOTE ADULT - SUBJECTIVE AND OBJECTIVE BOX
**INCOMPLETE NOTE    OVERNIGHT EVENTS:    SUBJECTIVE:  Patient seen and examined at bedside.  ROS: Patient denies h/n/v/d, fever, chills, cp, palpitations, sob, abd pain, leg swelling, rashes, dysuria, and changes in BM.     Vital Signs Last 12 Hrs  T(F): 98.4 (10-28-23 @ 05:50), Max: 98.4 (10-28-23 @ 05:50)  HR: 76 (10-28-23 @ 05:50) (76 - 76)  BP: 118/72 (10-28-23 @ 05:50) (118/72 - 118/72)  BP(mean): --  RR: 18 (10-28-23 @ 05:50) (18 - 18)  SpO2: 97% (10-28-23 @ 05:50) (97% - 97%)  I&O's Summary      PHYSICAL EXAM:  Constitutional: NAD, comfortable in bed.  HEENT: NC/AT, PERRLA, EOMI, no conjunctival pallor or scleral icterus, MMM  Neck: Supple, no JVD  Respiratory: CTA B/L. No w/r/r.   Cardiovascular: RRR, normal S1 and S2, no m/r/g.   Gastrointestinal: +BS, soft NTND, no guarding or rebound tenderness, no palpable masses   Extremities: wwp; no cyanosis, clubbing or edema.   Vascular: Pulses equal and strong throughout.   Neurological: AAOx3, no CN deficits, strength and sensation intact throughout.   Skin: No gross skin abnormalities or rashes        LABS:                        7.6    5.00  )-----------( 111      ( 28 Oct 2023 07:42 )             22.9     10-28    140  |  108  |  84<H>  ----------------------------<  90  3.7   |  19<L>  |  2.73<H>    Ca    8.7      28 Oct 2023 07:42  Phos  4.7     10-28  Mg     1.8     10-28    TPro  5.3<L>  /  Alb  2.1<L>  /  TBili  <0.2  /  DBili  x   /  AST  12  /  ALT  9<L>  /  AlkPhos  61  10-28      Urinalysis Basic - ( 28 Oct 2023 07:42 )    Color: x / Appearance: x / SG: x / pH: x  Gluc: 90 mg/dL / Ketone: x  / Bili: x / Urobili: x   Blood: x / Protein: x / Nitrite: x   Leuk Esterase: x / RBC: x / WBC x   Sq Epi: x / Non Sq Epi: x / Bacteria: x          RADIOLOGY & ADDITIONAL TESTS:    MEDICATIONS  (STANDING):  allopurinol 100 milliGRAM(s) Oral daily  cefTRIAXone   IVPB 2000 milliGRAM(s) IV Intermittent every 24 hours  colchicine 0.6 milliGRAM(s) Oral two times a day  heparin   Injectable 5000 Unit(s) SubCutaneous every 8 hours  hydroxychloroquine 200 milliGRAM(s) Oral every 24 hours  lisinopril 40 milliGRAM(s) Oral daily  NIFEdipine XL 30 milliGRAM(s) Oral daily  pantoprazole    Tablet 40 milliGRAM(s) Oral before breakfast  predniSONE   Tablet 40 milliGRAM(s) Oral daily  sodium bicarbonate 650 milliGRAM(s) Oral three times a day    MEDICATIONS  (PRN):  acetaminophen     Tablet .. 650 milliGRAM(s) Oral every 6 hours PRN Temp greater or equal to 38C (100.4F), Mild Pain (1 - 3)  ondansetron Injectable 4 milliGRAM(s) IV Push every 8 hours PRN Nausea and/or Vomiting     OVERNIGHT EVENTS: SE     SUBJECTIVE:  Patient seen and examined at bedside. States her hand is feeling slightly better and that she is able to move it more. However she states that she feels her elbow is swollen   ROS: Patient denies h/n/v/d, fever, chills, cp, palpitations, sob, abd pain, leg swelling, rashes, dysuria, and changes in BM.     Vital Signs Last 12 Hrs  T(F): 98.4 (10-28-23 @ 05:50), Max: 98.4 (10-28-23 @ 05:50)  HR: 76 (10-28-23 @ 05:50) (76 - 76)  BP: 118/72 (10-28-23 @ 05:50) (118/72 - 118/72)  BP(mean): --  RR: 18 (10-28-23 @ 05:50) (18 - 18)  SpO2: 97% (10-28-23 @ 05:50) (97% - 97%)  I&O's Summary      PHYSICAL EXAM:  Constitutional: NAD, comfortable in bed.  HEENT: NC/AT, PERRLA, EOMI, no conjunctival pallor or scleral icterus, MMM  Neck: Supple, no JVD  Respiratory: CTA B/L. No w/r/r.   Cardiovascular: RRR, normal S1 and S2, no m/r/g.   Gastrointestinal: +BS, soft NTND, no guarding or rebound tenderness, no palpable masses   Extremities WWP; no clubbing or cyanosis; pitting edema to 2+ on the LE b/l, improving; left arm swelling to the elbow, also improving. Minimal Redness in the left arm present, but continuing to improving.  Vascular: Pulses equal and strong throughout.   Neurological: AAOx3, no CN deficits, strength and sensation intact throughout.   Skin: No gross skin abnormalities or rashes        LABS:                        7.6    5.00  )-----------( 111      ( 28 Oct 2023 07:42 )             22.9     10-28    140  |  108  |  84<H>  ----------------------------<  90  3.7   |  19<L>  |  2.73<H>    Ca    8.7      28 Oct 2023 07:42  Phos  4.7     10-28  Mg     1.8     10-28    TPro  5.3<L>  /  Alb  2.1<L>  /  TBili  <0.2  /  DBili  x   /  AST  12  /  ALT  9<L>  /  AlkPhos  61  10-28      Urinalysis Basic - ( 28 Oct 2023 07:42 )    Color: x / Appearance: x / SG: x / pH: x  Gluc: 90 mg/dL / Ketone: x  / Bili: x / Urobili: x   Blood: x / Protein: x / Nitrite: x   Leuk Esterase: x / RBC: x / WBC x   Sq Epi: x / Non Sq Epi: x / Bacteria: x          RADIOLOGY & ADDITIONAL TESTS:    MEDICATIONS  (STANDING):  allopurinol 100 milliGRAM(s) Oral daily  cefTRIAXone   IVPB 2000 milliGRAM(s) IV Intermittent every 24 hours  colchicine 0.6 milliGRAM(s) Oral two times a day  heparin   Injectable 5000 Unit(s) SubCutaneous every 8 hours  hydroxychloroquine 200 milliGRAM(s) Oral every 24 hours  lisinopril 40 milliGRAM(s) Oral daily  NIFEdipine XL 30 milliGRAM(s) Oral daily  pantoprazole    Tablet 40 milliGRAM(s) Oral before breakfast  predniSONE   Tablet 40 milliGRAM(s) Oral daily  sodium bicarbonate 650 milliGRAM(s) Oral three times a day    MEDICATIONS  (PRN):  acetaminophen     Tablet .. 650 milliGRAM(s) Oral every 6 hours PRN Temp greater or equal to 38C (100.4F), Mild Pain (1 - 3)  ondansetron Injectable 4 milliGRAM(s) IV Push every 8 hours PRN Nausea and/or Vomiting

## 2023-10-29 ENCOUNTER — TRANSCRIPTION ENCOUNTER (OUTPATIENT)
Age: 33
End: 2023-10-29

## 2023-10-29 VITALS
OXYGEN SATURATION: 97 % | HEART RATE: 91 BPM | SYSTOLIC BLOOD PRESSURE: 116 MMHG | TEMPERATURE: 98 F | DIASTOLIC BLOOD PRESSURE: 74 MMHG | RESPIRATION RATE: 16 BRPM

## 2023-10-29 LAB
ALBUMIN SERPL ELPH-MCNC: 2 G/DL — LOW (ref 3.3–5)
ALBUMIN SERPL ELPH-MCNC: 2 G/DL — LOW (ref 3.3–5)
ALP SERPL-CCNC: 58 U/L — SIGNIFICANT CHANGE UP (ref 40–120)
ALP SERPL-CCNC: 58 U/L — SIGNIFICANT CHANGE UP (ref 40–120)
ALT FLD-CCNC: 9 U/L — LOW (ref 10–45)
ALT FLD-CCNC: 9 U/L — LOW (ref 10–45)
ANION GAP SERPL CALC-SCNC: 11 MMOL/L — SIGNIFICANT CHANGE UP (ref 5–17)
ANION GAP SERPL CALC-SCNC: 11 MMOL/L — SIGNIFICANT CHANGE UP (ref 5–17)
AST SERPL-CCNC: 13 U/L — SIGNIFICANT CHANGE UP (ref 10–40)
AST SERPL-CCNC: 13 U/L — SIGNIFICANT CHANGE UP (ref 10–40)
BILIRUB SERPL-MCNC: <0.2 MG/DL — SIGNIFICANT CHANGE UP (ref 0.2–1.2)
BILIRUB SERPL-MCNC: <0.2 MG/DL — SIGNIFICANT CHANGE UP (ref 0.2–1.2)
BUN SERPL-MCNC: 88 MG/DL — HIGH (ref 7–23)
BUN SERPL-MCNC: 88 MG/DL — HIGH (ref 7–23)
CALCIUM SERPL-MCNC: 8.6 MG/DL — SIGNIFICANT CHANGE UP (ref 8.4–10.5)
CALCIUM SERPL-MCNC: 8.6 MG/DL — SIGNIFICANT CHANGE UP (ref 8.4–10.5)
CHLORIDE SERPL-SCNC: 110 MMOL/L — HIGH (ref 96–108)
CHLORIDE SERPL-SCNC: 110 MMOL/L — HIGH (ref 96–108)
CO2 SERPL-SCNC: 19 MMOL/L — LOW (ref 22–31)
CO2 SERPL-SCNC: 19 MMOL/L — LOW (ref 22–31)
CREAT SERPL-MCNC: 2.32 MG/DL — HIGH (ref 0.5–1.3)
CREAT SERPL-MCNC: 2.32 MG/DL — HIGH (ref 0.5–1.3)
CRP SERPL-MCNC: 14.8 MG/L — HIGH (ref 0–4)
CRP SERPL-MCNC: 14.8 MG/L — HIGH (ref 0–4)
EGFR: 27 ML/MIN/1.73M2 — LOW
EGFR: 27 ML/MIN/1.73M2 — LOW
GLUCOSE SERPL-MCNC: 86 MG/DL — SIGNIFICANT CHANGE UP (ref 70–99)
GLUCOSE SERPL-MCNC: 86 MG/DL — SIGNIFICANT CHANGE UP (ref 70–99)
HCT VFR BLD CALC: 24.5 % — LOW (ref 34.5–45)
HCT VFR BLD CALC: 24.5 % — LOW (ref 34.5–45)
HGB BLD-MCNC: 8 G/DL — LOW (ref 11.5–15.5)
HGB BLD-MCNC: 8 G/DL — LOW (ref 11.5–15.5)
MAGNESIUM SERPL-MCNC: 2.5 MG/DL — SIGNIFICANT CHANGE UP (ref 1.6–2.6)
MAGNESIUM SERPL-MCNC: 2.5 MG/DL — SIGNIFICANT CHANGE UP (ref 1.6–2.6)
MCHC RBC-ENTMCNC: 29.6 PG — SIGNIFICANT CHANGE UP (ref 27–34)
MCHC RBC-ENTMCNC: 29.6 PG — SIGNIFICANT CHANGE UP (ref 27–34)
MCHC RBC-ENTMCNC: 32.7 GM/DL — SIGNIFICANT CHANGE UP (ref 32–36)
MCHC RBC-ENTMCNC: 32.7 GM/DL — SIGNIFICANT CHANGE UP (ref 32–36)
MCV RBC AUTO: 90.7 FL — SIGNIFICANT CHANGE UP (ref 80–100)
MCV RBC AUTO: 90.7 FL — SIGNIFICANT CHANGE UP (ref 80–100)
NRBC # BLD: 0 /100 WBCS — SIGNIFICANT CHANGE UP (ref 0–0)
NRBC # BLD: 0 /100 WBCS — SIGNIFICANT CHANGE UP (ref 0–0)
PHOSPHATE SERPL-MCNC: 4.5 MG/DL — SIGNIFICANT CHANGE UP (ref 2.5–4.5)
PHOSPHATE SERPL-MCNC: 4.5 MG/DL — SIGNIFICANT CHANGE UP (ref 2.5–4.5)
PLATELET # BLD AUTO: 111 K/UL — LOW (ref 150–400)
PLATELET # BLD AUTO: 111 K/UL — LOW (ref 150–400)
POTASSIUM SERPL-MCNC: 3.7 MMOL/L — SIGNIFICANT CHANGE UP (ref 3.5–5.3)
POTASSIUM SERPL-MCNC: 3.7 MMOL/L — SIGNIFICANT CHANGE UP (ref 3.5–5.3)
POTASSIUM SERPL-SCNC: 3.7 MMOL/L — SIGNIFICANT CHANGE UP (ref 3.5–5.3)
POTASSIUM SERPL-SCNC: 3.7 MMOL/L — SIGNIFICANT CHANGE UP (ref 3.5–5.3)
PROT SERPL-MCNC: 5 G/DL — LOW (ref 6–8.3)
PROT SERPL-MCNC: 5 G/DL — LOW (ref 6–8.3)
RBC # BLD: 2.7 M/UL — LOW (ref 3.8–5.2)
RBC # BLD: 2.7 M/UL — LOW (ref 3.8–5.2)
RBC # FLD: 15.1 % — HIGH (ref 10.3–14.5)
RBC # FLD: 15.1 % — HIGH (ref 10.3–14.5)
SODIUM SERPL-SCNC: 140 MMOL/L — SIGNIFICANT CHANGE UP (ref 135–145)
SODIUM SERPL-SCNC: 140 MMOL/L — SIGNIFICANT CHANGE UP (ref 135–145)
WBC # BLD: 3.58 K/UL — LOW (ref 3.8–10.5)
WBC # BLD: 3.58 K/UL — LOW (ref 3.8–10.5)
WBC # FLD AUTO: 3.58 K/UL — LOW (ref 3.8–10.5)
WBC # FLD AUTO: 3.58 K/UL — LOW (ref 3.8–10.5)

## 2023-10-29 PROCEDURE — 83735 ASSAY OF MAGNESIUM: CPT

## 2023-10-29 PROCEDURE — 85652 RBC SED RATE AUTOMATED: CPT

## 2023-10-29 PROCEDURE — 86140 C-REACTIVE PROTEIN: CPT

## 2023-10-29 PROCEDURE — 81001 URINALYSIS AUTO W/SCOPE: CPT

## 2023-10-29 PROCEDURE — 96374 THER/PROPH/DIAG INJ IV PUSH: CPT

## 2023-10-29 PROCEDURE — 73110 X-RAY EXAM OF WRIST: CPT

## 2023-10-29 PROCEDURE — 84145 PROCALCITONIN (PCT): CPT

## 2023-10-29 PROCEDURE — 85027 COMPLETE CBC AUTOMATED: CPT

## 2023-10-29 PROCEDURE — 97165 OT EVAL LOW COMPLEX 30 MIN: CPT

## 2023-10-29 PROCEDURE — 87086 URINE CULTURE/COLONY COUNT: CPT

## 2023-10-29 PROCEDURE — 80202 ASSAY OF VANCOMYCIN: CPT

## 2023-10-29 PROCEDURE — 80076 HEPATIC FUNCTION PANEL: CPT

## 2023-10-29 PROCEDURE — 99233 SBSQ HOSP IP/OBS HIGH 50: CPT

## 2023-10-29 PROCEDURE — 36415 COLL VENOUS BLD VENIPUNCTURE: CPT

## 2023-10-29 PROCEDURE — 87040 BLOOD CULTURE FOR BACTERIA: CPT

## 2023-10-29 PROCEDURE — 84100 ASSAY OF PHOSPHORUS: CPT

## 2023-10-29 PROCEDURE — 84550 ASSAY OF BLOOD/URIC ACID: CPT

## 2023-10-29 PROCEDURE — 85025 COMPLETE CBC W/AUTO DIFF WBC: CPT

## 2023-10-29 PROCEDURE — 99285 EMERGENCY DEPT VISIT HI MDM: CPT | Mod: 25

## 2023-10-29 PROCEDURE — 80048 BASIC METABOLIC PNL TOTAL CA: CPT

## 2023-10-29 PROCEDURE — 80053 COMPREHEN METABOLIC PANEL: CPT

## 2023-10-29 PROCEDURE — 84702 CHORIONIC GONADOTROPIN TEST: CPT

## 2023-10-29 RX ORDER — CEFPODOXIME PROXETIL 100 MG
2 TABLET ORAL
Qty: 40 | Refills: 0
Start: 2023-10-29 | End: 2023-11-07

## 2023-10-29 RX ORDER — CEFTRIAXONE 500 MG/1
2000 INJECTION, POWDER, FOR SOLUTION INTRAMUSCULAR; INTRAVENOUS EVERY 24 HOURS
Refills: 0 | Status: DISCONTINUED | OUTPATIENT
Start: 2023-10-29 | End: 2023-10-29

## 2023-10-29 RX ORDER — CEFPODOXIME PROXETIL 100 MG
1 TABLET ORAL
Qty: 20 | Refills: 0
Start: 2023-10-29 | End: 2023-11-07

## 2023-10-29 RX ORDER — CEFPODOXIME PROXETIL 100 MG
400 TABLET ORAL ONCE
Refills: 0 | Status: COMPLETED | OUTPATIENT
Start: 2023-10-29 | End: 2023-10-29

## 2023-10-29 RX ORDER — CEFPODOXIME PROXETIL 100 MG
100 TABLET ORAL ONCE
Refills: 0 | Status: DISCONTINUED | OUTPATIENT
Start: 2023-10-29 | End: 2023-10-29

## 2023-10-29 RX ADMIN — LISINOPRIL 40 MILLIGRAM(S): 2.5 TABLET ORAL at 06:48

## 2023-10-29 RX ADMIN — Medication 400 MILLIGRAM(S): at 20:07

## 2023-10-29 RX ADMIN — Medication 650 MILLIGRAM(S): at 13:30

## 2023-10-29 RX ADMIN — Medication 200 MILLIGRAM(S): at 12:15

## 2023-10-29 RX ADMIN — Medication 100 MILLIGRAM(S): at 20:07

## 2023-10-29 RX ADMIN — Medication 650 MILLIGRAM(S): at 06:48

## 2023-10-29 RX ADMIN — Medication 30 MILLIGRAM(S): at 06:48

## 2023-10-29 RX ADMIN — Medication 0.6 MILLIGRAM(S): at 19:02

## 2023-10-29 RX ADMIN — PANTOPRAZOLE SODIUM 40 MILLIGRAM(S): 20 TABLET, DELAYED RELEASE ORAL at 06:48

## 2023-10-29 RX ADMIN — Medication 100 MILLIGRAM(S): at 12:15

## 2023-10-29 RX ADMIN — Medication 40 MILLIGRAM(S): at 06:48

## 2023-10-29 RX ADMIN — Medication 0.6 MILLIGRAM(S): at 06:48

## 2023-10-29 NOTE — DISCHARGE NOTE NURSING/CASE MANAGEMENT/SOCIAL WORK - PATIENT PORTAL LINK FT
You can access the FollowMyHealth Patient Portal offered by [facility] by registering at the following website: [email or web address]. By joining [facility]’s FollowMyHealth portal, you will also be able to view your health information using other applications (apps) compatible with our system.

## 2023-10-29 NOTE — PROGRESS NOTE ADULT - ASSESSMENT
32 y/o F with a PMH of Lupus Nephritis (Class IV-V), HTN, now admitted with left wrist pain, swelling and fever, suspicious for ?Septic Arthritis/OM/Cellulitis. Nephrology consulted for Lupus Nephritis management/recs.     Scr baseline: 1.5-2.0  Admitted with Cr 2.3, rise to 2.8 10/27, now improving, today at 2.32   UPCR: 3.5 (improved from 7.6)  BUN: 88 (iso steroid use)  XR and US reviewed    Imp: Class IV/V Lupus Nephritis with possibly some pre-renal azotemia    Plan:  Do not restart Mycophenolate for now  Cont. home dose Prednisone/RAASi.   Encourage PO intake  Pte received Rituximab OP, now developing Sepsis, will hold Rituximab for now.   Stop Torsemide 20 daily for now, LEs edema improved.   cont allopurinol - follow uric acid (seems not likely gout now)   Patient may benefit from SGLT2, will f/u OP.   Avoid sudden BP drop.   Treatment of sepsis as per primary team.  Cont. Sodium bicarb 650mg TID   Daily labs   LUE US showed no sonographic evidence of deep venous thrombosis. 32 y/o F with a PMH of Lupus Nephritis (Class IV-V), HTN, now admitted with left wrist pain, swelling and fever, suspicious for ?Septic Arthritis/OM/Cellulitis. Nephrology consulted for Lupus Nephritis management/recs.     Scr baseline: 1.5-2.0  Admitted with Cr 2.3, rise to 2.8 10/27, now improving, today at 2.32   UPCR: 3.5 (improved from 7.6)  BUN: 88 (iso steroid use)  XR and US reviewed    Imp: Class IV/V Lupus Nephritis with possibly some pre-renal azotemia    Plan:  Do not restart Mycophenolate for now  Cont. home dose Prednisone/RAASi.   Encourage PO intake  Pte received Rituximab OP, now developing Sepsis, will hold Rituximab for now.   Stop Torsemide 20 daily for now, LEs edema improved.   cont allopurinol - follow uric acid (seems not likely gout now)   Patient may benefit from SGLT2, will f/u OP.   Avoid sudden BP drop.   Treatment of sepsis as per primary team.  Cont. Sodium bicarb 650mg TID   Daily labs   LUE US showed no sonographic evidence of deep venous thrombosis.  On discharge: Keep holding MMF and torsemide. Pt has appt with nephrology Dr. Jerez on 11/1 so can follow up there and MMF and diuretic can be managed then. Ensure pt has BMP before her office visit on 11/1

## 2023-10-29 NOTE — PROGRESS NOTE ADULT - SUBJECTIVE AND OBJECTIVE BOX
Patient was seen and examined at bedside. Case discuss with resident. Pt denied having hand pain this morning. Pt just reports swelling.     OBJECTIVE:  Vital Signs Last 24 Hrs  T(C): 36.9 (29 Oct 2023 12:33), Max: 37 (28 Oct 2023 22:38)  T(F): 98.4 (29 Oct 2023 12:33), Max: 98.6 (28 Oct 2023 22:38)  HR: 91 (29 Oct 2023 12:33) (77 - 91)  BP: 116/74 (29 Oct 2023 12:33) (108/61 - 127/82)  RR: 16 (29 Oct 2023 12:33) (16 - 18)  SpO2: 97% (29 Oct 2023 12:33) (97% - 98%)    Parameters below as of 29 Oct 2023 12:33  Patient On (Oxygen Delivery Method): room air    PHYSICAL EXAM:  Gen: NAD laying in bed  CV: RRR, +S1/S2, no mumur  Pulm: CTA b/l no wheezing or crackles   Abd: soft, NTND + BS no rebound or guarding   Left hand swelling and contracture of fingers       LABS:                        8.0    3.58  )-----------( 111      ( 29 Oct 2023 09:10 )             24.5     10-29  140  |  110<H>  |  88<H>  ----------------------------<  86  3.7   |  19<L>  |  2.32<H>    Ca    8.6      29 Oct 2023 09:10  Phos  4.5     10-29  Mg     2.5     10-29    TPro  5.0<L>  /  Alb  2.0<L>  /  TBili  <0.2  /  DBili  x   /  AST  13  /  ALT  9<L>  /  AlkPhos  58  10-29    LIVER FUNCTIONS - ( 29 Oct 2023 09:10 )  Alb: 2.0 g/dL / Pro: 5.0 g/dL / ALK PHOS: 58 U/L / ALT: 9 U/L / AST: 13 U/L / GGT: x               acetaminophen     Tablet .. 650 milliGRAM(s) Oral every 6 hours PRN  allopurinol 100 milliGRAM(s) Oral daily  cefTRIAXone   IVPB 2000 milliGRAM(s) IV Intermittent every 24 hours  colchicine 0.6 milliGRAM(s) Oral two times a day  heparin   Injectable 5000 Unit(s) SubCutaneous every 8 hours  hydroxychloroquine 200 milliGRAM(s) Oral every 24 hours  lisinopril 40 milliGRAM(s) Oral daily  NIFEdipine XL 30 milliGRAM(s) Oral daily  ondansetron Injectable 4 milliGRAM(s) IV Push every 8 hours PRN  pantoprazole    Tablet 40 milliGRAM(s) Oral before breakfast  predniSONE   Tablet 40 milliGRAM(s) Oral daily  sodium bicarbonate 650 milliGRAM(s) Oral three times a day

## 2023-10-29 NOTE — DISCHARGE NOTE NURSING/CASE MANAGEMENT/SOCIAL WORK - NSDCPEFALRISK_GEN_ALL_CORE
For information on Fall & Injury Prevention, visit: [email or web address] OR  [email or web address] OR  https://www.cdc.gov/steadi/patient.html

## 2023-10-29 NOTE — PROGRESS NOTE ADULT - REASON FOR ADMISSION
Left wrist pain and swelling

## 2023-10-29 NOTE — PROGRESS NOTE ADULT - PROVIDER SPECIALTY LIST ADULT
Nephrology
Hospitalist
Nephrology
Internal Medicine

## 2023-10-29 NOTE — PROGRESS NOTE ADULT - ATTENDING COMMENTS
32 y/o F with a PMH of Lupus Nephritis (Class IV-V), HTN, Nephrotic Syndrome, admitted with left wrist pain, swelling and fever, s/p IV Abx with some improvement in sxs and downtrending WBC.    Pt seen and examined at bedside with Dr. Morales.  MMF has been held; prednisone continued.  The pt's outpt Torsemide was also held in light of rise in Cr, which has now improved.  Further, the Pt stated the edema had improved.  ACE-I was maintained in light of proteinuria.  If pt d/c'ed today, she already has a f/u appt with nephrologist, Dr. Jerez on Wednesday.   Agree with details of note above.
cont abx and hand sx f/u  MMF on hold  rituximab second dose on hold  cont pred for now  diuretics lowered
Class IV/V Lupus Nephritis with possibly some pre-renal azotemia  Pt a/w wrist infxn as above.  Chart reviewed at length and case d/w Dr. Morales on renal rounds.  Pt seen at  bedside and reports some decrease in wrist pain today.  Some decrease in LE edema so agree with holding Torsemide for now.   In light of infxn, MMF held.   Agree with continuing prednisone and lisinopril (note improvement in proteinuria).  Please send urine Na.   Renal service will continue to follow with you.
cont hold MMF and ritux  hold diuretics as creat up and edema and BP better  hand sx f/u
Pt seen and examined by me this AM. Agree with treatment plan as above with addition.   pt states slight more localized edema on medial dorsal aspect of hand and lateral wrist. no pain on palpation. afebrile, ESR >140,   labs reviewed.   L hand/wrist edema 2/2 inflammatory vs infectious: prior charts reviewed, on ceftriaxone IV, hand surgery following, if cont worsening of localized edema, ? consider MRI of L hand.   rest of a/p as above,
#Sepsis, 2/2 cellulitis vs. septic joint  #Lupus nephritis with CKD, appears to be stage 4 and around baseline  #SLE  #HTN      -Will c/w CTX and Vanc for now, continue for nows. Discussed with hand surgeon, feels she is improving. Continue to monitor. Seems more infectious than inflammatory. No plans for intervention at this time.   -Renal consult. Rec holding cellcept. c/w prednisone. Switch to torsemide. Hold metolazone.   -c/w plaquenil. Will c/w current dose steroid.   -c/w antihypertensives
#Sepsis, 2/2 cellulitis vs. septic joint  #Lupus nephritis with CKD, appears to be stage 4 and around baseline  #SLE  #HTN      -Will c/w CTX and Vanc for now. Discussed with hand surgeon, feels she is improving. Continue to monitor but some concern today given inflammatory markers slightly uptrended, would like to continue to monitor on IV abx and local control. Seems more infectious than inflammatory. No plans for intervention at this time.   -Renal consult. Rec holding cellcept. c/w prednisone. Switch to torsemide. Hold metolazone. Volume status does seem to continue to improve.   -c/w plaquenil. Will c/w current dose steroid.   -c/w antihypertensives
#Sepsis, 2/2 cellulitis vs. septic joint  #Lupus nephritis with CKD, appears to be stage 4 and around baseline  #SLE  #HTN      -Will c/w CTX and Vanc for now, continue for nows. Discussed with hand surgeon, feels she is improving. Continue to monitor. Seems more infectious than inflammatory.   -Renal consult. Rec holding cellcept. c/w steroids. Switch to torsemide. Hold metolazone.   -c/w plaquenil. Will c/w current dose steroid.   -c/w antihypertensives

## 2023-10-29 NOTE — PROGRESS NOTE ADULT - SUBJECTIVE AND OBJECTIVE BOX
Patient is a 33y Female seen and evaluated at bedside. No acute events overnight. Laying comfortably in bed. Denies any new symptoms. Improving L wrist swelling and pain.      Meds:    acetaminophen     Tablet .. 650 every 6 hours PRN  allopurinol 100 daily  cefTRIAXone   IVPB 2000 every 24 hours  colchicine 0.6 two times a day  heparin   Injectable 5000 every 8 hours  hydroxychloroquine 200 every 24 hours  lisinopril 40 daily  NIFEdipine XL 30 daily  ondansetron Injectable 4 every 8 hours PRN  pantoprazole    Tablet 40 before breakfast  predniSONE   Tablet 40 daily  sodium bicarbonate 650 three times a day      T(C): , Max: 37 (10-28-23 @ 22:38)  T(F): , Max: 98.6 (10-28-23 @ 22:38)  HR: 77 (10-29-23 @ 06:23)  BP: 125/84 (10-29-23 @ 06:23)  BP(mean): --  RR: 18 (10-29-23 @ 06:23)  SpO2: 98% (10-29-23 @ 06:23)  Wt(kg): --    10-28 @ 07:01  -  10-29 @ 07:00  --------------------------------------------------------  IN: 50 mL / OUT: 0 mL / NET: 50 mL          Review of Systems:  ROS negative except as per HPI      PHYSICAL EXAM:  Constitutional: NAD  HEENT: anicteric sclera, oropharynx clear, MMM  Neck: No JVD  Respiratory: CTAB, no wheezes, rales or rhonchi  Cardiovascular: S1, S2, RRR  Gastrointestinal: BS+, soft, NT/ND  Extremities: LUE swollen and tender at the level of the wrist and hand, improved, decreased ROM due to pain; 1+ peripheral edema in LEs, improving.   Neurological: A/O x 3, no focal deficits    LABS:                        8.0    3.58  )-----------( 111      ( 29 Oct 2023 09:10 )             24.5     10-29    140  |  110<H>  |  88<H>  ----------------------------<  86  3.7   |  19<L>  |  2.32<H>    Ca    8.6      29 Oct 2023 09:10  Phos  4.5     10-29  Mg     2.5     10-29    TPro  5.0<L>  /  Alb  2.0<L>  /  TBili  <0.2  /  DBili  x   /  AST  13  /  ALT  9<L>  /  AlkPhos  58  10-29        Urinalysis Basic - ( 29 Oct 2023 09:10 )    Color: x / Appearance: x / SG: x / pH: x  Gluc: 86 mg/dL / Ketone: x  / Bili: x / Urobili: x   Blood: x / Protein: x / Nitrite: x   Leuk Esterase: x / RBC: x / WBC x   Sq Epi: x / Non Sq Epi: x / Bacteria: x            RADIOLOGY & ADDITIONAL STUDIES:

## 2023-10-29 NOTE — PROGRESS NOTE ADULT - ASSESSMENT
33 F PMHx SLE since middle school, diagnosed with lupus nephritis 1 month ago, and hypertension currently admitted for evaluation of worsening left hand and wrist swelling for one week. Pt was seen and evaluated by Hand surgery and started on IV abx with improvement.     #Left hand and wrist swelling  - Hand surgery consult appreciated  -Will continue CTX and d/c on PO doxycycline and cefpodoxime   -Continue colchine     #Lupus nephritis   -Nephrology following; Per Nephrology will continue to hold  Cellcept until 11/1   - Will continue  allopurinol 100mg PO qdaily, hydroxychloroquine 200mg PO q daily and  prednisone 60mg PO qd  - continue to hold metolazone 5mg PO q12    #HTN   -Pt with HTN likely 2/2 to lupus nephritis  -Continue  Lisinopril 40mg PO q daily and Nifedipine ER 30mg PO qdaily     #DISPO  -Will d/c today on PO abx and pt to follow up w/ hand surgery and rheum as outpt

## 2023-10-30 LAB
CULTURE RESULTS: SIGNIFICANT CHANGE UP
FIBRINOGEN AG PPP IA-MCNC: 643 MG/DL
SPECIMEN SOURCE: SIGNIFICANT CHANGE UP

## 2023-10-31 ENCOUNTER — APPOINTMENT (OUTPATIENT)
Dept: HEMATOLOGY ONCOLOGY | Facility: CLINIC | Age: 33
End: 2023-10-31
Payer: MEDICAID

## 2023-10-31 VITALS
BODY MASS INDEX: 20.02 KG/M2 | OXYGEN SATURATION: 100 % | TEMPERATURE: 97.2 F | HEART RATE: 94 BPM | WEIGHT: 113 LBS | HEIGHT: 63 IN | SYSTOLIC BLOOD PRESSURE: 118 MMHG | RESPIRATION RATE: 18 BRPM | DIASTOLIC BLOOD PRESSURE: 79 MMHG

## 2023-10-31 PROCEDURE — 99214 OFFICE O/P EST MOD 30 MIN: CPT

## 2023-10-31 RX ORDER — POTASSIUM CHLORIDE 1500 MG/1
20 TABLET, EXTENDED RELEASE ORAL DAILY
Qty: 30 | Refills: 0 | Status: DISCONTINUED | COMMUNITY
Start: 2023-10-10 | End: 2023-10-31

## 2023-10-31 RX ORDER — PREDNISONE 20 MG/1
20 TABLET ORAL
Refills: 0 | Status: DISCONTINUED | COMMUNITY
Start: 2023-10-17 | End: 2023-10-31

## 2023-10-31 RX ORDER — FUROSEMIDE 80 MG/1
80 TABLET ORAL TWICE DAILY
Qty: 60 | Refills: 5 | Status: DISCONTINUED | COMMUNITY
Start: 2023-09-29 | End: 2023-10-31

## 2023-10-31 RX ORDER — MYCOPHENOLATE MOFETIL 500 MG/1
500 TABLET ORAL
Qty: 360 | Refills: 3 | Status: DISCONTINUED | COMMUNITY
Start: 2023-09-29 | End: 2023-10-31

## 2023-11-01 ENCOUNTER — APPOINTMENT (OUTPATIENT)
Dept: NEPHROLOGY | Facility: CLINIC | Age: 33
End: 2023-11-01
Payer: MEDICAID

## 2023-11-01 VITALS
WEIGHT: 114 LBS | DIASTOLIC BLOOD PRESSURE: 87 MMHG | BODY MASS INDEX: 20.19 KG/M2 | HEART RATE: 92 BPM | SYSTOLIC BLOOD PRESSURE: 131 MMHG

## 2023-11-01 PROCEDURE — 99215 OFFICE O/P EST HI 40 MIN: CPT

## 2023-11-01 RX ORDER — CEFUROXIME AXETIL 250 MG
1 TABLET ORAL
Refills: 0
Start: 2023-11-01

## 2023-11-01 RX ORDER — CEFPODOXIME PROXETIL 100 MG
1 TABLET ORAL
Refills: 0
Start: 2023-11-01

## 2023-11-01 RX ORDER — CEPHALEXIN 500 MG
1 CAPSULE ORAL
Qty: 40 | Refills: 0
Start: 2023-11-01 | End: 2023-11-10

## 2023-11-01 NOTE — CHART NOTE - NSCHARTNOTEFT_GEN_A_CORE
Pt verbally understands below information.    Refill sent to the Pharmacy.    got a call that patient was unable to  abx given insurance issue. I did not discharge patient but would help. discussed with vivo kefflex is covered. no specific indication for gram negative coverage so felt a switch reasonable.

## 2023-11-08 LAB
25(OH)D3 SERPL-MCNC: 30.9 NG/ML
ALBUMIN SERPL ELPH-MCNC: 2.9 G/DL
ALP BLD-CCNC: 56 U/L
ALT SERPL-CCNC: 26 U/L
ANION GAP SERPL CALC-SCNC: 15 MMOL/L
APPEARANCE: CLEAR
AST SERPL-CCNC: 23 U/L
BACTERIA: NEGATIVE /HPF
BILIRUB SERPL-MCNC: 0.2 MG/DL
BILIRUBIN URINE: NEGATIVE
BLOOD URINE: ABNORMAL
BUN SERPL-MCNC: 92 MG/DL
C3 SERPL-MCNC: 54 MG/DL
C4 SERPL-MCNC: 18 MG/DL
CALCIUM SERPL-MCNC: 9.5 MG/DL
CAST: 6 /LPF
CHLORIDE SERPL-SCNC: 110 MMOL/L
CHOLEST SERPL-MCNC: 396 MG/DL
CO2 SERPL-SCNC: 18 MMOL/L
COLOR: YELLOW
CREAT SERPL-MCNC: 2.12 MG/DL
CREAT SPEC-SCNC: 31 MG/DL
CREAT/PROT UR: 2 RATIO
DSDNA AB SER-ACNC: 211 IU/ML
EGFR: 31 ML/MIN/1.73M2
EPITHELIAL CELLS: 0 /HPF
GLUCOSE QUALITATIVE U: NEGATIVE MG/DL
GLUCOSE SERPL-MCNC: 101 MG/DL
HAPTOGLOB SERPL-MCNC: 72 MG/DL
HCT VFR BLD CALC: 28.1 %
HDLC SERPL-MCNC: 81 MG/DL
HGB BLD-MCNC: 8.5 G/DL
HLA METHODOLOGY: NORMAL
HLA-B: NORMAL
HLA-B: NORMAL
HYALINE CASTS: PRESENT
KETONES URINE: NEGATIVE MG/DL
LDH SERPL-CCNC: 317 IU/L
LDH1 CFR SERPL ELPH: 32 %
LDH2 CFR SERPL ELPH: 38 %
LDH3 CFR SERPL ELPH: 19 %
LDH4 CFR SERPL ELPH: 7 %
LDH5 CFR SERPL ELPH: 4 %
LDLC SERPL CALC-MCNC: 286 MG/DL
LEUKOCYTE ESTERASE URINE: NEGATIVE
MAGNESIUM SERPL-MCNC: 1.6 MG/DL
MCHC RBC-ENTMCNC: 28.9 PG
MCHC RBC-ENTMCNC: 30.2 GM/DL
MCV RBC AUTO: 95.6 FL
MICROSCOPIC-UA: NORMAL
NITRITE URINE: NEGATIVE
NONHDLC SERPL-MCNC: 316 MG/DL
PH URINE: 5.5
PHOSPHATE SERPL-MCNC: 4.6 MG/DL
PLATELET # BLD AUTO: 241 K/UL
POTASSIUM SERPL-SCNC: 4.7 MMOL/L
PROT SERPL-MCNC: 5.4 G/DL
PROT UR-MCNC: 62 MG/DL
PROTEIN URINE: 100 MG/DL
RBC # BLD: 2.94 M/UL
RBC # FLD: 17 %
RED BLOOD CELLS URINE: 4 /HPF
REVIEW: NORMAL
SODIUM SERPL-SCNC: 143 MMOL/L
SPECIFIC GRAVITY URINE: 1.01
TRIGL SERPL-MCNC: 153 MG/DL
URATE SERPL-MCNC: 7.3 MG/DL
UROBILINOGEN URINE: 0.2 MG/DL
WBC # FLD AUTO: 6.69 K/UL
WHITE BLOOD CELLS URINE: 1 /HPF

## 2023-11-15 ENCOUNTER — APPOINTMENT (OUTPATIENT)
Dept: NEPHROLOGY | Facility: CLINIC | Age: 33
End: 2023-11-15
Payer: MEDICAID

## 2023-11-15 VITALS
SYSTOLIC BLOOD PRESSURE: 111 MMHG | HEART RATE: 99 BPM | WEIGHT: 104 LBS | DIASTOLIC BLOOD PRESSURE: 76 MMHG | BODY MASS INDEX: 18.42 KG/M2

## 2023-11-15 PROCEDURE — 99215 OFFICE O/P EST HI 40 MIN: CPT

## 2023-11-15 RX ORDER — DOXYCYCLINE HYCLATE 100 MG/1
100 CAPSULE ORAL
Refills: 0 | Status: COMPLETED | COMMUNITY
Start: 2023-10-31 | End: 2023-11-15

## 2023-11-22 RX ORDER — METOLAZONE 5 MG/1
5 TABLET ORAL
Qty: 60 | Refills: 5 | Status: DISCONTINUED | COMMUNITY
Start: 2023-11-15 | End: 2023-11-22

## 2023-11-22 RX ORDER — NIFEDIPINE 30 MG/1
30 TABLET, EXTENDED RELEASE ORAL
Qty: 30 | Refills: 5 | Status: DISCONTINUED | COMMUNITY
Start: 2023-09-29 | End: 2023-11-22

## 2023-11-27 ENCOUNTER — APPOINTMENT (OUTPATIENT)
Dept: NEPHROLOGY | Facility: CLINIC | Age: 33
End: 2023-11-27

## 2023-11-28 ENCOUNTER — APPOINTMENT (OUTPATIENT)
Dept: INFUSION THERAPY | Facility: CLINIC | Age: 33
End: 2023-11-28

## 2023-11-28 ENCOUNTER — OUTPATIENT (OUTPATIENT)
Dept: OUTPATIENT SERVICES | Facility: HOSPITAL | Age: 33
LOS: 1 days | End: 2023-11-28
Payer: MEDICAID

## 2023-11-28 VITALS
RESPIRATION RATE: 18 BRPM | TEMPERATURE: 98 F | HEART RATE: 96 BPM | HEIGHT: 63 IN | SYSTOLIC BLOOD PRESSURE: 109 MMHG | WEIGHT: 106.04 LBS | OXYGEN SATURATION: 98 % | DIASTOLIC BLOOD PRESSURE: 77 MMHG

## 2023-11-28 VITALS
SYSTOLIC BLOOD PRESSURE: 127 MMHG | HEART RATE: 78 BPM | RESPIRATION RATE: 18 BRPM | TEMPERATURE: 98 F | DIASTOLIC BLOOD PRESSURE: 869 MMHG | OXYGEN SATURATION: 96 %

## 2023-11-28 DIAGNOSIS — N04.9 NEPHROTIC SYNDROME WITH UNSPECIFIED MORPHOLOGIC CHANGES: ICD-10-CM

## 2023-11-28 LAB
ALBUMIN SERPL ELPH-MCNC: 2.6 G/DL — LOW (ref 3.3–5)
ALBUMIN SERPL ELPH-MCNC: 2.6 G/DL — LOW (ref 3.3–5)
ALP SERPL-CCNC: 62 U/L — SIGNIFICANT CHANGE UP (ref 40–120)
ALP SERPL-CCNC: 62 U/L — SIGNIFICANT CHANGE UP (ref 40–120)
ALT FLD-CCNC: 16 U/L — SIGNIFICANT CHANGE UP (ref 10–45)
ALT FLD-CCNC: 16 U/L — SIGNIFICANT CHANGE UP (ref 10–45)
ANION GAP SERPL CALC-SCNC: 13 MMOL/L — SIGNIFICANT CHANGE UP (ref 5–17)
ANION GAP SERPL CALC-SCNC: 13 MMOL/L — SIGNIFICANT CHANGE UP (ref 5–17)
AST SERPL-CCNC: 24 U/L — SIGNIFICANT CHANGE UP (ref 10–40)
AST SERPL-CCNC: 24 U/L — SIGNIFICANT CHANGE UP (ref 10–40)
BILIRUB SERPL-MCNC: 0.7 MG/DL — SIGNIFICANT CHANGE UP (ref 0.2–1.2)
BILIRUB SERPL-MCNC: 0.7 MG/DL — SIGNIFICANT CHANGE UP (ref 0.2–1.2)
BUN SERPL-MCNC: 105 MG/DL — HIGH (ref 7–23)
BUN SERPL-MCNC: 105 MG/DL — HIGH (ref 7–23)
CALCIUM SERPL-MCNC: 9.5 MG/DL — SIGNIFICANT CHANGE UP (ref 8.4–10.5)
CALCIUM SERPL-MCNC: 9.5 MG/DL — SIGNIFICANT CHANGE UP (ref 8.4–10.5)
CHLORIDE SERPL-SCNC: 111 MMOL/L — HIGH (ref 96–108)
CHLORIDE SERPL-SCNC: 111 MMOL/L — HIGH (ref 96–108)
CO2 SERPL-SCNC: 23 MMOL/L — SIGNIFICANT CHANGE UP (ref 22–31)
CO2 SERPL-SCNC: 23 MMOL/L — SIGNIFICANT CHANGE UP (ref 22–31)
CREAT SERPL-MCNC: 1.8 MG/DL — HIGH (ref 0.5–1.3)
CREAT SERPL-MCNC: 1.8 MG/DL — HIGH (ref 0.5–1.3)
EGFR: 37 ML/MIN/1.73M2 — LOW
EGFR: 37 ML/MIN/1.73M2 — LOW
GLUCOSE SERPL-MCNC: 103 MG/DL — HIGH (ref 70–99)
GLUCOSE SERPL-MCNC: 103 MG/DL — HIGH (ref 70–99)
HCT VFR BLD CALC: 22.3 % — LOW (ref 34.5–45)
HCT VFR BLD CALC: 22.3 % — LOW (ref 34.5–45)
HGB BLD-MCNC: 7.3 G/DL — LOW (ref 11.5–15.5)
HGB BLD-MCNC: 7.3 G/DL — LOW (ref 11.5–15.5)
LYMPHOCYTES # BLD AUTO: 0.2 K/UL — LOW (ref 1–3.3)
LYMPHOCYTES # BLD AUTO: 0.2 K/UL — LOW (ref 1–3.3)
LYMPHOCYTES # BLD AUTO: 5.6 % — LOW (ref 13–44)
LYMPHOCYTES # BLD AUTO: 5.6 % — LOW (ref 13–44)
MCHC RBC-ENTMCNC: 30 PG — SIGNIFICANT CHANGE UP (ref 27–34)
MCHC RBC-ENTMCNC: 30 PG — SIGNIFICANT CHANGE UP (ref 27–34)
MCHC RBC-ENTMCNC: 32.7 GM/DL — SIGNIFICANT CHANGE UP (ref 32–36)
MCHC RBC-ENTMCNC: 32.7 GM/DL — SIGNIFICANT CHANGE UP (ref 32–36)
MCV RBC AUTO: 91.8 FL — SIGNIFICANT CHANGE UP (ref 80–100)
MCV RBC AUTO: 91.8 FL — SIGNIFICANT CHANGE UP (ref 80–100)
NEUTROPHILS # BLD AUTO: 3.9 K/UL — SIGNIFICANT CHANGE UP (ref 1.8–7.4)
NEUTROPHILS # BLD AUTO: 3.9 K/UL — SIGNIFICANT CHANGE UP (ref 1.8–7.4)
NEUTROPHILS NFR BLD AUTO: 90 % — HIGH (ref 43–77)
NEUTROPHILS NFR BLD AUTO: 90 % — HIGH (ref 43–77)
PHOSPHATE SERPL-MCNC: 3.4 MG/DL — SIGNIFICANT CHANGE UP (ref 2.5–4.5)
PHOSPHATE SERPL-MCNC: 3.4 MG/DL — SIGNIFICANT CHANGE UP (ref 2.5–4.5)
PLATELET # BLD AUTO: 133 K/UL — LOW (ref 150–400)
PLATELET # BLD AUTO: 133 K/UL — LOW (ref 150–400)
POTASSIUM SERPL-MCNC: 3.8 MMOL/L — SIGNIFICANT CHANGE UP (ref 3.5–5.3)
POTASSIUM SERPL-MCNC: 3.8 MMOL/L — SIGNIFICANT CHANGE UP (ref 3.5–5.3)
POTASSIUM SERPL-SCNC: 3.8 MMOL/L — SIGNIFICANT CHANGE UP (ref 3.5–5.3)
POTASSIUM SERPL-SCNC: 3.8 MMOL/L — SIGNIFICANT CHANGE UP (ref 3.5–5.3)
PROT SERPL-MCNC: 5.9 G/DL — LOW (ref 6–8.3)
PROT SERPL-MCNC: 5.9 G/DL — LOW (ref 6–8.3)
RBC # BLD: 2.43 M/UL — LOW (ref 3.8–5.2)
RBC # BLD: 2.43 M/UL — LOW (ref 3.8–5.2)
RBC # FLD: 16.1 % — HIGH (ref 10.3–14.5)
RBC # FLD: 16.1 % — HIGH (ref 10.3–14.5)
SODIUM SERPL-SCNC: 147 MMOL/L — HIGH (ref 135–145)
SODIUM SERPL-SCNC: 147 MMOL/L — HIGH (ref 135–145)
WBC # BLD: 4.3 K/UL — SIGNIFICANT CHANGE UP (ref 3.8–10.5)
WBC # BLD: 4.3 K/UL — SIGNIFICANT CHANGE UP (ref 3.8–10.5)
WBC # FLD AUTO: 4.3 K/UL — SIGNIFICANT CHANGE UP (ref 3.8–10.5)
WBC # FLD AUTO: 4.3 K/UL — SIGNIFICANT CHANGE UP (ref 3.8–10.5)

## 2023-11-28 PROCEDURE — 80053 COMPREHEN METABOLIC PANEL: CPT

## 2023-11-28 PROCEDURE — 84100 ASSAY OF PHOSPHORUS: CPT

## 2023-11-28 PROCEDURE — 85025 COMPLETE CBC W/AUTO DIFF WBC: CPT

## 2023-11-28 PROCEDURE — 36415 COLL VENOUS BLD VENIPUNCTURE: CPT

## 2023-11-28 PROCEDURE — 96413 CHEMO IV INFUSION 1 HR: CPT

## 2023-11-28 PROCEDURE — 96375 TX/PRO/DX INJ NEW DRUG ADDON: CPT

## 2023-11-28 PROCEDURE — 96415 CHEMO IV INFUSION ADDL HR: CPT

## 2023-11-28 RX ORDER — RITUXIMAB 10 MG/ML
1000 INJECTION, SOLUTION INTRAVENOUS ONCE
Refills: 0 | Status: COMPLETED | OUTPATIENT
Start: 2023-11-28 | End: 2023-11-28

## 2023-11-28 RX ORDER — DIPHENHYDRAMINE HCL 50 MG
25 CAPSULE ORAL ONCE
Refills: 0 | Status: COMPLETED | OUTPATIENT
Start: 2023-11-28 | End: 2023-11-28

## 2023-11-28 RX ORDER — ACETAMINOPHEN 500 MG
500 TABLET ORAL ONCE
Refills: 0 | Status: COMPLETED | OUTPATIENT
Start: 2023-11-28 | End: 2023-11-28

## 2023-11-28 RX ADMIN — Medication 25 MILLIGRAM(S): at 11:22

## 2023-11-28 RX ADMIN — Medication 202 MILLIGRAM(S): at 11:07

## 2023-11-28 RX ADMIN — RITUXIMAB 1000 MILLIGRAM(S): 10 INJECTION, SOLUTION INTRAVENOUS at 11:32

## 2023-11-28 RX ADMIN — Medication 500 MILLIGRAM(S): at 10:50

## 2023-11-28 RX ADMIN — Medication 100 MILLIGRAM(S): at 11:05

## 2023-11-28 RX ADMIN — Medication 500 MILLIGRAM(S): at 11:20

## 2023-11-28 RX ADMIN — Medication 200 MILLIGRAM(S): at 10:50

## 2023-11-28 RX ADMIN — RITUXIMAB 1000 MILLIGRAM(S): 10 INJECTION, SOLUTION INTRAVENOUS at 14:20

## 2023-11-28 NOTE — PHARMACY COMMUNICATION NOTE - COMMENTS
Per Dr. Jerez, okay to treat patient with Truxima today with Hgb of 7.3. Dr. Jerez will have patient follow up with hematology.

## 2023-11-28 NOTE — DISCHARGE INSTRUCTIONS: GENERAL THERAPY - NSAPPTSTXDETAIL_HEME_A_AMB
[FreeTextEntry1] : The patient is a 73 year old male who presents for a Follow-up for Bladder cancer. Date first diagnosed: (2001). TNM Staging - T: T1 ; N: N0 ; M: M0 .  Grade: G2. Residual tumor: unknown. Cell type: transitional cell carcinoma. Treatment components: transurethral resection and mitomycin. Diagnostic tests include cystoscopy and cytology. Note for "Bladder cancer": patient recalls having bladder tumor in 2001, treated with intravesical therapy. last cystoscopy in 2003.\par 02/13/2017: cysto TURB at [facility] VS now has multifocal papillary carcinoma\par 03/01/2017 he was here for Cystoscopy to evaluate bladder tumor\par 03/06/2017 Cystopanendoscopy, Dilation of the urethra transurethral resection of the bladder tumor was done at [facility]. Pathology : negative\par 04/05/2017 He was here for evaluation of effect of Ceftin\par 04/12/2017 Urine Culture results: No Growth\par 04/24/2017: urine culture : no growth\par Mitomycin not available, therefore will start BCG\par \par 06/13/2017  1st BCG instillation was given.\par LOT: [id number]\par Exp: Feb 07, 2018\par \par 06/22/2017  2nd BCG live instillation WAS GIVEN.\par 06/29/2017 3RD BCG live instillation was given.\par 07/06/2017 4th BCG live instillation was given\par 07/13/2017 5th BCG live instillation was given.\par 07/27/2017 6th BCG live instillation was given.\par \par 09/06/2017 he is here for cystoscopy & cytology\par 11/01/2017 He was here for Pathology result.\par Bladder: Bladder wall with benign urothelial lining and minimal chronic inflammation.\par Urine Cytology: Negative.\par \par 12/11/2017 Cysto was done.\par 01/11/2017 He is here for Pathology result.\par Bladder: Bladder wall with benign urothelial loning and focal small von brunn nest.\par Urine Cytology: Atypical findings.\par \par 03/14/2018 Cysto, cyt and B-sono was done.\par Pathology result:\par Bladder: Bladder mucosa with benign urothelial lining.\par Urine cyology: Acellular specimen\par \par 05/23/2018 He is here for Cysto and Cyt.\par Pathology result:\par Bladder: Bladder mucosa with benign urothelial lining.\par Urine cystology: Negative.\par \par 08/08/2018 Uroflo and B-sono was done.\par \par 10/03/2018 cysto and cytology was done.\par \par 10/17/2018 He was here for Pathology result, Uroflow and B-sono.\par Pathology result: Bladder mucosa with beingn urothelial lining and focal von brunn nest.\par Urine Cytology: Atypical fingings.\par \par 01/23/2019  Uroflo and cystoscopy was done.\par \par 02/07/2019 He was here for Pathology result.\par Bladder: Bladder mucosa with mild chronic inflammation\par Urine cytology: Negative.\par \par 05/08/2019 Cystoscopy and Cytology was done.\par \par 06/12/2019 He is here for Pathology result, Uroflo and B-sono.\par Bladder: BLADDER MUCOSA WITH BENIGN UROTHELIAL LINING\par Urine Cytology: Atypical findings\par \par Cysto and cytology have been negative. Here for Cysto and cytology\par 01/23/2020: cystoscopy and cytology , possible biopsy\par \par  truxima

## 2023-11-30 ENCOUNTER — LABORATORY RESULT (OUTPATIENT)
Age: 33
End: 2023-11-30

## 2023-12-05 ENCOUNTER — LABORATORY RESULT (OUTPATIENT)
Age: 33
End: 2023-12-05

## 2023-12-05 ENCOUNTER — APPOINTMENT (OUTPATIENT)
Dept: HEMATOLOGY ONCOLOGY | Facility: CLINIC | Age: 33
End: 2023-12-05
Payer: MEDICAID

## 2023-12-05 VITALS
OXYGEN SATURATION: 100 % | TEMPERATURE: 97.8 F | WEIGHT: 109 LBS | BODY MASS INDEX: 19.31 KG/M2 | DIASTOLIC BLOOD PRESSURE: 74 MMHG | HEART RATE: 106 BPM | HEIGHT: 63 IN | SYSTOLIC BLOOD PRESSURE: 118 MMHG | RESPIRATION RATE: 18 BRPM

## 2023-12-05 PROCEDURE — 99214 OFFICE O/P EST MOD 30 MIN: CPT

## 2023-12-06 ENCOUNTER — APPOINTMENT (OUTPATIENT)
Dept: NEPHROLOGY | Facility: CLINIC | Age: 33
End: 2023-12-06
Payer: MEDICAID

## 2023-12-06 VITALS — DIASTOLIC BLOOD PRESSURE: 72 MMHG | HEART RATE: 100 BPM | SYSTOLIC BLOOD PRESSURE: 115 MMHG

## 2023-12-06 DIAGNOSIS — N04.9 NEPHROTIC SYNDROME WITH UNSPECIFIED MORPHOLOGIC CHANGES: ICD-10-CM

## 2023-12-06 DIAGNOSIS — I10 ESSENTIAL (PRIMARY) HYPERTENSION: ICD-10-CM

## 2023-12-06 DIAGNOSIS — E79.0 HYPERURICEMIA W/OUT SIGNS OF INFLAMMATORY ARTHRITIS AND TOPHACEOUS DISEASE: ICD-10-CM

## 2023-12-06 DIAGNOSIS — E78.5 HYPERLIPIDEMIA, UNSPECIFIED: ICD-10-CM

## 2023-12-06 LAB
EPO SERPL-MCNC: 23 MIU/ML
HAPTOGLOB SERPL-MCNC: 130 MG/DL
LDH SERPL-CCNC: 302 U/L
RBC # BLD: 2.09 M/UL
RETICS # AUTO: 3.4 %
RETICS AGGREG/RBC NFR: 71.3 K/UL

## 2023-12-06 PROCEDURE — 99215 OFFICE O/P EST HI 40 MIN: CPT

## 2023-12-06 RX ORDER — MYCOPHENILIC ACID 360 MG/1
360 TABLET, DELAYED RELEASE ORAL TWICE DAILY
Qty: 60 | Refills: 5 | Status: DISCONTINUED | COMMUNITY
Start: 2023-10-13 | End: 2023-12-06

## 2023-12-06 RX ORDER — TORSEMIDE 20 MG/1
20 TABLET ORAL DAILY
Qty: 60 | Refills: 5 | Status: DISCONTINUED | COMMUNITY
Start: 2023-11-01 | End: 2023-12-06

## 2023-12-06 RX ORDER — LISINOPRIL 40 MG/1
40 TABLET ORAL DAILY
Qty: 30 | Refills: 5 | Status: DISCONTINUED | COMMUNITY
Start: 2023-09-29 | End: 2023-12-06

## 2023-12-06 RX ORDER — SPIRONOLACTONE 25 MG/1
25 TABLET ORAL DAILY
Qty: 30 | Refills: 5 | Status: DISCONTINUED | COMMUNITY
Start: 2023-11-01 | End: 2023-12-06

## 2023-12-08 ENCOUNTER — APPOINTMENT (OUTPATIENT)
Dept: INFUSION THERAPY | Facility: CLINIC | Age: 33
End: 2023-12-08

## 2023-12-08 ENCOUNTER — OUTPATIENT (OUTPATIENT)
Dept: OUTPATIENT SERVICES | Facility: HOSPITAL | Age: 33
LOS: 1 days | End: 2023-12-08
Payer: MEDICAID

## 2023-12-08 VITALS
WEIGHT: 108.91 LBS | OXYGEN SATURATION: 100 % | RESPIRATION RATE: 18 BRPM | SYSTOLIC BLOOD PRESSURE: 95 MMHG | TEMPERATURE: 99 F | DIASTOLIC BLOOD PRESSURE: 62 MMHG | HEART RATE: 115 BPM | HEIGHT: 63 IN

## 2023-12-08 DIAGNOSIS — D64.9 ANEMIA, UNSPECIFIED: ICD-10-CM

## 2023-12-08 LAB
HCT VFR BLD CALC: 22.6 %
HGB BLD-MCNC: 7.1 G/DL
MCHC RBC-ENTMCNC: 31.4 GM/DL
MCHC RBC-ENTMCNC: 31.7 PG
MCV RBC AUTO: 100.9 FL
PLATELET # BLD AUTO: 120 K/UL
RBC # BLD: 2.24 M/UL
RBC # FLD: 16.8 %
WBC # FLD AUTO: 1.53 K/UL

## 2023-12-12 ENCOUNTER — LABORATORY RESULT (OUTPATIENT)
Age: 33
End: 2023-12-12

## 2023-12-12 ENCOUNTER — OUTPATIENT (OUTPATIENT)
Dept: OUTPATIENT SERVICES | Facility: HOSPITAL | Age: 33
LOS: 1 days | End: 2023-12-12
Payer: MEDICAID

## 2023-12-12 ENCOUNTER — APPOINTMENT (OUTPATIENT)
Dept: HEMATOLOGY ONCOLOGY | Facility: CLINIC | Age: 33
End: 2023-12-12
Payer: MEDICAID

## 2023-12-12 VITALS
HEART RATE: 96 BPM | BODY MASS INDEX: 19.31 KG/M2 | RESPIRATION RATE: 18 BRPM | SYSTOLIC BLOOD PRESSURE: 116 MMHG | WEIGHT: 109 LBS | OXYGEN SATURATION: 99 % | DIASTOLIC BLOOD PRESSURE: 77 MMHG | HEIGHT: 63 IN | TEMPERATURE: 98.4 F

## 2023-12-12 LAB — CMV DNA # UR NAA+PROBE: ABNORMAL IU/ML

## 2023-12-12 PROCEDURE — 36415 COLL VENOUS BLD VENIPUNCTURE: CPT

## 2023-12-12 PROCEDURE — 86900 BLOOD TYPING SEROLOGIC ABO: CPT

## 2023-12-12 PROCEDURE — 99215 OFFICE O/P EST HI 40 MIN: CPT

## 2023-12-12 PROCEDURE — P9040: CPT

## 2023-12-12 PROCEDURE — 86850 RBC ANTIBODY SCREEN: CPT

## 2023-12-12 PROCEDURE — 86901 BLOOD TYPING SEROLOGIC RH(D): CPT

## 2023-12-12 PROCEDURE — 86923 COMPATIBILITY TEST ELECTRIC: CPT

## 2023-12-12 PROCEDURE — 36430 TRANSFUSION BLD/BLD COMPNT: CPT

## 2023-12-13 DIAGNOSIS — M32.14 GLOMERULAR DISEASE IN SYSTEMIC LUPUS ERYTHEMATOSUS: ICD-10-CM

## 2023-12-13 LAB
ALBUMIN SERPL ELPH-MCNC: 2.3 G/DL
ALP BLD-CCNC: 46 U/L
ALT SERPL-CCNC: 32 U/L
ANION GAP SERPL CALC-SCNC: 6 MMOL/L
AST SERPL-CCNC: 28 U/L
BILIRUB SERPL-MCNC: 0.7 MG/DL
BUN SERPL-MCNC: 69 MG/DL
CALCIUM SERPL-MCNC: 9 MG/DL
CHLORIDE SERPL-SCNC: 113 MMOL/L
CO2 SERPL-SCNC: 22 MMOL/L
CREAT SERPL-MCNC: 1.9 MG/DL
EGFR: 35 ML/MIN/1.73M2
GLUCOSE SERPL-MCNC: 114 MG/DL
POTASSIUM SERPL-SCNC: 4 MMOL/L
PROT SERPL-MCNC: 5.4 G/DL
SODIUM SERPL-SCNC: 141 MMOL/L

## 2023-12-13 NOTE — HISTORY OF PRESENT ILLNESS
[de-identified] : SREEKANTH THIBODEAUX  is a 33-year-old woman with SLE who presents to hematology for f/u of anemia.   The patient has a longstanding diagnosis of SLE since her teenage years managed on chronic immunosuppression therapy.  She received care here in 2018 but has been in [place] for several years, returning on September 11, 2023.  Soon after her return she presented to [facility] with subacute, severe anasarca up to her chest.  She was found to have nephrotic disease and KARENA as well as pancytopenia.  Patient's initial blood counts were WBC 3.47, hemoglobin 6.8, and platelet 76.  RBCs were normocytic.  She responded to 2 units of PRBCs, however hemoglobin has subsequently drifted back down to 6.9 where it has remained for the past 2 weeks.  While inpatient, she was seen by hematology to assess her low blood counts with the following results:   Reticulocyte site count 9/14 consistent with hypoproliferation.  , haptoglobin undetectable, total bilirubin within normal limits.  Iron studies with elevated ferritin 1046 and percent saturation 55, along with decreased TIBC, consistent with anemia of chronic disease.  Peripheral smear reviewed by hematology revealed RBC morphologic changes including echinocytes, teardrop cells, and rare schistocytes.  There were a positive reticulocytes and dyspoietic changes of the neutrophils. Vit B12 659. Jasmine test was negative.   Overall impression was that pancytopenia was likely due to her underlying lupus with a possible contribution of marrow suppression from her immunosuppressive therapy.  Hematology deferred to renal/rheumatology with regards to treatment of the underlying cause.  Ultimately, the patient was treated with increased dose of mycophenolate, prednisone pulse, and recieved a dose of rituxumab about 3 weeks ago. At that time, she had noted swelling and pain in her L wrist and was using a splint. These symptoms gradually worsened, and she saw her rheumatologist who prescribed colchicine due to elevated uric acid with no improvement. She presented to the ED on 10/24 due to continued worsening of swelling and pain. There she was febrile and hypertensive which persisted overnight with a Tmax of 103 F. She was treated with antibiotics and admitted for further workup and management. Her cellcept and diuretics were discontinued and she was prescribed doxycycline and cefpodoxime. The patient's wrist infection is fully resolved and she is off antibiotics.  She has resumed treatment with mycophenolate and received another dose of rituximab on 11/28.  [de-identified] : Since the resumption of mycophenalate the patient's anemia worsened and she was transfused 5 days ago with 1 unit pRBC. This medication was subsequently held. On today's labs she has responded well to transfusion and her other cell lines are also improved. No changes in her clinical status otherwise.

## 2023-12-13 NOTE — PHYSICAL EXAM
[Normal] : affect appropriate [de-identified] : Pale young woman seated comfortably in exam room. No acute distress  [de-identified] : Normal work of breathing on room air.  Speaking full sentences normal respiratory rate on room air.  Speaking full sentences without increased work of breathing [de-identified] : RRR. Extremities WWP. Trace pedal edema bilaterally

## 2023-12-13 NOTE — HISTORY OF PRESENT ILLNESS
[de-identified] : SREEKANTH THIBODEAUX  is a 33-year-old woman with SLE who presents to hematology for f/u of anemia.   The patient has a longstanding diagnosis of SLE since her teenage years managed on chronic immunosuppression therapy.  She received care here in 2018 but has been in [place] for several years, returning on September 11, 2023.  Soon after her return she presented to [facility] with subacute, severe anasarca up to her chest.  She was found to have nephrotic disease and KARENA as well as pancytopenia.  Patient's initial blood counts were WBC 3.47, hemoglobin 6.8, and platelet 76.  RBCs were normocytic.  She responded to 2 units of PRBCs, however hemoglobin has subsequently drifted back down to 6.9 where it has remained for the past 2 weeks.  While inpatient, she was seen by hematology to assess her low blood counts with the following results:   Reticulocyte site count 9/14 consistent with hypoproliferation.  , haptoglobin undetectable, total bilirubin within normal limits.  Iron studies with elevated ferritin 1046 and percent saturation 55, along with decreased TIBC, consistent with anemia of chronic disease.  Peripheral smear reviewed by hematology revealed RBC morphologic changes including echinocytes, teardrop cells, and rare schistocytes.  There were a positive reticulocytes and dyspoietic changes of the neutrophils. Vit B12 659. Jasmine test was negative.   Overall impression was that pancytopenia was likely due to her underlying lupus with a possible contribution of marrow suppression from her immunosuppressive therapy.  Hematology deferred to renal/rheumatology with regards to treatment of the underlying cause.  Ultimately, the patient was treated with increased dose of mycophenolate, prednisone pulse, and recieved a dose of rituxumab about 3 weeks ago. At that time, she had noted swelling and pain in her L wrist and was using a splint. These symptoms gradually worsened, and she saw her rheumatologist who prescribed colchicine due to elevated uric acid with no improvement. She presented to the ED on 10/24 due to continued worsening of swelling and pain. There she was febrile and hypertensive which persisted overnight with a Tmax of 103 F. She was treated with antibiotics and admitted for further workup and management. Her cellcept and diuretics were discontinued and she was prescribed doxycycline and cefpodoxime. The patient's wrist infection is fully resolved and she is off antibiotics.  She has resumed treatment with mycophenolate and received another dose of rituximab on 11/28.  [de-identified] : Since the resumption of mycophenalate the patient's anemia worsened and she was transfused 5 days ago with 1 unit pRBC. This medication was subsequently held. On today's labs she has responded well to transfusion and her other cell lines are also improved. No changes in her clinical status otherwise.

## 2023-12-13 NOTE — PHYSICAL EXAM
[Normal] : affect appropriate [de-identified] : Pale young woman seated comfortably in exam room. No acute distress  [de-identified] : Normal work of breathing on room air.  Speaking full sentences normal respiratory rate on room air.  Speaking full sentences without increased work of breathing [de-identified] : RRR. Extremities WWP. Trace pedal edema bilaterally

## 2023-12-19 ENCOUNTER — LABORATORY RESULT (OUTPATIENT)
Age: 33
End: 2023-12-19

## 2023-12-19 ENCOUNTER — APPOINTMENT (OUTPATIENT)
Dept: HEMATOLOGY ONCOLOGY | Facility: CLINIC | Age: 33
End: 2023-12-19
Payer: MEDICAID

## 2023-12-19 ENCOUNTER — OUTPATIENT (OUTPATIENT)
Dept: OUTPATIENT SERVICES | Facility: HOSPITAL | Age: 33
LOS: 1 days | End: 2023-12-19
Payer: MEDICAID

## 2023-12-19 VITALS
TEMPERATURE: 98.1 F | BODY MASS INDEX: 20.38 KG/M2 | SYSTOLIC BLOOD PRESSURE: 114 MMHG | HEIGHT: 63 IN | RESPIRATION RATE: 18 BRPM | HEART RATE: 106 BPM | OXYGEN SATURATION: 98 % | DIASTOLIC BLOOD PRESSURE: 76 MMHG | WEIGHT: 115 LBS

## 2023-12-19 DIAGNOSIS — M32.14 GLOMERULAR DISEASE IN SYSTEMIC LUPUS ERYTHEMATOSUS: ICD-10-CM

## 2023-12-19 PROCEDURE — 99215 OFFICE O/P EST HI 40 MIN: CPT

## 2023-12-19 NOTE — REVIEW OF SYSTEMS
[Fatigue] : fatigue [Joint Pain] : joint pain [Negative] : Allergic/Immunologic [FreeTextEntry9] : spasm in hands and toes

## 2023-12-19 NOTE — PHYSICAL EXAM
[Normal] : affect appropriate [de-identified] : Pale young woman seated comfortably in exam room. No acute distress  [de-identified] : Normal work of breathing on room air.  Speaking full sentences normal respiratory rate on room air.  Speaking full sentences without increased work of breathing [de-identified] : RRR. Extremities WWP. Trace pedal edema bilaterally

## 2023-12-19 NOTE — HISTORY OF PRESENT ILLNESS
[de-identified] : SREEKANTH THIBODEAUX  is a 33-year-old woman with SLE who presents to hematology for f/u of anemia.   The patient has a longstanding diagnosis of SLE since her teenage years managed on chronic immunosuppression therapy.  She received care here in 2018 but has been in [place] for several years, returning on September 11, 2023.  Soon after her return she presented to [facility] with subacute, severe anasarca up to her chest.  She was found to have nephrotic disease and KARENA as well as pancytopenia.  Patient's initial blood counts were WBC 3.47, hemoglobin 6.8, and platelet 76.  RBCs were normocytic.  She responded to 2 units of PRBCs, however hemoglobin has subsequently drifted back down to 6.9 where it has remained for the past 2 weeks.  While inpatient, she was seen by hematology to assess her low blood counts with the following results:   Reticulocyte site count 9/14 consistent with hypoproliferation.  , haptoglobin undetectable, total bilirubin within normal limits.  Iron studies with elevated ferritin 1046 and percent saturation 55, along with decreased TIBC, consistent with anemia of chronic disease.  Peripheral smear reviewed by hematology revealed RBC morphologic changes including echinocytes, teardrop cells, and rare schistocytes.  There were a positive reticulocytes and dyspoietic changes of the neutrophils. Vit B12 659. Jasmine test was negative.   Overall impression was that pancytopenia was likely due to her underlying lupus with a possible contribution of marrow suppression from her immunosuppressive therapy.  Hematology deferred to renal/rheumatology with regards to treatment of the underlying cause.  Ultimately, the patient was treated with increased dose of mycophenolate, prednisone pulse, and recieved a dose of rituxumab in October. At that time, she had noted swelling and pain in her L wrist and was using a splint. These symptoms gradually worsened, and she saw her rheumatologist who prescribed colchicine due to elevated uric acid with no improvement. She presented to the ED on 10/24 due to continued worsening of swelling and pain. There she was febrile and hypertensive which persisted overnight with a Tmax of 103 F. She was treated with antibiotics and admitted for further workup and management. Her cellcept and diuretics were discontinued and she was prescribed doxycycline and cefpodoxime. The patient's wrist infection is fully resolved and she is off antibiotics.  She then resumed treatment with mycophenolate and received another dose of rituximab on 11/28, however the mycophenalate was held again after her anemia worsened requiring a unit of pRBC.  [de-identified] : Patient returns for lab check. Her Hb today is slightly improved and WBC/plt counts have both ascended into normal range. She reports no change in her clinical condition.

## 2023-12-20 PROCEDURE — 86850 RBC ANTIBODY SCREEN: CPT

## 2023-12-20 PROCEDURE — 86901 BLOOD TYPING SEROLOGIC RH(D): CPT

## 2023-12-20 PROCEDURE — 86900 BLOOD TYPING SEROLOGIC ABO: CPT

## 2023-12-23 LAB
25(OH)D3 SERPL-MCNC: 42.3 NG/ML
ALBUMIN SERPL ELPH-MCNC: 2.9 G/DL
ALP BLD-CCNC: 54 U/L
ALT SERPL-CCNC: 44 U/L
ANION GAP SERPL CALC-SCNC: 11 MMOL/L
APPEARANCE: ABNORMAL
AST SERPL-CCNC: 31 U/L
BACTERIA: NEGATIVE /HPF
BILIRUB SERPL-MCNC: <0.2 MG/DL
BILIRUBIN URINE: NEGATIVE
BLOOD URINE: ABNORMAL
BUN SERPL-MCNC: 61 MG/DL
C3 SERPL-MCNC: 79 MG/DL
C4 SERPL-MCNC: 31 MG/DL
CALCIUM SERPL-MCNC: 8.5 MG/DL
CAST: 0 /LPF
CHLORIDE SERPL-SCNC: 106 MMOL/L
CHOLEST SERPL-MCNC: 217 MG/DL
CO2 SERPL-SCNC: 22 MMOL/L
COLOR: YELLOW
CREAT SERPL-MCNC: 1.68 MG/DL
CREAT SPEC-SCNC: 69 MG/DL
CREAT/PROT UR: 0.6 RATIO
EGFR: 40 ML/MIN/1.73M2
EPITHELIAL CELLS: 0 /HPF
GLUCOSE QUALITATIVE U: NEGATIVE MG/DL
GLUCOSE SERPL-MCNC: 79 MG/DL
HCT VFR BLD CALC: 26.3 %
HDLC SERPL-MCNC: 62 MG/DL
HGB BLD-MCNC: 8.2 G/DL
KETONES URINE: NEGATIVE MG/DL
LDLC SERPL CALC-MCNC: 127 MG/DL
LEUKOCYTE ESTERASE URINE: NEGATIVE
MAGNESIUM SERPL-MCNC: 2 MG/DL
MCHC RBC-ENTMCNC: 31.2 GM/DL
MCHC RBC-ENTMCNC: 31.8 PG
MCV RBC AUTO: 101.9 FL
MICROSCOPIC-UA: NORMAL
NITRITE URINE: NEGATIVE
NONHDLC SERPL-MCNC: 155 MG/DL
PH URINE: 5.5
PHOSPHATE SERPL-MCNC: 3.8 MG/DL
PLATELET # BLD AUTO: 150 K/UL
POTASSIUM SERPL-SCNC: 4.9 MMOL/L
PROT SERPL-MCNC: 4.9 G/DL
PROT UR-MCNC: 39 MG/DL
PROTEIN URINE: 30 MG/DL
RBC # BLD: 2.58 M/UL
RBC # FLD: 16.5 %
RED BLOOD CELLS URINE: 9 /HPF
SODIUM SERPL-SCNC: 138 MMOL/L
SPECIFIC GRAVITY URINE: 1.01
TRIGL SERPL-MCNC: 161 MG/DL
URATE SERPL-MCNC: 6.1 MG/DL
UROBILINOGEN URINE: 0.2 MG/DL
WBC # FLD AUTO: 5.19 K/UL
WHITE BLOOD CELLS URINE: 1 /HPF

## 2023-12-26 LAB — DSDNA AB SER-ACNC: 52 IU/ML

## 2023-12-27 ENCOUNTER — APPOINTMENT (OUTPATIENT)
Dept: NEPHROLOGY | Facility: CLINIC | Age: 33
End: 2023-12-27
Payer: MEDICAID

## 2023-12-27 VITALS
DIASTOLIC BLOOD PRESSURE: 78 MMHG | SYSTOLIC BLOOD PRESSURE: 112 MMHG | BODY MASS INDEX: 20.37 KG/M2 | WEIGHT: 115 LBS | HEART RATE: 116 BPM

## 2023-12-27 DIAGNOSIS — R60.9 EDEMA, UNSPECIFIED: ICD-10-CM

## 2023-12-27 PROCEDURE — 99215 OFFICE O/P EST HI 40 MIN: CPT

## 2023-12-27 RX ORDER — PANTOPRAZOLE 40 MG/1
40 TABLET, DELAYED RELEASE ORAL DAILY
Qty: 30 | Refills: 5 | Status: DISCONTINUED | COMMUNITY
Start: 2023-09-29 | End: 2023-12-27

## 2023-12-27 NOTE — ASSESSMENT
[FreeTextEntry1] : KARENA in setting of SLE 4 and 5-- improving  SLE serologies much better proteinuria improved < gm  BP in good range lipids better , uric acid much better-- cont statin , lower allopurinol and may be able to dc it  pancytopenia much better off MMF -- still anemia but improved- cont heme f/u to see ID re CMV viruria-- at this point with pancytopenia improving may not be significant? more likely pancytopenia was due to MMF than CMV and no other evidence infxn clinically. steroids also lowered  cont pred at 10 mg for 3 more weeks than plan lower to 5 mg/d cont torsemide at 10 mg btu can take extra if edema bothersome- may conisder lower ACEi if needs more diuretic to avoid hypotension -- and proteinuria is much less f/u on me monthe

## 2023-12-27 NOTE — HISTORY OF PRESENT ILLNESS
[FreeTextEntry1] : f/u KARENA, SLE 4 and 5 on lower torsmide due to low BP- now 110/80 range with no more dizziness. --notes some increase in ankle edeme on statin pred lowered to 10 mg/d past week --feels she's more sleepy now  otherwise feeling well got u PRBC with heme-- may start ANNE with heme  Rheum -- Dr Nava

## 2023-12-27 NOTE — PHYSICAL EXAM
[General Appearance - Alert] : alert [General Appearance - In No Acute Distress] : in no acute distress [Sclera] : the sclera and conjunctiva were normal [Jugular Venous Distention Increased] : there was no jugular-venous distention [Auscultation Breath Sounds / Voice Sounds] : lungs were clear to auscultation bilaterally [Heart Sounds Gallop] : no gallops [Murmurs] : no murmurs [Heart Sounds Pericardial Friction Rub] : no pericardial rub [Tachycardic ___] : the heart rate was tachycardic at [unfilled] bpm [Regular] : the rhythm was regular [___ +] : bilateral [unfilled]+ pitting edema to the ankles [Abdomen Soft] : soft [Abdomen Tenderness] : non-tender [No CVA Tenderness] : no ~M costovertebral angle tenderness [Involuntary Movements] : no involuntary movements were seen [No Focal Deficits] : no focal deficits [] : no rash [Affect] : the affect was normal [Oriented To Time, Place, And Person] : oriented to person, place, and time

## 2023-12-27 NOTE — REVIEW OF SYSTEMS
[As Noted in HPI] : as noted in HPI [Lower Ext Edema] : lower extremity edema [Negative] : Heme/Lymph [Arthralgias] : no arthralgias [Dizziness] : no dizziness

## 2023-12-28 ENCOUNTER — APPOINTMENT (OUTPATIENT)
Dept: INFECTIOUS DISEASE | Facility: CLINIC | Age: 33
End: 2023-12-28
Payer: MEDICAID

## 2023-12-28 PROCEDURE — 99203 OFFICE O/P NEW LOW 30 MIN: CPT

## 2023-12-28 NOTE — HISTORY OF PRESENT ILLNESS
[FreeTextEntry1] : 33 year old female with lupus nephritis on chronic prednisone (and until recently on MMF) here for evaluation of CMV in urine.  In Novemeber 2023, she developed leukopenia and thrombocytopenia (already has chronic anemia).  Work up was done and it showed CMV in urine (4000 copies).  MMF was stopped at that time.  Prednisone was tapered down to 10 mg PO daily.  Patient presents for evaluation.  No fevers, chills, chest pain, SOB, cough, diarrhea. + occasional headache. No photophobia, no neck stiffness.

## 2023-12-28 NOTE — PHYSICAL EXAM
[General Appearance - Alert] : alert [General Appearance - In No Acute Distress] : in no acute distress [Sclera] : the sclera and conjunctiva were normal [Heart Rate And Rhythm] : heart rate was normal and rhythm regular [Edema] : there was no peripheral edema [Abdomen Soft] : soft [No Palpable Adenopathy] : no palpable adenopathy [Musculoskeletal - Swelling] : no joint swelling [] : no rash [Motor Exam] : the motor exam was normal [Oriented To Time, Place, And Person] : oriented to person, place, and time

## 2024-01-02 ENCOUNTER — LABORATORY RESULT (OUTPATIENT)
Age: 34
End: 2024-01-02

## 2024-01-02 ENCOUNTER — OUTPATIENT (OUTPATIENT)
Dept: OUTPATIENT SERVICES | Facility: HOSPITAL | Age: 34
LOS: 1 days | End: 2024-01-02
Payer: MEDICAID

## 2024-01-02 ENCOUNTER — APPOINTMENT (OUTPATIENT)
Dept: HEMATOLOGY ONCOLOGY | Facility: CLINIC | Age: 34
End: 2024-01-02
Payer: MEDICAID

## 2024-01-02 VITALS
TEMPERATURE: 97.8 F | RESPIRATION RATE: 18 BRPM | WEIGHT: 114 LBS | BODY MASS INDEX: 20.2 KG/M2 | HEART RATE: 114 BPM | OXYGEN SATURATION: 98 % | SYSTOLIC BLOOD PRESSURE: 122 MMHG | HEIGHT: 63 IN | DIASTOLIC BLOOD PRESSURE: 81 MMHG

## 2024-01-02 DIAGNOSIS — B25.9 CYTOMEGALOVIRAL DISEASE, UNSPECIFIED: ICD-10-CM

## 2024-01-02 PROCEDURE — 99213 OFFICE O/P EST LOW 20 MIN: CPT

## 2024-01-02 PROCEDURE — 86850 RBC ANTIBODY SCREEN: CPT

## 2024-01-02 PROCEDURE — 86901 BLOOD TYPING SEROLOGIC RH(D): CPT

## 2024-01-02 PROCEDURE — 86900 BLOOD TYPING SEROLOGIC ABO: CPT

## 2024-01-02 RX ORDER — DARBEPOETIN ALFA 25 UG/.42ML
25 INJECTION, SOLUTION INTRAVENOUS; SUBCUTANEOUS
Qty: 4 | Refills: 0 | Status: DISCONTINUED | COMMUNITY
Start: 2023-12-05 | End: 2024-01-02

## 2024-01-02 RX ORDER — ATORVASTATIN CALCIUM 20 MG/1
20 TABLET, FILM COATED ORAL DAILY
Qty: 30 | Refills: 5 | Status: DISCONTINUED | COMMUNITY
Start: 2023-11-15 | End: 2024-01-02

## 2024-01-02 NOTE — PHYSICAL EXAM
[Normal] : affect appropriate [de-identified] : Pale young woman seated comfortably in exam room. No acute distress  [de-identified] : Normal work of breathing on room air.  Speaking full sentences normal respiratory rate on room air.  Speaking full sentences without increased work of breathing [de-identified] : Mild, non-pitting edema surrounding L ankle. No calf tenderness, pre-tibial edema. ROM undiminished, gait normal

## 2024-01-02 NOTE — HISTORY OF PRESENT ILLNESS
[de-identified] : SREEKANTH THIBODEAUX  is a 33-year-old woman with SLE who presents to hematology for f/u of anemia.   The patient has a longstanding diagnosis of SLE since her teenage years managed on chronic immunosuppression therapy.  She received care here in 2018 but has been in [place] for several years, returning on September 11, 2023.  Soon after her return she presented to [facility] with subacute, severe anasarca up to her chest.  She was found to have nephrotic disease and KARENA as well as pancytopenia.  Patient's initial blood counts were WBC 3.47, hemoglobin 6.8, and platelet 76.  RBCs were normocytic.  She responded to 2 units of PRBCs, however hemoglobin has subsequently drifted back down to 6.9 where it has remained for the past 2 weeks.  While inpatient, she was seen by hematology to assess her low blood counts with the following results:   Reticulocyte site count 9/14 consistent with hypoproliferation.  , haptoglobin undetectable, total bilirubin within normal limits.  Iron studies with elevated ferritin 1046 and percent saturation 55, along with decreased TIBC, consistent with anemia of chronic disease.  Peripheral smear reviewed by hematology revealed RBC morphologic changes including echinocytes, teardrop cells, and rare schistocytes.  There were a positive reticulocytes and dyspoietic changes of the neutrophils. Vit B12 659. Jasmine test was negative.   Overall impression was that pancytopenia was likely due to her underlying lupus with a possible contribution of marrow suppression from her immunosuppressive therapy.  Hematology deferred to renal/rheumatology with regards to treatment of the underlying cause.  Ultimately, the patient was treated with increased dose of mycophenolate, prednisone pulse, and recieved a dose of rituxumab in October. At that time, she had noted swelling and pain in her L wrist and was using a splint. These symptoms gradually worsened, and she saw her rheumatologist who prescribed colchicine due to elevated uric acid with no improvement. She presented to the ED on 10/24 due to continued worsening of swelling and pain. There she was febrile and hypertensive which persisted overnight with a Tmax of 103 F. She was treated with antibiotics and admitted for further workup and management. Her cellcept and diuretics were discontinued and she was prescribed doxycycline and cefpodoxime. The patient's wrist infection is fully resolved and she is off antibiotics.  She then resumed treatment with mycophenolate and received another dose of rituximab on 11/28, however the mycophenalate was held again after her anemia worsened requiring a unit of pRBC. Hb has been slowly improving since that time.  [de-identified] : The patient's blood counts continue to be stable to improved.  She reports no change in her clinical condition but states that she fell recently after sitting in a position that put her left leg to sleep, after which she tried to get up and walk.  She has had some minor swelling and discomfort in her left ankle since that time.

## 2024-01-02 NOTE — REVIEW OF SYSTEMS
[Fatigue] : fatigue [Joint Pain] : joint pain [Negative] : Allergic/Immunologic [FreeTextEntry9] : spasm toes

## 2024-01-21 LAB
ALBUMIN SERPL ELPH-MCNC: 3.6 G/DL
ALP BLD-CCNC: 56 U/L
ALT SERPL-CCNC: 12 U/L
ANION GAP SERPL CALC-SCNC: 13 MMOL/L
APPEARANCE: CLEAR
AST SERPL-CCNC: 21 U/L
BACTERIA: NEGATIVE /HPF
BILIRUB SERPL-MCNC: 0.2 MG/DL
BILIRUBIN URINE: NEGATIVE
BLOOD URINE: ABNORMAL
BUN SERPL-MCNC: 30 MG/DL
CALCIUM SERPL-MCNC: 9.3 MG/DL
CAST: 1 /LPF
CHLORIDE SERPL-SCNC: 103 MMOL/L
CO2 SERPL-SCNC: 24 MMOL/L
COLOR: YELLOW
CREAT SERPL-MCNC: 1.49 MG/DL
CREAT SPEC-SCNC: 96 MG/DL
CREAT/PROT UR: 0.5 RATIO
EGFR: 47 ML/MIN/1.73M2
EPITHELIAL CELLS: 1 /HPF
GLUCOSE QUALITATIVE U: NEGATIVE MG/DL
GLUCOSE SERPL-MCNC: 77 MG/DL
HCT VFR BLD CALC: 29 %
HGB BLD-MCNC: 9.1 G/DL
KETONES URINE: NEGATIVE MG/DL
LEUKOCYTE ESTERASE URINE: NEGATIVE
MAGNESIUM SERPL-MCNC: 1.7 MG/DL
MCHC RBC-ENTMCNC: 31.4 GM/DL
MCHC RBC-ENTMCNC: 32.4 PG
MCV RBC AUTO: 103.2 FL
MICROSCOPIC-UA: NORMAL
NITRITE URINE: NEGATIVE
PH URINE: 5.5
PHOSPHATE SERPL-MCNC: 4.7 MG/DL
PLATELET # BLD AUTO: 157 K/UL
POTASSIUM SERPL-SCNC: 4.1 MMOL/L
PROT SERPL-MCNC: 5.8 G/DL
PROT UR-MCNC: 51 MG/DL
PROTEIN URINE: 100 MG/DL
RBC # BLD: 2.81 M/UL
RBC # FLD: 14.8 %
RED BLOOD CELLS URINE: 8 /HPF
SODIUM SERPL-SCNC: 140 MMOL/L
SPECIFIC GRAVITY URINE: 1.01
URATE SERPL-MCNC: 8 MG/DL
UROBILINOGEN URINE: 0.2 MG/DL
WBC # FLD AUTO: 4.68 K/UL
WHITE BLOOD CELLS URINE: 1 /HPF

## 2024-01-22 LAB
C3 SERPL-MCNC: 89 MG/DL
C4 SERPL-MCNC: 31 MG/DL

## 2024-01-24 ENCOUNTER — APPOINTMENT (OUTPATIENT)
Dept: NEPHROLOGY | Facility: CLINIC | Age: 34
End: 2024-01-24
Payer: MEDICAID

## 2024-01-24 VITALS
WEIGHT: 113 LBS | DIASTOLIC BLOOD PRESSURE: 86 MMHG | SYSTOLIC BLOOD PRESSURE: 127 MMHG | BODY MASS INDEX: 20.02 KG/M2 | HEART RATE: 97 BPM

## 2024-01-24 DIAGNOSIS — N17.9 ACUTE KIDNEY FAILURE, UNSPECIFIED: ICD-10-CM

## 2024-01-24 LAB — DSDNA AB SER-ACNC: 64 IU/ML

## 2024-01-24 PROCEDURE — 99214 OFFICE O/P EST MOD 30 MIN: CPT

## 2024-01-24 PROCEDURE — G2211 COMPLEX E/M VISIT ADD ON: CPT

## 2024-01-24 NOTE — ASSESSMENT
[FreeTextEntry1] : overall improved CBC improved off MMF  KARENA cont to improve, proteinuria much better and cont to imporve lupus serologies overall much better-- DSDNa slt up  BP acceptable will cont regimen incl pred 5 mg/d f/u rheum to decide on further pred dosing as edema resolved might be able to lower diuretic further -- follow BP at home but might try 1/2 dose  plan re-dose ritux April/ May -- 5-6 mos after last dose f/u here 2-3 mos

## 2024-01-24 NOTE — HISTORY OF PRESENT ILLNESS
[FreeTextEntry1] : f/u SLE 4+5 down to prednisone 5 mg/d past week has not noticed leg swelling Not checking BP at home no specific complaints --energy overall much better, slt joint stiffness when cold labs done and reviewed - see below rheum Dr Nava

## 2024-02-14 NOTE — ED ADULT NURSE NOTE - RESPIRATORY ASSESSMENT
[facility]   Hospitalist Progress Note  Date: 2024  Patient Name: Kiesha Pan  : 1957  MRN: [id number]  Date of admission: 2024  Room/Bed: 256/1      Subjective   Subjective     Chief Complaint:   Chest pain    Summary:  Kiesha Pan is a 66-year-old female with a past medical history of hypertension, atrial fibrillation, hyperlipidemia, depression, alcohol abuse, previous stroke and GERD presenting with chest pain. While watching TV around 5pm, she developed a sharp chest pain. The pain shortly after felt like someone was sitting on her chest. She rated her pain a 6/10 in severity and it was non-radiating. She has not been compliant with her eliquis for several months.  Patient endorses intermittent symptoms similar to these in the past however never persistent.     In ED, blood pressure 112/88, heart rate 160, respiratory rate 18, temperature 98.1 and O2 saturation 97% on room air.  Labs on arrival showed a sodium 144, potassium 3.8, BUN 16, creatinine 0.8, WBC 9.8, hemoglobin 14.3 and platelet count 42.  Troponin 11, proBNP 55.2, chest x-ray showed no active disease.     Interval Followup: Seen and examined resting comfortably some concern for worsening withdrawal symptoms today continuing to monitor.  Eating and drinking discontinue IV fluids continuing heparin drip will transition to Eliquis at time of discharge patient and patient's  refused cardiac catheterization can follow-up with cardiology outpatient if they change their mind ventricular rate better controlled.  Suspect patient will be ready for discharge next 24 to 48 hours.  Replacing potassium replacing magnesium recheck call tomorrow    Review of systems: All systems reviewed and negative except the following: Patient complains of generalized weakness fatigue some shakiness  Objective   Objective     Vitals:   Temp:  [97.8 °F (36.6 °C)-98.8 °F (37.1 °C)] 98.2 °F (36.8 °C)  Heart Rate:  [] 98  Resp:  [18] 18  BP:  (134-189)/() 166/95    Physical Exam   General: Awake, alert, NAD  HENT: NCAT, MMM  Eyes: pupils equal, no scleral icterus  Cardiovascular: RRR, no murmurs   Pulmonary: CTA bilaterally; no wheezes; no conversational dyspnea  Gastrointestinal: S/ND/NT, +BS  Musculoskeletal: No gross deformities  Skin: No jaundice, no rash on exposed skin appreciated  Neuro: CN II through XII grossly intact; speech clear; no tremor      Result Review    Result Review:  I have personally reviewed results from the last 24 hours 2/14/2024    [x]  Laboratory      Lab 02/14/24  1310 02/14/24  0603 02/13/24  0451 02/12/24  0918 02/12/24  0309 02/11/24  1834   WBC  --  6.39  --   --  10.16 9.89   HEMOGLOBIN  --  12.3  --   --  12.7 14.3   HEMATOCRIT  --  36.9  --   --  38.4 43.0   PLATELETS  --  411  --   --  418 482*   NEUTROS ABS  --  3.02  --   --  7.46* 6.64   IMMATURE GRANS (ABS)  --  0.01  --   --  0.02 0.02   LYMPHS ABS  --  2.32  --   --  1.54 2.00   MONOS ABS  --  0.65  --   --  0.99* 0.87   EOS ABS  --  0.33  --   --  0.07 0.26   MCV  --  97.4*  --   --  97.5* 98.9*   PROTIME  --   --   --   --   --  12.8   APTT 162.1* 60.2* 82.4   < > 68.0* 27.7*    < > = values in this interval not displayed.         Lab 02/14/24  0603 02/13/24  0451 02/12/24  0309 02/11/24  1834   SODIUM 140 136 137 144   POTASSIUM 3.4* 3.7 4.2 3.8   CHLORIDE 108* 105 100 105   CO2 21.2* 20.1* 16.2* 18.5*   ANION GAP 10.8 10.9 20.8* 20.5*   BUN 3* 6* 18 16   CREATININE 0.54* 0.57 0.73 0.88   EGFR 100.4 99.1 89.7 71.7   GLUCOSE 113* 127* 106* 75   CALCIUM 8.8 8.6 8.7 9.2   MAGNESIUM 1.4*  --   --  1.9   PHOSPHORUS 2.6  --   --   --    HEMOGLOBIN A1C  --   --  5.90*  --          Lab 02/14/24  0603 02/11/24  1834   TOTAL PROTEIN 6.1 7.8   ALBUMIN 3.7 4.6   GLOBULIN 2.4 3.2   ALT (SGPT) 22 22   AST (SGOT) 25 27   BILIRUBIN 0.3 <0.2   ALK PHOS 43 54   LIPASE  --  33         Lab 02/11/24 2054 02/11/24  1834   PROBNP  --  55.2   HSTROP T 24* 11   PROTIME  --   12.8   INR  --  0.94         Lab 02/12/24  0309   CHOLESTEROL 231*   LDL CHOL 165*   HDL CHOL 49   TRIGLYCERIDES 94             Brief Urine Lab Results       None          [x]  Microbiology   Microbiology Results (last 10 days)       ** No results found for the last 240 hours. **          [x]  Radiology  XR Chest 1 View    Result Date: 2/11/2024    Emphysema.  No active pulmonary disease is seen.       SUNIL MCKENNA MD       Electronically Signed and Approved By: SUNIL MCKENNA MD on 2/11/2024 at 19:16            []  EKG/Telemetry   []  Cardiology/Vascular   []  Pathology  []  Old records  []  Other:    Assessment & Plan   Assessment / Plan     Assessment:  Atrial fibrillation with RVR  Chest pain  Hypertension  Hyperlipidemia  Depression  Alcohol abuse, with potential for withdrawal  Stroke  GERD  Concern for alcohol withdrawal    Plan:  Cardiac cath on hold due to refusal by patient and   DC IV fluids  Continue heparin drip transition to Eliquis at discharge  Continue rate controlling medications  Replacing potassium and magnesium  Continuing cardiac telemetry  Continue CIWA protocol for alcohol withdrawal  Further treatment contingent upon h her hospital course  Discussed with RN      DVT prophylaxis:  Medical and mechanical DVT prophylaxis orders are present.        CODE STATUS:   Code Status (Patient has no pulse and is not breathing): CPR (Attempt to Resuscitate)  Medical Interventions (Patient has pulse or is breathing): Full Support  Electronically signed by BRITTANY Elise, 02/14/24, 3:25 PM EST.    Attending Documentation:  Patient independently seen and evaluated, above documentation reflects plan put forth during bedside rounds.  More than 51% of the time of this patient encounter was performed by me. I discussed the care plan with NIRU Toscano PA-C, I agree with his findings and plan as documented, what I have added to the care plan and modified is as follows in my documentation and my  medical decision making; 66-year-old female presented with chest pain, was in A-fib with RVR.  Underwent a stress test.  Plan was to pursue cardiac cath, patient was actually down in the Cath Lab preparing to undergo the procedure and spouse raise some concerns and questions.  Procedure was held and ultimately has been deferred by patient and spouse.  She does not wish to pursue it at this time.  We will continue CIWA protocol for now, as needed benzodiazepines on symptom based protocol.  Patient in no distress today, denies chest pain.  Electronically signed by Emil Frazier MD, 02/14/24, 5:09 PM EST.       WDL

## 2024-03-06 NOTE — ED ADULT NURSE NOTE - NSFALLUNIVINTERV_ED_ALL_ED
Admitted Bed/Stretcher in lowest position, wheels locked, appropriate side rails in place/Call bell, personal items and telephone in reach/Instruct patient to call for assistance before getting out of bed/chair/stretcher/Non-slip footwear applied when patient is off stretcher/[place] to call system/Physically safe environment - no spills, clutter or unnecessary equipment/Purposeful proactive rounding/Room/bathroom lighting operational, light cord in reach

## 2024-03-25 LAB
ALBUMIN SERPL ELPH-MCNC: 3.5 G/DL
ALP BLD-CCNC: 64 U/L
ALT SERPL-CCNC: 15 U/L
ANION GAP SERPL CALC-SCNC: 13 MMOL/L
APPEARANCE: CLEAR
AST SERPL-CCNC: 19 U/L
BACTERIA: NEGATIVE /HPF
BILIRUB SERPL-MCNC: 0.2 MG/DL
BILIRUBIN URINE: NEGATIVE
BLOOD URINE: ABNORMAL
BUN SERPL-MCNC: 19 MG/DL
C3 SERPL-MCNC: 74 MG/DL
C4 SERPL-MCNC: 25 MG/DL
CALCIUM SERPL-MCNC: 8.9 MG/DL
CAST: 0 /LPF
CHLORIDE SERPL-SCNC: 106 MMOL/L
CHOLEST SERPL-MCNC: 174 MG/DL
CO2 SERPL-SCNC: 20 MMOL/L
COLOR: YELLOW
CREAT SERPL-MCNC: 1.05 MG/DL
CREAT SPEC-SCNC: 42 MG/DL
CREAT SPEC-SCNC: 42 MG/DL
CREAT/PROT UR: 0.8 RATIO
DSDNA AB SER-ACNC: 56 IU/ML
EGFR: 71 ML/MIN/1.73M2
EPITHELIAL CELLS: 0 /HPF
GLUCOSE QUALITATIVE U: NEGATIVE MG/DL
GLUCOSE SERPL-MCNC: 101 MG/DL
HBV CORE IGG+IGM SER QL: NONREACTIVE
HBV SURFACE AB SER QL: REACTIVE
HBV SURFACE AG SER QL: NONREACTIVE
HCT VFR BLD CALC: 26 %
HDLC SERPL-MCNC: 57 MG/DL
HGB BLD-MCNC: 8.1 G/DL
KETONES URINE: NEGATIVE MG/DL
LDLC SERPL CALC-MCNC: 106 MG/DL
LEUKOCYTE ESTERASE URINE: NEGATIVE
MAGNESIUM SERPL-MCNC: 1.6 MG/DL
MCHC RBC-ENTMCNC: 30.9 PG
MCHC RBC-ENTMCNC: 31.2 GM/DL
MCV RBC AUTO: 99.2 FL
MICROALBUMIN 24H UR DL<=1MG/L-MCNC: 18.5 MG/DL
MICROALBUMIN/CREAT 24H UR-RTO: 439 MG/G
MICROSCOPIC-UA: NORMAL
NITRITE URINE: NEGATIVE
NONHDLC SERPL-MCNC: 118 MG/DL
PH URINE: 6
PLATELET # BLD AUTO: 145 K/UL
POTASSIUM SERPL-SCNC: 4.8 MMOL/L
PROT SERPL-MCNC: 5.7 G/DL
PROT UR-MCNC: 33 MG/DL
PROTEIN URINE: 30 MG/DL
RBC # BLD: 2.62 M/UL
RBC # FLD: 12.8 %
RED BLOOD CELLS URINE: 2 /HPF
REVIEW: NORMAL
SODIUM SERPL-SCNC: 138 MMOL/L
SPECIFIC GRAVITY URINE: 1.01
TRIGL SERPL-MCNC: 62 MG/DL
URATE SERPL-MCNC: 5.6 MG/DL
UROBILINOGEN URINE: 0.2 MG/DL
WBC # FLD AUTO: 2.15 K/UL
WHITE BLOOD CELLS URINE: 1 /HPF

## 2024-03-27 ENCOUNTER — APPOINTMENT (OUTPATIENT)
Dept: NEPHROLOGY | Facility: CLINIC | Age: 34
End: 2024-03-27
Payer: MEDICAID

## 2024-03-27 VITALS
HEART RATE: 84 BPM | SYSTOLIC BLOOD PRESSURE: 108 MMHG | WEIGHT: 113 LBS | BODY MASS INDEX: 20.02 KG/M2 | DIASTOLIC BLOOD PRESSURE: 74 MMHG

## 2024-03-27 DIAGNOSIS — L03.114 CELLULITIS OF LEFT UPPER LIMB: ICD-10-CM

## 2024-03-27 DIAGNOSIS — Z86.19 PERSONAL HISTORY OF OTHER INFECTIOUS AND PARASITIC DISEASES: ICD-10-CM

## 2024-03-27 DIAGNOSIS — D64.9 ANEMIA, UNSPECIFIED: ICD-10-CM

## 2024-03-27 PROCEDURE — 99214 OFFICE O/P EST MOD 30 MIN: CPT

## 2024-03-27 PROCEDURE — G2211 COMPLEX E/M VISIT ADD ON: CPT | Mod: NC,1L

## 2024-03-27 RX ORDER — ALLOPURINOL 100 MG/1
100 TABLET ORAL DAILY
Qty: 30 | Refills: 3 | Status: DISCONTINUED | COMMUNITY
Start: 2023-09-29 | End: 2024-03-27

## 2024-03-27 RX ORDER — PREDNISONE 5 MG/1
5 TABLET ORAL
Qty: 90 | Refills: 3 | Status: ACTIVE | COMMUNITY
Start: 2023-09-29 | End: 1900-01-01

## 2024-03-27 RX ORDER — TORSEMIDE 10 MG/1
10 TABLET ORAL
Qty: 90 | Refills: 3 | Status: DISCONTINUED | COMMUNITY
Start: 2023-12-06 | End: 2024-03-27

## 2024-03-27 NOTE — HISTORY OF PRESENT ILLNESS
[FreeTextEntry1] : f/u SLE nephritis feeling well except chronic tiredness -- new insomnia issues x 2 weeks now no dizziness no swelling- has been off torsemide hand sx resolved  Not checking BP at home meds reviewed with pt and list updated labs done and reviewed - see below rheum - Dr Nava

## 2024-03-27 NOTE — ASSESSMENT
[FreeTextEntry1] : SLE 4/5 with KARENA-- KARENA and NS have resolved still low grade proteinuria on ACEI  SLE serologies much improved  still pancytopenia despite off MMF-- will dc allopurinol incase contributing ( and uric acid normalized off diuretics)  plan re-dose rituximab for maintenance cont pred for now but may be able to dc soon f/u heme re anemia/ pancytopenia discussed interventions for insomnia f/u here 2 mos with labs

## 2024-04-19 NOTE — PHARMACY COMMUNICATION NOTE - COMMENTS
I e mailed Dr. jerez about the patients Truxima infusion:  Patient  Joan Mckenna (: 1990) is scheduled to come in on 2024 for her Truxima 1000mg dose.  She previously started her first dose ( of day 1, Day 15) on 10-. For whatever reason she did not come in on 10-26-23 for her Day 15 dose. On 2023 she did come in for her 2nd dose of Truxima  for which we have an order dated 2023.                   Here is the confusion: Because the order dated 2023 said to give on Day 1 every 6 months  technically if she comes in 24 she would be about 1 month early. My question to you is will it be ok to treat on 2024? If it is we will require a new dated order (2024) in order to treat on 2024.  The other option is to wait  until 6 months after the last infusion (2023) which would take us to around the end of May 2024. Please let us know how you would like to proceed.    Thank You,  Ayaz Deutsch [facility]  [phone]  His response was as follows:  It is fine to give the rituximab 24. If you need a new order we will do it.     Separately, when I looked in her chart,  I find no record of her getting any rituximab ( except for my order). I do see \[facility] [place] [facility]\"" documentation of several units of blood given around the same time scanned under “Infusions” which is the way the chemo infusions used to get scanned in. I am curious why the blood transfusions are documented with a transfusion form from \[facility] [place] [facility]\"" while the chemo isn’t.   Thank you!  JR Jaun Jerez MD  Associate Professor of Medicine  Division of Nephrology  [facility], [facility] at [address], [place] [address]  [email or web address]  My response was as follows:    Thank you for getting back to me. We will require a new order dated for 2024. Also if you could check off in the lab section on page #2 the CBC with Differential box that would be helpful. The CBC with Diff is required prior to starting a new cycle.                  As far as the blood transfusion goes I can’t answer that question. However Graciela Muller or Maribell Neff  (Nurse managers)would be more helpful with this issue. They are CC’d above.                    Thank You,                  Ayaz Deutsch [facility]

## 2024-04-22 ENCOUNTER — LABORATORY RESULT (OUTPATIENT)
Age: 34
End: 2024-04-22

## 2024-04-22 ENCOUNTER — APPOINTMENT (OUTPATIENT)
Dept: HEMATOLOGY ONCOLOGY | Facility: CLINIC | Age: 34
End: 2024-04-22
Payer: MEDICAID

## 2024-04-22 ENCOUNTER — OUTPATIENT (OUTPATIENT)
Dept: OUTPATIENT SERVICES | Facility: HOSPITAL | Age: 34
LOS: 1 days | End: 2024-04-22

## 2024-04-22 VITALS
HEIGHT: 63 IN | TEMPERATURE: 97.2 F | BODY MASS INDEX: 20.2 KG/M2 | SYSTOLIC BLOOD PRESSURE: 106 MMHG | HEART RATE: 85 BPM | OXYGEN SATURATION: 99 % | WEIGHT: 114 LBS | DIASTOLIC BLOOD PRESSURE: 73 MMHG | RESPIRATION RATE: 18 BRPM

## 2024-04-22 DIAGNOSIS — M32.14 GLOMERULAR DISEASE IN SYSTEMIC LUPUS ERYTHEMATOSUS: ICD-10-CM

## 2024-04-22 LAB
ALBUMIN SERPL ELPH-MCNC: 3.4 G/DL
ALP BLD-CCNC: 54 U/L
ALT SERPL-CCNC: 20 U/L
ANION GAP SERPL CALC-SCNC: 6 MMOL/L
AST SERPL-CCNC: 29 U/L
BILIRUB SERPL-MCNC: 0.7 MG/DL
BUN SERPL-MCNC: 31 MG/DL
CALCIUM SERPL-MCNC: 10.2 MG/DL
CHLORIDE SERPL-SCNC: 114 MMOL/L
CO2 SERPL-SCNC: 27 MMOL/L
CREAT SERPL-MCNC: 1.1 MG/DL
EGFR: 67 ML/MIN/1.73M2
GLUCOSE SERPL-MCNC: 90 MG/DL
HAPTOGLOB SERPL-MCNC: 25 MG/DL
LDH SERPL-CCNC: 216 U/L
POTASSIUM SERPL-SCNC: 4.3 MMOL/L
PROT SERPL-MCNC: 6.5 G/DL
SODIUM SERPL-SCNC: 147 MMOL/L

## 2024-04-22 PROCEDURE — 86900 BLOOD TYPING SEROLOGIC ABO: CPT

## 2024-04-22 PROCEDURE — G2211 COMPLEX E/M VISIT ADD ON: CPT | Mod: NC,1L

## 2024-04-22 PROCEDURE — 99215 OFFICE O/P EST HI 40 MIN: CPT | Mod: 25

## 2024-04-22 PROCEDURE — 86901 BLOOD TYPING SEROLOGIC RH(D): CPT

## 2024-04-22 PROCEDURE — 86850 RBC ANTIBODY SCREEN: CPT

## 2024-04-22 RX ORDER — LISINOPRIL 20 MG/1
20 TABLET ORAL
Qty: 90 | Refills: 3 | Status: DISCONTINUED | COMMUNITY
Start: 2023-12-06 | End: 2024-04-22

## 2024-04-22 NOTE — PHYSICAL EXAM
[de-identified] : Pale young woman seated comfortably in exam room. No acute distress  [de-identified] : Normal work of breathing on room air.  Speaking full sentences normal respiratory rate on room air.  Speaking full sentences without increased work of breathing

## 2024-04-22 NOTE — REVIEW OF SYSTEMS
[Insomnia] : insomnia [Negative] : Musculoskeletal [FreeTextEntry2] : fatigue continues [de-identified] : insomnia x 1 month

## 2024-04-22 NOTE — HISTORY OF PRESENT ILLNESS
[de-identified] : SREEKANTH THIBODEAUX  is a 34-year-old woman with SLE who presents to hematology for f/u of anemia.   The patient has a longstanding diagnosis of SLE since her teenage years managed on chronic immunosuppression therapy.  She received care here in 2018 but has been in [place] for several years, returning on September 11, 2023.  Soon after her return she presented to [facility] with subacute, severe anasarca up to her chest.  She was found to have nephrotic disease and KARENA as well as pancytopenia.  Patient's initial blood counts were WBC 3.47, hemoglobin 6.8, and platelet 76.  RBCs were normocytic.  She responded to 2 units of PRBCs, however hemoglobin has subsequently drifted back down to 6.9 where it has remained for the past 2 weeks.  While inpatient, she was seen by hematology to assess her low blood counts with the following results:   Reticulocyte site count 9/14 consistent with hypoproliferation.  , haptoglobin undetectable, total bilirubin within normal limits.  Iron studies with elevated ferritin 1046 and percent saturation 55, along with decreased TIBC, consistent with anemia of chronic disease.  Peripheral smear reviewed by hematology revealed RBC morphologic changes including echinocytes, teardrop cells, and rare schistocytes.  There were a positive reticulocytes and dyspoietic changes of the neutrophils. Vit B12 659. Jasmine test was negative.   Overall impression was that pancytopenia was likely due to her underlying lupus with a possible contribution of marrow suppression from her immunosuppressive therapy.  Hematology deferred to renal/rheumatology with regards to treatment of the underlying cause.  Ultimately, the patient was treated with increased dose of mycophenolate, prednisone pulse, and recieved a dose of rituxumab in October. At that time, she had noted swelling and pain in her L wrist and was using a splint. These symptoms gradually worsened, and she saw her rheumatologist who prescribed colchicine due to elevated uric acid with no improvement. She presented to the ED on 10/24 due to continued worsening of swelling and pain. There she was febrile and hypertensive which persisted overnight with a Tmax of 103 F. She was treated with antibiotics and admitted for further workup and management. Her cellcept and diuretics were discontinued and she was prescribed doxycycline and cefpodoxime. The patient's wrist infection is fully resolved and she is off antibiotics.  She then resumed treatment with mycophenolate and received another dose of rituximab on 11/28, however the mycophenalate was held again after her anemia worsened requiring a unit of pRBC. Hb improved.    [de-identified] : The patient is now felt to be in good control in terms of her SLE serologies.  She is taking prednisone 5 mg daily, hydroxychloroquine at stable dose.  Patient's WBC count and platelets initially improved in January but have decreased now to 2.4 and 143 respectively.  These appear to be stable throughout the past month.  Allopurinol was discontinued at last nephrology visit.  Clinically, patient reports intermittent insomnia but otherwise denies complaints.  No longer has any lower extremity edema

## 2024-04-26 ENCOUNTER — APPOINTMENT (OUTPATIENT)
Dept: INFUSION THERAPY | Facility: CLINIC | Age: 34
End: 2024-04-26

## 2024-04-26 ENCOUNTER — OUTPATIENT (OUTPATIENT)
Dept: OUTPATIENT SERVICES | Facility: HOSPITAL | Age: 34
LOS: 1 days | End: 2024-04-26
Payer: MEDICAID

## 2024-04-26 VITALS
DIASTOLIC BLOOD PRESSURE: 75 MMHG | RESPIRATION RATE: 16 BRPM | HEART RATE: 74 BPM | SYSTOLIC BLOOD PRESSURE: 109 MMHG | TEMPERATURE: 98 F | OXYGEN SATURATION: 100 %

## 2024-04-26 VITALS
WEIGHT: 113.98 LBS | SYSTOLIC BLOOD PRESSURE: 100 MMHG | HEART RATE: 98 BPM | OXYGEN SATURATION: 99 % | RESPIRATION RATE: 16 BRPM | DIASTOLIC BLOOD PRESSURE: 68 MMHG | TEMPERATURE: 98 F | HEIGHT: 63 IN

## 2024-04-26 DIAGNOSIS — N04.9 NEPHROTIC SYNDROME WITH UNSPECIFIED MORPHOLOGIC CHANGES: ICD-10-CM

## 2024-04-26 LAB
ACANTHOCYTES BLD QL SMEAR: SLIGHT — SIGNIFICANT CHANGE UP
ALBUMIN SERPL ELPH-MCNC: 3.2 G/DL — LOW (ref 3.3–5)
ALP SERPL-CCNC: 56 U/L — SIGNIFICANT CHANGE UP (ref 40–120)
ALT FLD-CCNC: 18 U/L — SIGNIFICANT CHANGE UP (ref 10–45)
ANION GAP SERPL CALC-SCNC: 0 MMOL/L — LOW (ref 5–17)
ANISOCYTOSIS BLD QL: SLIGHT — SIGNIFICANT CHANGE UP
AST SERPL-CCNC: 27 U/L — SIGNIFICANT CHANGE UP (ref 10–40)
BILIRUB SERPL-MCNC: 0.7 MG/DL — SIGNIFICANT CHANGE UP (ref 0.2–1.2)
BUN SERPL-MCNC: 25 MG/DL — HIGH (ref 7–23)
CALCIUM SERPL-MCNC: 10 MG/DL — SIGNIFICANT CHANGE UP (ref 8.4–10.5)
CHLORIDE SERPL-SCNC: 114 MMOL/L — HIGH (ref 96–108)
CO2 SERPL-SCNC: 29 MMOL/L — SIGNIFICANT CHANGE UP (ref 22–31)
CREAT SERPL-MCNC: 1.2 MG/DL — SIGNIFICANT CHANGE UP (ref 0.5–1.3)
DACRYOCYTES BLD QL SMEAR: SLIGHT — SIGNIFICANT CHANGE UP
EGFR: 60 ML/MIN/1.73M2 — SIGNIFICANT CHANGE UP
ELLIPTOCYTES BLD QL SMEAR: SLIGHT — SIGNIFICANT CHANGE UP
EOSINOPHIL # BLD AUTO: 0 K/UL — SIGNIFICANT CHANGE UP (ref 0–0.5)
EOSINOPHIL NFR BLD AUTO: 1 % — SIGNIFICANT CHANGE UP (ref 0–6)
GIANT PLATELETS BLD QL SMEAR: PRESENT — SIGNIFICANT CHANGE UP
GLUCOSE SERPL-MCNC: 88 MG/DL — SIGNIFICANT CHANGE UP (ref 70–99)
HCT VFR BLD CALC: 25.9 % — LOW (ref 34.5–45)
HGB BLD-MCNC: 8.6 G/DL — LOW (ref 11.5–15.5)
HYPOCHROMIA BLD QL: SLIGHT — SIGNIFICANT CHANGE UP
LG PLATELETS BLD QL AUTO: PRESENT — SIGNIFICANT CHANGE UP
LYMPHOCYTES # BLD AUTO: 0.7 K/UL — LOW (ref 1–3.3)
LYMPHOCYTES # BLD AUTO: 32 % — SIGNIFICANT CHANGE UP (ref 13–44)
MACROCYTES BLD QL: SLIGHT — SIGNIFICANT CHANGE UP
MANUAL SMEAR VERIFICATION: SIGNIFICANT CHANGE UP
MCHC RBC-ENTMCNC: 30.9 PG — SIGNIFICANT CHANGE UP (ref 27–34)
MCHC RBC-ENTMCNC: 33.2 GM/DL — SIGNIFICANT CHANGE UP (ref 32–36)
MCV RBC AUTO: 93.2 FL — SIGNIFICANT CHANGE UP (ref 80–100)
MICROCYTES BLD QL: SLIGHT — SIGNIFICANT CHANGE UP
MONOCYTES # BLD AUTO: 0.4 K/UL — SIGNIFICANT CHANGE UP (ref 0–0.9)
MONOCYTES NFR BLD AUTO: 17 % — HIGH (ref 2–14)
NEUTROPHILS # BLD AUTO: 1.2 K/UL — LOW (ref 1.8–7.4)
NEUTROPHILS NFR BLD AUTO: 50 % — SIGNIFICANT CHANGE UP (ref 43–77)
OVALOCYTES BLD QL SMEAR: SLIGHT — SIGNIFICANT CHANGE UP
PHOSPHATE SERPL-MCNC: 4.7 MG/DL — HIGH (ref 2.5–4.5)
PLAT MORPH BLD: ABNORMAL
PLATELET # BLD AUTO: 127 K/UL — LOW (ref 150–400)
POIKILOCYTOSIS BLD QL AUTO: SLIGHT — SIGNIFICANT CHANGE UP
POLYCHROMASIA BLD QL SMEAR: SLIGHT — SIGNIFICANT CHANGE UP
POTASSIUM SERPL-MCNC: 4.3 MMOL/L — SIGNIFICANT CHANGE UP (ref 3.5–5.3)
POTASSIUM SERPL-SCNC: 4.3 MMOL/L — SIGNIFICANT CHANGE UP (ref 3.5–5.3)
PROT SERPL-MCNC: 6.5 G/DL — SIGNIFICANT CHANGE UP (ref 6–8.3)
RBC # BLD: 2.78 M/UL — LOW (ref 3.8–5.2)
RBC # FLD: 12.3 % — SIGNIFICANT CHANGE UP (ref 10.3–14.5)
RBC BLD AUTO: ABNORMAL
SCHISTOCYTES BLD QL AUTO: SLIGHT — SIGNIFICANT CHANGE UP
SODIUM SERPL-SCNC: 143 MMOL/L — SIGNIFICANT CHANGE UP (ref 135–145)
SPHEROCYTES BLD QL SMEAR: SLIGHT — SIGNIFICANT CHANGE UP
WBC # BLD: 2.3 K/UL — LOW (ref 3.8–10.5)
WBC # FLD AUTO: 2.3 K/UL — LOW (ref 3.8–10.5)

## 2024-04-26 PROCEDURE — 96415 CHEMO IV INFUSION ADDL HR: CPT

## 2024-04-26 PROCEDURE — 80053 COMPREHEN METABOLIC PANEL: CPT

## 2024-04-26 PROCEDURE — 85007 BL SMEAR W/DIFF WBC COUNT: CPT

## 2024-04-26 PROCEDURE — 85025 COMPLETE CBC W/AUTO DIFF WBC: CPT

## 2024-04-26 PROCEDURE — 84100 ASSAY OF PHOSPHORUS: CPT

## 2024-04-26 PROCEDURE — 96375 TX/PRO/DX INJ NEW DRUG ADDON: CPT

## 2024-04-26 PROCEDURE — 96413 CHEMO IV INFUSION 1 HR: CPT

## 2024-04-26 PROCEDURE — 36415 COLL VENOUS BLD VENIPUNCTURE: CPT

## 2024-04-26 RX ORDER — ACETAMINOPHEN 500 MG
500 TABLET ORAL ONCE
Refills: 0 | Status: COMPLETED | OUTPATIENT
Start: 2024-04-26 | End: 2024-04-26

## 2024-04-26 RX ORDER — RITUXIMAB 10 MG/ML
1000 INJECTION, SOLUTION INTRAVENOUS ONCE
Refills: 0 | Status: COMPLETED | OUTPATIENT
Start: 2024-04-26 | End: 2024-04-26

## 2024-04-26 RX ORDER — DIPHENHYDRAMINE HCL 50 MG
25 CAPSULE ORAL ONCE
Refills: 0 | Status: COMPLETED | OUTPATIENT
Start: 2024-04-26 | End: 2024-04-26

## 2024-04-26 RX ADMIN — Medication 500 MILLIGRAM(S): at 11:00

## 2024-04-26 RX ADMIN — Medication 202 MILLIGRAM(S): at 10:26

## 2024-04-26 RX ADMIN — Medication 100 MILLIGRAM(S): at 10:24

## 2024-04-26 RX ADMIN — Medication 500 MILLIGRAM(S): at 10:23

## 2024-04-26 RX ADMIN — RITUXIMAB 1000 MILLIGRAM(S): 10 INJECTION, SOLUTION INTRAVENOUS at 13:35

## 2024-04-26 RX ADMIN — Medication 25 MILLIGRAM(S): at 10:41

## 2024-04-26 RX ADMIN — RITUXIMAB 1000 MILLIGRAM(S): 10 INJECTION, SOLUTION INTRAVENOUS at 10:47

## 2024-05-27 LAB
ALBUMIN SERPL ELPH-MCNC: 3.3 G/DL
ALP BLD-CCNC: 61 U/L
ALT SERPL-CCNC: 19 U/L
ANION GAP SERPL CALC-SCNC: 14 MMOL/L
APPEARANCE: CLEAR
AST SERPL-CCNC: 22 U/L
BACTERIA: NEGATIVE /HPF
BILIRUB SERPL-MCNC: <0.2 MG/DL
BILIRUBIN URINE: NEGATIVE
BLOOD URINE: ABNORMAL
BUN SERPL-MCNC: 33 MG/DL
C3 SERPL-MCNC: 66 MG/DL
C4 SERPL-MCNC: 20 MG/DL
CALCIUM SERPL-MCNC: 8.1 MG/DL
CAST: 0 /LPF
CHLORIDE SERPL-SCNC: 100 MMOL/L
CO2 SERPL-SCNC: 17 MMOL/L
COLOR: YELLOW
CREAT SERPL-MCNC: 1.03 MG/DL
CREAT SPEC-SCNC: 72 MG/DL
CREAT SPEC-SCNC: 72 MG/DL
CREAT/PROT UR: 0.2 RATIO
EGFR: 72 ML/MIN/1.73M2
EPITHELIAL CELLS: 1 /HPF
FOLATE SERPL-MCNC: 12.3 NG/ML
GLUCOSE QUALITATIVE U: NEGATIVE MG/DL
GLUCOSE SERPL-MCNC: 72 MG/DL
HCT VFR BLD CALC: 25.9 %
HGB BLD-MCNC: 8.7 G/DL
KETONES URINE: NEGATIVE MG/DL
LEUKOCYTE ESTERASE URINE: NEGATIVE
MAGNESIUM SERPL-MCNC: 1.6 MG/DL
MCHC RBC-ENTMCNC: 31.6 PG
MCHC RBC-ENTMCNC: 33.6 GM/DL
MCV RBC AUTO: 94.2 FL
MICROALBUMIN 24H UR DL<=1MG/L-MCNC: 5.7 MG/DL
MICROALBUMIN/CREAT 24H UR-RTO: 80 MG/G
MICROSCOPIC-UA: NORMAL
NITRITE URINE: NEGATIVE
PH URINE: 6
PLATELET # BLD AUTO: 168 K/UL
POTASSIUM SERPL-SCNC: 4 MMOL/L
PROT SERPL-MCNC: 5.3 G/DL
PROT UR-MCNC: 13 MG/DL
PROTEIN URINE: NORMAL MG/DL
RBC # BLD: 2.75 M/UL
RBC # FLD: 12.3 %
RED BLOOD CELLS URINE: 0 /HPF
SODIUM SERPL-SCNC: 131 MMOL/L
SPECIFIC GRAVITY URINE: 1.01
URATE SERPL-MCNC: 6.7 MG/DL
UROBILINOGEN URINE: 0.2 MG/DL
VIT B12 SERPL-MCNC: 420 PG/ML
WBC # FLD AUTO: 2.74 K/UL
WHITE BLOOD CELLS URINE: 1 /HPF

## 2024-05-28 LAB — DSDNA AB SER-ACNC: 120 IU/ML

## 2024-05-29 ENCOUNTER — APPOINTMENT (OUTPATIENT)
Dept: NEPHROLOGY | Facility: CLINIC | Age: 34
End: 2024-05-29
Payer: MEDICAID

## 2024-05-29 VITALS
HEART RATE: 88 BPM | SYSTOLIC BLOOD PRESSURE: 105 MMHG | WEIGHT: 114 LBS | BODY MASS INDEX: 20.19 KG/M2 | DIASTOLIC BLOOD PRESSURE: 74 MMHG

## 2024-05-29 DIAGNOSIS — M32.14 GLOMERULAR DISEASE IN SYSTEMIC LUPUS ERYTHEMATOSUS: ICD-10-CM

## 2024-05-29 PROCEDURE — G2211 COMPLEX E/M VISIT ADD ON: CPT | Mod: NC

## 2024-05-29 PROCEDURE — 99214 OFFICE O/P EST MOD 30 MIN: CPT

## 2024-05-29 RX ORDER — LISINOPRIL 10 MG/1
10 TABLET ORAL DAILY
Refills: 0 | Status: DISCONTINUED | COMMUNITY
Start: 2024-04-22 | End: 2024-05-29

## 2024-06-03 ENCOUNTER — APPOINTMENT (OUTPATIENT)
Dept: HEMATOLOGY ONCOLOGY | Facility: CLINIC | Age: 34
End: 2024-06-03
Payer: MEDICAID

## 2024-06-03 VITALS
HEIGHT: 63 IN | WEIGHT: 114 LBS | BODY MASS INDEX: 20.2 KG/M2 | OXYGEN SATURATION: 97 % | RESPIRATION RATE: 18 BRPM | HEART RATE: 80 BPM | TEMPERATURE: 98.3 F | DIASTOLIC BLOOD PRESSURE: 82 MMHG | SYSTOLIC BLOOD PRESSURE: 125 MMHG

## 2024-06-03 DIAGNOSIS — D61.818 OTHER PANCYTOPENIA: ICD-10-CM

## 2024-06-03 PROCEDURE — 99214 OFFICE O/P EST MOD 30 MIN: CPT

## 2024-06-03 PROCEDURE — G2211 COMPLEX E/M VISIT ADD ON: CPT | Mod: NC

## 2024-06-03 NOTE — PHYSICAL EXAM
[Normal] : affect appropriate [de-identified] : Pale young woman seated comfortably in exam room. No acute distress  [de-identified] : Normal work of breathing on room air.  Speaking full sentences normal respiratory rate on room air.  Speaking full sentences without increased work of breathing

## 2024-06-03 NOTE — HISTORY OF PRESENT ILLNESS
[de-identified] : SREEKANTH THIBODEAUX  is a 34-year-old woman with SLE who presents to hematology for f/u of cytopenias.   The patient has a longstanding diagnosis of SLE since her teenage years managed on chronic immunosuppression therapy.  She received care here in 2018 but has been in [place] for several years, returning on September 11, 2023.  Soon after her return she presented to [facility] with subacute, severe anasarca up to her chest.  She was found to have nephrotic disease and KARENA as well as pancytopenia.  Patient's initial blood counts were WBC 3.47, hemoglobin 6.8, and platelet 76.  RBCs were normocytic.  She responded to 2 units of PRBCs, however hemoglobin has subsequently drifted back down to 6.9 where it has remained for the past 2 weeks.  While inpatient, she was seen by hematology to assess her low blood counts with the following results:   Reticulocyte site count 9/14 consistent with hypoproliferation.  , haptoglobin undetectable, total bilirubin within normal limits.  Iron studies with elevated ferritin 1046 and percent saturation 55, along with decreased TIBC, consistent with anemia of chronic disease.  Peripheral smear reviewed by hematology revealed RBC morphologic changes including echinocytes, teardrop cells, and rare schistocytes.  There were a positive reticulocytes and dyspoietic changes of the neutrophils. Vit B12 659. Jasmine test was negative.   Overall impression was that pancytopenia was likely due to her underlying lupus with a possible contribution of marrow suppression from her immunosuppressive therapy.  Hematology deferred to renal/rheumatology with regards to treatment of the underlying cause.  Ultimately, the patient was treated with increased dose of mycophenolate, prednisone pulse, and recieved a dose of rituxumab in October. At that time, she had noted swelling and pain in her L wrist and was using a splint. These symptoms gradually worsened, and she saw her rheumatologist who prescribed colchicine due to elevated uric acid with no improvement. She presented to the ED on 10/24 due to continued worsening of swelling and pain. There she was febrile and hypertensive which persisted overnight with a Tmax of 103 F. She was treated with antibiotics and admitted for further workup and management. Her cellcept and diuretics were discontinued and she was prescribed doxycycline and cefpodoxime. The patient's wrist infection is fully resolved and she is off antibiotics.  She then resumed treatment with mycophenolate and received another dose of rituximab on 11/28, however the mycophenalate was held again after her anemia worsened requiring a unit of pRBC. Hb improved.   The patient has since been in good control in terms of her SLE serologies.  She is taking prednisone 5 mg daily, hydroxychloroquine at stable dose.  Patient's WBC count and platelets initially improved in January but have decreased now to 2.4 and 143 respectively.  These appear to be stable throughout the past month.  Allopurinol was discontinued. Clinically, patient reports intermittent insomnia but otherwise denies complaints.  No longer has any lower extremity edema [de-identified] : Pt clinical picture is overall stable with no new complaints and stable chronic insomnia and fatigue. No edema or significant joint aches. Recent labs reviewed: CBC with stable Hb and slight improvements in plt count and WBC, B12 and folate adequate, Cr stable.

## 2024-06-03 NOTE — REVIEW OF SYSTEMS
[Fatigue] : fatigue [Insomnia] : insomnia [Negative] : Allergic/Immunologic [FreeTextEntry2] : fatigue continues [de-identified] : insomnia x 1 month

## 2024-06-04 NOTE — ASSESSMENT
[FreeTextEntry1] : lupus nephritis appears very well controlled now KARENA resolved minimal to no proteinuria serologies trending slt more active latest labs ( though overall much improved) persistent anemia ( and leukopenia) -- unclear why -- will d/w heme and rheum if could be SLE related and consider trial increase pred to see if helps? ( has not tolerated MMF which caused GI sx and seemed to worsen cytopenias) f/u here 3 mos

## 2024-06-04 NOTE — HISTORY OF PRESENT ILLNESS
[FreeTextEntry1] : f/u SLE 4 and 5 s/p rituximab maintenance dose end of April has no complaints except still tiredness ( chronic)  no edema, no joint sx meds reviewed with pt- -on pred 5 mg/d still  labs done and reviewed - see below rheum - Dr Nava heme_ Dr Grant

## 2024-08-27 NOTE — PROGRESS NOTE ADULT - PROBLEM SELECTOR PROBLEM 5
"Preventive Care Visit  [facility]  Ashley JEFFYessenia Hebert Nikhil King-PEDRO Melgar  [facility]       Subjective   Rocio is a 35 Ashley Hebert PA-C     Pancytopenia

## 2024-09-06 NOTE — PROGRESS NOTE ADULT - PROBLEM SELECTOR PLAN 2
PHYSICAL THERAPY TREATMENT NOTE - INPATIENT    Room Number: 531/531-A     Session: 2    Number of Visits to Meet Established Goals: 5    Presenting Problem: Intractable vomiting  Co-Morbidities : CAD, HTN, DM, macular degeneration    ASSESSMENT   Patient demonstrates limited progress this session, goals  remain in progress.    Patient continues to function below baseline with bed mobility, transfers, and gait.  Contributing factors to remaining limitations include decreased endurance/aerobic capacity, impaired dynamic and static balance, decreased muscular endurance, and cognitive deficits (significant fear of fall and poor safety awareness).  Next session anticipate patient to progress transfers and gait.  Physical Therapy will continue to follow patient for duration of hospitalization.    Patient continues to benefit from continued skilled PT services: to promote return to prior level of function and safety with continuous assistance and gradual rehabilitative therapy .    PLAN  PT Treatment Plan: Bed mobility;Patient education;Gait training;Energy conservation;Endurance;Range of motion;Strengthening;Transfer training;Balance training  Rehab Potential : Good  Frequency (Obs): 3-5x/week    CURRENT GOALS     Goal #1 Patient is able to demonstrate supine - sit EOB @ level: supervision     Goal #2 Patient is able to demonstrate transfers EOB to/from Chair/Wheelchair at assistance level: minimum assistance     Goal #3 Patient is able to ambulate 10 feet with assist device: walker - rolling at assistance level: minimum assistance     Goal #4    Goal #5    Goal #6    Goal Comments: Goals established on 8/29/2024 9/6/2024 all goals ongoing.     SUBJECTIVE  \"Are you the doctor?\"  \"Where is my daughter?\"  \"Oh I need help\"   Pt requires frequent re-direction   OBJECTIVE  Precautions: Bed/chair alarm;Hard of hearing;Low vision    WEIGHT BEARING RESTRICTION  Weight Bearing Restriction: None                PAIN ASSESSMENT    Ratin  Location: pt denies pain  Management Techniques: Repositioning    BALANCE                                                                                                                       Static Sitting: Fair  Dynamic Sitting: Fair -           Static Standing: Poor +  Dynamic Standing: Poor +    ACTIVITY TOLERANCE                         O2 WALK         AM-PAC '6-Clicks' INPATIENT SHORT FORM - BASIC MOBILITY  How much difficulty does the patient currently have...  Patient Difficulty: Turning over in bed (including adjusting bedclothes, sheets and blankets)?: A Little   Patient Difficulty: Sitting down on and standing up from a chair with arms (e.g., wheelchair, bedside commode, etc.): A Lot   Patient Difficulty: Moving from lying on back to sitting on the side of the bed?: A Little   How much help from another person does the patient currently need...   Help from Another: Moving to and from a bed to a chair (including a wheelchair)?: A Little   Help from Another: Need to walk in hospital room?: A Lot   Help from Another: Climbing 3-5 steps with a railing?: Total       AM-PAC Score:  Raw Score: 14   Approx Degree of Impairment: 61.29%   Standardized Score (AM-PAC Scale): 38.1   CMS Modifier (G-Code): CL    FUNCTIONAL ABILITY STATUS  Gait Assessment   Functional Mobility/Gait Assessment  Gait Assistance: Minimum assistance  Distance (ft): 2  Assistive Device: Rolling walker  Pattern: Shuffle    Skilled Therapy Provided  Pt presents in semi sup  Therapist educated pt on benefits of mobility to prevent deconditioning   Pt perseverates on whether the doctor rounded - pt requires step by step cues for sequencing for bed mobility and t/f   Therapist cued pt for breathing and proper integration of RW   BP monitored d/t pt c/o dizziness and BP WNL   Pt left in chair needs met , alarm set     Bed Mobility:  Rolling: nt  Supine<>Sit: min A c HOB elevated    Sit<>Supine: NT  Min A to scoot       Transfer  Mobility:  Sit<>Stand: min assist   Stand<>Sit: min assist and cues for hand placement.   Gait: min A c RW , 2nd person d/t pt fear of fall         THERAPEUTIC EXERCISES  Lower Extremity Alternating marching  Ankle pumps  Hip AB/AD  Heel raises  Heel slides  LAQ     Upper Extremity Elbow flex/ext and PNF 02     Position Sitting     Repetitions      Sets        Patient End of Session: Up in chair;Needs met;RN aware of session/findings;Call light within reach;All patient questions and concerns addressed;Alarm set    PT Session Time: 23 minutes  Gait Trainin minutes  Therapeutic Activity: 23 minutes               Hx of SLE. Currently on Prednisone, Mycophenolate, Hydroxychloroquine, Collateral from outpt Rheumatologist as per chart note.   rheum following

## 2024-09-13 ENCOUNTER — LABORATORY RESULT (OUTPATIENT)
Age: 34
End: 2024-09-13

## 2024-09-16 ENCOUNTER — APPOINTMENT (OUTPATIENT)
Dept: HEMATOLOGY ONCOLOGY | Facility: CLINIC | Age: 34
End: 2024-09-16

## 2024-09-17 ENCOUNTER — APPOINTMENT (OUTPATIENT)
Dept: HEMATOLOGY ONCOLOGY | Facility: CLINIC | Age: 34
End: 2024-09-17
Payer: MEDICAID

## 2024-09-17 VITALS
HEIGHT: 63 IN | OXYGEN SATURATION: 97 % | HEART RATE: 85 BPM | DIASTOLIC BLOOD PRESSURE: 77 MMHG | SYSTOLIC BLOOD PRESSURE: 107 MMHG | RESPIRATION RATE: 18 BRPM | BODY MASS INDEX: 20.48 KG/M2 | WEIGHT: 115.6 LBS | TEMPERATURE: 98.5 F

## 2024-09-17 PROCEDURE — 99214 OFFICE O/P EST MOD 30 MIN: CPT | Mod: 25

## 2024-09-18 ENCOUNTER — APPOINTMENT (OUTPATIENT)
Dept: NEPHROLOGY | Facility: CLINIC | Age: 34
End: 2024-09-18
Payer: MEDICAID

## 2024-09-18 DIAGNOSIS — M32.14 GLOMERULAR DISEASE IN SYSTEMIC LUPUS ERYTHEMATOSUS: ICD-10-CM

## 2024-09-18 DIAGNOSIS — D61.818 OTHER PANCYTOPENIA: ICD-10-CM

## 2024-09-18 DIAGNOSIS — R80.9 PROTEINURIA, UNSPECIFIED: ICD-10-CM

## 2024-09-18 PROCEDURE — G2211 COMPLEX E/M VISIT ADD ON: CPT | Mod: NC

## 2024-09-18 PROCEDURE — 99215 OFFICE O/P EST HI 40 MIN: CPT

## 2024-09-18 NOTE — ASSESSMENT
[FreeTextEntry1] : Lupus nephritis class 4 and  5-- controlled s/p rituximab / steroids -- CD19 cells suppressed  complements normalized and DSDNA improved  albuminuria increased from May but still low grade and NS has resolved  hx intolerance to MMF and  lack of response to belimumab in past. AZA likely contraindicated with pancytopenia. will likely re-dose rituximab maintenance in next 2 mos ( last dose April 2024) -- may just give 500 mg ritux  may try low dose ARB or SGLT2i for albuminuria on f.u - though BP may limit  advised limit fluid intake prior to bedtime -- given # for  re urine frequency - possible irritable bladder  pancytopenia stable, hgb improved--cont follow with heme f/u 2 mos

## 2024-09-18 NOTE — HISTORY OF PRESENT ILLNESS
[FreeTextEntry1] : f/u SLE calss 4 and 5 no complaints except tiredness  no edema  urine frequency -- not dysuria-- keeps her up at night. not new but worse just saw heme re pancytopenia -- improved and no  intervention planned  meds reviewed with pt and list updated labs done and reviewed - see below rheum Dr Nava

## 2024-09-19 NOTE — REVIEW OF SYSTEMS
[Fatigue] : fatigue [Negative] : Allergic/Immunologic [Constipation] : constipation [Fever] : no fever [Chills] : no chills [Night Sweats] : no night sweats [Recent Change In Weight] : ~T no recent weight change [Chest Pain] : no chest pain [Palpitations] : no palpitations [Lower Ext Edema] : no lower extremity edema [Shortness Of Breath] : no shortness of breath [Wheezing] : no wheezing [SOB on Exertion] : no shortness of breath during exertion [Abdominal Pain] : no abdominal pain [Vomiting] : no vomiting [Dysuria] : no dysuria [Joint Pain] : no joint pain [Joint Stiffness] : no joint stiffness [Skin Rash] : no skin rash [Fainting] : no fainting [Insomnia] : no insomnia [Muscle Weakness] : no muscle weakness [Easy Bleeding] : no tendency for easy bleeding [Easy Bruising] : no tendency for easy bruising

## 2024-09-19 NOTE — ASSESSMENT
[FreeTextEntry1] : Ms. Mckenna is a 34F PMH SLE, lupus nephritis who presents for follow up of pancytopenia.  The patient's pancytopenia is chronic and was seen on labs from 2018. Inpatient laboratory assessment 11/2023 revealed a hypoproliferative, normocytic anemia with iron studies consistent with anemia of chronic disease. However, there were also indications that hemolysis was occurring, including undetectable haptoglobin and elevated LDH.  The impression was that anemia due to marrow suppression from inflammation and autoimmune hemolytic anemia are both very common in patients with SLE, and it can therefore be difficult to tease out a single cause. Likely both had been contributing. In addition, the patient's renal dysfunction also raised the possibility of impaired erythropoietin production, which may portend response to ANNE's.  Patient completed antibiotic treatment for wrist infection and resumed mycophenolate treatment and rituximab. hemoglobin subsequently decreased to <7 and mycophenolate was again held. 1u PRBC was given (12/2023). Since this time, the patient's hemoglobin has continued to improve along with her other cell lines.  However, since 1/2024 anemia remained relatively stable, but WBC count and platelet count have decreased to below normal. Both are consistent with values seen in the past.  Her SLE otherwise appears to be under good control and medication felt to be most responsible for her severe anemia, mycophenolate, was discontinued in 4/2024.  #pancytopenia --hgb 10.3 g/dl on CBC last week, 9.8 g/dl today. Overall improved from previous baseline ~8 g/dl. --anemia thought to be multifactorial due to marrow suppression ISO chronic disease (as reflected in iron studies), with possible AIHA as in SLE patients --counts improving since discontinuation of mycophenolate, has not required subsequent transfusions --SLE currently stable on Plaquenil 200mg daily and prednisone 5mg daily --nephrology and rheumatology follow up are scheduled this week --given overall improvement in her anemia, it is reasonable for her to continue routine care with nephrology and rheumatology to optimize her SLE. Hematology remains available should her counts change in the future.  RTC PRN  Please see attending physician attestation below.

## 2024-09-19 NOTE — HISTORY OF PRESENT ILLNESS
[de-identified] : SREEKANTH THIBODEAUX  is a 34-year-old woman with SLE who presents to hematology for f/u of cytopenias.   The patient has a longstanding diagnosis of SLE since her teenage years managed on chronic immunosuppression therapy.  She received care here in 2018 but has been in [place] for several years, returning on September 11, 2023.  Soon after her return she presented to [facility] with subacute, severe anasarca up to her chest.  She was found to have nephrotic disease and KARNEA as well as pancytopenia.  Patient's initial blood counts were WBC 3.47, hemoglobin 6.8, and platelet 76.  RBCs were normocytic.  She responded to 2 units of PRBCs, however hemoglobin has subsequently drifted back down to 6.9 where it has remained for the past 2 weeks.  While inpatient, she was seen by hematology to assess her low blood counts with the following results:   Reticulocyte site count 9/14 consistent with hypoproliferation.  , haptoglobin undetectable, total bilirubin within normal limits.  Iron studies with elevated ferritin 1046 and percent saturation 55, along with decreased TIBC, consistent with anemia of chronic disease.  Peripheral smear reviewed by hematology revealed RBC morphologic changes including echinocytes, teardrop cells, and rare schistocytes.  There were a positive reticulocytes and dyspoietic changes of the neutrophils. Vit B12 659. Jasmine test was negative.   Overall impression was that pancytopenia was likely due to her underlying lupus with a possible contribution of marrow suppression from her immunosuppressive therapy.  Hematology deferred to renal/rheumatology with regards to treatment of the underlying cause.  Ultimately, the patient was treated with increased dose of mycophenolate, prednisone pulse, and recieved a dose of rituxumab in October. At that time, she had noted swelling and pain in her L wrist and was using a splint. These symptoms gradually worsened, and she saw her rheumatologist who prescribed colchicine due to elevated uric acid with no improvement. She presented to the ED on 10/24 due to continued worsening of swelling and pain. There she was febrile and hypertensive which persisted overnight with a Tmax of 103 F. She was treated with antibiotics and admitted for further workup and management. Her cellcept and diuretics were discontinued and she was prescribed doxycycline and cefpodoxime. The patient's wrist infection is fully resolved and she is off antibiotics.  She then resumed treatment with mycophenolate and received another dose of rituximab on 11/28, however the mycophenalate was held again after her anemia worsened requiring a unit of pRBC. Hb improved.   The patient has since been in good control in terms of her SLE serologies.  She is taking prednisone 5 mg daily, hydroxychloroquine at stable dose.  Patient's WBC count and platelets initially improved in January but have decreased now to 2.4 and 143 respectively.  These appear to be stable throughout the past month.  Allopurinol was discontinued. Clinically, patient reports intermittent insomnia but otherwise denies complaints.  No longer has any lower extremity edema [de-identified] : Overall feeling well since last visit. Notes chronic fatigue, unchanged. Endorses nocturia (waking up every 2 hours to urinate), however, states that she intentionally hydrates throughout the day to stay healthy. Believes that this contributes to her sleeplessness. Denies difficulty falling asleep, no excessive thirst, no edema.

## 2024-09-19 NOTE — HISTORY OF PRESENT ILLNESS
[de-identified] : SREEKANTH THIBODEAUX  is a 34-year-old woman with SLE who presents to hematology for f/u of cytopenias.   The patient has a longstanding diagnosis of SLE since her teenage years managed on chronic immunosuppression therapy.  She received care here in 2018 but has been in [place] for several years, returning on September 11, 2023.  Soon after her return she presented to [facility] with subacute, severe anasarca up to her chest.  She was found to have nephrotic disease and KARENA as well as pancytopenia.  Patient's initial blood counts were WBC 3.47, hemoglobin 6.8, and platelet 76.  RBCs were normocytic.  She responded to 2 units of PRBCs, however hemoglobin has subsequently drifted back down to 6.9 where it has remained for the past 2 weeks.  While inpatient, she was seen by hematology to assess her low blood counts with the following results:   Reticulocyte site count 9/14 consistent with hypoproliferation.  , haptoglobin undetectable, total bilirubin within normal limits.  Iron studies with elevated ferritin 1046 and percent saturation 55, along with decreased TIBC, consistent with anemia of chronic disease.  Peripheral smear reviewed by hematology revealed RBC morphologic changes including echinocytes, teardrop cells, and rare schistocytes.  There were a positive reticulocytes and dyspoietic changes of the neutrophils. Vit B12 659. Jasmine test was negative.   Overall impression was that pancytopenia was likely due to her underlying lupus with a possible contribution of marrow suppression from her immunosuppressive therapy.  Hematology deferred to renal/rheumatology with regards to treatment of the underlying cause.  Ultimately, the patient was treated with increased dose of mycophenolate, prednisone pulse, and recieved a dose of rituxumab in October. At that time, she had noted swelling and pain in her L wrist and was using a splint. These symptoms gradually worsened, and she saw her rheumatologist who prescribed colchicine due to elevated uric acid with no improvement. She presented to the ED on 10/24 due to continued worsening of swelling and pain. There she was febrile and hypertensive which persisted overnight with a Tmax of 103 F. She was treated with antibiotics and admitted for further workup and management. Her cellcept and diuretics were discontinued and she was prescribed doxycycline and cefpodoxime. The patient's wrist infection is fully resolved and she is off antibiotics.  She then resumed treatment with mycophenolate and received another dose of rituximab on 11/28, however the mycophenalate was held again after her anemia worsened requiring a unit of pRBC. Hb improved.   The patient has since been in good control in terms of her SLE serologies.  She is taking prednisone 5 mg daily, hydroxychloroquine at stable dose.  Patient's WBC count and platelets initially improved in January but have decreased now to 2.4 and 143 respectively.  These appear to be stable throughout the past month.  Allopurinol was discontinued. Clinically, patient reports intermittent insomnia but otherwise denies complaints.  No longer has any lower extremity edema [de-identified] : Overall feeling well since last visit. Notes chronic fatigue, unchanged. Endorses nocturia (waking up every 2 hours to urinate), however, states that she intentionally hydrates throughout the day to stay healthy. Believes that this contributes to her sleeplessness. Denies difficulty falling asleep, no excessive thirst, no edema.

## 2024-09-19 NOTE — PHYSICAL EXAM
[Thin] : thin [Normal] : normal appearance, no rash, nodules, vesicles, ulcers, erythema [de-identified] : EOMI, no conjunctival injection, anicteric [de-identified] : No calf tenderness bilaterally [de-identified] : Hypoactive bowel sounds. soft, NTND. no HSM or masses appreciated

## 2024-09-19 NOTE — PHYSICAL EXAM
[Thin] : thin [Normal] : normal appearance, no rash, nodules, vesicles, ulcers, erythema [de-identified] : EOMI, no conjunctival injection, anicteric [de-identified] : No calf tenderness bilaterally [de-identified] : Hypoactive bowel sounds. soft, NTND. no HSM or masses appreciated

## 2024-09-23 LAB
ALBUMIN SERPL ELPH-MCNC: 3.4 G/DL
ALP BLD-CCNC: 70 U/L
ALT SERPL-CCNC: 40 U/L
ANION GAP SERPL CALC-SCNC: 5 MMOL/L
AST SERPL-CCNC: 48 U/L
BILIRUB SERPL-MCNC: 0.7 MG/DL
BUN SERPL-MCNC: 17 MG/DL
CALCIUM SERPL-MCNC: 9 MG/DL
CHLORIDE SERPL-SCNC: 110 MMOL/L
CO2 SERPL-SCNC: 28 MMOL/L
CREAT SERPL-MCNC: 1 MG/DL
EGFR: 75 ML/MIN/1.73M2
GLUCOSE SERPL-MCNC: 91 MG/DL
HAPTOGLOB SERPL-MCNC: <10 MG/DL
HCT VFR BLD CALC: 30 %
HGB BLD-MCNC: 9.8 G/DL
LDH SERPL-CCNC: 253 U/L
LYMPHOCYTES # BLD AUTO: 1 K/UL
LYMPHOCYTES NFR BLD AUTO: 43.9 %
MAN DIFF?: NO
MCHC RBC-ENTMCNC: 29.4 PG
MCHC RBC-ENTMCNC: 32.7 GM/DL
MCV RBC AUTO: 90.1 FL
NEUTROPHILS # BLD AUTO: 0.9 K/UL
NEUTROPHILS NFR BLD AUTO: 43.9 %
PLATELET # BLD AUTO: 145 K/UL
POTASSIUM SERPL-SCNC: 4.1 MMOL/L
PROT SERPL-MCNC: 6.4 G/DL
RBC # BLD: 3.28 M/UL
RBC # BLD: 3.33 M/UL
RBC # FLD: 13 %
RETICS # AUTO: 1.8 %
RETICS AGGREG/RBC NFR: 57.4 K/UL
SODIUM SERPL-SCNC: 143 MMOL/L
WBC # FLD AUTO: 2.2 K/UL

## 2024-10-30 ENCOUNTER — LABORATORY RESULT (OUTPATIENT)
Age: 34
End: 2024-10-30

## 2024-11-04 ENCOUNTER — NON-APPOINTMENT (OUTPATIENT)
Age: 34
End: 2024-11-04

## 2024-11-04 ENCOUNTER — APPOINTMENT (OUTPATIENT)
Dept: NEPHROLOGY | Facility: CLINIC | Age: 34
End: 2024-11-04
Payer: MEDICAID

## 2024-11-04 VITALS
BODY MASS INDEX: 20.37 KG/M2 | SYSTOLIC BLOOD PRESSURE: 107 MMHG | DIASTOLIC BLOOD PRESSURE: 69 MMHG | WEIGHT: 115 LBS | HEART RATE: 81 BPM

## 2024-11-04 DIAGNOSIS — M32.14 GLOMERULAR DISEASE IN SYSTEMIC LUPUS ERYTHEMATOSUS: ICD-10-CM

## 2024-11-04 DIAGNOSIS — R80.9 PROTEINURIA, UNSPECIFIED: ICD-10-CM

## 2024-11-04 PROCEDURE — 99214 OFFICE O/P EST MOD 30 MIN: CPT

## 2024-11-04 PROCEDURE — G2211 COMPLEX E/M VISIT ADD ON: CPT | Mod: NC

## 2024-11-04 RX ORDER — LOSARTAN POTASSIUM 25 MG/1
25 TABLET, FILM COATED ORAL DAILY
Qty: 30 | Refills: 5 | Status: ACTIVE | COMMUNITY
Start: 2024-11-04 | End: 1900-01-01

## 2024-11-19 ENCOUNTER — APPOINTMENT (OUTPATIENT)
Dept: NEPHROLOGY | Facility: CLINIC | Age: 34
End: 2024-11-19

## 2025-01-16 ENCOUNTER — NON-APPOINTMENT (OUTPATIENT)
Age: 35
End: 2025-01-16

## 2025-01-21 ENCOUNTER — APPOINTMENT (OUTPATIENT)
Dept: NEPHROLOGY | Facility: CLINIC | Age: 35
End: 2025-01-21
Payer: MEDICAID

## 2025-01-21 ENCOUNTER — NON-APPOINTMENT (OUTPATIENT)
Age: 35
End: 2025-01-21

## 2025-01-21 VITALS
DIASTOLIC BLOOD PRESSURE: 79 MMHG | HEART RATE: 88 BPM | WEIGHT: 116 LBS | BODY MASS INDEX: 20.55 KG/M2 | SYSTOLIC BLOOD PRESSURE: 101 MMHG

## 2025-01-21 DIAGNOSIS — R80.9 PROTEINURIA, UNSPECIFIED: ICD-10-CM

## 2025-01-21 DIAGNOSIS — M32.14 GLOMERULAR DISEASE IN SYSTEMIC LUPUS ERYTHEMATOSUS: ICD-10-CM

## 2025-01-21 PROCEDURE — 99214 OFFICE O/P EST MOD 30 MIN: CPT

## 2025-01-21 PROCEDURE — G2211 COMPLEX E/M VISIT ADD ON: CPT | Mod: NC

## 2025-02-25 NOTE — DIETITIAN INITIAL EVALUATION ADULT - % CHANGE
POST PROCEDURE FOLLOW-UP CALL    Post-Procedure Follow-Up - IR Chest Port Placement    The post-procedure call for Aura has been successfully concluded. The patient has confirmed their understanding of home care instructions and expressed intent to follow up with the ordering Physician for any results or concerns.    Patient verbalized No concerns with Port Placement at this time.    Currently, no further follow-up from Radiology is deemed necessary. However, should you require additional assistance or have any questions, please feel free to reach out to Radiology/Imaging Nursing Line.    We appreciate your cooperation and understanding. Your health and well-being remain our foremost priorities, and we are here to support you throughout your recovery process.      [facility]   Radiology/Imaging Nurse Line [phone]    Bonnie Brower LPN   IR Nurse Specialty Coordinators    2/25/2025  9:29 AM    
26.66

## 2025-03-03 NOTE — PHARMACY COMMUNICATION NOTE - PRIMARY MEDICATION
Labs placed for AWV/8/25.     Diagnosis Orders   1. Primary hypertension  CBC with Auto Differential    Comprehensive Metabolic Panel      2. Acquired hypothyroidism  TSH      3. Mixed hyperlipidemia  Lipid Panel      4. Vitamin D deficiency  Vitamin D 25 Hydroxy      5. Prediabetes  Hemoglobin A1C           [employer]ru[employer]a

## 2025-04-28 ENCOUNTER — APPOINTMENT (OUTPATIENT)
Dept: NEPHROLOGY | Facility: CLINIC | Age: 35
End: 2025-04-28
Payer: MEDICAID

## 2025-04-28 VITALS
SYSTOLIC BLOOD PRESSURE: 106 MMHG | WEIGHT: 117 LBS | BODY MASS INDEX: 20.73 KG/M2 | DIASTOLIC BLOOD PRESSURE: 70 MMHG | HEART RATE: 81 BPM

## 2025-04-28 DIAGNOSIS — M32.14 GLOMERULAR DISEASE IN SYSTEMIC LUPUS ERYTHEMATOSUS: ICD-10-CM

## 2025-04-28 DIAGNOSIS — R80.9 PROTEINURIA, UNSPECIFIED: ICD-10-CM

## 2025-04-28 PROCEDURE — 99214 OFFICE O/P EST MOD 30 MIN: CPT

## 2025-04-28 PROCEDURE — G2211 COMPLEX E/M VISIT ADD ON: CPT | Mod: NC

## 2025-06-03 NOTE — PROGRESS NOTE ADULT - PROBLEM SELECTOR PLAN 3
Discharge Summary/Instructions after an Endoscopic Procedure  Patient Name: Hayley Epstein  Patient MRN: [id number]  Patient YOB: 1987  Tuesday, Melanie 3, 2025  Calos Basilio III, MD  RESTRICTIONS:  During your procedure today, you received medications for sedation.  These   medications may affect your judgment, balance and coordination.  Therefore,   for 24 hours, you have the following restrictions:   - DO NOT drive a car, operate machinery, make legal/financial decisions,   sign important papers or drink alcohol.    ACTIVITY:  Today: no heavy lifting, straining or running due to procedural   sedation/anesthesia.  The following day: return to full activity including work.  DIET:  Eat and drink normally unless instructed otherwise.     TREATMENT FOR COMMON SIDE EFFECTS:  - Mild abdominal pain, nausea, belching, bloating or excessive gas:  rest,   eat lightly and use a heating pad.  - Sore Throat: treat with throat lozenges and/or gargle with warm salt   water.  - Because air was used during the procedure, expelling large amounts of air   from your rectum or belching is normal.  - If a bowel prep was taken, you may not have a bowel movement for 1-3 days.    This is normal.  SYMPTOMS TO WATCH FOR AND REPORT TO YOUR PHYSICIAN:  1. Abdominal pain or bloating, other than gas cramps.  2. Chest pain.  3. Back pain.  4. Signs of infection such as: chills or fever occurring within 24 hours   after the procedure.  5. Rectal bleeding, which would show as bright red, maroon, or black stools.   (A tablespoon of blood from the rectum is not serious, especially if   hemorrhoids are present.)  6. Vomiting.  7. Weakness or dizziness.  GO DIRECTLY TO THE NEAREST EMERGENCY ROOM IF YOU HAVE ANY OF THE FOLLOWING:      Difficulty breathing              Chills and/or fever over 101 F   Persistent vomiting and/or vomiting blood   Severe abdominal pain   Severe chest pain   Black, tarry stools   Bleeding- more than one  tablespoon   Any other symptom or condition that you feel may need urgent attention  Your doctor recommends these additional instructions:  If any biopsies were taken, your doctors clinic will contact you in 1 to 2   weeks with any results.  - Discharge patient to home (with escort).   - Patient has a contact number available for emergencies.  The signs and   symptoms of potential delayed complications were discussed with the   patient.  Return to normal activities tomorrow.  Written discharge   instructions were provided to the patient.   - Await pathology results.   - Will d/w patient in recovery to see if taking PPI; If EoE confirmed and on   PPI, may need dupixent.  For questions, problems or results please call your physician - Calos Basilio III, MD at Work:  [phone].  [facility], EMERGENCY ROOM PHONE NUMBER: [phone]  IF A COMPLICATION OR EMERGENCY SITUATION ARISES AND YOU ARE UNABLE TO REACH   YOUR PHYSICIAN - GO DIRECTLY TO THE EMERGENCY ROOM.  Calos Basilio III, MD  6/3/2025 10:41:59 AM  This report has been verified and signed electronically.  Dear patient,  As a result of recent federal legislation (The Federal Cures Act), you may   receive lab or pathology results from your procedure in your [facility]   account before your physician is able to contact you. Your physician or   their representative will relay the results to you with their   recommendations at their soonest availability.  Thank you,  [employer]   150s systolic BP, hx of HTN on ramipril 5 . i/s/o nephrotic syndrome.     - d/c Nifedipine 30  - c/w lisinopril 20 150s systolic BP, hx of HTN on ramipril 5 . i/s/o nephrotic syndrome.     - d/c Nifedipine 30  - c/w lisinopril 20 qd